# Patient Record
Sex: FEMALE | Race: WHITE | Employment: OTHER | URBAN - METROPOLITAN AREA
[De-identification: names, ages, dates, MRNs, and addresses within clinical notes are randomized per-mention and may not be internally consistent; named-entity substitution may affect disease eponyms.]

---

## 2017-01-17 ENCOUNTER — OFFICE VISIT (OUTPATIENT)
Dept: FAMILY MEDICINE CLINIC | Age: 77
End: 2017-01-17

## 2017-01-17 VITALS
TEMPERATURE: 97.8 F | DIASTOLIC BLOOD PRESSURE: 78 MMHG | HEART RATE: 78 BPM | HEIGHT: 61 IN | BODY MASS INDEX: 46.07 KG/M2 | SYSTOLIC BLOOD PRESSURE: 138 MMHG | WEIGHT: 244 LBS

## 2017-01-17 DIAGNOSIS — R10.11 RUQ ABDOMINAL PAIN: Primary | ICD-10-CM

## 2017-01-17 DIAGNOSIS — N18.30 CHRONIC KIDNEY DISEASE (CKD), STAGE III (MODERATE) (HCC): ICD-10-CM

## 2017-01-17 DIAGNOSIS — E72.11 HYPERHOMOCYSTEINEMIA (HCC): ICD-10-CM

## 2017-01-17 DIAGNOSIS — I10 ESSENTIAL HYPERTENSION, BENIGN: ICD-10-CM

## 2017-01-17 LAB
A/G RATIO: 1.4 (ref 1.1–2.2)
ALBUMIN SERPL-MCNC: 4 G/DL (ref 3.4–5)
ALP BLD-CCNC: 62 U/L (ref 40–129)
ALT SERPL-CCNC: 17 U/L (ref 10–40)
AMYLASE: 37 U/L (ref 25–115)
ANION GAP SERPL CALCULATED.3IONS-SCNC: 19 MMOL/L (ref 3–16)
AST SERPL-CCNC: 18 U/L (ref 15–37)
BASOPHILS ABSOLUTE: 0 K/UL (ref 0–0.2)
BASOPHILS RELATIVE PERCENT: 0.9 %
BILIRUB SERPL-MCNC: 0.4 MG/DL (ref 0–1)
BUN BLDV-MCNC: 51 MG/DL (ref 7–20)
CALCIUM SERPL-MCNC: 9.2 MG/DL (ref 8.3–10.6)
CHLORIDE BLD-SCNC: 95 MMOL/L (ref 99–110)
CO2: 26 MMOL/L (ref 21–32)
CREAT SERPL-MCNC: 1.8 MG/DL (ref 0.6–1.2)
EOSINOPHILS ABSOLUTE: 0.2 K/UL (ref 0–0.6)
EOSINOPHILS RELATIVE PERCENT: 7.5 %
GFR AFRICAN AMERICAN: 33
GFR NON-AFRICAN AMERICAN: 27
GLOBULIN: 2.8 G/DL
GLUCOSE BLD-MCNC: 176 MG/DL (ref 70–99)
HCT VFR BLD CALC: 39.4 % (ref 36–48)
HEMOGLOBIN: 13.2 G/DL (ref 12–16)
LIPASE: 38 U/L (ref 13–60)
LYMPHOCYTES ABSOLUTE: 0.7 K/UL (ref 1–5.1)
LYMPHOCYTES RELATIVE PERCENT: 23.1 %
MCH RBC QN AUTO: 32.5 PG (ref 26–34)
MCHC RBC AUTO-ENTMCNC: 33.4 G/DL (ref 31–36)
MCV RBC AUTO: 97.3 FL (ref 80–100)
MONOCYTES ABSOLUTE: 0.6 K/UL (ref 0–1.3)
MONOCYTES RELATIVE PERCENT: 19.8 %
NEUTROPHILS ABSOLUTE: 1.5 K/UL (ref 1.7–7.7)
NEUTROPHILS RELATIVE PERCENT: 48.7 %
PDW BLD-RTO: 14.7 % (ref 12.4–15.4)
PLATELET # BLD: 192 K/UL (ref 135–450)
PMV BLD AUTO: 7.3 FL (ref 5–10.5)
POTASSIUM SERPL-SCNC: 4.1 MMOL/L (ref 3.5–5.1)
RBC # BLD: 4.05 M/UL (ref 4–5.2)
SODIUM BLD-SCNC: 140 MMOL/L (ref 136–145)
TOTAL PROTEIN: 6.8 G/DL (ref 6.4–8.2)
WBC # BLD: 3.1 K/UL (ref 4–11)

## 2017-01-17 PROCEDURE — G8400 PT W/DXA NO RESULTS DOC: HCPCS | Performed by: FAMILY MEDICINE

## 2017-01-17 PROCEDURE — 1036F TOBACCO NON-USER: CPT | Performed by: FAMILY MEDICINE

## 2017-01-17 PROCEDURE — 1123F ACP DISCUSS/DSCN MKR DOCD: CPT | Performed by: FAMILY MEDICINE

## 2017-01-17 PROCEDURE — G8484 FLU IMMUNIZE NO ADMIN: HCPCS | Performed by: FAMILY MEDICINE

## 2017-01-17 PROCEDURE — 36415 COLL VENOUS BLD VENIPUNCTURE: CPT | Performed by: FAMILY MEDICINE

## 2017-01-17 PROCEDURE — 99214 OFFICE O/P EST MOD 30 MIN: CPT | Performed by: FAMILY MEDICINE

## 2017-01-17 PROCEDURE — 1090F PRES/ABSN URINE INCON ASSESS: CPT | Performed by: FAMILY MEDICINE

## 2017-01-17 PROCEDURE — G8419 CALC BMI OUT NRM PARAM NOF/U: HCPCS | Performed by: FAMILY MEDICINE

## 2017-01-17 PROCEDURE — 4040F PNEUMOC VAC/ADMIN/RCVD: CPT | Performed by: FAMILY MEDICINE

## 2017-01-17 PROCEDURE — G8598 ASA/ANTIPLAT THER USED: HCPCS | Performed by: FAMILY MEDICINE

## 2017-01-17 PROCEDURE — G8427 DOCREV CUR MEDS BY ELIG CLIN: HCPCS | Performed by: FAMILY MEDICINE

## 2017-01-17 RX ORDER — ONDANSETRON 4 MG/1
4 TABLET, FILM COATED ORAL EVERY 8 HOURS PRN
Qty: 20 TABLET | Refills: 0 | Status: SHIPPED | OUTPATIENT
Start: 2017-01-17 | End: 2017-03-20 | Stop reason: SDUPTHER

## 2017-02-06 RX ORDER — GLIPIZIDE 5 MG/1
TABLET, FILM COATED, EXTENDED RELEASE ORAL
Qty: 90 TABLET | Refills: 3 | Status: SHIPPED | OUTPATIENT
Start: 2017-02-06 | End: 2018-02-26 | Stop reason: SDUPTHER

## 2017-02-13 ENCOUNTER — ANTI-COAG VISIT (OUTPATIENT)
Dept: FAMILY MEDICINE CLINIC | Age: 77
End: 2017-02-13

## 2017-02-13 ENCOUNTER — OFFICE VISIT (OUTPATIENT)
Dept: FAMILY MEDICINE CLINIC | Age: 77
End: 2017-02-13

## 2017-02-13 VITALS
SYSTOLIC BLOOD PRESSURE: 134 MMHG | HEIGHT: 61 IN | HEART RATE: 76 BPM | BODY MASS INDEX: 46.44 KG/M2 | WEIGHT: 246 LBS | DIASTOLIC BLOOD PRESSURE: 76 MMHG

## 2017-02-13 DIAGNOSIS — I10 ESSENTIAL HYPERTENSION, BENIGN: Primary | ICD-10-CM

## 2017-02-13 DIAGNOSIS — E11.21 DIABETIC NEPHROPATHY ASSOCIATED WITH TYPE 2 DIABETES MELLITUS (HCC): ICD-10-CM

## 2017-02-13 DIAGNOSIS — E66.01 OBESITY, CLASS III, BMI 40-49.9 (MORBID OBESITY) (HCC): ICD-10-CM

## 2017-02-13 DIAGNOSIS — R60.0 LOCALIZED EDEMA: ICD-10-CM

## 2017-02-13 DIAGNOSIS — I82.409 ACUTE DEEP VEIN THROMBOSIS (DVT) OF LOWER EXTREMITY, UNSPECIFIED LATERALITY, UNSPECIFIED VEIN (HCC): ICD-10-CM

## 2017-02-13 DIAGNOSIS — E78.00 HYPERCHOLESTEROLEMIA: ICD-10-CM

## 2017-02-13 DIAGNOSIS — M1A.0490 IDIOPATHIC CHRONIC GOUT OF HAND WITHOUT TOPHUS, UNSPECIFIED LATERALITY: ICD-10-CM

## 2017-02-13 DIAGNOSIS — N18.30 CHRONIC KIDNEY DISEASE (CKD), STAGE III (MODERATE) (HCC): ICD-10-CM

## 2017-02-13 LAB
A/G RATIO: 1.6 (ref 1.1–2.2)
ALBUMIN SERPL-MCNC: 4.3 G/DL (ref 3.4–5)
ALP BLD-CCNC: 67 U/L (ref 40–129)
ALT SERPL-CCNC: 17 U/L (ref 10–40)
ANION GAP SERPL CALCULATED.3IONS-SCNC: 15 MMOL/L (ref 3–16)
AST SERPL-CCNC: 17 U/L (ref 15–37)
BILIRUB SERPL-MCNC: 0.4 MG/DL (ref 0–1)
BUN BLDV-MCNC: 29 MG/DL (ref 7–20)
CALCIUM SERPL-MCNC: 10.2 MG/DL (ref 8.3–10.6)
CHLORIDE BLD-SCNC: 99 MMOL/L (ref 99–110)
CHOLESTEROL, TOTAL: 222 MG/DL (ref 0–199)
CO2: 30 MMOL/L (ref 21–32)
CREAT SERPL-MCNC: 1.5 MG/DL (ref 0.6–1.2)
CREATININE URINE: 37.2 MG/DL (ref 28–259)
GFR AFRICAN AMERICAN: 41
GFR NON-AFRICAN AMERICAN: 34
GLOBULIN: 2.7 G/DL
GLUCOSE BLD-MCNC: 140 MG/DL (ref 70–99)
HDLC SERPL-MCNC: 51 MG/DL (ref 40–60)
INR BLD: 2.21 (ref 0.85–1.16)
LDL CHOLESTEROL CALCULATED: 112 MG/DL
MICROALBUMIN UR-MCNC: 1.3 MG/DL
MICROALBUMIN/CREAT UR-RTO: 34.9 MG/G (ref 0–30)
POTASSIUM SERPL-SCNC: 4.2 MMOL/L (ref 3.5–5.1)
PROTHROMBIN TIME: 25.6 SEC (ref 9.8–13)
SODIUM BLD-SCNC: 144 MMOL/L (ref 136–145)
TOTAL PROTEIN: 7 G/DL (ref 6.4–8.2)
TRIGL SERPL-MCNC: 296 MG/DL (ref 0–150)
VLDLC SERPL CALC-MCNC: 59 MG/DL

## 2017-02-13 PROCEDURE — G8598 ASA/ANTIPLAT THER USED: HCPCS | Performed by: FAMILY MEDICINE

## 2017-02-13 PROCEDURE — 99215 OFFICE O/P EST HI 40 MIN: CPT | Performed by: FAMILY MEDICINE

## 2017-02-13 PROCEDURE — 4040F PNEUMOC VAC/ADMIN/RCVD: CPT | Performed by: FAMILY MEDICINE

## 2017-02-13 PROCEDURE — 36415 COLL VENOUS BLD VENIPUNCTURE: CPT | Performed by: FAMILY MEDICINE

## 2017-02-13 PROCEDURE — 1090F PRES/ABSN URINE INCON ASSESS: CPT | Performed by: FAMILY MEDICINE

## 2017-02-13 PROCEDURE — G8400 PT W/DXA NO RESULTS DOC: HCPCS | Performed by: FAMILY MEDICINE

## 2017-02-13 PROCEDURE — G8427 DOCREV CUR MEDS BY ELIG CLIN: HCPCS | Performed by: FAMILY MEDICINE

## 2017-02-13 PROCEDURE — G8484 FLU IMMUNIZE NO ADMIN: HCPCS | Performed by: FAMILY MEDICINE

## 2017-02-13 PROCEDURE — 1123F ACP DISCUSS/DSCN MKR DOCD: CPT | Performed by: FAMILY MEDICINE

## 2017-02-13 PROCEDURE — G8417 CALC BMI ABV UP PARAM F/U: HCPCS | Performed by: FAMILY MEDICINE

## 2017-02-13 PROCEDURE — 1036F TOBACCO NON-USER: CPT | Performed by: FAMILY MEDICINE

## 2017-02-13 RX ORDER — ALLOPURINOL 300 MG/1
TABLET ORAL
Qty: 45 TABLET | Refills: 3 | Status: SHIPPED | OUTPATIENT
Start: 2017-02-13 | End: 2018-04-19 | Stop reason: SDUPTHER

## 2017-02-13 RX ORDER — FUROSEMIDE 80 MG
TABLET ORAL
Qty: 90 TABLET | Refills: 0 | Status: SHIPPED | OUTPATIENT
Start: 2017-02-13 | End: 2017-08-14 | Stop reason: SDUPTHER

## 2017-02-13 RX ORDER — METOPROLOL TARTRATE 100 MG/1
TABLET ORAL
Qty: 180 TABLET | Refills: 3 | Status: SHIPPED | OUTPATIENT
Start: 2017-02-13 | End: 2018-04-14 | Stop reason: SDUPTHER

## 2017-02-14 LAB
ESTIMATED AVERAGE GLUCOSE: 131.2 MG/DL
HBA1C MFR BLD: 6.2 %

## 2017-03-20 ENCOUNTER — CARE COORDINATION (OUTPATIENT)
Dept: CARE COORDINATION | Age: 77
End: 2017-03-20

## 2017-03-21 ENCOUNTER — OFFICE VISIT (OUTPATIENT)
Dept: FAMILY MEDICINE CLINIC | Age: 77
End: 2017-03-21

## 2017-03-21 ENCOUNTER — ANTI-COAG VISIT (OUTPATIENT)
Dept: FAMILY MEDICINE CLINIC | Age: 77
End: 2017-03-21

## 2017-03-21 VITALS — RESPIRATION RATE: 18 BRPM | DIASTOLIC BLOOD PRESSURE: 78 MMHG | HEART RATE: 76 BPM | SYSTOLIC BLOOD PRESSURE: 136 MMHG

## 2017-03-21 DIAGNOSIS — S80.12XA TRAUMATIC HEMATOMA OF LEFT LOWER LEG, INITIAL ENCOUNTER: ICD-10-CM

## 2017-03-21 DIAGNOSIS — I82.409 ACUTE DEEP VEIN THROMBOSIS (DVT) OF LOWER EXTREMITY, UNSPECIFIED LATERALITY, UNSPECIFIED VEIN (HCC): Primary | ICD-10-CM

## 2017-03-21 LAB
INTERNATIONAL NORMALIZATION RATIO, POC: 3.3
PROTHROMBIN TIME, POC: ABNORMAL

## 2017-03-21 PROCEDURE — G8417 CALC BMI ABV UP PARAM F/U: HCPCS | Performed by: FAMILY MEDICINE

## 2017-03-21 PROCEDURE — 99214 OFFICE O/P EST MOD 30 MIN: CPT | Performed by: FAMILY MEDICINE

## 2017-03-21 PROCEDURE — 1036F TOBACCO NON-USER: CPT | Performed by: FAMILY MEDICINE

## 2017-03-21 PROCEDURE — G8484 FLU IMMUNIZE NO ADMIN: HCPCS | Performed by: FAMILY MEDICINE

## 2017-03-21 PROCEDURE — G8598 ASA/ANTIPLAT THER USED: HCPCS | Performed by: FAMILY MEDICINE

## 2017-03-21 PROCEDURE — G8400 PT W/DXA NO RESULTS DOC: HCPCS | Performed by: FAMILY MEDICINE

## 2017-03-21 PROCEDURE — 1123F ACP DISCUSS/DSCN MKR DOCD: CPT | Performed by: FAMILY MEDICINE

## 2017-03-21 PROCEDURE — 1090F PRES/ABSN URINE INCON ASSESS: CPT | Performed by: FAMILY MEDICINE

## 2017-03-21 PROCEDURE — 4040F PNEUMOC VAC/ADMIN/RCVD: CPT | Performed by: FAMILY MEDICINE

## 2017-03-21 PROCEDURE — 85610 PROTHROMBIN TIME: CPT | Performed by: FAMILY MEDICINE

## 2017-03-21 PROCEDURE — G8428 CUR MEDS NOT DOCUMENT: HCPCS | Performed by: FAMILY MEDICINE

## 2017-03-21 RX ORDER — HYDROCODONE BITARTRATE AND ACETAMINOPHEN 5; 325 MG/1; MG/1
1-2 TABLET ORAL EVERY 6 HOURS PRN
Qty: 20 TABLET | Refills: 0 | Status: SHIPPED | OUTPATIENT
Start: 2017-03-21 | End: 2017-03-28

## 2017-03-23 ENCOUNTER — TELEPHONE (OUTPATIENT)
Dept: FAMILY MEDICINE CLINIC | Age: 77
End: 2017-03-23

## 2017-03-23 DIAGNOSIS — T14.8XXA HEMATOMA: Primary | ICD-10-CM

## 2017-03-24 ENCOUNTER — TELEPHONE (OUTPATIENT)
Dept: SURGERY | Age: 77
End: 2017-03-24

## 2017-03-24 ENCOUNTER — INITIAL CONSULT (OUTPATIENT)
Dept: SURGERY | Age: 77
End: 2017-03-24

## 2017-03-24 VITALS — TEMPERATURE: 98 F | DIASTOLIC BLOOD PRESSURE: 50 MMHG | SYSTOLIC BLOOD PRESSURE: 110 MMHG

## 2017-03-24 DIAGNOSIS — S80.12XA TRAUMATIC HEMATOMA OF LEFT LOWER LEG, INITIAL ENCOUNTER: Primary | ICD-10-CM

## 2017-03-24 DIAGNOSIS — M79.605 LEG PAIN, LEFT: ICD-10-CM

## 2017-03-24 PROCEDURE — 1123F ACP DISCUSS/DSCN MKR DOCD: CPT | Performed by: SURGERY

## 2017-03-24 PROCEDURE — G8417 CALC BMI ABV UP PARAM F/U: HCPCS | Performed by: SURGERY

## 2017-03-24 PROCEDURE — G8427 DOCREV CUR MEDS BY ELIG CLIN: HCPCS | Performed by: SURGERY

## 2017-03-24 PROCEDURE — 10140 I&D HMTMA SEROMA/FLUID COLLJ: CPT | Performed by: SURGERY

## 2017-03-24 PROCEDURE — G8598 ASA/ANTIPLAT THER USED: HCPCS | Performed by: SURGERY

## 2017-03-24 PROCEDURE — 1090F PRES/ABSN URINE INCON ASSESS: CPT | Performed by: SURGERY

## 2017-03-24 PROCEDURE — G8484 FLU IMMUNIZE NO ADMIN: HCPCS | Performed by: SURGERY

## 2017-03-24 PROCEDURE — 4040F PNEUMOC VAC/ADMIN/RCVD: CPT | Performed by: SURGERY

## 2017-03-24 PROCEDURE — 1036F TOBACCO NON-USER: CPT | Performed by: SURGERY

## 2017-03-24 PROCEDURE — G8400 PT W/DXA NO RESULTS DOC: HCPCS | Performed by: SURGERY

## 2017-03-27 ENCOUNTER — TELEPHONE (OUTPATIENT)
Dept: SURGERY | Age: 77
End: 2017-03-27

## 2017-03-28 ENCOUNTER — HOSPITAL ENCOUNTER (OUTPATIENT)
Dept: WOUND CARE | Age: 77
Discharge: HOME | End: 2017-03-29
Admitting: SURGERY

## 2017-03-28 ENCOUNTER — OFFICE VISIT (OUTPATIENT)
Dept: FAMILY MEDICINE CLINIC | Age: 77
End: 2017-03-28

## 2017-03-28 VITALS
BODY MASS INDEX: 45.54 KG/M2 | SYSTOLIC BLOOD PRESSURE: 127 MMHG | DIASTOLIC BLOOD PRESSURE: 64 MMHG | WEIGHT: 241 LBS | HEART RATE: 82 BPM

## 2017-03-28 VITALS
SYSTOLIC BLOOD PRESSURE: 129 MMHG | DIASTOLIC BLOOD PRESSURE: 75 MMHG | WEIGHT: 241.4 LBS | BODY MASS INDEX: 45.61 KG/M2 | TEMPERATURE: 98 F | RESPIRATION RATE: 18 BRPM | HEART RATE: 96 BPM

## 2017-03-28 DIAGNOSIS — L03.116 CELLULITIS OF LEFT LOWER EXTREMITY: ICD-10-CM

## 2017-03-28 DIAGNOSIS — S80.12XA TRAUMATIC HEMATOMA OF LEFT LOWER LEG, INITIAL ENCOUNTER: Primary | ICD-10-CM

## 2017-03-28 DIAGNOSIS — N18.4 TYPE 2 DIABETES MELLITUS WITH STAGE 4 CHRONIC KIDNEY DISEASE, WITHOUT LONG-TERM CURRENT USE OF INSULIN (HCC): ICD-10-CM

## 2017-03-28 DIAGNOSIS — E11.22 TYPE 2 DIABETES MELLITUS WITH STAGE 4 CHRONIC KIDNEY DISEASE, WITHOUT LONG-TERM CURRENT USE OF INSULIN (HCC): ICD-10-CM

## 2017-03-28 DIAGNOSIS — I10 ESSENTIAL HYPERTENSION, BENIGN: ICD-10-CM

## 2017-03-28 DIAGNOSIS — I82.409 RECURRENT DEEP VEIN THROMBOSIS (DVT) (HCC): ICD-10-CM

## 2017-03-28 LAB
A/G RATIO: 1.2 (ref 1.1–2.2)
ALBUMIN SERPL-MCNC: 3.6 G/DL (ref 3.4–5)
ALP BLD-CCNC: 99 U/L (ref 40–129)
ALT SERPL-CCNC: 69 U/L (ref 10–40)
ANION GAP SERPL CALCULATED.3IONS-SCNC: 17 MMOL/L (ref 3–16)
AST SERPL-CCNC: 51 U/L (ref 15–37)
BASOPHILS ABSOLUTE: 0.2 K/UL (ref 0–0.2)
BASOPHILS RELATIVE PERCENT: 2 %
BILIRUB SERPL-MCNC: 0.5 MG/DL (ref 0–1)
BUN BLDV-MCNC: 47 MG/DL (ref 7–20)
CALCIUM SERPL-MCNC: 9.2 MG/DL (ref 8.3–10.6)
CHLORIDE BLD-SCNC: 100 MMOL/L (ref 99–110)
CO2: 26 MMOL/L (ref 21–32)
CREAT SERPL-MCNC: 1.6 MG/DL (ref 0.6–1.2)
EOSINOPHILS ABSOLUTE: 0.6 K/UL (ref 0–0.6)
EOSINOPHILS RELATIVE PERCENT: 6 %
GFR AFRICAN AMERICAN: 38
GFR NON-AFRICAN AMERICAN: 31
GLOBULIN: 3 G/DL
GLUCOSE BLD-MCNC: 156 MG/DL (ref 70–99)
GLUCOSE BLD-MCNC: 158 MG/DL
HCT VFR BLD CALC: 25.2 % (ref 36–48)
HEMOGLOBIN: 8.3 G/DL (ref 12–16)
INTERNATIONAL NORMALIZATION RATIO, POC: 1.2
LYMPHOCYTES ABSOLUTE: 1.8 K/UL (ref 1–5.1)
LYMPHOCYTES RELATIVE PERCENT: 19 %
MCH RBC QN AUTO: 32.6 PG (ref 26–34)
MCHC RBC AUTO-ENTMCNC: 32.9 G/DL (ref 31–36)
MCV RBC AUTO: 99.1 FL (ref 80–100)
MONOCYTES ABSOLUTE: 1 K/UL (ref 0–1.3)
MONOCYTES RELATIVE PERCENT: 11 %
MYELOCYTE PERCENT: 2 %
NEUTROPHILS ABSOLUTE: 5.8 K/UL (ref 1.7–7.7)
NEUTROPHILS RELATIVE PERCENT: 60 %
NUCLEATED RED BLOOD CELLS: 1 /100 WBC
PDW BLD-RTO: 14.7 % (ref 12.4–15.4)
PLATELET # BLD: 470 K/UL (ref 135–450)
PMV BLD AUTO: 6.9 FL (ref 5–10.5)
POTASSIUM SERPL-SCNC: 4.6 MMOL/L (ref 3.5–5.1)
PROTHROMBIN TIME, POC: ABNORMAL
RBC # BLD: 2.54 M/UL (ref 4–5.2)
SLIDE REVIEW: ABNORMAL
SODIUM BLD-SCNC: 143 MMOL/L (ref 136–145)
TOTAL PROTEIN: 6.6 G/DL (ref 6.4–8.2)
WBC # BLD: 9.4 K/UL (ref 4–11)

## 2017-03-28 PROCEDURE — G8417 CALC BMI ABV UP PARAM F/U: HCPCS | Performed by: FAMILY MEDICINE

## 2017-03-28 PROCEDURE — 1036F TOBACCO NON-USER: CPT | Performed by: FAMILY MEDICINE

## 2017-03-28 PROCEDURE — 11045 DBRDMT SUBQ TISS EACH ADDL: CPT | Performed by: NURSE PRACTITIONER

## 2017-03-28 PROCEDURE — 1123F ACP DISCUSS/DSCN MKR DOCD: CPT | Performed by: FAMILY MEDICINE

## 2017-03-28 PROCEDURE — G8400 PT W/DXA NO RESULTS DOC: HCPCS | Performed by: FAMILY MEDICINE

## 2017-03-28 PROCEDURE — 99214 OFFICE O/P EST MOD 30 MIN: CPT | Performed by: FAMILY MEDICINE

## 2017-03-28 PROCEDURE — 36415 COLL VENOUS BLD VENIPUNCTURE: CPT | Performed by: FAMILY MEDICINE

## 2017-03-28 PROCEDURE — 82962 GLUCOSE BLOOD TEST: CPT | Performed by: FAMILY MEDICINE

## 2017-03-28 PROCEDURE — G8427 DOCREV CUR MEDS BY ELIG CLIN: HCPCS | Performed by: FAMILY MEDICINE

## 2017-03-28 PROCEDURE — 1090F PRES/ABSN URINE INCON ASSESS: CPT | Performed by: FAMILY MEDICINE

## 2017-03-28 PROCEDURE — G8484 FLU IMMUNIZE NO ADMIN: HCPCS | Performed by: FAMILY MEDICINE

## 2017-03-28 PROCEDURE — G8598 ASA/ANTIPLAT THER USED: HCPCS | Performed by: FAMILY MEDICINE

## 2017-03-28 PROCEDURE — 4040F PNEUMOC VAC/ADMIN/RCVD: CPT | Performed by: FAMILY MEDICINE

## 2017-03-28 PROCEDURE — 11042 DBRDMT SUBQ TIS 1ST 20SQCM/<: CPT | Performed by: NURSE PRACTITIONER

## 2017-03-28 PROCEDURE — 85610 PROTHROMBIN TIME: CPT | Performed by: FAMILY MEDICINE

## 2017-03-28 RX ORDER — CHLORAL HYDRATE 500 MG
1000 CAPSULE ORAL DAILY
Status: ON HOLD | COMMUNITY
End: 2020-06-12 | Stop reason: HOSPADM

## 2017-03-28 RX ORDER — LIDOCAINE 50 MG/G
OINTMENT TOPICAL ONCE
Status: DISCONTINUED | OUTPATIENT
Start: 2017-03-28 | End: 2017-03-30 | Stop reason: ALTCHOICE

## 2017-03-28 RX ORDER — MULTIVITAMIN/IRON/FOLIC ACID 18MG-0.4MG
250 TABLET ORAL DAILY
Status: ON HOLD | COMMUNITY
End: 2020-06-12 | Stop reason: HOSPADM

## 2017-03-28 RX ORDER — CEPHALEXIN 500 MG/1
500 CAPSULE ORAL 4 TIMES DAILY
Qty: 40 CAPSULE | Refills: 0 | Status: SHIPPED | OUTPATIENT
Start: 2017-03-28 | End: 2017-04-07

## 2017-03-28 RX ORDER — HYDROCODONE BITARTRATE AND ACETAMINOPHEN 5; 325 MG/1; MG/1
1-2 TABLET ORAL EVERY 4 HOURS PRN
Qty: 30 TABLET | Refills: 0 | Status: SHIPPED | OUTPATIENT
Start: 2017-03-28 | End: 2017-04-04

## 2017-03-28 ASSESSMENT — PAIN SCALES - GENERAL: PAINLEVEL_OUTOF10: 0

## 2017-03-29 DIAGNOSIS — D62 ANEMIA DUE TO BLOOD LOSS, ACUTE: Primary | ICD-10-CM

## 2017-03-29 RX ORDER — FERROUS SULFATE 325(65) MG
325 TABLET ORAL
Qty: 30 TABLET | Refills: 11 | Status: SHIPPED | OUTPATIENT
Start: 2017-03-29 | End: 2017-07-27

## 2017-03-30 ENCOUNTER — HOSPITAL ENCOUNTER (OUTPATIENT)
Dept: WOUND CARE | Age: 77
Discharge: HOME | End: 2017-03-28
Attending: SURGERY | Admitting: SURGERY

## 2017-03-30 ENCOUNTER — HOSPITAL ENCOUNTER (OUTPATIENT)
Dept: WOUND CARE | Age: 77
Discharge: HOME | End: 2017-03-30
Attending: SURGERY | Admitting: SURGERY

## 2017-03-30 VITALS
HEART RATE: 75 BPM | TEMPERATURE: 99 F | SYSTOLIC BLOOD PRESSURE: 109 MMHG | RESPIRATION RATE: 20 BRPM | DIASTOLIC BLOOD PRESSURE: 68 MMHG

## 2017-03-30 DIAGNOSIS — L97.922 ULCER OF LEFT LOWER LEG, WITH FAT LAYER EXPOSED (HCC): ICD-10-CM

## 2017-03-30 DIAGNOSIS — S80.12XD TRAUMATIC HEMATOMA OF LEFT LOWER LEG, SUBSEQUENT ENCOUNTER: Primary | ICD-10-CM

## 2017-03-30 PROBLEM — L97.929 ULCER OF LEFT LOWER LEG (HCC): Status: ACTIVE | Noted: 2017-03-30

## 2017-03-30 LAB
GRAM STAIN RESULT: ABNORMAL
WOUND/ABSCESS: ABNORMAL

## 2017-03-30 PROCEDURE — 99024 POSTOP FOLLOW-UP VISIT: CPT | Performed by: SURGERY

## 2017-03-30 RX ORDER — LIDOCAINE 50 MG/G
OINTMENT TOPICAL ONCE
Status: DISCONTINUED | OUTPATIENT
Start: 2017-03-30 | End: 2017-03-31 | Stop reason: HOSPADM

## 2017-03-30 RX ORDER — CLONIDINE HYDROCHLORIDE 0.1 MG/1
TABLET ORAL
Qty: 60 TABLET | Refills: 3 | Status: SHIPPED | OUTPATIENT
Start: 2017-03-30 | End: 2017-06-20 | Stop reason: SDUPTHER

## 2017-04-06 ENCOUNTER — HOSPITAL ENCOUNTER (OUTPATIENT)
Dept: WOUND CARE | Age: 77
Discharge: HOME | End: 2017-04-06
Attending: SURGERY | Admitting: SURGERY

## 2017-04-06 VITALS
DIASTOLIC BLOOD PRESSURE: 70 MMHG | RESPIRATION RATE: 20 BRPM | BODY MASS INDEX: 45.99 KG/M2 | TEMPERATURE: 97.8 F | WEIGHT: 243.39 LBS | SYSTOLIC BLOOD PRESSURE: 142 MMHG | HEART RATE: 80 BPM

## 2017-04-06 DIAGNOSIS — S80.12XD TRAUMATIC HEMATOMA OF LEFT LOWER LEG, SUBSEQUENT ENCOUNTER: Primary | ICD-10-CM

## 2017-04-06 DIAGNOSIS — L97.923 ULCER OF LEFT LOWER LEG, WITH NECROSIS OF MUSCLE (HCC): ICD-10-CM

## 2017-04-06 PROCEDURE — 11046 DBRDMT MUSC&/FSCA EA ADDL: CPT | Performed by: SURGERY

## 2017-04-06 PROCEDURE — 11043 DBRDMT MUSC&/FSCA 1ST 20/<: CPT | Performed by: SURGERY

## 2017-04-06 RX ORDER — LIDOCAINE 50 MG/G
OINTMENT TOPICAL ONCE
Status: DISCONTINUED | OUTPATIENT
Start: 2017-04-06 | End: 2017-04-07 | Stop reason: HOSPADM

## 2017-04-06 RX ORDER — HYDROCODONE BITARTRATE AND ACETAMINOPHEN 5; 325 MG/1; MG/1
1 TABLET ORAL EVERY 6 HOURS PRN
Qty: 30 TABLET | Refills: 0 | Status: SHIPPED | OUTPATIENT
Start: 2017-04-06 | End: 2017-04-13

## 2017-04-06 ASSESSMENT — PAIN SCALES - GENERAL: PAINLEVEL_OUTOF10: 3

## 2017-04-06 ASSESSMENT — PAIN DESCRIPTION - PAIN TYPE: TYPE: ACUTE PAIN

## 2017-04-06 ASSESSMENT — PAIN DESCRIPTION - LOCATION: LOCATION: LEG

## 2017-04-06 ASSESSMENT — PAIN DESCRIPTION - DESCRIPTORS: DESCRIPTORS: SORE

## 2017-04-06 ASSESSMENT — PAIN DESCRIPTION - ORIENTATION: ORIENTATION: LEFT

## 2017-04-07 ENCOUNTER — TELEPHONE (OUTPATIENT)
Dept: SURGERY | Age: 77
End: 2017-04-07

## 2017-04-10 ENCOUNTER — TELEPHONE (OUTPATIENT)
Dept: SURGERY | Age: 77
End: 2017-04-10

## 2017-04-10 ENCOUNTER — TELEPHONE (OUTPATIENT)
Dept: FAMILY MEDICINE CLINIC | Age: 77
End: 2017-04-10

## 2017-04-13 ENCOUNTER — HOSPITAL ENCOUNTER (OUTPATIENT)
Dept: WOUND CARE | Age: 77
Discharge: HOME | End: 2017-04-13
Attending: SURGERY | Admitting: SURGERY

## 2017-04-13 VITALS
DIASTOLIC BLOOD PRESSURE: 71 MMHG | SYSTOLIC BLOOD PRESSURE: 127 MMHG | TEMPERATURE: 98.7 F | HEART RATE: 88 BPM | RESPIRATION RATE: 18 BRPM

## 2017-04-13 DIAGNOSIS — S80.12XD TRAUMATIC HEMATOMA OF LEFT LOWER LEG, SUBSEQUENT ENCOUNTER: Primary | ICD-10-CM

## 2017-04-13 PROCEDURE — 11043 DBRDMT MUSC&/FSCA 1ST 20/<: CPT | Performed by: SURGERY

## 2017-04-13 PROCEDURE — 11046 DBRDMT MUSC&/FSCA EA ADDL: CPT | Performed by: SURGERY

## 2017-04-13 RX ORDER — LIDOCAINE 50 MG/G
OINTMENT TOPICAL PRN
Status: DISCONTINUED | OUTPATIENT
Start: 2017-04-13 | End: 2017-04-14 | Stop reason: HOSPADM

## 2017-04-13 ASSESSMENT — PAIN SCALES - GENERAL: PAINLEVEL_OUTOF10: 5

## 2017-04-13 ASSESSMENT — PAIN DESCRIPTION - LOCATION: LOCATION: LEG

## 2017-04-13 ASSESSMENT — PAIN DESCRIPTION - ORIENTATION: ORIENTATION: LEFT;LOWER

## 2017-04-13 ASSESSMENT — PAIN DESCRIPTION - DESCRIPTORS: DESCRIPTORS: THROBBING

## 2017-04-13 ASSESSMENT — PAIN DESCRIPTION - PAIN TYPE: TYPE: ACUTE PAIN

## 2017-04-13 ASSESSMENT — PAIN DESCRIPTION - FREQUENCY: FREQUENCY: CONTINUOUS

## 2017-04-20 ENCOUNTER — HOSPITAL ENCOUNTER (OUTPATIENT)
Dept: WOUND CARE | Age: 77
Discharge: HOME | End: 2017-04-20
Attending: SURGERY | Admitting: SURGERY

## 2017-04-20 VITALS
DIASTOLIC BLOOD PRESSURE: 76 MMHG | SYSTOLIC BLOOD PRESSURE: 122 MMHG | HEART RATE: 92 BPM | RESPIRATION RATE: 18 BRPM | TEMPERATURE: 98.4 F

## 2017-04-20 DIAGNOSIS — S80.12XD TRAUMATIC HEMATOMA OF LEFT LOWER LEG, SUBSEQUENT ENCOUNTER: Primary | ICD-10-CM

## 2017-04-20 DIAGNOSIS — L97.923 ULCER OF LEFT LOWER LEG, WITH NECROSIS OF MUSCLE (HCC): ICD-10-CM

## 2017-04-20 PROCEDURE — 11046 DBRDMT MUSC&/FSCA EA ADDL: CPT | Performed by: SURGERY

## 2017-04-20 PROCEDURE — 11043 DBRDMT MUSC&/FSCA 1ST 20/<: CPT | Performed by: SURGERY

## 2017-04-20 RX ORDER — LIDOCAINE 50 MG/G
OINTMENT TOPICAL ONCE
Status: DISCONTINUED | OUTPATIENT
Start: 2017-04-20 | End: 2017-04-21 | Stop reason: HOSPADM

## 2017-04-20 ASSESSMENT — PAIN SCALES - GENERAL: PAINLEVEL_OUTOF10: 0

## 2017-04-27 ENCOUNTER — HOSPITAL ENCOUNTER (OUTPATIENT)
Dept: WOUND CARE | Age: 77
Discharge: HOME | End: 2017-04-27
Attending: SURGERY | Admitting: SURGERY

## 2017-04-27 VITALS
SYSTOLIC BLOOD PRESSURE: 127 MMHG | DIASTOLIC BLOOD PRESSURE: 66 MMHG | RESPIRATION RATE: 18 BRPM | HEART RATE: 74 BPM | TEMPERATURE: 97.5 F

## 2017-04-27 DIAGNOSIS — L97.922 ULCER OF LEFT LOWER LEG, WITH FAT LAYER EXPOSED (HCC): Primary | ICD-10-CM

## 2017-04-27 PROCEDURE — 11042 DBRDMT SUBQ TIS 1ST 20SQCM/<: CPT | Performed by: NURSE PRACTITIONER

## 2017-04-27 PROCEDURE — 11045 DBRDMT SUBQ TISS EACH ADDL: CPT | Performed by: NURSE PRACTITIONER

## 2017-04-27 RX ORDER — LIDOCAINE 50 MG/G
OINTMENT TOPICAL PRN
Status: DISCONTINUED | OUTPATIENT
Start: 2017-04-27 | End: 2017-04-28 | Stop reason: HOSPADM

## 2017-04-27 ASSESSMENT — PAIN SCALES - GENERAL: PAINLEVEL_OUTOF10: 0

## 2017-05-04 ENCOUNTER — HOSPITAL ENCOUNTER (OUTPATIENT)
Dept: WOUND CARE | Age: 77
Discharge: HOME | End: 2017-05-04
Attending: SURGERY | Admitting: SURGERY

## 2017-05-04 VITALS
TEMPERATURE: 97.7 F | RESPIRATION RATE: 18 BRPM | SYSTOLIC BLOOD PRESSURE: 133 MMHG | BODY MASS INDEX: 47.36 KG/M2 | HEART RATE: 79 BPM | WEIGHT: 250.66 LBS | DIASTOLIC BLOOD PRESSURE: 80 MMHG

## 2017-05-04 DIAGNOSIS — L97.923 ULCER OF LEFT LOWER LEG, WITH NECROSIS OF MUSCLE (HCC): ICD-10-CM

## 2017-05-04 DIAGNOSIS — S80.12XD TRAUMATIC HEMATOMA OF LEFT LOWER LEG, SUBSEQUENT ENCOUNTER: Primary | ICD-10-CM

## 2017-05-04 PROCEDURE — 11045 DBRDMT SUBQ TISS EACH ADDL: CPT | Performed by: SURGERY

## 2017-05-04 PROCEDURE — 11042 DBRDMT SUBQ TIS 1ST 20SQCM/<: CPT | Performed by: SURGERY

## 2017-05-04 RX ORDER — HYDROCODONE BITARTRATE AND ACETAMINOPHEN 5; 325 MG/1; MG/1
2 TABLET ORAL EVERY 6 HOURS PRN
Qty: 25 TABLET | Refills: 0 | Status: SHIPPED | OUTPATIENT
Start: 2017-05-04 | End: 2017-05-11

## 2017-05-04 RX ORDER — LIDOCAINE 50 MG/G
OINTMENT TOPICAL ONCE
Status: DISCONTINUED | OUTPATIENT
Start: 2017-05-04 | End: 2017-05-05 | Stop reason: HOSPADM

## 2017-05-05 ENCOUNTER — OFFICE VISIT (OUTPATIENT)
Dept: FAMILY MEDICINE CLINIC | Age: 77
End: 2017-05-05

## 2017-05-05 VITALS
TEMPERATURE: 97.9 F | SYSTOLIC BLOOD PRESSURE: 136 MMHG | DIASTOLIC BLOOD PRESSURE: 72 MMHG | BODY MASS INDEX: 46.63 KG/M2 | HEART RATE: 76 BPM | HEIGHT: 61 IN | WEIGHT: 247 LBS

## 2017-05-05 DIAGNOSIS — N18.4 TYPE 2 DIABETES MELLITUS WITH STAGE 4 CHRONIC KIDNEY DISEASE, WITHOUT LONG-TERM CURRENT USE OF INSULIN (HCC): Primary | ICD-10-CM

## 2017-05-05 DIAGNOSIS — I82.409 RECURRENT DEEP VEIN THROMBOSIS (DVT) (HCC): ICD-10-CM

## 2017-05-05 DIAGNOSIS — D50.9 IRON DEFICIENCY ANEMIA, UNSPECIFIED IRON DEFICIENCY ANEMIA TYPE: ICD-10-CM

## 2017-05-05 DIAGNOSIS — I10 ESSENTIAL HYPERTENSION, BENIGN: ICD-10-CM

## 2017-05-05 DIAGNOSIS — R51.9 HEADACHE, UNSPECIFIED HEADACHE TYPE: ICD-10-CM

## 2017-05-05 DIAGNOSIS — E11.22 TYPE 2 DIABETES MELLITUS WITH STAGE 4 CHRONIC KIDNEY DISEASE, WITHOUT LONG-TERM CURRENT USE OF INSULIN (HCC): Primary | ICD-10-CM

## 2017-05-05 LAB
A/G RATIO: 1.3 (ref 1.1–2.2)
ALBUMIN SERPL-MCNC: 3.9 G/DL (ref 3.4–5)
ALP BLD-CCNC: 65 U/L (ref 40–129)
ALT SERPL-CCNC: 11 U/L (ref 10–40)
ANION GAP SERPL CALCULATED.3IONS-SCNC: 16 MMOL/L (ref 3–16)
AST SERPL-CCNC: 14 U/L (ref 15–37)
BASOPHILS ABSOLUTE: 0 K/UL (ref 0–0.2)
BASOPHILS RELATIVE PERCENT: 0.7 %
BILIRUB SERPL-MCNC: <0.2 MG/DL (ref 0–1)
BUN BLDV-MCNC: 30 MG/DL (ref 7–20)
CALCIUM SERPL-MCNC: 9.6 MG/DL (ref 8.3–10.6)
CHLORIDE BLD-SCNC: 101 MMOL/L (ref 99–110)
CO2: 27 MMOL/L (ref 21–32)
CREAT SERPL-MCNC: 1.5 MG/DL (ref 0.6–1.2)
EOSINOPHILS ABSOLUTE: 0.3 K/UL (ref 0–0.6)
EOSINOPHILS RELATIVE PERCENT: 5.2 %
GFR AFRICAN AMERICAN: 41
GFR NON-AFRICAN AMERICAN: 34
GLOBULIN: 3 G/DL
GLUCOSE BLD-MCNC: 156 MG/DL (ref 70–99)
GLUCOSE BLD-MCNC: 177 MG/DL
HCT VFR BLD CALC: 32.7 % (ref 36–48)
HEMOGLOBIN: 10.4 G/DL (ref 12–16)
IRON SATURATION: 24 % (ref 15–50)
IRON: 71 UG/DL (ref 37–145)
LYMPHOCYTES ABSOLUTE: 1.3 K/UL (ref 1–5.1)
LYMPHOCYTES RELATIVE PERCENT: 21.5 %
MCH RBC QN AUTO: 31.2 PG (ref 26–34)
MCHC RBC AUTO-ENTMCNC: 31.9 G/DL (ref 31–36)
MCV RBC AUTO: 97.9 FL (ref 80–100)
MONOCYTES ABSOLUTE: 0.6 K/UL (ref 0–1.3)
MONOCYTES RELATIVE PERCENT: 9.6 %
NEUTROPHILS ABSOLUTE: 3.8 K/UL (ref 1.7–7.7)
NEUTROPHILS RELATIVE PERCENT: 63 %
PDW BLD-RTO: 16.1 % (ref 12.4–15.4)
PLATELET # BLD: 285 K/UL (ref 135–450)
PMV BLD AUTO: 7.2 FL (ref 5–10.5)
POTASSIUM SERPL-SCNC: 4.5 MMOL/L (ref 3.5–5.1)
RBC # BLD: 3.34 M/UL (ref 4–5.2)
SEDIMENTATION RATE, ERYTHROCYTE: 84 MM/HR (ref 0–30)
SODIUM BLD-SCNC: 144 MMOL/L (ref 136–145)
TOTAL IRON BINDING CAPACITY: 291 UG/DL (ref 260–445)
TOTAL PROTEIN: 6.9 G/DL (ref 6.4–8.2)
WBC # BLD: 6 K/UL (ref 4–11)

## 2017-05-05 PROCEDURE — 1090F PRES/ABSN URINE INCON ASSESS: CPT | Performed by: FAMILY MEDICINE

## 2017-05-05 PROCEDURE — 82962 GLUCOSE BLOOD TEST: CPT | Performed by: FAMILY MEDICINE

## 2017-05-05 PROCEDURE — G8427 DOCREV CUR MEDS BY ELIG CLIN: HCPCS | Performed by: FAMILY MEDICINE

## 2017-05-05 PROCEDURE — G8400 PT W/DXA NO RESULTS DOC: HCPCS | Performed by: FAMILY MEDICINE

## 2017-05-05 PROCEDURE — 99214 OFFICE O/P EST MOD 30 MIN: CPT | Performed by: FAMILY MEDICINE

## 2017-05-05 PROCEDURE — 36415 COLL VENOUS BLD VENIPUNCTURE: CPT | Performed by: FAMILY MEDICINE

## 2017-05-05 PROCEDURE — G8417 CALC BMI ABV UP PARAM F/U: HCPCS | Performed by: FAMILY MEDICINE

## 2017-05-05 PROCEDURE — 1123F ACP DISCUSS/DSCN MKR DOCD: CPT | Performed by: FAMILY MEDICINE

## 2017-05-05 PROCEDURE — 4040F PNEUMOC VAC/ADMIN/RCVD: CPT | Performed by: FAMILY MEDICINE

## 2017-05-05 PROCEDURE — G8598 ASA/ANTIPLAT THER USED: HCPCS | Performed by: FAMILY MEDICINE

## 2017-05-05 PROCEDURE — 1036F TOBACCO NON-USER: CPT | Performed by: FAMILY MEDICINE

## 2017-05-05 ASSESSMENT — PATIENT HEALTH QUESTIONNAIRE - PHQ9
SUM OF ALL RESPONSES TO PHQ9 QUESTIONS 1 & 2: 0
SUM OF ALL RESPONSES TO PHQ QUESTIONS 1-9: 0
1. LITTLE INTEREST OR PLEASURE IN DOING THINGS: 0
2. FEELING DOWN, DEPRESSED OR HOPELESS: 0

## 2017-05-06 LAB
ESTIMATED AVERAGE GLUCOSE: 122.6 MG/DL
HBA1C MFR BLD: 5.9 %

## 2017-05-11 ENCOUNTER — HOSPITAL ENCOUNTER (OUTPATIENT)
Dept: WOUND CARE | Age: 77
Discharge: HOME | End: 2017-05-11
Attending: SURGERY | Admitting: SURGERY

## 2017-05-11 VITALS
DIASTOLIC BLOOD PRESSURE: 83 MMHG | HEART RATE: 90 BPM | TEMPERATURE: 97.9 F | SYSTOLIC BLOOD PRESSURE: 144 MMHG | RESPIRATION RATE: 18 BRPM

## 2017-05-11 DIAGNOSIS — L97.923 ULCER OF LEFT LOWER LEG, WITH NECROSIS OF MUSCLE (HCC): ICD-10-CM

## 2017-05-11 DIAGNOSIS — S80.12XD TRAUMATIC HEMATOMA OF LEFT LOWER LEG, SUBSEQUENT ENCOUNTER: Primary | ICD-10-CM

## 2017-05-11 PROCEDURE — 11042 DBRDMT SUBQ TIS 1ST 20SQCM/<: CPT | Performed by: SURGERY

## 2017-05-11 PROCEDURE — 11045 DBRDMT SUBQ TISS EACH ADDL: CPT | Performed by: SURGERY

## 2017-05-11 RX ORDER — LIDOCAINE 50 MG/G
OINTMENT TOPICAL ONCE
Status: DISCONTINUED | OUTPATIENT
Start: 2017-05-11 | End: 2017-05-12 | Stop reason: HOSPADM

## 2017-05-18 ENCOUNTER — HOSPITAL ENCOUNTER (OUTPATIENT)
Dept: WOUND CARE | Age: 77
Discharge: HOME | End: 2017-05-18
Attending: SURGERY | Admitting: SURGERY

## 2017-05-18 VITALS
HEART RATE: 90 BPM | DIASTOLIC BLOOD PRESSURE: 69 MMHG | SYSTOLIC BLOOD PRESSURE: 123 MMHG | RESPIRATION RATE: 18 BRPM | TEMPERATURE: 97.5 F

## 2017-05-18 RX ORDER — LIDOCAINE 50 MG/G
OINTMENT TOPICAL ONCE
Status: DISCONTINUED | OUTPATIENT
Start: 2017-05-18 | End: 2017-05-19 | Stop reason: HOSPADM

## 2017-05-25 ENCOUNTER — HOSPITAL ENCOUNTER (OUTPATIENT)
Dept: WOUND CARE | Age: 77
Discharge: HOME | End: 2017-05-25
Attending: SURGERY | Admitting: SURGERY

## 2017-05-25 VITALS
HEART RATE: 88 BPM | DIASTOLIC BLOOD PRESSURE: 84 MMHG | TEMPERATURE: 97.5 F | SYSTOLIC BLOOD PRESSURE: 152 MMHG | RESPIRATION RATE: 20 BRPM

## 2017-05-25 DIAGNOSIS — L97.922 ULCER OF LEFT LOWER LEG, WITH FAT LAYER EXPOSED (HCC): Primary | ICD-10-CM

## 2017-05-25 PROCEDURE — 11042 DBRDMT SUBQ TIS 1ST 20SQCM/<: CPT | Performed by: SURGERY

## 2017-05-25 PROCEDURE — 11045 DBRDMT SUBQ TISS EACH ADDL: CPT | Performed by: SURGERY

## 2017-05-25 RX ORDER — LIDOCAINE 50 MG/G
OINTMENT TOPICAL ONCE
Status: DISCONTINUED | OUTPATIENT
Start: 2017-05-25 | End: 2017-05-26 | Stop reason: HOSPADM

## 2017-05-25 ASSESSMENT — PAIN DESCRIPTION - PROGRESSION: CLINICAL_PROGRESSION: NOT CHANGED

## 2017-05-25 ASSESSMENT — PAIN DESCRIPTION - PAIN TYPE: TYPE: ACUTE PAIN

## 2017-05-25 ASSESSMENT — PAIN DESCRIPTION - ORIENTATION: ORIENTATION: LEFT

## 2017-05-25 ASSESSMENT — PAIN DESCRIPTION - DESCRIPTORS: DESCRIPTORS: BURNING

## 2017-05-25 ASSESSMENT — PAIN DESCRIPTION - LOCATION: LOCATION: LEG

## 2017-05-25 ASSESSMENT — PAIN SCALES - GENERAL: PAINLEVEL_OUTOF10: 5

## 2017-05-25 ASSESSMENT — PAIN DESCRIPTION - FREQUENCY: FREQUENCY: INTERMITTENT

## 2017-06-01 ENCOUNTER — HOSPITAL ENCOUNTER (OUTPATIENT)
Dept: WOUND CARE | Age: 77
Discharge: HOME | End: 2017-06-01
Attending: SURGERY | Admitting: SURGERY

## 2017-06-01 VITALS
TEMPERATURE: 98.6 F | HEART RATE: 89 BPM | DIASTOLIC BLOOD PRESSURE: 76 MMHG | RESPIRATION RATE: 20 BRPM | SYSTOLIC BLOOD PRESSURE: 132 MMHG

## 2017-06-01 DIAGNOSIS — L97.923 ULCER OF LEFT LOWER LEG, WITH NECROSIS OF MUSCLE (HCC): ICD-10-CM

## 2017-06-01 DIAGNOSIS — S80.12XD TRAUMATIC HEMATOMA OF LEFT LOWER LEG, SUBSEQUENT ENCOUNTER: Primary | ICD-10-CM

## 2017-06-01 PROCEDURE — 11042 DBRDMT SUBQ TIS 1ST 20SQCM/<: CPT | Performed by: SURGERY

## 2017-06-01 PROCEDURE — 11045 DBRDMT SUBQ TISS EACH ADDL: CPT | Performed by: SURGERY

## 2017-06-01 RX ORDER — LIDOCAINE 50 MG/G
OINTMENT TOPICAL PRN
Status: DISCONTINUED | OUTPATIENT
Start: 2017-06-01 | End: 2017-06-02 | Stop reason: HOSPADM

## 2017-06-08 ENCOUNTER — HOSPITAL ENCOUNTER (OUTPATIENT)
Dept: WOUND CARE | Age: 77
Discharge: HOME | End: 2017-06-08
Attending: SURGERY | Admitting: SURGERY

## 2017-06-08 VITALS
TEMPERATURE: 98.7 F | BODY MASS INDEX: 46.24 KG/M2 | WEIGHT: 244.93 LBS | RESPIRATION RATE: 20 BRPM | DIASTOLIC BLOOD PRESSURE: 75 MMHG | SYSTOLIC BLOOD PRESSURE: 131 MMHG | HEIGHT: 61 IN | HEART RATE: 77 BPM

## 2017-06-08 DIAGNOSIS — S80.12XD TRAUMATIC HEMATOMA OF LEFT LOWER LEG, SUBSEQUENT ENCOUNTER: Primary | ICD-10-CM

## 2017-06-08 DIAGNOSIS — L97.923 ULCER OF LEFT LOWER LEG, WITH NECROSIS OF MUSCLE (HCC): ICD-10-CM

## 2017-06-08 PROCEDURE — 11045 DBRDMT SUBQ TISS EACH ADDL: CPT | Performed by: SURGERY

## 2017-06-08 PROCEDURE — 11042 DBRDMT SUBQ TIS 1ST 20SQCM/<: CPT | Performed by: SURGERY

## 2017-06-08 RX ORDER — LIDOCAINE 50 MG/G
OINTMENT TOPICAL PRN
Status: DISCONTINUED | OUTPATIENT
Start: 2017-06-08 | End: 2017-06-09 | Stop reason: HOSPADM

## 2017-06-08 ASSESSMENT — PAIN SCALES - GENERAL: PAINLEVEL_OUTOF10: 0

## 2017-06-15 ENCOUNTER — HOSPITAL ENCOUNTER (OUTPATIENT)
Dept: WOUND CARE | Age: 77
Discharge: HOME | End: 2017-06-15
Attending: SURGERY | Admitting: SURGERY

## 2017-06-15 VITALS
TEMPERATURE: 98.2 F | RESPIRATION RATE: 18 BRPM | HEART RATE: 87 BPM | DIASTOLIC BLOOD PRESSURE: 70 MMHG | SYSTOLIC BLOOD PRESSURE: 131 MMHG

## 2017-06-15 DIAGNOSIS — S80.12XD TRAUMATIC HEMATOMA OF LEFT LOWER LEG, SUBSEQUENT ENCOUNTER: ICD-10-CM

## 2017-06-15 DIAGNOSIS — L97.923 ULCER OF LEFT LOWER LEG, WITH NECROSIS OF MUSCLE (HCC): Primary | ICD-10-CM

## 2017-06-15 PROCEDURE — 11042 DBRDMT SUBQ TIS 1ST 20SQCM/<: CPT | Performed by: SURGERY

## 2017-06-15 PROCEDURE — 11045 DBRDMT SUBQ TISS EACH ADDL: CPT | Performed by: SURGERY

## 2017-06-15 RX ORDER — LIDOCAINE 50 MG/G
OINTMENT TOPICAL PRN
Status: DISCONTINUED | OUTPATIENT
Start: 2017-06-15 | End: 2017-06-16 | Stop reason: HOSPADM

## 2017-06-16 ENCOUNTER — PROCEDURE VISIT (OUTPATIENT)
Dept: SURGERY | Age: 77
End: 2017-06-16

## 2017-06-16 DIAGNOSIS — M79.89 LEFT LEG SWELLING: ICD-10-CM

## 2017-06-16 DIAGNOSIS — M79.605 LEG PAIN, LEFT: Primary | ICD-10-CM

## 2017-06-16 PROCEDURE — 93971 EXTREMITY STUDY: CPT | Performed by: SURGERY

## 2017-06-20 ENCOUNTER — OFFICE VISIT (OUTPATIENT)
Dept: FAMILY MEDICINE CLINIC | Age: 77
End: 2017-06-20

## 2017-06-20 VITALS
HEART RATE: 78 BPM | SYSTOLIC BLOOD PRESSURE: 136 MMHG | WEIGHT: 248 LBS | HEIGHT: 61 IN | BODY MASS INDEX: 46.82 KG/M2 | DIASTOLIC BLOOD PRESSURE: 76 MMHG

## 2017-06-20 DIAGNOSIS — I35.8 HEART MURMUR, AORTIC: ICD-10-CM

## 2017-06-20 DIAGNOSIS — E11.22 TYPE 2 DIABETES MELLITUS WITH STAGE 4 CHRONIC KIDNEY DISEASE, WITHOUT LONG-TERM CURRENT USE OF INSULIN (HCC): ICD-10-CM

## 2017-06-20 DIAGNOSIS — I10 ESSENTIAL HYPERTENSION, BENIGN: ICD-10-CM

## 2017-06-20 DIAGNOSIS — N18.4 TYPE 2 DIABETES MELLITUS WITH STAGE 4 CHRONIC KIDNEY DISEASE, WITHOUT LONG-TERM CURRENT USE OF INSULIN (HCC): ICD-10-CM

## 2017-06-20 DIAGNOSIS — L97.921 CHRONIC ULCER OF LEFT LEG, LIMITED TO BREAKDOWN OF SKIN (HCC): ICD-10-CM

## 2017-06-20 DIAGNOSIS — G47.33 OSA (OBSTRUCTIVE SLEEP APNEA): ICD-10-CM

## 2017-06-20 DIAGNOSIS — N18.30 CHRONIC KIDNEY DISEASE (CKD), STAGE III (MODERATE) (HCC): ICD-10-CM

## 2017-06-20 DIAGNOSIS — E78.00 HYPERCHOLESTEROLEMIA: Primary | ICD-10-CM

## 2017-06-20 DIAGNOSIS — I82.409 RECURRENT DEEP VEIN THROMBOSIS (DVT) (HCC): ICD-10-CM

## 2017-06-20 PROCEDURE — G8427 DOCREV CUR MEDS BY ELIG CLIN: HCPCS | Performed by: FAMILY MEDICINE

## 2017-06-20 PROCEDURE — G8417 CALC BMI ABV UP PARAM F/U: HCPCS | Performed by: FAMILY MEDICINE

## 2017-06-20 PROCEDURE — 1090F PRES/ABSN URINE INCON ASSESS: CPT | Performed by: FAMILY MEDICINE

## 2017-06-20 PROCEDURE — G8400 PT W/DXA NO RESULTS DOC: HCPCS | Performed by: FAMILY MEDICINE

## 2017-06-20 PROCEDURE — G8598 ASA/ANTIPLAT THER USED: HCPCS | Performed by: FAMILY MEDICINE

## 2017-06-20 PROCEDURE — 1123F ACP DISCUSS/DSCN MKR DOCD: CPT | Performed by: FAMILY MEDICINE

## 2017-06-20 PROCEDURE — 99215 OFFICE O/P EST HI 40 MIN: CPT | Performed by: FAMILY MEDICINE

## 2017-06-20 PROCEDURE — 1036F TOBACCO NON-USER: CPT | Performed by: FAMILY MEDICINE

## 2017-06-20 PROCEDURE — 4040F PNEUMOC VAC/ADMIN/RCVD: CPT | Performed by: FAMILY MEDICINE

## 2017-06-20 RX ORDER — LOSARTAN POTASSIUM 50 MG/1
TABLET ORAL
Qty: 90 TABLET | Refills: 3 | Status: SHIPPED | OUTPATIENT
Start: 2017-06-20 | End: 2018-04-26 | Stop reason: SDUPTHER

## 2017-06-20 RX ORDER — CLONIDINE HYDROCHLORIDE 0.1 MG/1
TABLET ORAL
Qty: 60 TABLET | Refills: 5 | Status: SHIPPED | OUTPATIENT
Start: 2017-06-20 | End: 2017-12-11 | Stop reason: DRUGHIGH

## 2017-06-22 ENCOUNTER — HOSPITAL ENCOUNTER (OUTPATIENT)
Dept: WOUND CARE | Age: 77
Discharge: HOME | End: 2017-06-22
Attending: SURGERY | Admitting: SURGERY

## 2017-06-22 VITALS
DIASTOLIC BLOOD PRESSURE: 79 MMHG | RESPIRATION RATE: 18 BRPM | SYSTOLIC BLOOD PRESSURE: 145 MMHG | TEMPERATURE: 99.2 F | HEART RATE: 86 BPM

## 2017-06-22 DIAGNOSIS — S80.12XD TRAUMATIC HEMATOMA OF LEFT LOWER LEG, SUBSEQUENT ENCOUNTER: ICD-10-CM

## 2017-06-22 DIAGNOSIS — L97.923 ULCER OF LEFT LOWER LEG, WITH NECROSIS OF MUSCLE (HCC): Primary | ICD-10-CM

## 2017-06-22 PROCEDURE — 11042 DBRDMT SUBQ TIS 1ST 20SQCM/<: CPT | Performed by: SURGERY

## 2017-06-22 PROCEDURE — 11045 DBRDMT SUBQ TISS EACH ADDL: CPT | Performed by: SURGERY

## 2017-06-22 RX ORDER — LIDOCAINE 50 MG/G
OINTMENT TOPICAL PRN
Status: DISCONTINUED | OUTPATIENT
Start: 2017-06-22 | End: 2017-06-23 | Stop reason: HOSPADM

## 2017-06-22 ASSESSMENT — PAIN SCALES - GENERAL: PAINLEVEL_OUTOF10: 0

## 2017-06-29 ENCOUNTER — HOSPITAL ENCOUNTER (OUTPATIENT)
Dept: WOUND CARE | Age: 77
Discharge: HOME | End: 2017-06-29
Attending: SURGERY | Admitting: SURGERY

## 2017-06-29 VITALS
RESPIRATION RATE: 18 BRPM | DIASTOLIC BLOOD PRESSURE: 75 MMHG | TEMPERATURE: 98.1 F | HEART RATE: 89 BPM | SYSTOLIC BLOOD PRESSURE: 130 MMHG

## 2017-06-29 DIAGNOSIS — L97.922 ULCER OF LEFT LOWER LEG, WITH FAT LAYER EXPOSED (HCC): ICD-10-CM

## 2017-06-29 DIAGNOSIS — S80.12XD TRAUMATIC HEMATOMA OF LEFT LOWER LEG, SUBSEQUENT ENCOUNTER: Primary | ICD-10-CM

## 2017-06-29 PROCEDURE — 11045 DBRDMT SUBQ TISS EACH ADDL: CPT | Performed by: SURGERY

## 2017-06-29 PROCEDURE — 11042 DBRDMT SUBQ TIS 1ST 20SQCM/<: CPT | Performed by: SURGERY

## 2017-06-29 RX ORDER — LIDOCAINE 50 MG/G
OINTMENT TOPICAL ONCE
Status: DISCONTINUED | OUTPATIENT
Start: 2017-06-29 | End: 2017-06-30 | Stop reason: HOSPADM

## 2017-06-29 ASSESSMENT — PAIN SCALES - GENERAL: PAINLEVEL_OUTOF10: 0

## 2017-07-03 RX ORDER — POTASSIUM CHLORIDE 750 MG/1
TABLET, FILM COATED, EXTENDED RELEASE ORAL
Qty: 90 TABLET | Refills: 3 | Status: SHIPPED | OUTPATIENT
Start: 2017-07-03 | End: 2018-07-16 | Stop reason: SDUPTHER

## 2017-07-06 ENCOUNTER — HOSPITAL ENCOUNTER (OUTPATIENT)
Dept: WOUND CARE | Age: 77
Discharge: HOME | End: 2017-07-06
Attending: SURGERY | Admitting: SURGERY

## 2017-07-06 VITALS
TEMPERATURE: 98.5 F | WEIGHT: 249.56 LBS | SYSTOLIC BLOOD PRESSURE: 127 MMHG | DIASTOLIC BLOOD PRESSURE: 74 MMHG | RESPIRATION RATE: 16 BRPM | HEART RATE: 85 BPM | BODY MASS INDEX: 47.12 KG/M2 | HEIGHT: 61 IN

## 2017-07-06 DIAGNOSIS — S80.12XD TRAUMATIC HEMATOMA OF LEFT LOWER LEG, SUBSEQUENT ENCOUNTER: Primary | ICD-10-CM

## 2017-07-06 PROCEDURE — 11042 DBRDMT SUBQ TIS 1ST 20SQCM/<: CPT | Performed by: SURGERY

## 2017-07-06 PROCEDURE — 11045 DBRDMT SUBQ TISS EACH ADDL: CPT | Performed by: SURGERY

## 2017-07-06 RX ORDER — LIDOCAINE HYDROCHLORIDE 40 MG/ML
SOLUTION TOPICAL ONCE
Status: DISCONTINUED | OUTPATIENT
Start: 2017-07-06 | End: 2017-07-07 | Stop reason: HOSPADM

## 2017-07-06 ASSESSMENT — PAIN SCALES - GENERAL: PAINLEVEL_OUTOF10: 0

## 2017-07-13 ENCOUNTER — HOSPITAL ENCOUNTER (OUTPATIENT)
Dept: WOUND CARE | Age: 77
Discharge: HOME | End: 2017-07-13
Attending: SURGERY | Admitting: SURGERY

## 2017-07-13 VITALS
RESPIRATION RATE: 16 BRPM | DIASTOLIC BLOOD PRESSURE: 78 MMHG | SYSTOLIC BLOOD PRESSURE: 145 MMHG | TEMPERATURE: 98.2 F | HEART RATE: 89 BPM

## 2017-07-13 DIAGNOSIS — S80.12XD TRAUMATIC HEMATOMA OF LEFT LOWER LEG, SUBSEQUENT ENCOUNTER: Primary | ICD-10-CM

## 2017-07-13 PROCEDURE — 11045 DBRDMT SUBQ TISS EACH ADDL: CPT | Performed by: SURGERY

## 2017-07-13 PROCEDURE — 11042 DBRDMT SUBQ TIS 1ST 20SQCM/<: CPT | Performed by: SURGERY

## 2017-07-13 RX ORDER — LIDOCAINE HYDROCHLORIDE 40 MG/ML
SOLUTION TOPICAL ONCE
Status: DISCONTINUED | OUTPATIENT
Start: 2017-07-13 | End: 2017-07-14 | Stop reason: HOSPADM

## 2017-07-13 ASSESSMENT — PAIN SCALES - GENERAL: PAINLEVEL_OUTOF10: 0

## 2017-07-14 ENCOUNTER — TELEPHONE (OUTPATIENT)
Dept: SURGERY | Age: 77
End: 2017-07-14

## 2017-07-21 ENCOUNTER — TELEPHONE (OUTPATIENT)
Dept: SURGERY | Age: 77
End: 2017-07-21

## 2017-07-27 ENCOUNTER — HOSPITAL ENCOUNTER (OUTPATIENT)
Dept: WOUND CARE | Age: 77
Discharge: HOME | End: 2017-07-27
Attending: SURGERY | Admitting: SURGERY

## 2017-07-27 VITALS
SYSTOLIC BLOOD PRESSURE: 132 MMHG | RESPIRATION RATE: 20 BRPM | DIASTOLIC BLOOD PRESSURE: 80 MMHG | HEART RATE: 87 BPM | TEMPERATURE: 98.1 F

## 2017-07-27 DIAGNOSIS — S80.12XD TRAUMATIC HEMATOMA OF LEFT LOWER LEG, SUBSEQUENT ENCOUNTER: Primary | ICD-10-CM

## 2017-07-27 PROCEDURE — 97597 DBRDMT OPN WND 1ST 20 CM/<: CPT | Performed by: NURSE PRACTITIONER

## 2017-07-27 RX ORDER — LIDOCAINE HYDROCHLORIDE 40 MG/ML
SOLUTION TOPICAL ONCE
Status: DISCONTINUED | OUTPATIENT
Start: 2017-07-27 | End: 2017-07-28 | Stop reason: HOSPADM

## 2017-08-01 ENCOUNTER — OFFICE VISIT (OUTPATIENT)
Dept: FAMILY MEDICINE CLINIC | Age: 77
End: 2017-08-01

## 2017-08-01 VITALS
HEART RATE: 79 BPM | SYSTOLIC BLOOD PRESSURE: 139 MMHG | WEIGHT: 253 LBS | BODY MASS INDEX: 47.77 KG/M2 | DIASTOLIC BLOOD PRESSURE: 78 MMHG | HEIGHT: 61 IN

## 2017-08-01 DIAGNOSIS — I63.9 CEREBRAL INFARCTION, UNSPECIFIED MECHANISM (HCC): ICD-10-CM

## 2017-08-01 DIAGNOSIS — G47.33 OSA (OBSTRUCTIVE SLEEP APNEA): ICD-10-CM

## 2017-08-01 DIAGNOSIS — I10 ESSENTIAL HYPERTENSION, BENIGN: ICD-10-CM

## 2017-08-01 DIAGNOSIS — D64.9 ANEMIA, UNSPECIFIED TYPE: ICD-10-CM

## 2017-08-01 DIAGNOSIS — E78.00 HYPERCHOLESTEROLEMIA: ICD-10-CM

## 2017-08-01 DIAGNOSIS — M1A.0490 IDIOPATHIC CHRONIC GOUT OF HAND WITHOUT TOPHUS, UNSPECIFIED LATERALITY: ICD-10-CM

## 2017-08-01 DIAGNOSIS — G89.29 CHRONIC LEFT SHOULDER PAIN: ICD-10-CM

## 2017-08-01 DIAGNOSIS — M25.512 CHRONIC LEFT SHOULDER PAIN: ICD-10-CM

## 2017-08-01 DIAGNOSIS — L97.922 ULCER OF LEFT LOWER LEG, WITH FAT LAYER EXPOSED (HCC): ICD-10-CM

## 2017-08-01 DIAGNOSIS — N18.4 TYPE 2 DIABETES MELLITUS WITH STAGE 4 CHRONIC KIDNEY DISEASE, WITHOUT LONG-TERM CURRENT USE OF INSULIN (HCC): Primary | ICD-10-CM

## 2017-08-01 DIAGNOSIS — N18.30 CHRONIC KIDNEY DISEASE (CKD), STAGE III (MODERATE) (HCC): ICD-10-CM

## 2017-08-01 DIAGNOSIS — E11.22 TYPE 2 DIABETES MELLITUS WITH STAGE 4 CHRONIC KIDNEY DISEASE, WITHOUT LONG-TERM CURRENT USE OF INSULIN (HCC): Primary | ICD-10-CM

## 2017-08-01 LAB
A/G RATIO: 1.4 (ref 1.1–2.2)
ALBUMIN SERPL-MCNC: 3.9 G/DL (ref 3.4–5)
ALP BLD-CCNC: 60 U/L (ref 40–129)
ALT SERPL-CCNC: 13 U/L (ref 10–40)
ANION GAP SERPL CALCULATED.3IONS-SCNC: 15 MMOL/L (ref 3–16)
AST SERPL-CCNC: 16 U/L (ref 15–37)
BASOPHILS ABSOLUTE: 0 K/UL (ref 0–0.2)
BASOPHILS RELATIVE PERCENT: 0.8 %
BILIRUB SERPL-MCNC: <0.2 MG/DL (ref 0–1)
BUN BLDV-MCNC: 30 MG/DL (ref 7–20)
CALCIUM SERPL-MCNC: 9.6 MG/DL (ref 8.3–10.6)
CHLORIDE BLD-SCNC: 102 MMOL/L (ref 99–110)
CO2: 27 MMOL/L (ref 21–32)
CREAT SERPL-MCNC: 1.3 MG/DL (ref 0.6–1.2)
EOSINOPHILS ABSOLUTE: 0.4 K/UL (ref 0–0.6)
EOSINOPHILS RELATIVE PERCENT: 6.3 %
GFR AFRICAN AMERICAN: 48
GFR NON-AFRICAN AMERICAN: 40
GLOBULIN: 2.7 G/DL
GLUCOSE BLD-MCNC: 168 MG/DL (ref 70–99)
HBA1C MFR BLD: 7.3 %
HCT VFR BLD CALC: 34.5 % (ref 36–48)
HEMOGLOBIN: 11.2 G/DL (ref 12–16)
LYMPHOCYTES ABSOLUTE: 1.2 K/UL (ref 1–5.1)
LYMPHOCYTES RELATIVE PERCENT: 19.8 %
MCH RBC QN AUTO: 31.2 PG (ref 26–34)
MCHC RBC AUTO-ENTMCNC: 32.5 G/DL (ref 31–36)
MCV RBC AUTO: 96 FL (ref 80–100)
MONOCYTES ABSOLUTE: 0.5 K/UL (ref 0–1.3)
MONOCYTES RELATIVE PERCENT: 9 %
NEUTROPHILS ABSOLUTE: 3.9 K/UL (ref 1.7–7.7)
NEUTROPHILS RELATIVE PERCENT: 64.1 %
PDW BLD-RTO: 16.7 % (ref 12.4–15.4)
PLATELET # BLD: 218 K/UL (ref 135–450)
PMV BLD AUTO: 7.3 FL (ref 5–10.5)
POTASSIUM SERPL-SCNC: 4.2 MMOL/L (ref 3.5–5.1)
RBC # BLD: 3.59 M/UL (ref 4–5.2)
SEDIMENTATION RATE, ERYTHROCYTE: 76 MM/HR (ref 0–30)
SODIUM BLD-SCNC: 144 MMOL/L (ref 136–145)
TOTAL PROTEIN: 6.6 G/DL (ref 6.4–8.2)
URIC ACID, SERUM: 7.9 MG/DL (ref 2.6–6)
WBC # BLD: 6.1 K/UL (ref 4–11)

## 2017-08-01 PROCEDURE — G8427 DOCREV CUR MEDS BY ELIG CLIN: HCPCS | Performed by: FAMILY MEDICINE

## 2017-08-01 PROCEDURE — 99215 OFFICE O/P EST HI 40 MIN: CPT | Performed by: FAMILY MEDICINE

## 2017-08-01 PROCEDURE — G8417 CALC BMI ABV UP PARAM F/U: HCPCS | Performed by: FAMILY MEDICINE

## 2017-08-01 PROCEDURE — 4040F PNEUMOC VAC/ADMIN/RCVD: CPT | Performed by: FAMILY MEDICINE

## 2017-08-01 PROCEDURE — 36415 COLL VENOUS BLD VENIPUNCTURE: CPT | Performed by: FAMILY MEDICINE

## 2017-08-01 PROCEDURE — 1123F ACP DISCUSS/DSCN MKR DOCD: CPT | Performed by: FAMILY MEDICINE

## 2017-08-01 PROCEDURE — G8598 ASA/ANTIPLAT THER USED: HCPCS | Performed by: FAMILY MEDICINE

## 2017-08-01 PROCEDURE — 1090F PRES/ABSN URINE INCON ASSESS: CPT | Performed by: FAMILY MEDICINE

## 2017-08-01 PROCEDURE — 83036 HEMOGLOBIN GLYCOSYLATED A1C: CPT | Performed by: FAMILY MEDICINE

## 2017-08-01 PROCEDURE — G8400 PT W/DXA NO RESULTS DOC: HCPCS | Performed by: FAMILY MEDICINE

## 2017-08-01 PROCEDURE — 1036F TOBACCO NON-USER: CPT | Performed by: FAMILY MEDICINE

## 2017-08-03 ENCOUNTER — HOSPITAL ENCOUNTER (OUTPATIENT)
Dept: WOUND CARE | Age: 77
Discharge: HOME | End: 2017-08-03
Attending: SURGERY | Admitting: NURSE PRACTITIONER

## 2017-08-03 VITALS
WEIGHT: 254.41 LBS | RESPIRATION RATE: 18 BRPM | DIASTOLIC BLOOD PRESSURE: 79 MMHG | HEART RATE: 85 BPM | SYSTOLIC BLOOD PRESSURE: 138 MMHG | BODY MASS INDEX: 48.03 KG/M2 | HEIGHT: 61 IN | TEMPERATURE: 98.8 F

## 2017-08-03 DIAGNOSIS — S80.12XD TRAUMATIC HEMATOMA OF LEFT LOWER LEG, SUBSEQUENT ENCOUNTER: ICD-10-CM

## 2017-08-03 DIAGNOSIS — M79.89 LEFT LEG SWELLING: ICD-10-CM

## 2017-08-03 DIAGNOSIS — L97.922 ULCER OF LEFT LOWER LEG, WITH FAT LAYER EXPOSED (HCC): Primary | ICD-10-CM

## 2017-08-03 PROCEDURE — 97597 DBRDMT OPN WND 1ST 20 CM/<: CPT | Performed by: NURSE PRACTITIONER

## 2017-08-03 RX ORDER — LIDOCAINE HYDROCHLORIDE 40 MG/ML
SOLUTION TOPICAL ONCE
Status: DISCONTINUED | OUTPATIENT
Start: 2017-08-03 | End: 2017-08-04 | Stop reason: HOSPADM

## 2017-08-03 ASSESSMENT — PAIN DESCRIPTION - FREQUENCY: FREQUENCY: INTERMITTENT

## 2017-08-03 ASSESSMENT — PAIN DESCRIPTION - DESCRIPTORS: DESCRIPTORS: ACHING

## 2017-08-03 ASSESSMENT — PAIN DESCRIPTION - PROGRESSION: CLINICAL_PROGRESSION: NOT CHANGED

## 2017-08-03 ASSESSMENT — PAIN DESCRIPTION - ONSET: ONSET: ON-GOING

## 2017-08-03 ASSESSMENT — PAIN SCALES - GENERAL: PAINLEVEL_OUTOF10: 6

## 2017-08-03 ASSESSMENT — PAIN DESCRIPTION - PAIN TYPE: TYPE: ACUTE PAIN

## 2017-08-03 ASSESSMENT — PAIN DESCRIPTION - LOCATION: LOCATION: LEG

## 2017-08-03 ASSESSMENT — PAIN DESCRIPTION - ORIENTATION: ORIENTATION: LEFT

## 2017-08-10 ENCOUNTER — HOSPITAL ENCOUNTER (OUTPATIENT)
Dept: WOUND CARE | Age: 77
Discharge: HOME | End: 2017-08-10
Attending: SURGERY | Admitting: SURGERY

## 2017-08-10 VITALS
TEMPERATURE: 98.6 F | HEART RATE: 87 BPM | DIASTOLIC BLOOD PRESSURE: 78 MMHG | SYSTOLIC BLOOD PRESSURE: 176 MMHG | RESPIRATION RATE: 18 BRPM

## 2017-08-10 DIAGNOSIS — L97.922 ULCER OF LEFT LOWER LEG, WITH FAT LAYER EXPOSED (HCC): Primary | ICD-10-CM

## 2017-08-10 PROCEDURE — 11042 DBRDMT SUBQ TIS 1ST 20SQCM/<: CPT | Performed by: SURGERY

## 2017-08-10 RX ORDER — LIDOCAINE HYDROCHLORIDE 40 MG/ML
SOLUTION TOPICAL ONCE
Status: DISCONTINUED | OUTPATIENT
Start: 2017-08-10 | End: 2017-08-11 | Stop reason: HOSPADM

## 2017-08-14 DIAGNOSIS — R60.0 LOCALIZED EDEMA: ICD-10-CM

## 2017-08-14 RX ORDER — FUROSEMIDE 80 MG
TABLET ORAL
Qty: 90 TABLET | Refills: 0 | Status: SHIPPED | OUTPATIENT
Start: 2017-08-14 | End: 2017-11-21 | Stop reason: SDUPTHER

## 2017-08-17 ENCOUNTER — HOSPITAL ENCOUNTER (OUTPATIENT)
Dept: WOUND CARE | Age: 77
Discharge: HOME | End: 2017-08-17
Attending: SURGERY | Admitting: SURGERY

## 2017-08-17 VITALS
DIASTOLIC BLOOD PRESSURE: 70 MMHG | TEMPERATURE: 97.7 F | SYSTOLIC BLOOD PRESSURE: 138 MMHG | HEART RATE: 81 BPM | RESPIRATION RATE: 18 BRPM

## 2017-08-17 DIAGNOSIS — L97.923 ULCER OF LEFT LOWER LEG, WITH NECROSIS OF MUSCLE (HCC): Primary | ICD-10-CM

## 2017-08-17 PROCEDURE — 11042 DBRDMT SUBQ TIS 1ST 20SQCM/<: CPT | Performed by: SURGERY

## 2017-08-17 RX ORDER — LIDOCAINE HYDROCHLORIDE 40 MG/ML
SOLUTION TOPICAL ONCE
Status: DISCONTINUED | OUTPATIENT
Start: 2017-08-17 | End: 2017-08-18 | Stop reason: HOSPADM

## 2017-08-17 ASSESSMENT — PAIN SCALES - GENERAL: PAINLEVEL_OUTOF10: 0

## 2017-08-24 ENCOUNTER — HOSPITAL ENCOUNTER (OUTPATIENT)
Dept: WOUND CARE | Age: 77
Discharge: HOME | End: 2017-08-24
Attending: SURGERY | Admitting: SURGERY

## 2017-08-24 VITALS
RESPIRATION RATE: 16 BRPM | SYSTOLIC BLOOD PRESSURE: 124 MMHG | TEMPERATURE: 98.1 F | HEART RATE: 76 BPM | DIASTOLIC BLOOD PRESSURE: 71 MMHG

## 2017-08-24 DIAGNOSIS — L97.922 ULCER OF LEFT LOWER LEG, WITH FAT LAYER EXPOSED (HCC): Primary | ICD-10-CM

## 2017-08-24 PROCEDURE — 11042 DBRDMT SUBQ TIS 1ST 20SQCM/<: CPT | Performed by: SURGERY

## 2017-08-24 RX ORDER — LIDOCAINE 50 MG/G
OINTMENT TOPICAL ONCE
Status: DISCONTINUED | OUTPATIENT
Start: 2017-08-24 | End: 2017-08-25 | Stop reason: HOSPADM

## 2017-08-24 ASSESSMENT — PAIN SCALES - GENERAL: PAINLEVEL_OUTOF10: 0

## 2017-08-31 ENCOUNTER — HOSPITAL ENCOUNTER (OUTPATIENT)
Dept: WOUND CARE | Age: 77
Discharge: HOME | End: 2017-08-31
Attending: SURGERY | Admitting: SURGERY

## 2017-08-31 VITALS
SYSTOLIC BLOOD PRESSURE: 124 MMHG | DIASTOLIC BLOOD PRESSURE: 64 MMHG | HEART RATE: 86 BPM | RESPIRATION RATE: 16 BRPM | TEMPERATURE: 98.1 F

## 2017-08-31 DIAGNOSIS — L97.923 ULCER OF LEFT LOWER LEG, WITH NECROSIS OF MUSCLE (HCC): ICD-10-CM

## 2017-08-31 DIAGNOSIS — M79.89 LEFT LEG SWELLING: Primary | ICD-10-CM

## 2017-08-31 PROCEDURE — 11042 DBRDMT SUBQ TIS 1ST 20SQCM/<: CPT | Performed by: SURGERY

## 2017-08-31 RX ORDER — LIDOCAINE 50 MG/G
OINTMENT TOPICAL ONCE
Status: DISCONTINUED | OUTPATIENT
Start: 2017-08-31 | End: 2017-09-01 | Stop reason: HOSPADM

## 2017-08-31 ASSESSMENT — PAIN DESCRIPTION - FREQUENCY: FREQUENCY: INTERMITTENT

## 2017-08-31 ASSESSMENT — PAIN DESCRIPTION - ORIENTATION: ORIENTATION: LEFT;LOWER

## 2017-08-31 ASSESSMENT — PAIN DESCRIPTION - ONSET: ONSET: ON-GOING

## 2017-08-31 ASSESSMENT — PAIN DESCRIPTION - DESCRIPTORS: DESCRIPTORS: BURNING

## 2017-08-31 ASSESSMENT — PAIN SCALES - GENERAL: PAINLEVEL_OUTOF10: 3

## 2017-08-31 ASSESSMENT — PAIN DESCRIPTION - LOCATION: LOCATION: LEG

## 2017-08-31 ASSESSMENT — PAIN DESCRIPTION - PAIN TYPE: TYPE: ACUTE PAIN;CHRONIC PAIN

## 2017-09-07 ENCOUNTER — HOSPITAL ENCOUNTER (OUTPATIENT)
Dept: WOUND CARE | Age: 77
Discharge: HOME | End: 2017-09-07
Attending: SURGERY | Admitting: SURGERY

## 2017-09-07 VITALS
SYSTOLIC BLOOD PRESSURE: 189 MMHG | RESPIRATION RATE: 16 BRPM | DIASTOLIC BLOOD PRESSURE: 92 MMHG | TEMPERATURE: 98.4 F | HEIGHT: 61 IN | HEART RATE: 86 BPM | WEIGHT: 246.47 LBS | BODY MASS INDEX: 46.53 KG/M2

## 2017-09-07 DIAGNOSIS — L97.923 ULCER OF LEFT LOWER LEG, WITH NECROSIS OF MUSCLE (HCC): Primary | ICD-10-CM

## 2017-09-07 PROCEDURE — 11042 DBRDMT SUBQ TIS 1ST 20SQCM/<: CPT | Performed by: SURGERY

## 2017-09-07 RX ORDER — LIDOCAINE HYDROCHLORIDE 40 MG/ML
SOLUTION TOPICAL ONCE
Status: DISCONTINUED | OUTPATIENT
Start: 2017-09-07 | End: 2017-09-08 | Stop reason: HOSPADM

## 2017-09-07 ASSESSMENT — PAIN DESCRIPTION - PAIN TYPE: TYPE: ACUTE PAIN

## 2017-09-07 ASSESSMENT — PAIN DESCRIPTION - DESCRIPTORS: DESCRIPTORS: ACHING;THROBBING

## 2017-09-07 ASSESSMENT — PAIN DESCRIPTION - LOCATION: LOCATION: LEG

## 2017-09-07 ASSESSMENT — PAIN DESCRIPTION - ORIENTATION: ORIENTATION: LEFT;LOWER

## 2017-09-07 ASSESSMENT — PAIN DESCRIPTION - FREQUENCY: FREQUENCY: INTERMITTENT

## 2017-09-07 ASSESSMENT — PAIN SCALES - GENERAL: PAINLEVEL_OUTOF10: 8

## 2017-09-07 ASSESSMENT — PAIN DESCRIPTION - ONSET: ONSET: ON-GOING

## 2017-09-14 ENCOUNTER — HOSPITAL ENCOUNTER (OUTPATIENT)
Dept: WOUND CARE | Age: 77
Discharge: HOME | End: 2017-09-14
Attending: NURSE PRACTITIONER | Admitting: NURSE PRACTITIONER

## 2017-09-14 VITALS
SYSTOLIC BLOOD PRESSURE: 133 MMHG | HEART RATE: 89 BPM | DIASTOLIC BLOOD PRESSURE: 77 MMHG | RESPIRATION RATE: 16 BRPM | TEMPERATURE: 98.4 F

## 2017-09-14 DIAGNOSIS — S80.12XD TRAUMATIC HEMATOMA OF LEFT LOWER LEG, SUBSEQUENT ENCOUNTER: Primary | ICD-10-CM

## 2017-09-14 DIAGNOSIS — L97.922 ULCER OF LEFT LOWER LEG, WITH FAT LAYER EXPOSED (HCC): ICD-10-CM

## 2017-09-14 PROCEDURE — 11042 DBRDMT SUBQ TIS 1ST 20SQCM/<: CPT | Performed by: NURSE PRACTITIONER

## 2017-09-14 RX ORDER — LIDOCAINE HYDROCHLORIDE 40 MG/ML
SOLUTION TOPICAL ONCE
Status: DISCONTINUED | OUTPATIENT
Start: 2017-09-14 | End: 2017-09-15 | Stop reason: HOSPADM

## 2017-09-21 ENCOUNTER — HOSPITAL ENCOUNTER (OUTPATIENT)
Dept: WOUND CARE | Age: 77
Discharge: HOME | End: 2017-09-21
Attending: SURGERY | Admitting: SURGERY

## 2017-09-21 VITALS
DIASTOLIC BLOOD PRESSURE: 80 MMHG | SYSTOLIC BLOOD PRESSURE: 135 MMHG | HEART RATE: 81 BPM | TEMPERATURE: 98.6 F | RESPIRATION RATE: 20 BRPM

## 2017-09-21 DIAGNOSIS — S80.12XD TRAUMATIC HEMATOMA OF LEFT LOWER LEG, SUBSEQUENT ENCOUNTER: Primary | ICD-10-CM

## 2017-09-21 PROCEDURE — 11042 DBRDMT SUBQ TIS 1ST 20SQCM/<: CPT | Performed by: SURGERY

## 2017-09-21 RX ORDER — LIDOCAINE HYDROCHLORIDE 40 MG/ML
SOLUTION TOPICAL ONCE
Status: DISCONTINUED | OUTPATIENT
Start: 2017-09-21 | End: 2017-09-22 | Stop reason: HOSPADM

## 2017-09-28 ENCOUNTER — HOSPITAL ENCOUNTER (OUTPATIENT)
Dept: WOUND CARE | Age: 77
Discharge: HOME | End: 2017-09-28
Attending: SURGERY | Admitting: SURGERY

## 2017-09-28 VITALS
TEMPERATURE: 98 F | HEART RATE: 80 BPM | DIASTOLIC BLOOD PRESSURE: 78 MMHG | RESPIRATION RATE: 20 BRPM | SYSTOLIC BLOOD PRESSURE: 133 MMHG

## 2017-09-28 DIAGNOSIS — R60.0 LOCALIZED EDEMA: Primary | ICD-10-CM

## 2017-09-28 DIAGNOSIS — L97.923 ULCER OF LEFT LOWER LEG, WITH NECROSIS OF MUSCLE (HCC): ICD-10-CM

## 2017-09-28 PROCEDURE — 11042 DBRDMT SUBQ TIS 1ST 20SQCM/<: CPT | Performed by: SURGERY

## 2017-09-28 RX ORDER — LIDOCAINE HYDROCHLORIDE 40 MG/ML
SOLUTION TOPICAL ONCE
Status: DISCONTINUED | OUTPATIENT
Start: 2017-09-28 | End: 2017-09-29 | Stop reason: HOSPADM

## 2017-09-28 RX ORDER — LIDOCAINE HYDROCHLORIDE 40 MG/ML
SOLUTION TOPICAL ONCE
Status: DISCONTINUED | OUTPATIENT
Start: 2017-09-28 | End: 2017-09-28

## 2017-09-28 ASSESSMENT — PAIN SCALES - GENERAL: PAINLEVEL_OUTOF10: 0

## 2017-10-12 ENCOUNTER — HOSPITAL ENCOUNTER (OUTPATIENT)
Dept: WOUND CARE | Age: 77
Discharge: HOME | End: 2017-10-12
Attending: SURGERY | Admitting: SURGERY

## 2017-10-12 VITALS
HEIGHT: 61 IN | BODY MASS INDEX: 45.95 KG/M2 | RESPIRATION RATE: 16 BRPM | HEART RATE: 81 BPM | SYSTOLIC BLOOD PRESSURE: 134 MMHG | DIASTOLIC BLOOD PRESSURE: 81 MMHG | TEMPERATURE: 98 F | WEIGHT: 243.39 LBS

## 2017-10-12 DIAGNOSIS — L97.922 ULCER OF LEFT LOWER LEG, WITH FAT LAYER EXPOSED (HCC): Primary | ICD-10-CM

## 2017-10-12 PROCEDURE — 11042 DBRDMT SUBQ TIS 1ST 20SQCM/<: CPT | Performed by: SURGERY

## 2017-10-12 RX ORDER — LIDOCAINE HYDROCHLORIDE 40 MG/ML
SOLUTION TOPICAL ONCE
Status: DISCONTINUED | OUTPATIENT
Start: 2017-10-12 | End: 2017-10-13 | Stop reason: HOSPADM

## 2017-10-12 NOTE — PROGRESS NOTES
Subjective:      Patient ID: Gail Modi is a 68 y.o. female. Chief Complaint   Patient presents with    Wound Check     left lower leg wound follow-up   Says she is doing well., min pain    HPI  Huge hematoma that made skin ischemic Lt leg    Review of Systems  No change  Objective:   Physical Exam   Constitutional:   Morbid obesity   Cardiovascular:   Pulses:       Dorsalis pedis pulses are 2+ on the right side, and 2+ on the left side. Posterior tibial pulses are 1+ on the right side, and 0 on the left side. + 4 pitting edema and hematoma Lt   Musculoskeletal: Edema: + 4 left leg pitting edema. Legs:  Much improved    Assessment:      1. Traumatic hematoma of left lower leg, initial encounter     2. Leg pain, left     Even when now when most of the skin looks normal but still is  sensitive to light touch. Ant lat and posterior calf    Duplex scan was normal       Excisional Debridement Procedure Note  Indications:  Based on my examination of this patient's wound(s)/ulcer(s) today, debridement is required to promote healing and evaluate the wound base. Performed by: Catalina Hansen MD    Consent obtained? Yes    Time out taken: Yes    Pain Control: Anesthetic: Other (comment) (lidocaine 4% 5 ml to left lower leg wound)     Debridement:Excisional Debridement    Using curette, scissors and forceps the wound/ulcer was sharply debrided    down through and including the removal of  dermis and subcutaneous tissue.         Devitalized Tissue Debrided:  biofilm, slough, necrotic/eschar and callus      Pre Debridement Measurements:  Are located in the [facility]  Documentation Flow Sheet   Wound/Ulcer #: 1, 2 and 4     Post  Debridement Measurements:  Wound 03/28/17 Leg Lateral;Left;Lower # 1  (Active)   Wound Type Wound 10/12/2017  3:55 PM   Wound Traumatic 9/21/2017  6:15 PM   Dressing Status Changed 10/12/2017  4:30 PM   Dressing Changed Changed/New 10/12/2017  4:30 PM   Dressing/Treatment treatment:  Well tolerated by patient. Plan:     Treatment Note please see attached Discharge Instructions    In my professional opinion this patient would benefit from HBO Therapy: No    Written patient dismissal instructions given to patient and signed by patient or POA. Discharge Instructions       Wound Clinic Physician Orders and Discharge Instructions  [address]   [address], [place] [address]  Telephone: [phone]      NAME: Fnany Burrell  YOB: 1940  MEDICAL RECORD NUMBER: [id number]  DATE: 10/12/2017      Wound Cleansing:   Do not scrub or use excessive force. Cleanse wound prior to applying a clean dressing with:  [x] Normal Saline [x] Keep Wound Dry in Shower [] Wound Cleanser   [] Cleanse wound with Mild Soap & Water [] May Shower at Discharge   [] Other:       Topical Treatments:  Do not apply lotions, creams, or ointments to wound bed unless directed. [] Apply moisturizing lotion to skin surrounding the wound prior to dressing change.  [] Apply antifungal ointment to skin surrounding the wound prior to dressing change. [x] Apply thin film of moisture barrier ointment to skin immediately around wound.   [] Other:   Verdia [place]  Dressings:                            Wound Location: LEFT LOWER LEG  Cordella Elmer  [x] Apply Primary Dressing:                                                                                      [] MediHoney Gel                    [] Alginate with Silver              [] Alginate                        [x] Collagen                   [] Collagen with Silver   [] Santyl with Moisten saline gauze                          [] Hydrocolloid               [] MediHoney Alginate           [] Foam with Silver                        [] Foam   [] Hydrofera Blue                  [] Mepilex Border                                             [x] Moisten with Saline           [] Hydrogel                   [] Mepitel                                              [] Bactroban/Mupirocin            [] Polysporin                                      [] Other:    [] Pack wound loosely with                 [] Iodoform   [] Plain Packing                 [] Other   [x] Cover and Secure with:                                           [x] Gauze                      [] Jayjay             [x] Kerlix                        [] Ace Wrap                 [] Cover Roll Tape                   [] ABD                                                            [] Other:                         Avoid contact of tape with skin. [x] Change dressing:                 [] Daily                         [] Every Other Day                  [x] Three times per week                        [] Once a week            [] Do Not Change Dressing                      [x] Other:                 Apply:   [x] SpandaGrip []Right Leg [x]Left Leg  []Low compression 5-10 mm/Hg   [x]Medium compression 10-20 mm/Hg  []High compression 20-30 mm/Hg  every morning immediately when getting up should be applied to affected leg(s) from mid foot to knee making sure to cover the heel. Remove every night before going to bed.      [x] Elevate leg(s) above the level of the heart when sitting. [x] Avoid prolonged standing in one place.       Compression:  Apply: [] Multilayer Compression Wrap Applied in Clinic []RightLeg []Left Leg  [] Multi-layer compression. Do not get leg(s) with wrap wet. If wraps become too tight call the center or completely remove the wrap. [x] Elevate leg(s) above the level of the heart when sitting.    [x] Avoid prolonged standing in one place.          Dietary:  [] Diet as tolerated: [x] Calorie Diabetic Diet: [] No Added Salt:  [] Increase Protein: [] Other:   Activity:  [x] Activity as tolerated: [] Patient has no activity restrictions [] Strict Bedrest: [] Remain off Work: [] May return to full duty work: [] Return to work with restrictions:       Return Appointment:  [] Wound and dressing supply provider:   [] [facility] or [facility]:   [] Nurse visit: [] Physician or NP scheduled for Nurse Visit:   [x] Return Appointment: With DR Jazmyn Barroso in 1 Week(s)      [] Ordered tests:        SCRIPT GIVEN FOR #[id number] ON 5/4/17 FOR PAIN      Nurse Case Manger: : Nikko Prasadmason  Electronically signed by Carole Fuentes RN on 10/12/2017 at 4:03 PM   [address] [facility] Information: Should you experience any significant changes in your wound(s) or have questions about your wound care, please contact the [address] [place] [address] [phone] [address] [place] 8:30 am - 4:30 pm and Friday 8:30 am - 2:00 pm. If you need help with your wound outside these hours and cannot wait until we are again available, contact your PCP or go to the hospital emergency room.       Nurse Signature:_______________________      Date: ___________ Time: ____________  Alrene Garces  Discharge Nurse Signature          PLEASE NOTE: IF YOU ARE UNABLE TO OBTAIN WOUND SUPPLIES, CONTINUE TO USE THE SUPPLIES YOU HAVE AVAILABLE UNTIL YOU ARE ABLE TO [address]. IT IS MOST IMPORTANT TO KEEP THE WOUND COVERED AT ALL TIMES.     Physician Signature:_______________________  John  Date: ___________ Time: ____________  Arlene Garces  [] Dr Rachna Bernal [x] Dr Champ Grigsby CNP   [] Dr Adela Ewing [] Dr Filemon Hernandez [] Dr Marylen Cough [] Dr Davidson1 [place]. [place]  [] Dr Stephanie Rasmussen [] Jostin Logan NP [] Dr. Delphine Jacobson      The information contained in the After Visit Summary has been reviewed with me, the patient and/or responsible adult, by my health care provider(s). I had the opportunity to ask questions regarding this information.  I have elected to receive;          Patient Signature:_______________________  John  Date: ___________ Time: ____________  Eloisa.K[place]  [] Patient unable to sign Discharge Instructions given to ECF/Transportation/POA          [] After Visit Summary  [x] Comprehensive Discharge Instruction        Electronically signed by Anamaria Martinez MD on 10/12/2017 at 4:36 PM

## 2017-10-19 ENCOUNTER — HOSPITAL ENCOUNTER (OUTPATIENT)
Dept: WOUND CARE | Age: 77
Discharge: HOME | End: 2017-10-19
Attending: SURGERY | Admitting: SURGERY

## 2017-10-19 VITALS
DIASTOLIC BLOOD PRESSURE: 78 MMHG | TEMPERATURE: 97.7 F | RESPIRATION RATE: 16 BRPM | HEART RATE: 84 BPM | SYSTOLIC BLOOD PRESSURE: 148 MMHG

## 2017-10-19 DIAGNOSIS — L97.922 SKIN ULCER OF LEFT LOWER LEG WITH FAT LAYER EXPOSED (HCC): Primary | ICD-10-CM

## 2017-10-19 DIAGNOSIS — S80.12XD TRAUMATIC HEMATOMA OF LEFT LOWER LEG, SUBSEQUENT ENCOUNTER: ICD-10-CM

## 2017-10-19 PROCEDURE — 11042 DBRDMT SUBQ TIS 1ST 20SQCM/<: CPT | Performed by: SURGERY

## 2017-10-19 RX ORDER — LIDOCAINE HYDROCHLORIDE 40 MG/ML
SOLUTION TOPICAL ONCE
Status: DISCONTINUED | OUTPATIENT
Start: 2017-10-19 | End: 2017-10-20 | Stop reason: HOSPADM

## 2017-10-19 ASSESSMENT — PAIN SCALES - GENERAL: PAINLEVEL_OUTOF10: 0

## 2017-10-19 NOTE — PROGRESS NOTES
Subjective:      Patient ID: Aaron Viera is a 68 y.o. female. Chief Complaint   Patient presents with    Wound Check     Follow-up visit for wounds to the left lower leg. Says she is doing well., min pain in wound, but skin in calf sensitive     HPI  Huge hematoma that made skin ischemic Lt leg    Review of Systems  No change  Objective:   Physical Exam   Constitutional:   Morbid obesity   Cardiovascular:   Pulses:       Dorsalis pedis pulses are 2+ on the right side, and 2+ on the left side. Posterior tibial pulses are 1+ on the right side, and 0 on the left side. + 4 pitting edema and hematoma Lt   Musculoskeletal: Edema: + 4 left leg pitting edema. Legs:  Much improved    Assessment:      1. Traumatic hematoma of left lower leg, initial encounter     2. Leg pain, left     Even when now when most of the skin looks normal but still is  sensitive to light touch. Ant lat and posterior calf    Duplex scan was normal       Excisional Debridement Procedure Note  Indications:  Based on my examination of this patient's wound(s)/ulcer(s) today, debridement is required to promote healing and evaluate the wound base. Performed by: Tania Lebron MD    Consent obtained? Yes    Time out taken: Yes    Pain Control: Anesthetic: 4% Topical Xylocaine (5 ml)     Debridement:Excisional Debridement    Using curette the wound/ulcer was sharply debrided    down through and including the removal of  dermis and subcutaneous tissue.         Devitalized Tissue Debrided:  biofilm and slough      Pre Debridement Measurements:  Are located in the [place]  Documentation Flow Sheet   Wound/Ulcer #: 1, 2 and 4     Post  Debridement Measurements:  Wound 03/28/17 Leg Lateral;Left;Lower # 1  (Active)   Wound Type Wound 10/19/2017  3:50 PM   Wound Traumatic 9/21/2017  6:15 PM   Dressing Status Old drainage 10/19/2017  3:50 PM   Dressing Changed Changed/New 10/12/2017  4:30 PM   Dressing/Treatment Other (Comment) granulation tissue;Red;Slough; Yellow 10/19/2017  3:50 PM   Margins Attached edges 10/19/2017  3:50 PM   Mandy-wound Assessment Dry; Intact 10/19/2017  3:50 PM   Non-staged Wound Description Full thickness 10/19/2017  3:50 PM   Red%Wound Bed 95% 10/19/2017  3:50 PM   Yellow%Wound Bed 5% 10/19/2017  3:50 PM   Black%Wound Bed 100 10/12/2017  3:55 PM   Drainage Amount Small 10/19/2017  3:50 PM   Drainage Description Serosanguinous 10/19/2017  3:50 PM   Odor None 10/12/2017  3:55 PM   Op First Treatment Date 08/24/17 8/24/2017  5:08 PM   Number of days: 55       Wound 09/21/17 Leg Anterior; Left #4 ( skin tore with dressing change 9/19/17) (Active)   Wound Type Wound 10/19/2017  3:50 PM   Wound Traumatic 9/21/2017  3:54 PM   Dressing Status Dry; Intact 10/19/2017  3:50 PM   Dressing Changed Changed/New 10/12/2017  4:30 PM   Dressing/Treatment Other (Comment) 10/12/2017  4:30 PM   Wound Cleansed Rinsed/Irrigated with saline 10/12/2017  4:30 PM   Wound Length (cm) 1.6 cm 10/19/2017  3:50 PM   Wound Width (cm) 0.4 cm 10/19/2017  3:50 PM   Wound Depth (cm)  0.1 10/19/2017  3:50 PM   Calculated Wound Size (cm^2) (l*w) 0.64 cm^2 10/19/2017  3:50 PM   Change in Wound Size % (l*w) 81.29 10/19/2017  3:50 PM   Wound Assessment Black;Slough 10/19/2017  3:50 PM   Margins Undefined edges 10/19/2017  3:50 PM   Mandy-wound Assessment Dry;Pink 10/19/2017  3:50 PM   Non-staged Wound Description Full thickness 10/19/2017  3:50 PM   Red%Wound Bed 10 10/12/2017  3:55 PM   Yellow%Wound Bed 80 10/12/2017  3:55 PM   Black%Wound Bed 100% 10/19/2017  3:50 PM   Drainage Amount None 10/19/2017  3:50 PM   Drainage Description Tan 10/12/2017  3:55 PM   Odor None 10/12/2017  3:55 PM   Op First Treatment Date 09/21/17 9/21/2017  3:54 PM   Number of days: 27       Percent of Wound/Ulcer Debrided: 100%    Total Surface Area Debrided:  4.04 sq cm    Diabetic/Pressure/Non Pressure Ulcers only:  Ulcer: N/A    Bleeding: Minimal    Hemostasis Achieved: by pressure    Procedural Pain: 1  / 10     Post Procedural Pain: 0 / 10     Response to treatment:  Well tolerated by patient. Plan:     Treatment Note please see attached Discharge Instructions    In my professional opinion this patient would benefit from HBO Therapy: No    Written patient dismissal instructions given to patient and signed by patient or POA. Discharge Instructions       Wound Clinic Physician Orders and Discharge Instructions  [address] [facility] [place]  Telephone: [phone]      NAME: Nova Gruber  YOB: 1940  MEDICAL RECORD NUMBER: [id number]  DATE: 10/19/2017      Wound Cleansing:   Do not scrub or use excessive force. Cleanse wound prior to applying a clean dressing with:  [x] Normal Saline [x] Keep Wound Dry in Shower [] Wound Cleanser   [] Cleanse wound with Mild Soap & Water [] May Shower at Discharge   [] Other:       Topical Treatments:  Do not apply lotions, creams, or ointments to wound bed unless directed. [] Apply moisturizing lotion to skin surrounding the wound prior to dressing change.  [] Apply antifungal ointment to skin surrounding the wound prior to dressing change. [x] Apply thin film of moisture barrier ointment to skin immediately around wound.   [] Other:   Elmon Fresh  Dressings:                            Wound Location: LEFT LOWER LEG  WOUNDS                  [x] Apply Primary Dressing:                                                                                      [] MediHoney Gel                    [] Alginate with Silver              [] Alginate                        [x] Collagen                   [] Collagen with Silver   [] Santyl with Moisten saline gauze                          [] Hydrocolloid               [] MediHoney Alginate           [] Foam with Silver                        [] Foam   [] Hydrofera Blue                  [] Mepilex Instructions given to ECF/Transportation/POA  Martin Lomas  [x] After Visit Summary  [x] Comprehensive Discharge Instruction        Electronically signed by Naeem Cage MD on 10/19/2017 at [address] [facility] PM  Discharge [address] [facility] Physician Orders and Discharge Instructions  [facility]  [address] [facility]   [address] [place]  Telephone: [phone]      NAME: Renee Linnette OF BIRTH: 1940  MEDICAL RECORD NUMBER: [id number]  DATE: 10/19/2017      Wound Cleansing:   Do not scrub or use excessive force. Cleanse wound prior to applying a clean dressing with:  [x] Normal Saline [x] Keep Wound Dry in Shower [] Wound Cleanser   [] Cleanse wound with Mild Soap & Water [] May Shower at Discharge   [] Other:       Topical Treatments:  Do not apply lotions, creams, or ointments to wound bed unless directed. [] Apply moisturizing lotion to skin surrounding the wound prior to dressing change.  [] Apply antifungal ointment to skin surrounding the wound prior to dressing change. [x] Apply thin film of moisture barrier ointment to skin immediately around wound.   [] Other:   Martin Lomas  Dressings:                            Wound Location: LEFT LOWER LEG  WOUNDS                  [x] Apply Primary Dressing:                                                                                      [] MediHoney Gel                    [] Alginate with Silver              [] Alginate                        [x] Collagen                   [] Collagen with Silver   [] Santyl with Moisten saline gauze                          [] Hydrocolloid               [] MediHoney Alginate           [] Foam with Silver                        [] Foam   [] Hydrofera Blue                  [] Mepilex Border                                             [x] Moisten with Visit Summary  [x] Comprehensive Discharge Instruction        Electronically signed by Gustabo Ricketts MD on 10/19/2017 at 4:11 PM

## 2017-10-24 ENCOUNTER — OFFICE VISIT (OUTPATIENT)
Dept: FAMILY MEDICINE CLINIC | Age: 77
End: 2017-10-24

## 2017-10-24 VITALS
WEIGHT: 244 LBS | SYSTOLIC BLOOD PRESSURE: 128 MMHG | HEIGHT: 61 IN | DIASTOLIC BLOOD PRESSURE: 73 MMHG | HEART RATE: 80 BPM | BODY MASS INDEX: 46.07 KG/M2

## 2017-10-24 DIAGNOSIS — I82.409 RECURRENT DEEP VEIN THROMBOSIS (DVT) (HCC): ICD-10-CM

## 2017-10-24 DIAGNOSIS — Z23 NEEDS FLU SHOT: ICD-10-CM

## 2017-10-24 DIAGNOSIS — D64.9 ANEMIA, UNSPECIFIED TYPE: ICD-10-CM

## 2017-10-24 DIAGNOSIS — L97.922 SKIN ULCER OF LEFT LOWER LEG WITH FAT LAYER EXPOSED (HCC): ICD-10-CM

## 2017-10-24 DIAGNOSIS — N18.30 CHRONIC KIDNEY DISEASE (CKD), STAGE III (MODERATE) (HCC): Primary | ICD-10-CM

## 2017-10-24 DIAGNOSIS — G47.33 OSA (OBSTRUCTIVE SLEEP APNEA): ICD-10-CM

## 2017-10-24 DIAGNOSIS — N18.30 TYPE 2 DIABETES MELLITUS WITH STAGE 3 CHRONIC KIDNEY DISEASE, WITHOUT LONG-TERM CURRENT USE OF INSULIN (HCC): ICD-10-CM

## 2017-10-24 DIAGNOSIS — I10 ESSENTIAL HYPERTENSION, BENIGN: ICD-10-CM

## 2017-10-24 DIAGNOSIS — E11.22 TYPE 2 DIABETES MELLITUS WITH STAGE 3 CHRONIC KIDNEY DISEASE, WITHOUT LONG-TERM CURRENT USE OF INSULIN (HCC): ICD-10-CM

## 2017-10-24 DIAGNOSIS — E78.00 HYPERCHOLESTEROLEMIA: ICD-10-CM

## 2017-10-24 LAB
A/G RATIO: 1.5 (ref 1.1–2.2)
ALBUMIN SERPL-MCNC: 4.4 G/DL (ref 3.4–5)
ALP BLD-CCNC: 63 U/L (ref 40–129)
ALT SERPL-CCNC: 16 U/L (ref 10–40)
ANION GAP SERPL CALCULATED.3IONS-SCNC: 16 MMOL/L (ref 3–16)
AST SERPL-CCNC: 17 U/L (ref 15–37)
BASOPHILS ABSOLUTE: 0.1 K/UL (ref 0–0.2)
BASOPHILS RELATIVE PERCENT: 1 %
BILIRUB SERPL-MCNC: 0.3 MG/DL (ref 0–1)
BUN BLDV-MCNC: 39 MG/DL (ref 7–20)
CALCIUM SERPL-MCNC: 11.1 MG/DL (ref 8.3–10.6)
CHLORIDE BLD-SCNC: 98 MMOL/L (ref 99–110)
CO2: 31 MMOL/L (ref 21–32)
CREAT SERPL-MCNC: 1.9 MG/DL (ref 0.6–1.2)
EOSINOPHILS ABSOLUTE: 0.4 K/UL (ref 0–0.6)
EOSINOPHILS RELATIVE PERCENT: 6.5 %
ESTIMATED AVERAGE GLUCOSE: 119.8 MG/DL
GFR AFRICAN AMERICAN: 31
GFR NON-AFRICAN AMERICAN: 26
GLOBULIN: 2.9 G/DL
GLUCOSE BLD-MCNC: 163 MG/DL (ref 70–99)
HBA1C MFR BLD: 5.8 %
HCT VFR BLD CALC: 36.8 % (ref 36–48)
HEMOGLOBIN: 12.4 G/DL (ref 12–16)
LYMPHOCYTES ABSOLUTE: 1.4 K/UL (ref 1–5.1)
LYMPHOCYTES RELATIVE PERCENT: 21.7 %
MCH RBC QN AUTO: 32.5 PG (ref 26–34)
MCHC RBC AUTO-ENTMCNC: 33.6 G/DL (ref 31–36)
MCV RBC AUTO: 96.6 FL (ref 80–100)
MONOCYTES ABSOLUTE: 0.6 K/UL (ref 0–1.3)
MONOCYTES RELATIVE PERCENT: 9.8 %
NEUTROPHILS ABSOLUTE: 3.8 K/UL (ref 1.7–7.7)
NEUTROPHILS RELATIVE PERCENT: 61 %
PDW BLD-RTO: 16.2 % (ref 12.4–15.4)
PLATELET # BLD: 219 K/UL (ref 135–450)
PMV BLD AUTO: 7.3 FL (ref 5–10.5)
POTASSIUM SERPL-SCNC: 4 MMOL/L (ref 3.5–5.1)
RBC # BLD: 3.81 M/UL (ref 4–5.2)
SODIUM BLD-SCNC: 145 MMOL/L (ref 136–145)
TOTAL PROTEIN: 7.3 G/DL (ref 6.4–8.2)
WBC # BLD: 6.3 K/UL (ref 4–11)

## 2017-10-24 PROCEDURE — 99215 OFFICE O/P EST HI 40 MIN: CPT | Performed by: FAMILY MEDICINE

## 2017-10-24 PROCEDURE — 4040F PNEUMOC VAC/ADMIN/RCVD: CPT | Performed by: FAMILY MEDICINE

## 2017-10-24 PROCEDURE — G8484 FLU IMMUNIZE NO ADMIN: HCPCS | Performed by: FAMILY MEDICINE

## 2017-10-24 PROCEDURE — 1123F ACP DISCUSS/DSCN MKR DOCD: CPT | Performed by: FAMILY MEDICINE

## 2017-10-24 PROCEDURE — 90662 IIV NO PRSV INCREASED AG IM: CPT | Performed by: FAMILY MEDICINE

## 2017-10-24 PROCEDURE — 1090F PRES/ABSN URINE INCON ASSESS: CPT | Performed by: FAMILY MEDICINE

## 2017-10-24 PROCEDURE — G8427 DOCREV CUR MEDS BY ELIG CLIN: HCPCS | Performed by: FAMILY MEDICINE

## 2017-10-24 PROCEDURE — G8598 ASA/ANTIPLAT THER USED: HCPCS | Performed by: FAMILY MEDICINE

## 2017-10-24 PROCEDURE — 1036F TOBACCO NON-USER: CPT | Performed by: FAMILY MEDICINE

## 2017-10-24 PROCEDURE — G0008 ADMIN INFLUENZA VIRUS VAC: HCPCS | Performed by: FAMILY MEDICINE

## 2017-10-24 PROCEDURE — G8417 CALC BMI ABV UP PARAM F/U: HCPCS | Performed by: FAMILY MEDICINE

## 2017-10-24 PROCEDURE — 36415 COLL VENOUS BLD VENIPUNCTURE: CPT | Performed by: FAMILY MEDICINE

## 2017-10-24 PROCEDURE — G8400 PT W/DXA NO RESULTS DOC: HCPCS | Performed by: FAMILY MEDICINE

## 2017-10-24 NOTE — PROGRESS NOTES
Chief Complaint   Patient presents with    Follow-up       HPI: Wilfred Prince presents for evaluation and management of chronic mult issues. Cliff Pacheco reports that she is doing pretty well. She is frustrated that her leg wound has not healed up for what is now about 9 months. She continues to take all of her chronic meds for blood pressure cholesterol and diabetes. Tolerating them well. 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 Susann Homans. She checks her blood sugars typically between breakfast and lunch and they're running about 120. Her edema is controlled with Lasix. She has been working on weight loss for her obstructive sleep apnea and has been avoiding carbohydrates. Has managed to affect a 9 pound weight loss. ROS: no fever or chills, no headache, no runny nose, no sore throat, no cough, no sob, no chest pain, no palpitation, no abd pain, no n/v/d/c, no  joint swelling, no rash, no new skin breakdown, no new weakness or numbness, no depressed mood, no suicidal or homicidal ideation  somey frequency, no dysuria, no bleeding and bruising    Allergies   Allergen Reactions    Latex Rash     Swelling lesions      Ace Inhibitors     Hydrochlorothiazide     Neosporin [Neomycin-Bacitracin Zn-Polymyx]     Penicillins     Sulfa Antibiotics     Tape [Adhesive Tape] Other (See Comments)     Sores     New Prescriptions    No medications on file     Current Outpatient Prescriptions   Medication Sig Dispense Refill    furosemide (LASIX) 80 MG tablet TAKE ONE HALF TO ONE WHOLE TABLET DAILY AS NEEDED FOR SWELLING 90 tablet 0    potassium chloride (KLOR-CON) 10 MEQ extended release tablet TAKE ONE TABLET BY MOUTH DAILY 90 tablet 3    cloNIDine (CATAPRES) 0.1 MG tablet TAKE 0.5 (1/2) TABLET BY MOUTH 2 TIMES DAILY. (Patient taking differently: daily TAKE 0.5 (1/2) TABLET BY MOUTH 2 TIMES DAILY. ) 60 tablet 5    exenatide (BYDUREON) 2 MG SRER injection INJECT 1

## 2017-11-02 ENCOUNTER — HOSPITAL ENCOUNTER (OUTPATIENT)
Dept: WOUND CARE | Age: 77
Discharge: HOME | End: 2017-11-02
Admitting: SURGERY

## 2017-11-02 VITALS
BODY MASS INDEX: 47.99 KG/M2 | HEART RATE: 80 BPM | HEIGHT: 61 IN | DIASTOLIC BLOOD PRESSURE: 70 MMHG | SYSTOLIC BLOOD PRESSURE: 160 MMHG | WEIGHT: 254.19 LBS | RESPIRATION RATE: 16 BRPM | TEMPERATURE: 98.8 F

## 2017-11-02 DIAGNOSIS — L97.922 SKIN ULCER OF LEFT LOWER LEG WITH FAT LAYER EXPOSED (HCC): Primary | ICD-10-CM

## 2017-11-02 PROCEDURE — 11042 DBRDMT SUBQ TIS 1ST 20SQCM/<: CPT | Performed by: SURGERY

## 2017-11-02 RX ORDER — LIDOCAINE HYDROCHLORIDE 40 MG/ML
SOLUTION TOPICAL ONCE
Status: DISCONTINUED | OUTPATIENT
Start: 2017-11-02 | End: 2017-11-03 | Stop reason: HOSPADM

## 2017-11-02 NOTE — PROGRESS NOTES
Subjective:      Patient ID: Aaron Viera is a 68 y.o. female. Chief Complaint   Patient presents with    Wound Check     Follow Up on Left Lower Leg   Says she is doing well., min pain in wound, but skin in calf sensitive     HPI  Huge hematoma that made skin ischemic Lt leg    Review of Systems  No change  Objective:   Physical Exam   Constitutional:   Morbid obesity   Cardiovascular:   Pulses:       Dorsalis pedis pulses are 2+ on the right side, and 2+ on the left side. Posterior tibial pulses are 1+ on the right side, and 0 on the left side. + 4 pitting edema and hematoma Lt   Musculoskeletal: Edema: + 4 left leg pitting edema. Legs:  Much improved    Assessment:      1. Traumatic hematoma of left lower leg, initial encounter     2. Leg pain, left     Even when now when most of the skin looks normal but still is  sensitive to light touch. Ant lat and posterior calf    Duplex scan was normal         Excisional Debridement Procedure Note  Indications:  Based on my examination of this patient's wound(s)/ulcer(s) today, debridement is required to promote healing and evaluate the wound base. Performed by: Tania Lebron MD    Consent obtained? Yes    Time out taken: Yes    Pain Control: Anesthetic: 4% Topical Xylocaine (5 ml)     Debridement:Excisional Debridement    Using curette, scissors and forceps the wound/ulcer was sharply debrided    down through and including the removal of  subcutaneous tissue.         Devitalized Tissue Debrided:  slough and necrotic/eschar      Pre Debridement Measurements:  Are located in the [facility]  Documentation Flow Sheet   Wound/Ulcer #: 1, 4     Post  Debridement Measurements:  Wound 03/28/17 Leg Lateral;Left;Lower;Proximal # 1  (Active)   Wound Image   11/2/2017  5:14 PM   Wound Type Wound 10/19/2017  3:50 PM   Wound Traumatic 9/21/2017  6:15 PM   Dressing Status Old drainage 11/2/2017  4:50 PM   Dressing Changed Changed/New 11/2/2017  5:30 PM N/A    Bleeding: Minimal    Hemostasis Achieved: by pressure    Procedural Pain: 2  / 10     Post Procedural Pain: 0 / 10     Response to treatment:  Well tolerated by patient. Plan:     Treatment Note please see attached Discharge Instructions    In my professional opinion this patient would benefit from HBO Therapy: No    Written patient dismissal instructions given to patient and signed by patient or POA. Discharge Instructions       Wound Clinic Physician Orders and Discharge Instructions  [address]   [address], [place] [address]  Telephone: [phone]      NAME: Krunal Mclaughlin  YOB: 1940  MEDICAL RECORD NUMBER: [id number]  DATE: 11/2/2017      Wound Cleansing:   Do not scrub or use excessive force. Cleanse wound prior to applying a clean dressing with:  [x] Normal Saline [x] Keep Wound Dry in Shower [] Wound Cleanser   [] Cleanse wound with Mild Soap & Water [] May Shower at Discharge   [] Other:       Topical Treatments:  Do not apply lotions, creams, or ointments to wound bed unless directed. [] Apply moisturizing lotion to skin surrounding the wound prior to dressing change.  [] Apply antifungal ointment to skin surrounding the wound prior to dressing change. [x] Apply thin film of moisture barrier ointment to skin immediately around wound.   [] Other:   Sherron Aguilera  Dressings:                            Wound Location: LEFT LOWER LEG  WOUNDS                  [x] Apply Primary Dressing:                                                                                      [] MediHoney Gel                    [] Alginate with Silver              [] Alginate                        [] Collagen                   [] Collagen with Silver   [] Santyl with Moisten saline gauze                          [] Hydrocolloid               [] MediHoney Alginate           [] Foam with Silver                        [] Foam   [x] Hydrofera Blue                  [] Mepilex Border                                             [x] Moisten with Saline           [] Hydrogel                   [] Mepitel                                              [] Bactroban/Mupirocin            [] Polysporin                                      [] Other:    [] Pack wound loosely with                 [] Iodoform   [] Plain Packing                 [] Other   [x] Cover and Secure with:                                           [x] Gauze                      [] Jayjay             [x] Kerlix                        [] Ace Wrap                 [] Cover Roll Tape                   [] ABD                                                            [] Other:                         Avoid contact of tape with skin. [x] Change dressing:                 [] Daily                         [] Every Other Day                  [x] Three times per week                        [] Once a week            [] Do Not Change Dressing                      [] Other:            Apply:   [x] SpandaGrip []Right Leg [x]Left Leg  []Low compression 5-10 mm/Hg   [x]Medium compression 10-20 mm/Hg  []High compression 20-30 mm/Hg  every morning immediately when getting up should be applied to affected leg(s) from mid foot to knee making sure to cover the heel. Remove every night before going to bed.      [x] Elevate leg(s) above the level of the heart when sitting. [x] Avoid prolonged standing in one place.       Compression:  Apply: [] Multilayer Compression Wrap Applied in Clinic []RightLeg []Left Leg  [] Multi-layer compression. Do not get leg(s) with wrap wet. If wraps become too tight call the center or completely remove the wrap. [x] Elevate leg(s) above the level of the heart when sitting.    [x] Avoid prolonged standing in one place.          Dietary:  [] Diet as tolerated: [x] Calorie Diabetic Diet: [] No Added Salt:  [] Increase Protein: [] Other:   Activity:  [x] Activity as tolerated: [] Patient has no activity restrictions [] Strict Bedrest: [] Remain off Work: [] May return to full duty work: [] Return to work with restrictions:   [address]  Return Appointment:  [] Wound and dressing supply provider:   [] [facility] or [facility] [address]:   [] Nurse visit: [] Physician or NP scheduled for Nurse Visit:   [x] Return Appointment: With DR Jazmyn Barroso in 1 Week(s)      [] Ordered tests:        [id number]      Nurse Case Manger: : Lorenz Krabbe  Electronically signed by Hans Pearson RN on 11/2/2017 at 5:05 PM  [address] [place] [address] Information: Should you experience any significant changes in your wound(s) or have questions about your wound care, please contact the [address] [place] [address] [phone] [address] [place] 8:30 am - 4:30 pm and Friday 8:30 am - 2:00 pm. If you need help with your wound outside these hours and cannot wait until we are again available, contact your PCP or go to the hospital emergency room.       Nurse Signature:_______________________      Date: ___________ Time: ____________  Kb Cater  Discharge Nurse Signature          PLEASE NOTE: IF YOU ARE UNABLE TO OBTAIN WOUND SUPPLIES, CONTINUE TO USE THE SUPPLIES YOU HAVE AVAILABLE UNTIL YOU ARE ABLE TO [facility]. IT IS MOST IMPORTANT TO KEEP THE WOUND COVERED AT ALL TIMES.     Physician Signature:_______________________  [address]  Date: ___________ Time: ____________  Kb Cater  [] Dr Jesenia Vasquez [x] Dr Sierra Kyle CNP   [] Dr Ksenia Burroughs [] Dr Orlando Rogers [] Dr Regulo Lemons [] Dr Davidson1 [facility]  [] Dr Danya Landa [] Jostin Duke NP [] Dr. Alda Church      The information contained in the After Visit Summary has been reviewed with me, the patient and/or responsible adult, by my health care provider(s). I had the opportunity to ask questions regarding this information.  I have elected to receive;          Patient Signature:_______________________  [address]  Date: ___________ Time:

## 2017-11-09 ENCOUNTER — HOSPITAL ENCOUNTER (OUTPATIENT)
Dept: WOUND CARE | Age: 77
Discharge: HOME | End: 2017-11-09
Attending: SURGERY | Admitting: SURGERY

## 2017-11-09 VITALS
RESPIRATION RATE: 18 BRPM | DIASTOLIC BLOOD PRESSURE: 76 MMHG | TEMPERATURE: 98.1 F | HEART RATE: 86 BPM | SYSTOLIC BLOOD PRESSURE: 153 MMHG

## 2017-11-09 DIAGNOSIS — L97.922 SKIN ULCER OF LEFT LOWER LEG WITH FAT LAYER EXPOSED (HCC): Primary | ICD-10-CM

## 2017-11-09 PROCEDURE — 11042 DBRDMT SUBQ TIS 1ST 20SQCM/<: CPT | Performed by: SURGERY

## 2017-11-09 RX ORDER — LIDOCAINE HYDROCHLORIDE 40 MG/ML
SOLUTION TOPICAL ONCE
Status: DISCONTINUED | OUTPATIENT
Start: 2017-11-09 | End: 2017-11-10 | Stop reason: HOSPADM

## 2017-11-09 ASSESSMENT — PAIN SCALES - GENERAL: PAINLEVEL_OUTOF10: 0

## 2017-11-09 NOTE — PROGRESS NOTES
Dressing/Treatment Other (Comment) 11/9/2017  5:06 PM   Wound Cleansed Rinsed/Irrigated with saline 10/12/2017  4:30 PM   Wound Length (cm) 0.6 cm 11/9/2017  4:22 PM   Wound Width (cm) 1 cm 11/9/2017  4:22 PM   Wound Depth (cm)  0.2 11/9/2017  4:22 PM   Calculated Wound Size (cm^2) (l*w) 0.6 cm^2 11/9/2017  4:22 PM   Change in Wound Size % (l*w) 99.57 11/9/2017  4:22 PM   Tunneling Position ___ O'Clock 4 4/27/2017  4:16 PM   Undermining Starts ___ O'Clock 9 4/27/2017  4:16 PM   Undermining Ends___ O'Clock 12 4/27/2017  4:16 PM   Undermining Maxium Distance (cm) 1.5 4/27/2017  4:16 PM   Wound Assessment Granulation tissue 11/9/2017  4:22 PM   Margins Undefined edges 11/2/2017  4:50 PM   Mandy-wound Assessment Fragile;Dry 11/9/2017  4:22 PM   Non-staged Wound Description Full thickness 9/21/2017  3:54 PM   [place]%Wound Bed 10 11/2/2017  4:50 PM   Red%Wound Bed 95 11/9/2017  4:22 PM   Yellow%Wound Bed 5 11/9/2017  4:22 PM   Black%Wound Bed 20 10/12/2017  3:55 PM   Other%Wound Bed 10 10/12/2017  3:55 PM   Drainage Amount Small 11/9/2017  4:22 PM   Drainage Description Serosanguinous 11/9/2017  4:22 PM   Odor None 11/9/2017  4:22 PM   Op First Treatment Date 03/24/17 5/18/2017  1:39 PM   Number of days: 226       Wound 09/21/17 Leg Left;Lateral;Distal #4 ( skin tore with dressing change 9/19/17) (Active)   Wound Image   11/2/2017  5:14 PM   Wound Type Wound 10/19/2017  3:50 PM   Wound Traumatic 9/21/2017  3:54 PM   Dressing Status Old drainage 11/9/2017  4:22 PM   Dressing Changed Changed/New 11/9/2017  5:06 PM   Dressing/Treatment Other (Comment) 11/9/2017  5:06 PM   Wound Cleansed Rinsed/Irrigated with saline 10/12/2017  4:30 PM   Wound Length (cm) 2.2 cm 11/9/2017  4:22 PM   Wound Width (cm) 1.4 cm 11/9/2017  4:22 PM   Wound Depth (cm)  0.1 11/9/2017  4:22 PM   Calculated Wound Size (cm^2) (l*w) 3.08 cm^2 11/9/2017  4:22 PM   Change in Wound Size % (l*w) 9.94 11/9/2017  4:22 PM   Wound Assessment Granulation

## 2017-11-16 ENCOUNTER — HOSPITAL ENCOUNTER (OUTPATIENT)
Dept: WOUND CARE | Age: 77
Discharge: HOME | End: 2017-11-16
Attending: SURGERY | Admitting: SURGERY

## 2017-11-16 VITALS
DIASTOLIC BLOOD PRESSURE: 76 MMHG | TEMPERATURE: 98.4 F | HEART RATE: 78 BPM | RESPIRATION RATE: 18 BRPM | SYSTOLIC BLOOD PRESSURE: 126 MMHG

## 2017-11-16 DIAGNOSIS — S80.12XD TRAUMATIC HEMATOMA OF LEFT LOWER LEG, SUBSEQUENT ENCOUNTER: Primary | ICD-10-CM

## 2017-11-16 PROCEDURE — 11042 DBRDMT SUBQ TIS 1ST 20SQCM/<: CPT | Performed by: SURGERY

## 2017-11-16 RX ORDER — LIDOCAINE HYDROCHLORIDE 40 MG/ML
SOLUTION TOPICAL ONCE
Status: DISCONTINUED | OUTPATIENT
Start: 2017-11-16 | End: 2017-11-17 | Stop reason: HOSPADM

## 2017-11-16 NOTE — PROGRESS NOTES
Subjective:    11/16/17  Patient ID: Corbin Ortiz is a 68 y.o. female. Chief Complaint   Patient presents with    Wound Check     follow up wounds left lower leg   Says she is doing well., min pain in wound, but skin in calf sensitive     HPI  Huge hematoma that made skin ischemic Lt leg    Review of Systems  No change  Objective:   Physical Exam   Constitutional:   Morbid obesity   Cardiovascular:   Pulses:       Dorsalis pedis pulses are 2+ on the right side, and 2+ on the left side. Posterior tibial pulses are 1+ on the right side, and 0 on the left side. + 4 pitting edema and hematoma Lt   Musculoskeletal: Edema: + 4 left leg pitting edema. Legs:  Much improved    Assessment:      1. Traumatic hematoma of left lower leg, initial encounter     2. Leg pain, left     Even when now when most of the skin looks normal but still is  sensitive to light touch. Ant lat and posterior calf    Duplex scan was normal           Excisional Debridement Procedure Note  Indications:  Based on my examination of this patient's wound(s)/ulcer(s) today, debridement is required to promote healing and evaluate the wound base. Performed by: Alvaro Anaya MD    Consent obtained? Yes    Time out taken: Yes    Pain Control: Anesthetic: 4% Lidocaine Liquid Topical (5mL's)     Debridement:Excisional Debridement    Using curette, scissors and forceps the wound/ulcer was sharply debrided    down through and including the removal of  epidermis, dermis and subcutaneous tissue.         Devitalized Tissue Debrided:  fibrin, biofilm, slough and callus      Pre Debridement Measurements:  Are located in the [place] [facility]  Documentation Flow Sheet   Wound/Ulcer #: 1 and 4     Post  Debridement Measurements:  Wound 03/28/17 Leg Lateral;Left;Lower;Proximal # 1  (Active)   Wound Image   11/2/2017  5:14 PM   Wound Type Wound 10/19/2017  3:50 PM   Wound Traumatic 9/21/2017  6:15 PM   Dressing Status Old drainage 11/16/2017  3:43

## 2017-11-21 DIAGNOSIS — R60.0 LOCALIZED EDEMA: ICD-10-CM

## 2017-11-22 RX ORDER — FUROSEMIDE 80 MG
TABLET ORAL
Qty: 90 TABLET | Refills: 3 | Status: SHIPPED | OUTPATIENT
Start: 2017-11-22 | End: 2018-04-26 | Stop reason: SDUPTHER

## 2017-11-30 ENCOUNTER — HOSPITAL ENCOUNTER (OUTPATIENT)
Dept: WOUND CARE | Age: 77
Discharge: HOME | End: 2017-11-30
Attending: SURGERY | Admitting: SURGERY

## 2017-11-30 VITALS
DIASTOLIC BLOOD PRESSURE: 75 MMHG | RESPIRATION RATE: 18 BRPM | HEART RATE: 79 BPM | TEMPERATURE: 98.3 F | SYSTOLIC BLOOD PRESSURE: 122 MMHG

## 2017-11-30 DIAGNOSIS — S80.12XD TRAUMATIC HEMATOMA OF LEFT LOWER LEG, SUBSEQUENT ENCOUNTER: Primary | ICD-10-CM

## 2017-11-30 PROCEDURE — 11042 DBRDMT SUBQ TIS 1ST 20SQCM/<: CPT | Performed by: SURGERY

## 2017-11-30 RX ORDER — LIDOCAINE HYDROCHLORIDE 40 MG/ML
SOLUTION TOPICAL ONCE
Status: DISCONTINUED | OUTPATIENT
Start: 2017-11-30 | End: 2017-12-01 | Stop reason: HOSPADM

## 2017-11-30 NOTE — PROGRESS NOTES
Subjective:    11/16/17  Patient ID: Juniro Be is a 68 y.o. female. Chief Complaint   Patient presents with    Wound Check     follow up wound left lateral and distal leg   Says she is doing well., min pain in wound, but skin in calf sensitive     HPI  Huge hematoma that made skin ischemic Lt leg    Review of Systems  No change  Objective:   Physical Exam   Constitutional:   Morbid obesity   Cardiovascular:   Pulses:       Dorsalis pedis pulses are 2+ on the right side, and 2+ on the left side. Posterior tibial pulses are 1+ on the right side, and 0 on the left side. + 4 pitting edema and hematoma Lt   Musculoskeletal: Edema: + 4 left leg pitting edema. Legs:  Much improved    Assessment:      1. Traumatic hematoma of left lower leg, initial encounter     2. Leg pain, left     Even when now when most of the skin looks normal but still is  sensitive to light touch. Ant lat and posterior calf    Duplex scan was normal             Excisional Debridement Procedure Note  Indications:  Based on my examination of this patient's wound(s)/ulcer(s) today, debridement is required to promote healing and evaluate the wound base. Performed by: Ag Davis MD    Consent obtained? Yes    Time out taken: Yes    Pain Control: Anesthetic: 4% Lidocaine Liquid Topical (5mL's)     Debridement:Excisional Debridement    Using curette the wound/ulcer was sharply debrided    down through and including the removal of  dermis and subcutaneous tissue.         Devitalized Tissue Debrided:  biofilm, slough and necrotic/eschar      Pre Debridement Measurements:  Are located in the [facility]  Documentation Flow Sheet   Wound/Ulcer #: 1 and 4     Post  Debridement Measurements:  Wound 03/28/17 Leg Lateral;Left;Lower;Proximal # 1  (Active)   Wound Image   11/2/2017  5:14 PM   Wound Type Wound 10/19/2017  3:50 PM   Wound Traumatic 9/21/2017  6:15 PM   Dressing Status Old drainage 11/30/2017  3:40 PM   Dressing Size % (l*w) -215.79 11/30/2017  4:04 PM   Wound Assessment Granulation tissue; Hyper granulation tissue 11/30/2017  3:40 PM   Drainage Amount Small 11/30/2017  3:40 PM   Drainage Description Serosanguinous 11/30/2017  3:40 PM   Odor None 11/30/2017  3:40 PM   Margins Undefined edges 11/30/2017  3:40 PM   Mandy-wound Assessment Dry;Pink 11/30/2017  3:40 PM   Non-staged Wound Description Full thickness 11/2/2017  4:50 PM   [place]%Wound Bed 10 11/2/2017  4:50 PM   Red%Wound Bed 95 11/30/2017  3:40 PM   Yellow%Wound Bed 5 11/30/2017  3:40 PM   Black%Wound Bed 100% 10/19/2017  3:50 PM   Op First Treatment Date 09/21/17 9/21/2017  3:54 PM   Number of days: 70       Percent of Wound/Ulcer Debrided: 100%    Total Surface Area Debrided:  12.62 sq cm    Diabetic/Pressure/Non Pressure Ulcers only:  Ulcer: N/A    Bleeding: Minimal    Hemostasis Achieved: by pressure    Procedural Pain: 3  / 10     Post Procedural Pain: 0 / 10     Response to treatment:  Well tolerated by patient.

## 2017-12-07 ENCOUNTER — HOSPITAL ENCOUNTER (OUTPATIENT)
Dept: WOUND CARE | Age: 77
Discharge: HOME | End: 2017-12-07
Attending: SURGERY | Admitting: SURGERY

## 2017-12-07 VITALS
DIASTOLIC BLOOD PRESSURE: 73 MMHG | TEMPERATURE: 97.6 F | HEIGHT: 61 IN | RESPIRATION RATE: 18 BRPM | BODY MASS INDEX: 46.49 KG/M2 | HEART RATE: 78 BPM | WEIGHT: 246.25 LBS | SYSTOLIC BLOOD PRESSURE: 155 MMHG

## 2017-12-07 DIAGNOSIS — S80.12XD TRAUMATIC HEMATOMA OF LEFT LOWER LEG, SUBSEQUENT ENCOUNTER: Primary | ICD-10-CM

## 2017-12-07 PROCEDURE — 11042 DBRDMT SUBQ TIS 1ST 20SQCM/<: CPT | Performed by: SURGERY

## 2017-12-07 RX ORDER — LIDOCAINE HYDROCHLORIDE 40 MG/ML
SOLUTION TOPICAL ONCE
Status: DISCONTINUED | OUTPATIENT
Start: 2017-12-07 | End: 2017-12-08 | Stop reason: HOSPADM

## 2017-12-07 ASSESSMENT — PAIN SCALES - GENERAL: PAINLEVEL_OUTOF10: 0

## 2017-12-07 NOTE — PROGRESS NOTES
Subjective:    12/7/17  Patient ID: Jose Munguia is a 68 y.o. female. Chief Complaint   Patient presents with    Wound Check     Follow-up visit for wounds to the left lower leg. Says she is doing well., min pain in wound, but skin in calf sensitive     HPI  Huge hematoma that made skin ischemic Lt leg    Review of Systems  No change  Objective:   Physical Exam   Constitutional:   Morbid obesity   Cardiovascular:   Pulses:       Dorsalis pedis pulses are 2+ on the right side, and 2+ on the left side. Posterior tibial pulses are 1+ on the right side, and 0 on the left side. + 4 pitting edema and hematoma Lt   Musculoskeletal: Edema: + 4 left leg pitting edema. Legs:  Much improved    Assessment:      1. Traumatic hematoma of left lower leg, initial encounter     2. Leg pain, left     Even when now when most of the skin looks normal but still is  sensitive to light touch. Ant lat and posterior calf    Duplex scan was normal             Excisional Debridement Procedure Note  Indications:  Based on my examination of this patient's wound(s)/ulcer(s) today, debridement is required to promote healing and evaluate the wound base. Performed by: Amelia Klein MD    Consent obtained? Yes    Time out taken: Yes    Pain Control: Anesthetic: 4% Lidocaine Liquid Topical (5 ml)     Debridement:Excisional Debridement    Using curette the wound/ulcer was sharply debrided    down through and including the removal of  subcutaneous tissue.         Devitalized Tissue Debrided:  biofilm, slough and necrotic/eschar      Pre Debridement Measurements:  Are located in the [facility]  Documentation Flow Sheet   Wound/Ulcer #: 1 and 5     Post  Debridement Measurements:  Wound 03/28/17 Leg Lateral;Left;Lower;Proximal # 1  (Active)   Wound Image   11/2/2017  5:14 PM   Wound Type Wound 12/7/2017  1:45 PM   Wound Traumatic 9/21/2017  6:15 PM   Dressing Status Old drainage 12/7/2017  1:45 PM   Dressing Changed Changed/New 12/7/2017  2:07 PM   Dressing/Treatment Other (Comment) 12/7/2017  2:07 PM   Wound Cleansed Rinsed/Irrigated with saline 10/12/2017  4:30 PM   Wound Length (cm) 2.9 cm 12/7/2017  2:03 PM   Wound Width (cm) 1.2 cm 12/7/2017  2:03 PM   Wound Depth (cm)  0.1 12/7/2017  1:45 PM   Calculated Wound Size (cm^2) (l*w) 3.48 cm^2 12/7/2017  2:03 PM   Change in Wound Size % (l*w) 97.48 12/7/2017  2:03 PM   Tunneling Position ___ O'Clock 4 4/27/2017  4:16 PM   Undermining Starts ___ O'Clock 9 4/27/2017  4:16 PM   Undermining Ends___ O'Clock 12 4/27/2017  4:16 PM   Undermining Maxium Distance (cm) 1.5 4/27/2017  4:16 PM   Wound Assessment Granulation tissue;Pink;Red;Slough; Yellow 12/7/2017  1:45 PM   Drainage Amount Small 12/7/2017  1:45 PM   Drainage Description Serosanguinous 12/7/2017  1:45 PM   Odor None 12/7/2017  1:45 PM   Margins Defined edges 12/7/2017  1:45 PM   Mandy-wound Assessment Dry;Fragile;Pink 12/7/2017  1:45 PM   Non-staged Wound Description Full thickness 12/7/2017  1:45 PM   [place]%Wound Bed 80% 12/7/2017  1:45 PM   Red%Wound Bed 10% 12/7/2017  1:45 PM   Yellow%Wound Bed 10% 12/7/2017  1:45 PM   Black%Wound Bed 90 11/30/2017  3:40 PM   Other%Wound Bed 10 10/12/2017  3:55 PM   Op First Treatment Date 03/24/17 5/18/2017  1:39 PM   Number of days: 254       Wound 09/21/17 Leg Left;Lateral;Distal #4 ( skin tore with dressing change 9/19/17) (Active)   Wound Image   11/2/2017  5:14 PM   Wound Type Wound 10/19/2017  3:50 PM   Wound Traumatic 9/21/2017  3:54 PM   Dressing Status Dry; Intact 12/7/2017  1:59 PM   Dressing Changed Changed/New 11/30/2017  4:35 PM   Dressing/Treatment Other (Comment) 11/30/2017  4:35 PM   Wound Cleansed Rinsed/Irrigated with saline 10/12/2017  4:30 PM   Wound Length (cm) 0 cm 12/7/2017  1:59 PM   Wound Width (cm) 0 cm 12/7/2017  1:59 PM   Wound Depth (cm)  0 12/7/2017  1:59 PM   Calculated Wound Size (cm^2) (l*w) 0 cm^2 12/7/2017  1:59 PM   Change in Wound Size % (l*w) 100 12/7/2017  1:59 PM   Wound Assessment Epithelialization 12/7/2017  1:59 PM   Drainage Amount None 12/7/2017  1:59 PM   Drainage Description Serosanguinous 11/30/2017  3:40 PM   Odor None 12/7/2017  1:59 PM   Margins Undefined edges 11/30/2017  3:40 PM   Mandy-wound Assessment Dry;Pink 12/7/2017  1:59 PM   Non-staged Wound Description Full thickness 11/2/2017  4:50 PM   [place]%Wound Bed 10 11/2/2017  4:50 PM   Red%Wound Bed 95 11/30/2017  3:40 PM   Yellow%Wound Bed 5 11/30/2017  3:40 PM   Black%Wound Bed 100% 10/19/2017  3:50 PM   Op First Treatment Date 09/21/17 9/21/2017  3:54 PM   Number of days: 77       Wound 12/07/17 Leg Left; Lower; Anterior # 5 LEFT LOWER LEG ANTERIOR NOTED 12/7/17 (Active)   Dressing Changed Changed/New 12/7/2017  2:07 PM   Dressing/Treatment Other (Comment) 12/7/2017  2:07 PM   Wound Length (cm) 0.6 cm 12/7/2017  2:06 PM   Wound Width (cm) 0.3 cm 12/7/2017  2:06 PM   Wound Depth (cm)  0.1 12/7/2017  2:06 PM   Calculated Wound Size (cm^2) (l*w) 0.18 cm^2 12/7/2017  2:06 PM   Wound Assessment Granulation tissue;Red;Yellow 12/7/2017  2:06 PM   Drainage Amount None 12/7/2017  2:06 PM   Odor None 12/7/2017  2:06 PM   Margins Attached edges 12/7/2017  2:06 PM   Mandy-wound Assessment Dry 12/7/2017  2:06 PM   Non-staged Wound Description Full thickness 12/7/2017  2:06 PM   Red%Wound Bed 80 12/7/2017  2:06 PM   Yellow%Wound Bed 20 12/7/2017  2:06 PM   Number of days: 0       Percent of Wound/Ulcer Debrided: 100%    Total Surface Area Debrided:  3.66 sq cm    Diabetic/Pressure/Non Pressure Ulcers only:  Ulcer: N/A    Bleeding: Minimal    Hemostasis Achieved: by pressure    Procedural Pain: 4  / 10     Post Procedural Pain: 0 / 10     Response to treatment:  Well tolerated by patient.

## 2017-12-11 ENCOUNTER — TELEPHONE (OUTPATIENT)
Dept: FAMILY MEDICINE CLINIC | Age: 77
End: 2017-12-11

## 2017-12-11 DIAGNOSIS — E78.00 HYPERCHOLESTEROLEMIA: Primary | ICD-10-CM

## 2017-12-11 DIAGNOSIS — I10 ESSENTIAL HYPERTENSION, BENIGN: ICD-10-CM

## 2017-12-11 RX ORDER — CLONIDINE HYDROCHLORIDE 0.1 MG/1
0.05 TABLET ORAL 2 TIMES DAILY
Qty: 9 TABLET | Refills: 0 | Status: SHIPPED | OUTPATIENT
Start: 2017-12-11 | End: 2018-01-23

## 2017-12-11 NOTE — TELEPHONE ENCOUNTER
Attempted to contact patient on 12/11/2017. Result: left message on the patient's voicemail asking patient to return my call. Pre-Visit planning not completed.

## 2017-12-14 ENCOUNTER — HOSPITAL ENCOUNTER (OUTPATIENT)
Dept: WOUND CARE | Age: 77
Discharge: HOME | End: 2017-12-14
Attending: SURGERY | Admitting: SURGERY

## 2017-12-14 VITALS
TEMPERATURE: 98.1 F | RESPIRATION RATE: 18 BRPM | DIASTOLIC BLOOD PRESSURE: 73 MMHG | SYSTOLIC BLOOD PRESSURE: 135 MMHG | HEART RATE: 105 BPM

## 2017-12-14 DIAGNOSIS — S80.12XD TRAUMATIC HEMATOMA OF LEFT LOWER LEG, SUBSEQUENT ENCOUNTER: Primary | ICD-10-CM

## 2017-12-14 PROCEDURE — 11042 DBRDMT SUBQ TIS 1ST 20SQCM/<: CPT | Performed by: SURGERY

## 2017-12-14 RX ORDER — LIDOCAINE HYDROCHLORIDE 40 MG/ML
SOLUTION TOPICAL ONCE
Status: DISCONTINUED | OUTPATIENT
Start: 2017-12-14 | End: 2017-12-15 | Stop reason: HOSPADM

## 2017-12-14 ASSESSMENT — PAIN SCALES - GENERAL: PAINLEVEL_OUTOF10: 0

## 2017-12-14 NOTE — PROGRESS NOTES
12/14/2017  2:09 PM   Dressing/Treatment Other (Comment) 12/14/2017  2:09 PM   Wound Cleansed Rinsed/Irrigated with saline 10/12/2017  4:30 PM   Wound Length (cm) 3.6 cm 12/14/2017  1:45 PM   Wound Width (cm) 1 cm 12/14/2017  1:45 PM   Wound Depth (cm)  0.1 12/14/2017  1:23 PM   Calculated Wound Size (cm^2) (l*w) 3.6 cm^2 12/14/2017  1:45 PM   Change in Wound Size % (l*w) 97.39 12/14/2017  1:45 PM   Tunneling Position ___ O'Clock 4 4/27/2017  4:16 PM   Undermining Starts ___ O'Clock 9 4/27/2017  4:16 PM   Undermining Ends___ O'Clock 12 4/27/2017  4:16 PM   Undermining Maxium Distance (cm) 1.5 4/27/2017  4:16 PM   Wound Assessment Granulation tissue;Red;Slough; Yellow 12/14/2017  1:23 PM   Drainage Amount Small 12/14/2017  1:23 PM   Drainage Description Serosanguinous 12/14/2017  1:23 PM   Odor None 12/7/2017  1:45 PM   Margins Defined edges 12/14/2017  1:23 PM   Mandy-wound Assessment Dry 12/14/2017  1:23 PM   Non-staged Wound Description Full thickness 12/14/2017  1:23 PM   [place]%Wound Bed 80% 12/7/2017  1:45 PM   Red%Wound Bed 95% 12/14/2017  1:23 PM   Yellow%Wound Bed 5% 12/14/2017  1:23 PM   Black%Wound Bed 90 11/30/2017  3:40 PM   Other%Wound Bed 10 10/12/2017  3:55 PM   Op First Treatment Date 03/24/17 5/18/2017  1:39 PM   Number of days: 261       Wound 12/07/17 Leg Left; Lower; Anterior # 5 LEFT LOWER LEG ANTERIOR NOTED 12/7/17 (Active)   Wound Type Wound 12/14/2017  1:16 PM   Dressing Status Dry; Intact 12/14/2017  1:16 PM   Dressing Changed Changed/New 12/7/2017  2:07 PM   Dressing/Treatment Other (Comment) 12/7/2017  2:07 PM   Wound Length (cm) 0 cm 12/14/2017  1:16 PM   Wound Width (cm) 0 cm 12/14/2017  1:16 PM   Wound Depth (cm)  0 12/14/2017  1:16 PM   Calculated Wound Size (cm^2) (l*w) 0 cm^2 12/14/2017  1:16 PM   Change in Wound Size % (l*w) 100 12/14/2017  1:16 PM   Wound Assessment Epithelialization 12/14/2017  1:16 PM   Drainage Amount None 12/14/2017  1:16 PM   Odor None 12/7/2017  2:06 PM   Margins Attached edges 12/7/2017  2:06 PM   Mandy-wound Assessment Dry 12/14/2017  1:16 PM   Non-staged Wound Description Full thickness 12/7/2017  2:06 PM   Red%Wound Bed 80 12/7/2017  2:06 PM   Yellow%Wound Bed 20 12/7/2017  2:06 PM   Number of days: 7       Percent of Wound/Ulcer Debrided: 100%    Total Surface Area Debrided:  3.6 sq cm    Diabetic/Pressure/Non Pressure Ulcers only:  Ulcer: N/A    Bleeding: Minimal    Hemostasis Achieved: by pressure    Procedural Pain: 2  / 10     Post Procedural Pain: 0 / 10     Response to treatment:  Well tolerated by patient.

## 2017-12-19 ENCOUNTER — OFFICE VISIT (OUTPATIENT)
Dept: FAMILY MEDICINE CLINIC | Age: 77
End: 2017-12-19

## 2017-12-19 VITALS
SYSTOLIC BLOOD PRESSURE: 135 MMHG | BODY MASS INDEX: 46.63 KG/M2 | DIASTOLIC BLOOD PRESSURE: 72 MMHG | WEIGHT: 247 LBS | HEIGHT: 61 IN | HEART RATE: 72 BPM

## 2017-12-19 DIAGNOSIS — I10 ESSENTIAL HYPERTENSION, BENIGN: Primary | ICD-10-CM

## 2017-12-19 DIAGNOSIS — N18.30 TYPE 2 DIABETES MELLITUS WITH STAGE 3 CHRONIC KIDNEY DISEASE, WITHOUT LONG-TERM CURRENT USE OF INSULIN (HCC): ICD-10-CM

## 2017-12-19 DIAGNOSIS — N18.4 CHRONIC KIDNEY DISEASE, STAGE 4, SEVERELY DECREASED GFR (HCC): ICD-10-CM

## 2017-12-19 DIAGNOSIS — E11.22 TYPE 2 DIABETES MELLITUS WITH STAGE 3 CHRONIC KIDNEY DISEASE, WITHOUT LONG-TERM CURRENT USE OF INSULIN (HCC): ICD-10-CM

## 2017-12-19 DIAGNOSIS — I82.409 RECURRENT DEEP VEIN THROMBOSIS (DVT) (HCC): ICD-10-CM

## 2017-12-19 DIAGNOSIS — E78.00 HYPERCHOLESTEROLEMIA: ICD-10-CM

## 2017-12-19 DIAGNOSIS — I35.8 HEART MURMUR, AORTIC: ICD-10-CM

## 2017-12-19 DIAGNOSIS — M1A.0490 IDIOPATHIC CHRONIC GOUT OF HAND WITHOUT TOPHUS, UNSPECIFIED LATERALITY: ICD-10-CM

## 2017-12-19 PROCEDURE — 1090F PRES/ABSN URINE INCON ASSESS: CPT | Performed by: FAMILY MEDICINE

## 2017-12-19 PROCEDURE — G8400 PT W/DXA NO RESULTS DOC: HCPCS | Performed by: FAMILY MEDICINE

## 2017-12-19 PROCEDURE — 4040F PNEUMOC VAC/ADMIN/RCVD: CPT | Performed by: FAMILY MEDICINE

## 2017-12-19 PROCEDURE — G8427 DOCREV CUR MEDS BY ELIG CLIN: HCPCS | Performed by: FAMILY MEDICINE

## 2017-12-19 PROCEDURE — 99215 OFFICE O/P EST HI 40 MIN: CPT | Performed by: FAMILY MEDICINE

## 2017-12-19 PROCEDURE — G8484 FLU IMMUNIZE NO ADMIN: HCPCS | Performed by: FAMILY MEDICINE

## 2017-12-19 PROCEDURE — G8598 ASA/ANTIPLAT THER USED: HCPCS | Performed by: FAMILY MEDICINE

## 2017-12-19 PROCEDURE — 1123F ACP DISCUSS/DSCN MKR DOCD: CPT | Performed by: FAMILY MEDICINE

## 2017-12-19 PROCEDURE — 1036F TOBACCO NON-USER: CPT | Performed by: FAMILY MEDICINE

## 2017-12-19 PROCEDURE — G8417 CALC BMI ABV UP PARAM F/U: HCPCS | Performed by: FAMILY MEDICINE

## 2017-12-19 NOTE — PROGRESS NOTES
Chief Complaint   Patient presents with    Hypertension    Chronic Kidney Disease    Hyperlipidemia    Diabetes       HPI:  Aaron Viera is a Highly comorbid complicated and at risk 68 y.o. (: 1940) here today   for multiple medical problems. Patient is  compliant with their blood pressure medications  without Lightheadedness, Edema and Sedation  They are not checking Home Blood Pressures. Patient is  compliant with and Tolerating their cholesterol medication without muscle aches or abdominal pain. Patient is  compliant with their diabetes medications  without diaphoresis, , sweating, , anxiety, , tremor, , diarrhea,  and hypoglycemia   They are checkingblood sugars which are running 110-120. She reports her gout is controlled on current medications.     She tells me Dr. Liat Taylor took her off her chronic warfarin therapy for recurrent DVTs    ROS:  Fever: negative  Chills: negative  Headache: negative  Visual changes: negative  Runny nose: negative  Sore throat: negative  Ear pain: negative  Swollen glands or thyroid pain/swelling: negative  Cough: negative  Sob: negative  Chest pain: negative  Palpitation: negative  Abd pain: negative  Nausea: negative  Vomiting: negative  Diarrhea: negative  Constipation: negative  Urinary frequency: positive  Dysuria: negative  Focal weakness or numbness: negative  Joint pain or swelling:negative   Rash or skin changes: negative  Easy bleeding:  no  Easy bruising: no        Allergies   Allergen Reactions    Latex Rash     Swelling lesions      Ace Inhibitors     Hydrochlorothiazide     Neosporin [Neomycin-Bacitracin Zn-Polymyx]     Penicillins     Sulfa Antibiotics     Tape Joe Mat Tape] Other (See Comments)     Sores     New Prescriptions    No medications on file     Current Outpatient Prescriptions   Medication Sig Dispense Refill    cloNIDine (CATAPRES) 0.1 MG tablet 0.5 tablets 2 times daily TAKE 0.5 (1/2) TABLET BY MOUTH 2 TIMES DAILY. 9 tablet 0    furosemide (LASIX) 80 MG tablet TAKE ONE HALF TO ONE WHOLE TABLET DAILY AS NEEDED FOR SWELLING 90 tablet 3    potassium chloride (KLOR-CON) 10 MEQ extended release tablet TAKE ONE TABLET BY MOUTH DAILY 90 tablet 3    exenatide (BYDUREON) 2 MG SRER injection INJECT 1 SYRINGE INTO THE SKIN ONCE A WEEK. 12 Syringe 3    losartan (COZAAR) 50 MG tablet TAKE 1 TABLET BY MOUTH DAILY. 90 tablet 3    ascorbic acid (VITAMIN C) 500 MG tablet Take 500 mg by mouth 2 times daily      Cholecalciferol (VITAMIN D PO) Take 1 tablet by mouth daily      Omega-3 Fatty Acids (FISH OIL) 1000 MG CAPS Take 1,000 mg by mouth daily      magnesium oxide (MAG-OX) 400 MG tablet Take 400 mg by mouth daily      ondansetron (ZOFRAN ODT) 4 MG disintegrating tablet Take 1-2 tablets by mouth every 12 hours as needed for Nausea 12 tablet 0    acetaminophen (APAP EXTRA STRENGTH) 500 MG tablet Take 1 tablet by mouth every 6 hours as needed for Pain 40 tablet 0    allopurinol (ZYLOPRIM) 300 MG tablet TAKE ONE HALF TABLET BY MOUTH DAILY 45 tablet 3    metoprolol (LOPRESSOR) 100 MG tablet TAKE ONE TABLET BY MOUTH TWO TIMES A  tablet 3    glipiZIDE (GLUCOTROL XL) 5 MG extended release tablet TAKE 1 TABLET BY MOUTH DAILY. 90 tablet 3    amLODIPine (NORVASC) 10 MG tablet TAKE 0.5 (1/2) TABLET BY MOUTH DAILY 90 tablet 3    simvastatin (ZOCOR) 40 MG tablet TAKE ONE TABLET EVERY EVENING 90 tablet 3    Handicap Placard MISC by Does not apply route Duration 5 years 1 each 0    TRUETEST TEST strip TEST UP TO 5 TIMES A  strip 3    loratadine (CLARITIN) 10 MG tablet Take 10 mg by mouth daily.  fluticasone (FLONASE) 50 MCG/ACT nasal spray 2 sprays by Nasal route daily.  Multiple Minerals-Vitamins (CITRACAL PLUS) TABS Take 1 tablet by mouth 2 times daily.  Elastic Bandages & Supports (B-4 MED COMPRESSION HOSE MENS) MISC by Does not apply route.  Wear in daytime only        No current facility-administered medications for this visit.         Past Medical History:   Diagnosis Date    Blood circulation, collateral     Cerebral artery occlusion with cerebral infarction (HCC)     Chronic kidney disease (CKD), stage III (moderate)     CVA (cerebral infarction)     Diabetes     DVT, lower extremity, recurrent (HCC) 3/30/2012    x 3 LLE :  life long coumadin    Edema     Gout     Heart murmur, aortic 10/14/2014    EF normal, no aortic stenosis Echo 11/11/14    Hypercholesterolemia     Hyperhomocysteinemia ([address] 4/16/12    Hypertension     TRUPTI (obstructive sleep apnea)     CPAP at 16cm    Osteoarthritis     Sciatica 11/8/2016    Traumatic hematoma of left lower leg 3/21/2017    Venous insufficiency     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 Avelino Bean left lower extremity     Family History   Problem Relation Age of Onset    Other Father      AAA    Diabetes Mother     Heart Failure Mother     Arthritis Other     Cancer Brother      bone    High Blood Pressure Other      Social History   Substance Use Topics    Smoking status: Never Smoker    Smokeless tobacco: Never Used    Alcohol use No       Objective     /72   Pulse 72   Ht 5' 1\" (1.549 m)   Wt 247 lb (112 kg)   BMI 46.67 kg/m²     Wt Readings from Last 3 Encounters:   12/07/17 246 lb 4.1 oz (111.7 kg)   11/02/17 254 lb 3.1 oz (115.3 kg)   10/24/17 244 lb (110.7 kg)       WDWN in NAD obese  HEENT: NCAT, PERRL, TM's neg, Canals patent, Nares pink and Patent, Op/OC: pink and  Mallampati 4 with full dentures dentition  Neck: no LAD, no TMG, supple and symmetric  Lungs: CTAB BS Equal and Easy, no accessory muscle use  CV: RRR w/o M,R,G,Pedal pulses present 2+, trace to 1+ bilateral pedal edema  Abd:  BS+, S, ND, NT, no hsm  Neuro:  A&O x 3, CN2-12 grossly intact, Motor/Sensory intact w/o focal deficit, Coordination intact, normal gait  Psych:  Judgement and insight are intact, Nl Speech and motor activity, no JIMMY, no FOI, dressed casually in street clothes  Skin:  Left lower extremity in an compression dressing      Chemistry        Component Value Date/Time     10/24/2017 1002    K 4.0 10/24/2017 1002    CL 98 (L) 10/24/2017 1002    CO2 31 10/24/2017 1002    BUN 39 (H) 10/24/2017 1002    CREATININE 1.9 (H) 10/24/2017 1002        Component Value Date/Time    CALCIUM 11.1 (H) 10/24/2017 1002    ALKPHOS 63 10/24/2017 1002    AST 17 10/24/2017 1002    ALT 16 10/24/2017 1002    BILITOT 0.3 10/24/2017 1002          Lab Results   Component Value Date    WBC 6.3 10/24/2017    HGB 12.4 10/24/2017    HCT 36.8 10/24/2017    MCV 96.6 10/24/2017     10/24/2017     Lab Results   Component Value Date    LABA1C 5.8 10/24/2017     Lab Results   Component Value Date    .8 10/24/2017     Lab Results   Component Value Date    LABA1C 5.8 10/24/2017     No components found for: CHLPL  Lab Results   Component Value Date    TRIG 296 (H) 02/13/2017    TRIG 264 (H) 07/13/2016    TRIG 244 (H) 07/17/2015     Lab Results   Component Value Date    HDL 51 02/13/2017    HDL 51 07/13/2016    HDL 50 07/17/2015     Lab Results   Component Value Date    LDLCALC 112 (H) 02/13/2017    LDLCALC 91 07/13/2016    LDLCALC 97 07/17/2015     Lab Results   Component Value Date    LABVLDL 59 02/13/2017    LABVLDL 53 07/13/2016    LABVLDL 49 07/17/2015         Assessment   Plan     1. Essential hypertension, benign  Appears controlled: Continue meds recheck 3 months  - Comprehensive Metabolic Panel    2. Chronic kidney disease, stage 4, severely decreased GFR (HCC)  Chronic and persistent: Manage risk factors recheck labs prior to follow up next visit  - Comprehensive Metabolic Panel    3.  Hypercholesterolemia  Chronic and persistent, appears stable follow    4. Type 2 diabetes mellitus with stage 3 chronic kidney disease, without long-term current use of insulin (HCC)  Well-controlled: Continue meds recheck 3 months  - Comprehensive Metabolic Panel    5. Heart murmur, aortic  Appears stable reviewed echocardiogram with patient follow    6. 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 Recurrent deep vein thrombosis (DVT) (HCC)  Doing well off Coumadin at this time continue to monitor      Francesca received counseling on the following healthy behaviors: nutrition and exercise  I have instructed Francesca to complete a self tracking handout on Blood Sugars  and instructed them to bring it with them to her next appointment. Discussed use, benefit, and side effects of prescribed medications. Barriers to medication compliance addressed. All patient questions answered. Pt voiced understanding. RTC 3 months    Scribe attestation: Beatriz aMy am scribing for and in the presence of Eduardo Black MD. Electronically signed by Caroline Barcenas on 12/19/2017 at 11:52 AM      Provider attestation: Blanca Martinez MD  personally performed the services described in this documentation, as scribed by the user listed above in my presence, and it is both accurate and complete. I agree with the Chief Complaint, ROS, and Past Histories independently gathered by the clinical support staff and the remaining scribed note accurately describes my personal service to the patient.     12/19/2017    12:26 PM

## 2017-12-21 ENCOUNTER — HOSPITAL ENCOUNTER (OUTPATIENT)
Dept: WOUND CARE | Age: 77
Discharge: HOME | End: 2017-12-28
Attending: SURGERY | Admitting: SURGERY

## 2017-12-21 DIAGNOSIS — L97.922 SKIN ULCER OF LEFT LOWER LEG WITH FAT LAYER EXPOSED (HCC): Primary | ICD-10-CM

## 2017-12-21 DIAGNOSIS — S80.12XD TRAUMATIC HEMATOMA OF LEFT LOWER LEG, SUBSEQUENT ENCOUNTER: ICD-10-CM

## 2017-12-28 VITALS
RESPIRATION RATE: 18 BRPM | DIASTOLIC BLOOD PRESSURE: 91 MMHG | HEART RATE: 82 BPM | TEMPERATURE: 97.9 F | SYSTOLIC BLOOD PRESSURE: 143 MMHG

## 2017-12-28 PROCEDURE — 11042 DBRDMT SUBQ TIS 1ST 20SQCM/<: CPT | Performed by: SURGERY

## 2017-12-28 RX ORDER — LIDOCAINE HYDROCHLORIDE 40 MG/ML
SOLUTION TOPICAL ONCE
Status: DISCONTINUED | OUTPATIENT
Start: 2017-12-28 | End: 2017-12-29 | Stop reason: HOSPADM

## 2017-12-28 ASSESSMENT — PAIN SCALES - GENERAL: PAINLEVEL_OUTOF10: 0

## 2017-12-28 NOTE — PROGRESS NOTES
Subjective:    12/14/17  Patient ID: Melissa Ramirez is a 68 y.o. female. Chief Complaint   Patient presents with    Wound Check     Follow-up visit for wounds to the left leg. Says she is doing well., min pain in wound, but skin in calf sensitive     HPI  Huge hematoma that made skin ischemic Lt leg    Review of Systems  No change  Objective:   Physical Exam   Constitutional:   Morbid obesity   Cardiovascular:   Pulses:       Dorsalis pedis pulses are 2+ on the right side, and 2+ on the left side. Posterior tibial pulses are 1+ on the right side, and 0 on the left side. + 4 pitting edema and hematoma Lt   Musculoskeletal: Edema: + 4 left leg pitting edema. Legs:  Much improved    Assessment:      1. Traumatic hematoma of left lower leg, initial encounter     2. Leg pain, left     Even when now when most of the skin looks normal but still is  sensitive to light touch. Ant lat and posterior calf    Duplex scan was normal             Excisional Debridement Procedure Note  Indications:  Based on my examination of this patient's wound(s)/ulcer(s) today, debridement is required to promote healing and evaluate the wound base. Performed by: Devin Munoz MD    Consent obtained? Yes    Time out taken: Yes    Pain Control: Anesthetic: 4% Lidocaine Liquid Topical (2.5 ml)     Debridement:Excisional Debridement    Using curette the wound/ulcer was sharply debrided    down through and including the removal of  subcutaneous tissue.         Devitalized Tissue Debrided:  biofilm, slough and callus      Pre Debridement Measurements:  Are located in the [facility]  Documentation Flow Sheet   Wound/Ulcer #: 1     Post  Debridement Measurements:  Wound 03/28/17 Leg Lateral;Left;Lower;Proximal # 1  (Active)   Wound Image   11/2/2017  5:14 PM   Wound Type Wound 12/28/2017  3:55 PM   Wound Traumatic 9/21/2017  6:15 PM   Dressing Status Old drainage 12/28/2017  3:55 PM   Dressing Changed Changed/New 12/14/2017 2:09 PM   Dressing/Treatment Other (Comment) 12/28/2017  4:28 PM   Wound Cleansed Rinsed/Irrigated with saline 12/28/2017  3:55 PM   Wound Length (cm) 6 cm 12/28/2017  3:55 PM   Wound Width (cm) 1.1 cm 12/28/2017  3:55 PM   Wound Depth (cm)  0.1 12/28/2017  3:55 PM   Calculated Wound Size (cm^2) (l*w) 6.6 cm^2 12/28/2017  3:55 PM   Change in Wound Size % (l*w) 95.22 12/28/2017  3:55 PM   Tunneling Position ___ O'Clock 4 4/27/2017  4:16 PM   Undermining Starts ___ O'Clock 9 4/27/2017  4:16 PM   Undermining Ends___ O'Clock 12 4/27/2017  4:16 PM   Undermining Maxium Distance (cm) 1.5 4/27/2017  4:16 PM   Wound Assessment Granulation tissue;Red;Slough; Yellow 12/28/2017  3:55 PM   Drainage Amount Small 12/28/2017  3:55 PM   Drainage Description Serosanguinous 12/28/2017  3:55 PM   Odor None 12/7/2017  1:45 PM   Margins Defined edges 12/28/2017  3:55 PM   Mandy-wound Assessment Dry 12/28/2017  3:55 PM   Non-staged Wound Description Full thickness 12/28/2017  3:55 PM   [place]%Wound Bed 80% 12/7/2017  1:45 PM   Red%Wound Bed 90% 12/28/2017  3:55 PM   Yellow%Wound Bed 10% 12/28/2017  3:55 PM   Black%Wound Bed 90 11/30/2017  3:40 PM   Other%Wound Bed 10 10/12/2017  3:55 PM   Op First Treatment Date 03/24/17 5/18/2017  1:39 PM   Number of days: 275       Percent of Wound/Ulcer Debrided: 100%    Total Surface Area Debrided:  6.6 sq cm    Diabetic/Pressure/Non Pressure Ulcers only:  Ulcer: N/A    Bleeding: Minimal    Hemostasis Achieved: by pressure    Procedural Pain: 5  / 10     Post Procedural Pain: 0 / 10     Response to treatment:  Well tolerated by patient.       Endoform applied c safe gel ,hydrofera blue and kerlix

## 2018-01-04 ENCOUNTER — HOSPITAL ENCOUNTER (OUTPATIENT)
Dept: WOUND CARE | Age: 78
Discharge: HOME | End: 2018-01-04
Attending: SURGERY | Admitting: SURGERY

## 2018-01-04 VITALS
DIASTOLIC BLOOD PRESSURE: 85 MMHG | HEART RATE: 94 BPM | RESPIRATION RATE: 18 BRPM | TEMPERATURE: 98.7 F | SYSTOLIC BLOOD PRESSURE: 157 MMHG

## 2018-01-04 DIAGNOSIS — S80.12XD TRAUMATIC HEMATOMA OF LEFT LOWER LEG, SUBSEQUENT ENCOUNTER: Primary | ICD-10-CM

## 2018-01-04 PROCEDURE — 11042 DBRDMT SUBQ TIS 1ST 20SQCM/<: CPT | Performed by: SURGERY

## 2018-01-04 RX ORDER — LIDOCAINE HYDROCHLORIDE 40 MG/ML
SOLUTION TOPICAL ONCE
Status: DISCONTINUED | OUTPATIENT
Start: 2018-01-04 | End: 2018-01-05 | Stop reason: HOSPADM

## 2018-01-04 ASSESSMENT — PAIN DESCRIPTION - LOCATION: LOCATION: LEG

## 2018-01-04 ASSESSMENT — PAIN DESCRIPTION - PROGRESSION: CLINICAL_PROGRESSION: NOT CHANGED

## 2018-01-04 ASSESSMENT — PAIN DESCRIPTION - ORIENTATION: ORIENTATION: LEFT

## 2018-01-04 ASSESSMENT — PAIN SCALES - GENERAL: PAINLEVEL_OUTOF10: 4

## 2018-01-04 ASSESSMENT — PAIN DESCRIPTION - ONSET: ONSET: ON-GOING

## 2018-01-04 ASSESSMENT — PAIN DESCRIPTION - DESCRIPTORS: DESCRIPTORS: DISCOMFORT

## 2018-01-04 ASSESSMENT — PAIN DESCRIPTION - PAIN TYPE: TYPE: ACUTE PAIN

## 2018-01-04 ASSESSMENT — PAIN DESCRIPTION - FREQUENCY: FREQUENCY: INTERMITTENT

## 2018-01-04 NOTE — PROGRESS NOTES
PM   Mandy-wound Assessment Dry;Edema;Pink 1/4/2018  4:18 PM   Non-staged Wound Description Full thickness 1/4/2018  4:18 PM   Red%Wound Bed 90% 1/4/2018  4:18 PM   Yellow%Wound Bed 10% 1/4/2018  4:18 PM   Op First Treatment Date 01/04/18 1/4/2018  4:18 PM   Number of days: 0       Wound 01/04/18 Leg Left;Lateral;Lower;Distal #7(acquired on 12/21/17) (Active)   Wound Image   1/4/2018  4:18 PM   Wound Type Wound 1/4/2018  4:18 PM   Dressing Status Other (Comment) 1/4/2018  4:18 PM   Dressing/Treatment Other (Comment) 1/4/2018  4:20 PM   Wound Length (cm) 0.5 cm 1/4/2018  4:18 PM   Wound Width (cm) 0.6 cm 1/4/2018  4:18 PM   Wound Depth (cm)  0.1 1/4/2018  4:18 PM   Calculated Wound Size (cm^2) (l*w) 0.3 cm^2 1/4/2018  4:18 PM   Wound Assessment Hyper granulation tissue;Red;Slough; Yellow 1/4/2018  4:18 PM   Drainage Amount None 1/4/2018  4:18 PM   Margins Undefined edges 1/4/2018  4:18 PM   Mandy-wound Assessment Dry;Edema;Pink 1/4/2018  4:18 PM   Non-staged Wound Description Full thickness 1/4/2018  4:18 PM   Red%Wound Bed 90% 1/4/2018  4:18 PM   Yellow%Wound Bed 10% 1/4/2018  4:18 PM   Op First Treatment Date 01/04/18 1/4/2018  4:18 PM   Number of days: 0       Percent of Wound/Ulcer Debrided: 100%    Total Surface Area Debrided:  10.7 sq cm    Diabetic/Pressure/Non Pressure Ulcers only:  Ulcer: N/A    Bleeding: Minimal    Hemostasis Achieved: by pressure    Procedural Pain: 5  / 10     Post Procedural Pain: 0 / 10     Response to treatment:  Well tolerated by patient. Endoform applied c safe gel ,hydrofera blue extrasorb and kerlix and spandagrip.

## 2018-01-08 DIAGNOSIS — E78.00 HYPERCHOLESTEROLEMIA: ICD-10-CM

## 2018-01-08 DIAGNOSIS — I10 ESSENTIAL HYPERTENSION, BENIGN: ICD-10-CM

## 2018-01-08 RX ORDER — AMLODIPINE BESYLATE 10 MG/1
TABLET ORAL
Qty: 45 TABLET | Refills: 3 | Status: SHIPPED | OUTPATIENT
Start: 2018-01-08 | End: 2019-01-17 | Stop reason: SDUPTHER

## 2018-01-08 RX ORDER — SIMVASTATIN 40 MG
TABLET ORAL
Qty: 90 TABLET | Refills: 3 | Status: SHIPPED | OUTPATIENT
Start: 2018-01-08 | End: 2019-01-18

## 2018-01-11 ENCOUNTER — HOSPITAL ENCOUNTER (OUTPATIENT)
Dept: WOUND CARE | Age: 78
Discharge: HOME | End: 2018-01-11
Attending: SURGERY | Admitting: SURGERY

## 2018-01-11 VITALS
HEART RATE: 78 BPM | SYSTOLIC BLOOD PRESSURE: 130 MMHG | RESPIRATION RATE: 18 BRPM | DIASTOLIC BLOOD PRESSURE: 79 MMHG | TEMPERATURE: 97.8 F

## 2018-01-11 DIAGNOSIS — L97.922 SKIN ULCER OF LEFT LOWER LEG WITH FAT LAYER EXPOSED (HCC): Primary | ICD-10-CM

## 2018-01-11 PROCEDURE — 11042 DBRDMT SUBQ TIS 1ST 20SQCM/<: CPT | Performed by: SURGERY

## 2018-01-11 RX ORDER — LIDOCAINE 50 MG/G
OINTMENT TOPICAL ONCE
Status: DISCONTINUED | OUTPATIENT
Start: 2018-01-11 | End: 2018-01-12 | Stop reason: HOSPADM

## 2018-01-11 ASSESSMENT — PAIN DESCRIPTION - PAIN TYPE: TYPE: ACUTE PAIN

## 2018-01-11 ASSESSMENT — PAIN SCALES - GENERAL: PAINLEVEL_OUTOF10: 7

## 2018-01-18 ENCOUNTER — HOSPITAL ENCOUNTER (OUTPATIENT)
Dept: WOUND CARE | Age: 78
Discharge: HOME | End: 2018-01-18
Attending: SURGERY | Admitting: SURGERY

## 2018-01-18 VITALS
HEART RATE: 81 BPM | RESPIRATION RATE: 18 BRPM | TEMPERATURE: 98.4 F | SYSTOLIC BLOOD PRESSURE: 128 MMHG | DIASTOLIC BLOOD PRESSURE: 79 MMHG

## 2018-01-18 DIAGNOSIS — L97.922 SKIN ULCER OF LEFT LOWER LEG WITH FAT LAYER EXPOSED (HCC): Primary | ICD-10-CM

## 2018-01-18 PROCEDURE — 11042 DBRDMT SUBQ TIS 1ST 20SQCM/<: CPT | Performed by: SURGERY

## 2018-01-18 RX ORDER — LIDOCAINE HYDROCHLORIDE 40 MG/ML
SOLUTION TOPICAL ONCE
Status: DISCONTINUED | OUTPATIENT
Start: 2018-01-18 | End: 2018-01-19 | Stop reason: HOSPADM

## 2018-01-23 RX ORDER — CLONIDINE HYDROCHLORIDE 0.1 MG/1
TABLET ORAL
Qty: 60 TABLET | Refills: 3 | Status: SHIPPED | OUTPATIENT
Start: 2018-01-23 | End: 2018-04-26 | Stop reason: SDUPTHER

## 2018-01-25 ENCOUNTER — HOSPITAL ENCOUNTER (OUTPATIENT)
Dept: WOUND CARE | Age: 78
Discharge: HOME | End: 2018-01-25
Attending: SURGERY | Admitting: SURGERY

## 2018-01-25 VITALS
DIASTOLIC BLOOD PRESSURE: 79 MMHG | SYSTOLIC BLOOD PRESSURE: 128 MMHG | TEMPERATURE: 97.3 F | HEART RATE: 80 BPM | RESPIRATION RATE: 18 BRPM

## 2018-01-25 DIAGNOSIS — L97.922 SKIN ULCER OF LEFT LOWER LEG WITH FAT LAYER EXPOSED (HCC): ICD-10-CM

## 2018-01-25 DIAGNOSIS — S80.12XD TRAUMATIC HEMATOMA OF LEFT LOWER LEG, SUBSEQUENT ENCOUNTER: Primary | ICD-10-CM

## 2018-01-25 PROCEDURE — 11042 DBRDMT SUBQ TIS 1ST 20SQCM/<: CPT | Performed by: SURGERY

## 2018-01-25 RX ORDER — LIDOCAINE HYDROCHLORIDE 40 MG/ML
SOLUTION TOPICAL ONCE
Status: DISCONTINUED | OUTPATIENT
Start: 2018-01-25 | End: 2018-01-26 | Stop reason: HOSPADM

## 2018-01-25 ASSESSMENT — PAIN DESCRIPTION - PROGRESSION: CLINICAL_PROGRESSION: NOT CHANGED

## 2018-01-25 ASSESSMENT — PAIN DESCRIPTION - ONSET: ONSET: ON-GOING

## 2018-01-25 ASSESSMENT — PAIN DESCRIPTION - FREQUENCY: FREQUENCY: INTERMITTENT

## 2018-01-25 ASSESSMENT — PAIN DESCRIPTION - PAIN TYPE: TYPE: ACUTE PAIN

## 2018-01-25 ASSESSMENT — PAIN DESCRIPTION - DESCRIPTORS: DESCRIPTORS: DISCOMFORT

## 2018-01-25 ASSESSMENT — PAIN DESCRIPTION - LOCATION: LOCATION: LEG

## 2018-01-25 ASSESSMENT — PAIN SCALES - GENERAL: PAINLEVEL_OUTOF10: 4

## 2018-01-25 ASSESSMENT — PAIN DESCRIPTION - ORIENTATION: ORIENTATION: LEFT

## 2018-02-01 ENCOUNTER — HOSPITAL ENCOUNTER (OUTPATIENT)
Dept: WOUND CARE | Age: 78
Discharge: HOME | End: 2018-02-01
Attending: SURGERY | Admitting: SURGERY

## 2018-02-01 VITALS
SYSTOLIC BLOOD PRESSURE: 140 MMHG | BODY MASS INDEX: 45.82 KG/M2 | RESPIRATION RATE: 18 BRPM | WEIGHT: 242.51 LBS | DIASTOLIC BLOOD PRESSURE: 80 MMHG | TEMPERATURE: 98.2 F | HEART RATE: 85 BPM

## 2018-02-01 DIAGNOSIS — L97.922 SKIN ULCER OF LEFT LOWER LEG WITH FAT LAYER EXPOSED (HCC): ICD-10-CM

## 2018-02-01 DIAGNOSIS — S80.12XD TRAUMATIC HEMATOMA OF LEFT LOWER LEG, SUBSEQUENT ENCOUNTER: Primary | ICD-10-CM

## 2018-02-01 PROCEDURE — 99212 OFFICE O/P EST SF 10 MIN: CPT | Performed by: SURGERY

## 2018-02-01 ASSESSMENT — PAIN SCALES - GENERAL: PAINLEVEL_OUTOF10: 0

## 2018-02-01 NOTE — PROGRESS NOTES
Subjective:   2/1/18  Patient ID: Negro Randhawa is a 68 y.o. female. Chief Complaint   Patient presents with    Wound Check     Follow up for wound on left lower leg   Says she is doing well., min pain in wound, but skin in calf sensitive     HPI  Huge hematoma that made skin ischemic Lt leg    Review of Systems  No change  Objective:   Physical Exam   Constitutional:   Morbid obesity   Cardiovascular:   Pulses:       Dorsalis pedis pulses are 2+ on the right side, and 2+ on the left side. Posterior tibial pulses are 1+ on the right side, and 0 on the left side. + 4 pitting edema and hematoma Lt   Musculoskeletal: Edema: + 4 left leg pitting edema. Legs:  Much improved    Assessment:      1. Traumatic hematoma of left lower leg, initial encounter     2. Leg pain, left     Even when now when most of the skin looks normal but still is  sensitive to light touch. Ant lat and posterior calf    Duplex scan was normal         Excisional Debridement Procedure Note  Indications:  Based on my examination of this patient's wound(s)/ulcer(s) today, debridement is not required to promote healing and evaluate the wound base. Performed by: Alfred Roy MD    Consent obtained? Yes    Time out taken: Yes    Pain Control:     Wound/Ulcer #: 1     Post  Debridement Measurements:  Wound 03/28/17 Leg Lateral;Left;Lower;Proximal # 1  (Active)   Wound Image   2/1/2018  3:50 PM   Wound Type Wound 2/1/2018  3:50 PM   Wound Traumatic 2/1/2018  3:50 PM   Dressing Status Clean;Dry; Intact 2/1/2018  3:50 PM   Dressing Changed Changed/New 12/14/2017  2:09 PM   Dressing/Treatment Other (Comment) 2/1/2018  4:21 PM   Wound Cleansed Rinsed/Irrigated with saline 2/1/2018  3:50 PM   Wound Length (cm) 0.5 cm 2/1/2018  3:50 PM   Wound Width (cm) 0.2 cm 2/1/2018  3:50 PM   Wound Depth (cm)  0.1 2/1/2018  3:50 PM   Calculated Wound Size (cm^2) (l*w) 0.1 cm^2 2/1/2018  3:50 PM   Change in Wound Size % (l*w) 99.93 2/1/2018

## 2018-02-26 RX ORDER — GLIPIZIDE 5 MG/1
TABLET, FILM COATED, EXTENDED RELEASE ORAL
Qty: 90 TABLET | Refills: 3 | Status: SHIPPED | OUTPATIENT
Start: 2018-02-26 | End: 2018-04-26 | Stop reason: SDUPTHER

## 2018-03-13 ENCOUNTER — TELEPHONE (OUTPATIENT)
Dept: FAMILY MEDICINE CLINIC | Age: 78
End: 2018-03-13

## 2018-03-13 DIAGNOSIS — E11.22 TYPE 2 DIABETES MELLITUS WITH CHRONIC KIDNEY DISEASE, WITHOUT LONG-TERM CURRENT USE OF INSULIN, UNSPECIFIED CKD STAGE (HCC): Primary | ICD-10-CM

## 2018-03-20 ENCOUNTER — OFFICE VISIT (OUTPATIENT)
Dept: FAMILY MEDICINE CLINIC | Age: 78
End: 2018-03-20

## 2018-03-20 VITALS
HEIGHT: 61 IN | BODY MASS INDEX: 46.07 KG/M2 | SYSTOLIC BLOOD PRESSURE: 148 MMHG | DIASTOLIC BLOOD PRESSURE: 68 MMHG | HEART RATE: 84 BPM | WEIGHT: 244 LBS

## 2018-03-20 DIAGNOSIS — M1A.0490 IDIOPATHIC CHRONIC GOUT OF HAND WITHOUT TOPHUS, UNSPECIFIED LATERALITY: ICD-10-CM

## 2018-03-20 DIAGNOSIS — I82.409 RECURRENT DEEP VEIN THROMBOSIS (DVT) (HCC): Primary | ICD-10-CM

## 2018-03-20 DIAGNOSIS — E11.22 TYPE 2 DIABETES MELLITUS WITH STAGE 3 CHRONIC KIDNEY DISEASE, WITHOUT LONG-TERM CURRENT USE OF INSULIN (HCC): ICD-10-CM

## 2018-03-20 DIAGNOSIS — G47.33 OSA (OBSTRUCTIVE SLEEP APNEA): ICD-10-CM

## 2018-03-20 DIAGNOSIS — I10 ESSENTIAL HYPERTENSION, BENIGN: ICD-10-CM

## 2018-03-20 DIAGNOSIS — N18.4 CHRONIC KIDNEY DISEASE, STAGE 4, SEVERELY DECREASED GFR (HCC): ICD-10-CM

## 2018-03-20 DIAGNOSIS — N18.30 TYPE 2 DIABETES MELLITUS WITH STAGE 3 CHRONIC KIDNEY DISEASE, WITHOUT LONG-TERM CURRENT USE OF INSULIN (HCC): ICD-10-CM

## 2018-03-20 DIAGNOSIS — E78.00 HYPERCHOLESTEROLEMIA: ICD-10-CM

## 2018-03-20 DIAGNOSIS — M79.605 LEG PAIN, LEFT: ICD-10-CM

## 2018-03-20 LAB
A/G RATIO: 1.6 (ref 1.1–2.2)
ALBUMIN SERPL-MCNC: 4.4 G/DL (ref 3.4–5)
ALP BLD-CCNC: 69 U/L (ref 40–129)
ALT SERPL-CCNC: 16 U/L (ref 10–40)
ANION GAP SERPL CALCULATED.3IONS-SCNC: 20 MMOL/L (ref 3–16)
AST SERPL-CCNC: 17 U/L (ref 15–37)
BILIRUB SERPL-MCNC: 0.4 MG/DL (ref 0–1)
BUN BLDV-MCNC: 38 MG/DL (ref 7–20)
CALCIUM SERPL-MCNC: 10.2 MG/DL (ref 8.3–10.6)
CHLORIDE BLD-SCNC: 95 MMOL/L (ref 99–110)
CHOLESTEROL, TOTAL: 187 MG/DL (ref 0–199)
CO2: 29 MMOL/L (ref 21–32)
CREAT SERPL-MCNC: 1.4 MG/DL (ref 0.6–1.2)
CREATININE URINE: 37.8 MG/DL (ref 28–259)
GFR AFRICAN AMERICAN: 44
GFR NON-AFRICAN AMERICAN: 36
GLOBULIN: 2.7 G/DL
GLUCOSE BLD-MCNC: 168 MG/DL (ref 70–99)
HBA1C MFR BLD: 6.7 %
HDLC SERPL-MCNC: 48 MG/DL (ref 40–60)
LDL CHOLESTEROL CALCULATED: 88 MG/DL
MICROALBUMIN UR-MCNC: <1.2 MG/DL
MICROALBUMIN/CREAT UR-RTO: NORMAL MG/G (ref 0–30)
POTASSIUM SERPL-SCNC: 4.2 MMOL/L (ref 3.5–5.1)
SODIUM BLD-SCNC: 144 MMOL/L (ref 136–145)
TOTAL PROTEIN: 7.1 G/DL (ref 6.4–8.2)
TRIGL SERPL-MCNC: 254 MG/DL (ref 0–150)
URIC ACID, SERUM: 8.9 MG/DL (ref 2.6–6)
VLDLC SERPL CALC-MCNC: 51 MG/DL

## 2018-03-20 PROCEDURE — 1123F ACP DISCUSS/DSCN MKR DOCD: CPT | Performed by: FAMILY MEDICINE

## 2018-03-20 PROCEDURE — 1090F PRES/ABSN URINE INCON ASSESS: CPT | Performed by: FAMILY MEDICINE

## 2018-03-20 PROCEDURE — G8400 PT W/DXA NO RESULTS DOC: HCPCS | Performed by: FAMILY MEDICINE

## 2018-03-20 PROCEDURE — G8482 FLU IMMUNIZE ORDER/ADMIN: HCPCS | Performed by: FAMILY MEDICINE

## 2018-03-20 PROCEDURE — 4040F PNEUMOC VAC/ADMIN/RCVD: CPT | Performed by: FAMILY MEDICINE

## 2018-03-20 PROCEDURE — 1036F TOBACCO NON-USER: CPT | Performed by: FAMILY MEDICINE

## 2018-03-20 PROCEDURE — G8417 CALC BMI ABV UP PARAM F/U: HCPCS | Performed by: FAMILY MEDICINE

## 2018-03-20 PROCEDURE — 36415 COLL VENOUS BLD VENIPUNCTURE: CPT | Performed by: FAMILY MEDICINE

## 2018-03-20 PROCEDURE — G8599 NO ASA/ANTIPLAT THER USE RNG: HCPCS | Performed by: FAMILY MEDICINE

## 2018-03-20 PROCEDURE — 83036 HEMOGLOBIN GLYCOSYLATED A1C: CPT | Performed by: FAMILY MEDICINE

## 2018-03-20 PROCEDURE — G8427 DOCREV CUR MEDS BY ELIG CLIN: HCPCS | Performed by: FAMILY MEDICINE

## 2018-03-20 PROCEDURE — 99215 OFFICE O/P EST HI 40 MIN: CPT | Performed by: FAMILY MEDICINE

## 2018-03-20 RX ORDER — GABAPENTIN 300 MG/1
300 CAPSULE ORAL 3 TIMES DAILY
Qty: 90 CAPSULE | Refills: 3 | Status: SHIPPED | OUTPATIENT
Start: 2018-03-20 | End: 2018-04-20 | Stop reason: SDUPTHER

## 2018-03-20 RX ORDER — FLUTICASONE PROPIONATE 50 MCG
2 SPRAY, SUSPENSION (ML) NASAL DAILY
Qty: 1 BOTTLE | Refills: 1 | Status: SHIPPED | OUTPATIENT
Start: 2018-03-20 | End: 2020-04-23 | Stop reason: ALTCHOICE

## 2018-03-20 ASSESSMENT — ENCOUNTER SYMPTOMS
BACK PAIN: 1
VOMITING: 0
DIARRHEA: 0
RHINORRHEA: 1
SHORTNESS OF BREATH: 0
GASTROINTESTINAL NEGATIVE: 1
EYE PAIN: 0
COUGH: 1
NAUSEA: 0
CONSTIPATION: 0
EYES NEGATIVE: 1
ABDOMINAL PAIN: 0
SORE THROAT: 0
COLOR CHANGE: 0

## 2018-03-20 NOTE — PROGRESS NOTES
No chief complaint on file. HPI:  Nabila Mas is a 68 y.o. (: 1940) here today   for review of a 2 week hx of low back pain that radiates down her left leg. Pt also states that she was released from the wound center on 18 for a wound that she has on her left leg that is causing her a great deal of pain as well. Pt states that she is currently taking Claritin for her seasonal allergies. She describes the pain as burning and shooting but unlike her right sided sciatica previously notes pain is worse if she dorsiflexes her foot. She is compliant with her blood pressure medications  without Lightheadedness, Urinary Frequency, Edema and Sedation  She is not checking Home Blood Pressures      She is compliant with her diabetes medications  without diaphoresis, , sweating, , anxiety, , tremor, , diarrhea,  and hypoglycemia   She is not  Checking blood sugars. She is compliant with her cholesterol medication without myalgia and abdominal pain    She notes no increased edema and denies any gouty attacks since last visit. She refuses to use CPAP for her sleep apnea, reports she sleeping okay    Review of Systems   Constitutional: Negative. Negative for chills and fever. HENT: Positive for rhinorrhea. Negative for ear pain and sore throat. Eyes: Negative. Negative for pain and visual disturbance. Respiratory: Positive for cough. Negative for shortness of breath. Cardiovascular: Negative. Negative for chest pain and palpitations. Gastrointestinal: Negative. Negative for abdominal pain, constipation, diarrhea, nausea and vomiting. Genitourinary: Negative. Negative for dysuria, frequency and urgency. Musculoskeletal: Positive for back pain. Negative for joint swelling and myalgias. Skin: Negative. Negative for color change and rash. Neurological: Negative. Negative for weakness, numbness and headaches. Hematological: Negative. Negative for adenopathy.  Does not (110.7 kg)   BMI 46.10 kg/m²   Wt Readings from Last 3 Encounters:   03/20/18 244 lb (110.7 kg)   02/01/18 242 lb 8.1 oz (110 kg)   12/19/17 247 lb (112 kg)       Physical Exam   Constitutional: She appears well-developed and well-nourished. Morbidly obese   HENT:   Head: Normocephalic and atraumatic. Nose: Nose normal.   Mouth/Throat: No oropharyngeal exudate. TMs negative bilaterally, canals patent, nasal mucosa pink and patent,  Oropharynx pink and patent    Full upper and lower dentures with a class 4 airway   Eyes: Pupils are equal, round, and reactive to light. Right eye exhibits no discharge. Left eye exhibits no discharge. No scleral icterus. Neck: Normal range of motion. Neck supple. No thyromegaly present. Cardiovascular: Normal rate, regular rhythm and intact distal pulses. Exam reveals no gallop and no friction rub. Murmur heard. Systolic murmur is present with a grade of 1/6   Pulses:       Dorsalis pedis pulses are 2+ on the right side, and 2+ on the left side. Posterior tibial pulses are 2+ on the right side, and 2+ on the left side. No Edema Lower Extremities   Pulmonary/Chest: Effort normal and breath sounds normal. She has no wheezes. She has no rales. Abdominal: Soft. Bowel sounds are normal. She exhibits no distension. There is no splenomegaly or hepatomegaly. There is no tenderness. There is no rebound and no guarding. Musculoskeletal: Normal range of motion. She exhibits no tenderness or deformity. Lymphadenopathy:     She has no cervical adenopathy. Neurological: She is alert. She has normal strength. No cranial nerve deficit or sensory deficit. Gait normal.   Negative straight leg raise bilaterally   Skin: Skin is warm and dry. No rash noted. No erythema. Well healed surgical scar with erythema and scale on lateral left lower leg   Psychiatric: She has a normal mood and affect.  Her speech is normal and behavior is normal. Judgment and thought content normal.   Vitals reviewed. Chemistry        Component Value Date/Time     10/24/2017 1002    K 4.0 10/24/2017 1002    CL 98 (L) 10/24/2017 1002    CO2 31 10/24/2017 1002    BUN 39 (H) 10/24/2017 1002    CREATININE 1.9 (H) 10/24/2017 1002        Component Value Date/Time    CALCIUM 11.1 (H) 10/24/2017 1002    ALKPHOS 63 10/24/2017 1002    AST 17 10/24/2017 1002    ALT 16 10/24/2017 1002    BILITOT 0.3 10/24/2017 1002          Lab Results   Component Value Date    WBC 6.3 10/24/2017    HGB 12.4 10/24/2017    HCT 36.8 10/24/2017    MCV 96.6 10/24/2017     10/24/2017     Lab Results   Component Value Date    LABA1C 5.8 10/24/2017     Lab Results   Component Value Date    .8 10/24/2017     Lab Results   Component Value Date    LABA1C 5.8 10/24/2017     No components found for: CHLPL  Lab Results   Component Value Date    TRIG 296 (H) 02/13/2017    TRIG 264 (H) 07/13/2016    TRIG 244 (H) 07/17/2015     Lab Results   Component Value Date    HDL 51 02/13/2017    HDL 51 07/13/2016    HDL 50 07/17/2015     Lab Results   Component Value Date    LDLCALC 112 (H) 02/13/2017    LDLCALC 91 07/13/2016    LDLCALC 97 07/17/2015     Lab Results   Component Value Date    LABVLDL 59 02/13/2017    LABVLDL 53 07/13/2016    LABVLDL 49 07/17/2015         Assessment   Plan     1. Recurrent deep vein thrombosis (DVT) (HCC)  Appears to be doing well off medications without recurrence    2. Essential hypertension, benign  Moderately elevated today we will recheck in 6 weeks if still elevated may need to titrate meds, her diastolic blood pressures a little bit low  - Comprehensive Metabolic Panel    3. Type 2 diabetes mellitus with stage 3 chronic kidney disease, without long-term current use of insulin (HCC)  Controlled: Appears stable. We will continue current management and monitor for adverse reaction and disease progression.   Follow-up as noted below    - Comprehensive Metabolic Panel  - Lipid Panel  - POCT

## 2018-04-10 ENCOUNTER — ANTI-COAG VISIT (OUTPATIENT)
Dept: FAMILY MEDICINE CLINIC | Age: 78
End: 2018-04-10

## 2018-04-14 DIAGNOSIS — I10 ESSENTIAL HYPERTENSION, BENIGN: ICD-10-CM

## 2018-04-16 RX ORDER — METOPROLOL TARTRATE 100 MG/1
TABLET ORAL
Qty: 180 TABLET | Refills: 3 | Status: SHIPPED | OUTPATIENT
Start: 2018-04-16 | End: 2019-04-15 | Stop reason: SDUPTHER

## 2018-04-19 RX ORDER — ALLOPURINOL 300 MG/1
TABLET ORAL
Qty: 45 TABLET | Refills: 3 | Status: SHIPPED | OUTPATIENT
Start: 2018-04-19 | End: 2019-04-15 | Stop reason: SDUPTHER

## 2018-04-20 DIAGNOSIS — M79.605 LEG PAIN, LEFT: ICD-10-CM

## 2018-04-23 RX ORDER — GABAPENTIN 300 MG/1
300 CAPSULE ORAL 3 TIMES DAILY
Qty: 90 CAPSULE | Refills: 3 | Status: SHIPPED | OUTPATIENT
Start: 2018-04-23 | End: 2018-10-24 | Stop reason: SDUPTHER

## 2018-04-26 DIAGNOSIS — N18.4 TYPE 2 DIABETES MELLITUS WITH STAGE 4 CHRONIC KIDNEY DISEASE, WITHOUT LONG-TERM CURRENT USE OF INSULIN (HCC): ICD-10-CM

## 2018-04-26 DIAGNOSIS — I10 ESSENTIAL HYPERTENSION, BENIGN: ICD-10-CM

## 2018-04-26 DIAGNOSIS — R60.0 LOCALIZED EDEMA: ICD-10-CM

## 2018-04-26 DIAGNOSIS — E11.22 TYPE 2 DIABETES MELLITUS WITH STAGE 4 CHRONIC KIDNEY DISEASE, WITHOUT LONG-TERM CURRENT USE OF INSULIN (HCC): ICD-10-CM

## 2018-04-26 RX ORDER — CLONIDINE HYDROCHLORIDE 0.1 MG/1
TABLET ORAL
Qty: 47 TABLET | Refills: 3 | Status: SHIPPED | OUTPATIENT
Start: 2018-04-26 | End: 2018-10-17

## 2018-04-26 RX ORDER — LOSARTAN POTASSIUM 50 MG/1
TABLET ORAL
Qty: 44 TABLET | Refills: 0 | Status: SHIPPED | OUTPATIENT
Start: 2018-04-26 | End: 2018-10-17

## 2018-04-26 RX ORDER — FUROSEMIDE 80 MG
TABLET ORAL
Qty: 44 TABLET | Refills: 0 | Status: SHIPPED | OUTPATIENT
Start: 2018-04-26 | End: 2019-01-18

## 2018-04-26 RX ORDER — CLONIDINE HYDROCHLORIDE 0.1 MG/1
TABLET ORAL
Qty: 90 TABLET | Refills: 0 | Status: SHIPPED | OUTPATIENT
Start: 2018-04-26 | End: 2018-10-17 | Stop reason: SDUPTHER

## 2018-04-26 RX ORDER — GLIPIZIDE 5 MG/1
TABLET, FILM COATED, EXTENDED RELEASE ORAL
Qty: 50 TABLET | Refills: 0 | Status: SHIPPED | OUTPATIENT
Start: 2018-04-26 | End: 2019-06-26

## 2018-05-16 DIAGNOSIS — N18.4 TYPE 2 DIABETES MELLITUS WITH STAGE 4 CHRONIC KIDNEY DISEASE, WITHOUT LONG-TERM CURRENT USE OF INSULIN (HCC): ICD-10-CM

## 2018-05-16 DIAGNOSIS — E11.22 TYPE 2 DIABETES MELLITUS WITH STAGE 4 CHRONIC KIDNEY DISEASE, WITHOUT LONG-TERM CURRENT USE OF INSULIN (HCC): ICD-10-CM

## 2018-05-16 RX ORDER — EXENATIDE 2 MG
KIT SUBCUTANEOUS
Qty: 12 EACH | Refills: 3 | Status: SHIPPED | OUTPATIENT
Start: 2018-05-16 | End: 2019-01-29

## 2018-05-18 ENCOUNTER — TELEPHONE (OUTPATIENT)
Dept: RHEUMATOLOGY | Age: 78
End: 2018-05-18

## 2018-07-10 ENCOUNTER — OFFICE VISIT (OUTPATIENT)
Dept: FAMILY MEDICINE CLINIC | Age: 78
End: 2018-07-10

## 2018-07-10 VITALS
DIASTOLIC BLOOD PRESSURE: 72 MMHG | SYSTOLIC BLOOD PRESSURE: 116 MMHG | HEART RATE: 73 BPM | BODY MASS INDEX: 48.37 KG/M2 | WEIGHT: 256 LBS

## 2018-07-10 DIAGNOSIS — I10 ESSENTIAL HYPERTENSION, BENIGN: ICD-10-CM

## 2018-07-10 DIAGNOSIS — N18.4 TYPE 2 DIABETES MELLITUS WITH STAGE 4 CHRONIC KIDNEY DISEASE, WITHOUT LONG-TERM CURRENT USE OF INSULIN (HCC): Primary | ICD-10-CM

## 2018-07-10 DIAGNOSIS — E11.22 TYPE 2 DIABETES MELLITUS WITH STAGE 4 CHRONIC KIDNEY DISEASE, WITHOUT LONG-TERM CURRENT USE OF INSULIN (HCC): Primary | ICD-10-CM

## 2018-07-10 DIAGNOSIS — E72.11 HYPERHOMOCYSTEINEMIA (HCC): ICD-10-CM

## 2018-07-10 DIAGNOSIS — R60.0 LOCALIZED EDEMA: ICD-10-CM

## 2018-07-10 DIAGNOSIS — E78.00 HYPERCHOLESTEROLEMIA: ICD-10-CM

## 2018-07-10 DIAGNOSIS — N18.4 CHRONIC KIDNEY DISEASE, STAGE 4, SEVERELY DECREASED GFR (HCC): ICD-10-CM

## 2018-07-10 LAB
A/G RATIO: 1.3 (ref 1.1–2.2)
ALBUMIN SERPL-MCNC: 4.3 G/DL (ref 3.4–5)
ALP BLD-CCNC: 75 U/L (ref 40–129)
ALT SERPL-CCNC: 16 U/L (ref 10–40)
ANION GAP SERPL CALCULATED.3IONS-SCNC: 18 MMOL/L (ref 3–16)
AST SERPL-CCNC: 17 U/L (ref 15–37)
BILIRUB SERPL-MCNC: 0.3 MG/DL (ref 0–1)
BUN BLDV-MCNC: 59 MG/DL (ref 7–20)
CALCIUM SERPL-MCNC: 10.6 MG/DL (ref 8.3–10.6)
CHLORIDE BLD-SCNC: 104 MMOL/L (ref 99–110)
CO2: 25 MMOL/L (ref 21–32)
CREAT SERPL-MCNC: 1.7 MG/DL (ref 0.6–1.2)
GFR AFRICAN AMERICAN: 35
GFR NON-AFRICAN AMERICAN: 29
GLOBULIN: 3.2 G/DL
GLUCOSE BLD-MCNC: 149 MG/DL (ref 70–99)
HBA1C MFR BLD: 7.9 %
HOMOCYSTEINE: 23 UMOL/L (ref 0–10)
POTASSIUM SERPL-SCNC: 4.4 MMOL/L (ref 3.5–5.1)
SODIUM BLD-SCNC: 147 MMOL/L (ref 136–145)
TOTAL PROTEIN: 7.5 G/DL (ref 6.4–8.2)

## 2018-07-10 PROCEDURE — 4040F PNEUMOC VAC/ADMIN/RCVD: CPT | Performed by: FAMILY MEDICINE

## 2018-07-10 PROCEDURE — 1036F TOBACCO NON-USER: CPT | Performed by: FAMILY MEDICINE

## 2018-07-10 PROCEDURE — G8599 NO ASA/ANTIPLAT THER USE RNG: HCPCS | Performed by: FAMILY MEDICINE

## 2018-07-10 PROCEDURE — 1090F PRES/ABSN URINE INCON ASSESS: CPT | Performed by: FAMILY MEDICINE

## 2018-07-10 PROCEDURE — 1123F ACP DISCUSS/DSCN MKR DOCD: CPT | Performed by: FAMILY MEDICINE

## 2018-07-10 PROCEDURE — G8427 DOCREV CUR MEDS BY ELIG CLIN: HCPCS | Performed by: FAMILY MEDICINE

## 2018-07-10 PROCEDURE — 99215 OFFICE O/P EST HI 40 MIN: CPT | Performed by: FAMILY MEDICINE

## 2018-07-10 PROCEDURE — G8417 CALC BMI ABV UP PARAM F/U: HCPCS | Performed by: FAMILY MEDICINE

## 2018-07-10 PROCEDURE — G8400 PT W/DXA NO RESULTS DOC: HCPCS | Performed by: FAMILY MEDICINE

## 2018-07-10 PROCEDURE — 36415 COLL VENOUS BLD VENIPUNCTURE: CPT | Performed by: FAMILY MEDICINE

## 2018-07-10 PROCEDURE — 83036 HEMOGLOBIN GLYCOSYLATED A1C: CPT | Performed by: FAMILY MEDICINE

## 2018-07-10 RX ORDER — FLASH GLUCOSE SENSOR
KIT MISCELLANEOUS
Qty: 1 DEVICE | Refills: 0 | Status: SHIPPED | OUTPATIENT
Start: 2018-07-10 | End: 2018-07-18 | Stop reason: SDUPTHER

## 2018-07-10 RX ORDER — FLASH GLUCOSE SENSOR
KIT MISCELLANEOUS
Qty: 1 EACH | Refills: 0 | Status: SHIPPED | OUTPATIENT
Start: 2018-07-10 | End: 2020-03-25

## 2018-07-10 ASSESSMENT — PATIENT HEALTH QUESTIONNAIRE - PHQ9
SUM OF ALL RESPONSES TO PHQ9 QUESTIONS 1 & 2: 0
2. FEELING DOWN, DEPRESSED OR HOPELESS: 0
1. LITTLE INTEREST OR PLEASURE IN DOING THINGS: 0
SUM OF ALL RESPONSES TO PHQ QUESTIONS 1-9: 0

## 2018-07-10 ASSESSMENT — ENCOUNTER SYMPTOMS
SORE THROAT: 0
VOMITING: 0
SHORTNESS OF BREATH: 0
CONSTIPATION: 0
COLOR CHANGE: 0
EYE PAIN: 0
ABDOMINAL PAIN: 0
COUGH: 0
DIARRHEA: 0
NAUSEA: 0
RHINORRHEA: 0

## 2018-07-10 NOTE — PROGRESS NOTES
bruise/bleed easily. Psychiatric/Behavioral: Negative for dysphoric mood, self-injury and suicidal ideas. The patient is not nervous/anxious. Allergies   Allergen Reactions    Latex Rash     Swelling lesions      Ace Inhibitors     Hydrochlorothiazide     Neosporin [Neomycin-Bacitracin Zn-Polymyx]     Penicillins     Sulfa Antibiotics     Tape Mae Kellen Tape] Other (See Comments)     Sores     New Prescriptions    No medications on file       Meds Prior to visit:  Current Outpatient Prescriptions on File Prior to Visit   Medication Sig Dispense Refill    BYDUREON 2 MG SRER injection INJECT 1 SYRINGE INTO THE SKIN ONCE A WEEK. 12 each 3    cloNIDine (CATAPRES) 0.1 MG tablet TAKE 0.5 (1/2) TABLET BY MOUTH 2 TIMES DAILY. 47 tablet 3    losartan (COZAAR) 50 MG tablet TAKE 1 TABLET BY MOUTH DAILY. 44 tablet 0    furosemide (LASIX) 80 MG tablet TAKE ONE HALF TO ONE WHOLE TABLET DAILY AS NEEDED FOR SWELLING 44 tablet 0    glipiZIDE (GLUCOTROL XL) 5 MG extended release tablet TAKE 1 TABLET BY MOUTH DAILY. 50 tablet 0    cloNIDine (CATAPRES) 0.1 MG tablet TAKE 0.5 (1/2) TABLET BY MOUTH 2 TIMES DAILY. 90 tablet 0    gabapentin (NEURONTIN) 300 MG capsule Take 1 capsule by mouth 3 times daily. . 90 capsule 3    allopurinol (ZYLOPRIM) 300 MG tablet TAKE ONE HALF TABLET BY MOUTH DAILY 45 tablet 3    metoprolol (LOPRESSOR) 100 MG tablet TAKE ONE TABLET BY MOUTH TWO TIMES A  tablet 3    fluticasone (FLONASE) 50 MCG/ACT nasal spray 2 sprays by Nasal route daily 1 Bottle 1    simvastatin (ZOCOR) 40 MG tablet TAKE ONE TABLET EVERY EVENING 90 tablet 3    amLODIPine (NORVASC) 10 MG tablet TAKE 0.5 (1/2) TABLET BY MOUTH DAILY 45 tablet 3    potassium chloride (KLOR-CON) 10 MEQ extended release tablet TAKE ONE TABLET BY MOUTH DAILY 90 tablet 3    ascorbic acid (VITAMIN C) 500 MG tablet Take 500 mg by mouth 2 times daily      Cholecalciferol (VITAMIN D PO) Take 1 tablet by mouth daily      Omega-3 Her speech is normal and behavior is normal.   Vitals reviewed. Chemistry        Component Value Date/Time     03/20/2018 0940    K 4.2 03/20/2018 0940    CL 95 (L) 03/20/2018 0940    CO2 29 03/20/2018 0940    BUN 38 (H) 03/20/2018 0940    CREATININE 1.4 (H) 03/20/2018 0940        Component Value Date/Time    CALCIUM 10.2 03/20/2018 0940    ALKPHOS 69 03/20/2018 0940    AST 17 03/20/2018 0940    ALT 16 03/20/2018 0940    BILITOT 0.4 03/20/2018 0940          Lab Results   Component Value Date    WBC 6.3 10/24/2017    HGB 12.4 10/24/2017    HCT 36.8 10/24/2017    MCV 96.6 10/24/2017     10/24/2017     Lab Results   Component Value Date    LABA1C 6.7 03/20/2018     Lab Results   Component Value Date    .8 10/24/2017     Lab Results   Component Value Date    LABA1C 6.7 03/20/2018     No components found for: CHLPL  Lab Results   Component Value Date    TRIG 254 (H) 03/20/2018    TRIG 296 (H) 02/13/2017    TRIG 264 (H) 07/13/2016     Lab Results   Component Value Date    HDL 48 03/20/2018    HDL 51 02/13/2017    HDL 51 07/13/2016     Lab Results   Component Value Date    LDLCALC 88 03/20/2018    LDLCALC 112 (H) 02/13/2017    LDLCALC 91 07/13/2016     Lab Results   Component Value Date    LABVLDL 51 03/20/2018    LABVLDL 59 02/13/2017    LABVLDL 53 07/13/2016         Assessment   Plan     1. Type 2 diabetes mellitus with stage 4 chronic kidney disease, without long-term current use of insulin (HCC)  Uncontrolled. Counseled patient to cut back on posture to eat more vegetables instead  - POCT glycosylated hemoglobin (Hb A1C)  - Comprehensive Metabolic Panel  - HOMOCYSTEINE, SERUM  - Continuous Blood Gluc  (FREESTYLE LACI READER) MARCO; As directed  Dispense: 1 Device; Refill: 0  - Continuous Blood Gluc Sensor ([address] [place] [address]) MISC; Use as directed  Dispense: 1 each; Refill: 0    2.  Localized edema  Counseled patient to keep foot elevated in that it was okay to use her Lasix

## 2018-07-13 ENCOUNTER — TELEPHONE (OUTPATIENT)
Dept: FAMILY MEDICINE CLINIC | Age: 78
End: 2018-07-13

## 2018-07-16 DIAGNOSIS — I10 ESSENTIAL HYPERTENSION, BENIGN: ICD-10-CM

## 2018-07-16 DIAGNOSIS — E11.22 TYPE 2 DIABETES MELLITUS WITH STAGE 4 CHRONIC KIDNEY DISEASE, WITHOUT LONG-TERM CURRENT USE OF INSULIN (HCC): ICD-10-CM

## 2018-07-16 DIAGNOSIS — N18.4 TYPE 2 DIABETES MELLITUS WITH STAGE 4 CHRONIC KIDNEY DISEASE, WITHOUT LONG-TERM CURRENT USE OF INSULIN (HCC): ICD-10-CM

## 2018-07-17 RX ORDER — POTASSIUM CHLORIDE 750 MG/1
TABLET, FILM COATED, EXTENDED RELEASE ORAL
Qty: 90 TABLET | Refills: 0 | Status: SHIPPED | OUTPATIENT
Start: 2018-07-17 | End: 2018-10-17 | Stop reason: SDUPTHER

## 2018-07-17 RX ORDER — LOSARTAN POTASSIUM 50 MG/1
TABLET ORAL
Qty: 90 TABLET | Refills: 0 | Status: SHIPPED | OUTPATIENT
Start: 2018-07-17 | End: 2018-10-17 | Stop reason: SDUPTHER

## 2018-07-18 DIAGNOSIS — N18.4 TYPE 2 DIABETES MELLITUS WITH STAGE 4 CHRONIC KIDNEY DISEASE, WITHOUT LONG-TERM CURRENT USE OF INSULIN (HCC): ICD-10-CM

## 2018-07-18 DIAGNOSIS — E11.22 TYPE 2 DIABETES MELLITUS WITH STAGE 4 CHRONIC KIDNEY DISEASE, WITHOUT LONG-TERM CURRENT USE OF INSULIN (HCC): ICD-10-CM

## 2018-07-18 RX ORDER — FLASH GLUCOSE SENSOR
KIT MISCELLANEOUS
Qty: 1 DEVICE | Refills: 0 | Status: SHIPPED | OUTPATIENT
Start: 2018-07-18 | End: 2018-07-19

## 2018-07-18 NOTE — TELEPHONE ENCOUNTER
[facility], but they don't carry Continuous Blood Gluc  (FREESTYLE LACI READER) MARCO       Pt requested to be send into [facility] on [address]. Device is on pending.

## 2018-07-20 RX ORDER — BLOOD-GLUCOSE METER
1 KIT MISCELLANEOUS DAILY
Qty: 1 KIT | Refills: 0 | Status: SHIPPED | OUTPATIENT
Start: 2018-07-20 | End: 2020-03-25

## 2018-07-20 RX ORDER — BLOOD-GLUCOSE METER
1 KIT MISCELLANEOUS DAILY
Qty: 1 KIT | Refills: 0 | Status: SHIPPED | OUTPATIENT
Start: 2018-07-20 | End: 2018-07-20 | Stop reason: SDUPTHER

## 2018-08-22 ENCOUNTER — TELEPHONE (OUTPATIENT)
Dept: FAMILY MEDICINE CLINIC | Age: 78
End: 2018-08-22

## 2018-08-22 RX ORDER — CIPROFLOXACIN 500 MG/1
500 TABLET, FILM COATED ORAL 2 TIMES DAILY
Qty: 14 TABLET | Refills: 0 | Status: SHIPPED | OUTPATIENT
Start: 2018-08-22 | End: 2018-08-29

## 2018-08-22 NOTE — TELEPHONE ENCOUNTER
The PT believes she has a bladder infection \"Burning when she urinates\"   She is wanting to know if something could be sent into the Pharmacy for her.         If possible use the [facility] [address] call back for PT--- [phone]

## 2018-10-09 ENCOUNTER — TELEPHONE (OUTPATIENT)
Dept: FAMILY MEDICINE CLINIC | Age: 78
End: 2018-10-09

## 2018-10-10 ENCOUNTER — HOSPITAL ENCOUNTER (OUTPATIENT)
Dept: GENERAL RADIOLOGY | Age: 78
Discharge: HOME | End: 2018-10-10
Payer: MEDICARE

## 2018-10-10 ENCOUNTER — OFFICE VISIT (OUTPATIENT)
Dept: FAMILY MEDICINE CLINIC | Age: 78
End: 2018-10-10
Payer: MEDICARE

## 2018-10-10 ENCOUNTER — HOSPITAL ENCOUNTER (OUTPATIENT)
Age: 78
Discharge: HOME | End: 2018-10-10
Payer: MEDICARE

## 2018-10-10 VITALS
HEIGHT: 61 IN | WEIGHT: 251 LBS | SYSTOLIC BLOOD PRESSURE: 134 MMHG | BODY MASS INDEX: 47.39 KG/M2 | HEART RATE: 82 BPM | DIASTOLIC BLOOD PRESSURE: 77 MMHG

## 2018-10-10 DIAGNOSIS — Z23 NEED FOR VACCINATION: ICD-10-CM

## 2018-10-10 DIAGNOSIS — M79.672 LEFT FOOT PAIN: ICD-10-CM

## 2018-10-10 DIAGNOSIS — E11.22 TYPE 2 DIABETES MELLITUS WITH STAGE 4 CHRONIC KIDNEY DISEASE, WITHOUT LONG-TERM CURRENT USE OF INSULIN (HCC): Primary | ICD-10-CM

## 2018-10-10 DIAGNOSIS — I10 ESSENTIAL HYPERTENSION, BENIGN: ICD-10-CM

## 2018-10-10 DIAGNOSIS — R60.0 LOCALIZED EDEMA: ICD-10-CM

## 2018-10-10 DIAGNOSIS — L03.116 CELLULITIS OF LEFT LOWER EXTREMITY: ICD-10-CM

## 2018-10-10 DIAGNOSIS — N18.4 CHRONIC KIDNEY DISEASE, STAGE 4, SEVERELY DECREASED GFR (HCC): ICD-10-CM

## 2018-10-10 DIAGNOSIS — N18.4 TYPE 2 DIABETES MELLITUS WITH STAGE 4 CHRONIC KIDNEY DISEASE, WITHOUT LONG-TERM CURRENT USE OF INSULIN (HCC): Primary | ICD-10-CM

## 2018-10-10 DIAGNOSIS — E78.00 HYPERCHOLESTEROLEMIA: ICD-10-CM

## 2018-10-10 PROBLEM — L97.922 SKIN ULCER OF LEFT LOWER LEG WITH FAT LAYER EXPOSED (HCC): Status: RESOLVED | Noted: 2017-03-30 | Resolved: 2018-10-10

## 2018-10-10 LAB
A/G RATIO: 1.5 (ref 1.1–2.2)
ALBUMIN SERPL-MCNC: 4.5 G/DL (ref 3.4–5)
ALP BLD-CCNC: 66 U/L (ref 40–129)
ALT SERPL-CCNC: 19 U/L (ref 10–40)
ANION GAP SERPL CALCULATED.3IONS-SCNC: 17 MMOL/L (ref 3–16)
AST SERPL-CCNC: 20 U/L (ref 15–37)
BASOPHILS ABSOLUTE: 0 K/UL (ref 0–0.2)
BASOPHILS RELATIVE PERCENT: 0.1 %
BILIRUB SERPL-MCNC: 0.3 MG/DL (ref 0–1)
BUN BLDV-MCNC: 40 MG/DL (ref 7–20)
CALCIUM SERPL-MCNC: 11.1 MG/DL (ref 8.3–10.6)
CHLORIDE BLD-SCNC: 97 MMOL/L (ref 99–110)
CO2: 27 MMOL/L (ref 21–32)
CREAT SERPL-MCNC: 1.8 MG/DL (ref 0.6–1.2)
EOSINOPHILS ABSOLUTE: 0.4 K/UL (ref 0–0.6)
EOSINOPHILS RELATIVE PERCENT: 5 %
GFR AFRICAN AMERICAN: 33
GFR NON-AFRICAN AMERICAN: 27
GLOBULIN: 3 G/DL
GLUCOSE BLD-MCNC: 243 MG/DL (ref 70–99)
HBA1C MFR BLD: 7.8 %
HCT VFR BLD CALC: 45.1 % (ref 36–48)
HEMOGLOBIN: 14.7 G/DL (ref 12–16)
LYMPHOCYTES ABSOLUTE: 1.4 K/UL (ref 1–5.1)
LYMPHOCYTES RELATIVE PERCENT: 16 %
MCH RBC QN AUTO: 32.6 PG (ref 26–34)
MCHC RBC AUTO-ENTMCNC: 32.6 G/DL (ref 31–36)
MCV RBC AUTO: 100 FL (ref 80–100)
MONOCYTES ABSOLUTE: 0.9 K/UL (ref 0–1.3)
MONOCYTES RELATIVE PERCENT: 10.1 %
NEUTROPHILS ABSOLUTE: 5.8 K/UL (ref 1.7–7.7)
NEUTROPHILS RELATIVE PERCENT: 68.8 %
PDW BLD-RTO: 15.4 % (ref 12.4–15.4)
PLATELET # BLD: 176 K/UL (ref 135–450)
PLATELET SLIDE REVIEW: ADEQUATE
PMV BLD AUTO: 8.3 FL (ref 5–10.5)
POTASSIUM SERPL-SCNC: 4.2 MMOL/L (ref 3.5–5.1)
RBC # BLD: 4.51 M/UL (ref 4–5.2)
SLIDE REVIEW: NORMAL
SODIUM BLD-SCNC: 141 MMOL/L (ref 136–145)
TOTAL PROTEIN: 7.5 G/DL (ref 6.4–8.2)
URIC ACID, SERUM: 7.8 MG/DL (ref 2.6–6)
WBC # BLD: 8.5 K/UL (ref 4–11)

## 2018-10-10 PROCEDURE — G8427 DOCREV CUR MEDS BY ELIG CLIN: HCPCS | Performed by: FAMILY MEDICINE

## 2018-10-10 PROCEDURE — 1090F PRES/ABSN URINE INCON ASSESS: CPT | Performed by: FAMILY MEDICINE

## 2018-10-10 PROCEDURE — G0008 ADMIN INFLUENZA VIRUS VAC: HCPCS | Performed by: FAMILY MEDICINE

## 2018-10-10 PROCEDURE — 36415 COLL VENOUS BLD VENIPUNCTURE: CPT | Performed by: FAMILY MEDICINE

## 2018-10-10 PROCEDURE — G8599 NO ASA/ANTIPLAT THER USE RNG: HCPCS | Performed by: FAMILY MEDICINE

## 2018-10-10 PROCEDURE — 1036F TOBACCO NON-USER: CPT | Performed by: FAMILY MEDICINE

## 2018-10-10 PROCEDURE — G8417 CALC BMI ABV UP PARAM F/U: HCPCS | Performed by: FAMILY MEDICINE

## 2018-10-10 PROCEDURE — 1101F PT FALLS ASSESS-DOCD LE1/YR: CPT | Performed by: FAMILY MEDICINE

## 2018-10-10 PROCEDURE — 99215 OFFICE O/P EST HI 40 MIN: CPT | Performed by: FAMILY MEDICINE

## 2018-10-10 PROCEDURE — 4040F PNEUMOC VAC/ADMIN/RCVD: CPT | Performed by: FAMILY MEDICINE

## 2018-10-10 PROCEDURE — 73630 X-RAY EXAM OF FOOT: CPT

## 2018-10-10 PROCEDURE — 83036 HEMOGLOBIN GLYCOSYLATED A1C: CPT | Performed by: FAMILY MEDICINE

## 2018-10-10 PROCEDURE — G8482 FLU IMMUNIZE ORDER/ADMIN: HCPCS | Performed by: FAMILY MEDICINE

## 2018-10-10 PROCEDURE — 90682 RIV4 VACC RECOMBINANT DNA IM: CPT | Performed by: FAMILY MEDICINE

## 2018-10-10 PROCEDURE — 1123F ACP DISCUSS/DSCN MKR DOCD: CPT | Performed by: FAMILY MEDICINE

## 2018-10-10 PROCEDURE — G8400 PT W/DXA NO RESULTS DOC: HCPCS | Performed by: FAMILY MEDICINE

## 2018-10-10 RX ORDER — LEVOFLOXACIN 750 MG/1
750 TABLET ORAL DAILY
Qty: 14 TABLET | Refills: 0 | Status: SHIPPED | OUTPATIENT
Start: 2018-10-10 | End: 2018-10-24

## 2018-10-10 ASSESSMENT — ENCOUNTER SYMPTOMS
VOMITING: 0
ABDOMINAL PAIN: 0
COUGH: 0
COLOR CHANGE: 0
DIARRHEA: 0
EYE PAIN: 0
SORE THROAT: 0
RHINORRHEA: 0
NAUSEA: 0
SHORTNESS OF BREATH: 0
CONSTIPATION: 0

## 2018-10-10 NOTE — PROGRESS NOTES
Vaccine Information Sheet, \"Influenza - Inactivated\"  given to [facility] Maryannities, or parent/legal guardian of  [facility] Nazario and verbalized understanding. Patient responses:    Have you ever had a reaction to a flu vaccine? No  Are you able to eat eggs without adverse effects? Yes  Do you have any current illness? No  Have you ever had [place] Syndrome? No    Flu vaccine given per order. Please see immunization tab.

## 2018-10-10 NOTE — PROGRESS NOTES
Chief Complaint   Patient presents with    Leg Problem    Diabetes    Hyperlipidemia    Hypertension         HPI:  Rosaline Massey is a 68 y.o. (: 1940) here today   for review of her left leg pain and review of multiple other medical problems      Pt is here today with a 4-5 day hx of left leg pain. Pt states that she has had some swelling in the leg and ankle. She also states that her veins in the leg were sticking out really far. She also has c/o not being able to move her pinky toe and warmth in the leg. She has pain when she moves her toes at all and has pain with walking. She notes her leg pain at the site of her former leg wound is quite severe-almost indescribable. They note that the skin of the well-healed wound has changed from pink to more purplish and dusky      She is compliant with her diabetes medications  without diaphoresis, , sweating, , anxiety, , tremor, , diarrhea,  and hypoglycemia   She is not  Checking blood sugars. She is compliant with her cholesterol medication without myalgia and abdominal pain      She is compliant with her blood pressure medications  without Lightheadedness, Urinary Frequency, Edema and Sedation  She is not checking Home Blood Pressures. Review of Systems   Constitutional: Negative for chills and fever. HENT: Negative for ear pain, rhinorrhea and sore throat. Eyes: Negative for pain and visual disturbance. Respiratory: Negative for cough and shortness of breath. Cardiovascular: Negative for chest pain and palpitations. Gastrointestinal: Negative for abdominal pain, constipation, diarrhea, nausea and vomiting. Genitourinary: Negative for dysuria and frequency. Musculoskeletal: Positive for arthralgias. Negative for joint swelling and myalgias. Skin: Positive for rash. Negative for color change. Neurological: Negative for weakness, numbness and headaches. Hematological: Negative for adenopathy. Does not bruise/bleed easily.

## 2018-10-11 DIAGNOSIS — M1A.0490 IDIOPATHIC CHRONIC GOUT OF HAND WITHOUT TOPHUS, UNSPECIFIED LATERALITY: Primary | ICD-10-CM

## 2018-10-11 DIAGNOSIS — E83.52 HYPERCALCEMIA: ICD-10-CM

## 2018-10-11 RX ORDER — COLCHICINE 0.6 MG/1
TABLET ORAL
Qty: 30 TABLET | Refills: 1 | Status: SHIPPED | OUTPATIENT
Start: 2018-10-11 | End: 2020-03-17

## 2018-10-15 ENCOUNTER — TELEPHONE (OUTPATIENT)
Dept: FAMILY MEDICINE CLINIC | Age: 78
End: 2018-10-15

## 2018-10-17 DIAGNOSIS — N18.4 TYPE 2 DIABETES MELLITUS WITH STAGE 4 CHRONIC KIDNEY DISEASE, WITHOUT LONG-TERM CURRENT USE OF INSULIN (HCC): ICD-10-CM

## 2018-10-17 DIAGNOSIS — E11.22 TYPE 2 DIABETES MELLITUS WITH STAGE 4 CHRONIC KIDNEY DISEASE, WITHOUT LONG-TERM CURRENT USE OF INSULIN (HCC): ICD-10-CM

## 2018-10-17 DIAGNOSIS — I10 ESSENTIAL HYPERTENSION, BENIGN: ICD-10-CM

## 2018-10-17 RX ORDER — POTASSIUM CHLORIDE 750 MG/1
TABLET, FILM COATED, EXTENDED RELEASE ORAL
Qty: 90 TABLET | Refills: 3 | Status: SHIPPED | OUTPATIENT
Start: 2018-10-17 | End: 2019-11-25 | Stop reason: SDUPTHER

## 2018-10-17 RX ORDER — CLONIDINE HYDROCHLORIDE 0.1 MG/1
TABLET ORAL
Qty: 90 TABLET | Refills: 3 | Status: SHIPPED | OUTPATIENT
Start: 2018-10-17 | End: 2019-11-25 | Stop reason: SDUPTHER

## 2018-10-17 RX ORDER — LOSARTAN POTASSIUM 50 MG/1
TABLET ORAL
Qty: 90 TABLET | Refills: 3 | Status: SHIPPED | OUTPATIENT
Start: 2018-10-17 | End: 2019-11-25 | Stop reason: SDUPTHER

## 2018-10-24 ENCOUNTER — OFFICE VISIT (OUTPATIENT)
Dept: FAMILY MEDICINE CLINIC | Age: 78
End: 2018-10-24
Payer: MEDICARE

## 2018-10-24 VITALS
WEIGHT: 253 LBS | SYSTOLIC BLOOD PRESSURE: 124 MMHG | BODY MASS INDEX: 47.77 KG/M2 | HEART RATE: 76 BPM | DIASTOLIC BLOOD PRESSURE: 85 MMHG | HEIGHT: 61 IN

## 2018-10-24 DIAGNOSIS — N18.4 CHRONIC KIDNEY DISEASE, STAGE 4, SEVERELY DECREASED GFR (HCC): ICD-10-CM

## 2018-10-24 DIAGNOSIS — E11.22 TYPE 2 DIABETES MELLITUS WITH STAGE 4 CHRONIC KIDNEY DISEASE, WITHOUT LONG-TERM CURRENT USE OF INSULIN (HCC): ICD-10-CM

## 2018-10-24 DIAGNOSIS — L03.119 CELLULITIS OF LOWER EXTREMITY, UNSPECIFIED LATERALITY: ICD-10-CM

## 2018-10-24 DIAGNOSIS — M1A.0490 IDIOPATHIC CHRONIC GOUT OF HAND WITHOUT TOPHUS, UNSPECIFIED LATERALITY: ICD-10-CM

## 2018-10-24 DIAGNOSIS — R60.0 LOCALIZED EDEMA: Primary | ICD-10-CM

## 2018-10-24 DIAGNOSIS — M79.605 LEG PAIN, LEFT: ICD-10-CM

## 2018-10-24 DIAGNOSIS — N18.4 TYPE 2 DIABETES MELLITUS WITH STAGE 4 CHRONIC KIDNEY DISEASE, WITHOUT LONG-TERM CURRENT USE OF INSULIN (HCC): ICD-10-CM

## 2018-10-24 PROCEDURE — 1090F PRES/ABSN URINE INCON ASSESS: CPT | Performed by: FAMILY MEDICINE

## 2018-10-24 PROCEDURE — G8599 NO ASA/ANTIPLAT THER USE RNG: HCPCS | Performed by: FAMILY MEDICINE

## 2018-10-24 PROCEDURE — G8417 CALC BMI ABV UP PARAM F/U: HCPCS | Performed by: FAMILY MEDICINE

## 2018-10-24 PROCEDURE — 4040F PNEUMOC VAC/ADMIN/RCVD: CPT | Performed by: FAMILY MEDICINE

## 2018-10-24 PROCEDURE — 99215 OFFICE O/P EST HI 40 MIN: CPT | Performed by: FAMILY MEDICINE

## 2018-10-24 PROCEDURE — G8427 DOCREV CUR MEDS BY ELIG CLIN: HCPCS | Performed by: FAMILY MEDICINE

## 2018-10-24 PROCEDURE — 1036F TOBACCO NON-USER: CPT | Performed by: FAMILY MEDICINE

## 2018-10-24 PROCEDURE — G8482 FLU IMMUNIZE ORDER/ADMIN: HCPCS | Performed by: FAMILY MEDICINE

## 2018-10-24 PROCEDURE — G8400 PT W/DXA NO RESULTS DOC: HCPCS | Performed by: FAMILY MEDICINE

## 2018-10-24 PROCEDURE — 1123F ACP DISCUSS/DSCN MKR DOCD: CPT | Performed by: FAMILY MEDICINE

## 2018-10-24 PROCEDURE — 1101F PT FALLS ASSESS-DOCD LE1/YR: CPT | Performed by: FAMILY MEDICINE

## 2018-10-24 RX ORDER — GABAPENTIN 300 MG/1
300 CAPSULE ORAL 3 TIMES DAILY
Qty: 90 CAPSULE | Refills: 3 | Status: SHIPPED | OUTPATIENT
Start: 2018-10-24 | End: 2019-12-16 | Stop reason: SDUPTHER

## 2018-10-24 ASSESSMENT — ENCOUNTER SYMPTOMS
CONSTIPATION: 0
SHORTNESS OF BREATH: 0
DIARRHEA: 0
COLOR CHANGE: 1
COUGH: 0
SORE THROAT: 0
ABDOMINAL PAIN: 0
RHINORRHEA: 0
NAUSEA: 0
EYE PAIN: 0
VOMITING: 0

## 2018-10-24 NOTE — PROGRESS NOTES
Psychiatric/Behavioral: Negative for dysphoric mood, self-injury and suicidal ideas. The patient is not nervous/anxious. Allergies   Allergen Reactions    Latex Rash     Swelling lesions      Ace Inhibitors     Hydrochlorothiazide     Neosporin [Neomycin-Bacitracin Zn-Polymyx]     Penicillins     Sulfa Antibiotics     Tape [facility] Tape] Other (See Comments)     Sores     New Prescriptions    No medications on file       Meds Prior to visit:  Current Outpatient Prescriptions on File Prior to Visit   Medication Sig Dispense Refill    potassium chloride (KLOR-CON) 10 MEQ extended release tablet TAKE ONE TABLET BY MOUTH DAILY 90 tablet 3    losartan (COZAAR) 50 MG tablet TAKE 1 TABLET BY MOUTH DAILY. 90 tablet 3    cloNIDine (CATAPRES) 0.1 MG tablet TAKE 0.5 (1/2) TABLET BY MOUTH 2 TIMES DAILY. 90 tablet 3    colchicine (COLCRYS) 0.6 MG tablet Take by mouth two today and then one daily 30 tablet 1    glucose monitoring kit (FREESTYLE) monitoring kit 1 kit by Does not apply route daily 1 kit 0    Continuous Blood Gluc Sensor (FREESTYLE LACI SENSOR SYSTEM) [facility] Use as directed 1 each 0    BYDUREON 2 MG SRER injection INJECT 1 SYRINGE INTO THE SKIN ONCE A WEEK. 12 each 3    furosemide (LASIX) 80 MG tablet TAKE ONE HALF TO ONE WHOLE TABLET DAILY AS NEEDED FOR SWELLING 44 tablet 0    glipiZIDE (GLUCOTROL XL) 5 MG extended release tablet TAKE 1 TABLET BY MOUTH DAILY. 50 tablet 0    gabapentin (NEURONTIN) 300 MG capsule Take 1 capsule by mouth 3 times daily. . 90 capsule 3    allopurinol (ZYLOPRIM) 300 MG tablet TAKE ONE HALF TABLET BY MOUTH DAILY 45 tablet 3    metoprolol (LOPRESSOR) 100 MG tablet TAKE ONE TABLET BY MOUTH TWO TIMES A  tablet 3    fluticasone (FLONASE) 50 MCG/ACT nasal spray 2 sprays by Nasal route daily 1 Bottle 1    simvastatin (ZOCOR) 40 MG tablet TAKE ONE TABLET EVERY EVENING 90 tablet 3    amLODIPine (NORVASC) 10 MG tablet TAKE 0.5 (1/2) TABLET BY MOUTH DAILY 45 tablet 3    ascorbic acid (VITAMIN C) 500 MG tablet Take 500 mg by mouth 2 times daily      Cholecalciferol (VITAMIN D PO) Take 1 tablet by mouth daily      Omega-3 Fatty Acids (FISH OIL) 1000 MG CAPS Take 1,000 mg by mouth daily      magnesium oxide (MAG-OX) 400 MG tablet Take 400 mg by mouth daily      acetaminophen (APAP EXTRA STRENGTH) 500 MG tablet Take 1 tablet by mouth every 6 hours as needed for Pain 40 tablet 0    Handicap Placard MISC by Does not apply route Duration 5 years 1 each 0    TRUETEST TEST strip TEST UP TO 5 TIMES A  strip 3    loratadine (CLARITIN) 10 MG tablet Take 10 mg by mouth daily.  Multiple Minerals-Vitamins (CITRACAL PLUS) TABS Take 1 tablet by mouth 2 times daily.  Elastic Bandages & Supports (B-4 MED COMPRESSION HOSE MENS) MISC by Does not apply route. Wear in daytime only        No current facility-administered medications on file prior to visit.         Past Medical History:   Diagnosis Date    Blood circulation, collateral     Cerebral artery occlusion with cerebral infarction (HCC)     Chronic kidney disease (CKD), stage III (moderate) (HCC)     CVA (cerebral infarction)     Diabetes     DVT, lower extremity, recurrent ([address] 3/30/2012    x 3 LLE :  life long coumadin    Edema     Gout     Heart murmur 10/14/2014    EF normal, no aortic stenosis Echo 11/11/14 - mild MR    Hypercalcemia 10/11/2018    Hypercholesterolemia     Hyperhomocysteinemia ([address] 4/16/12    Hypertension     TRUPTI (obstructive sleep apnea)     CPAP at 16cm    Osteoarthritis     Sciatica 11/8/2016    Traumatic hematoma of left lower leg 3/21/2017    Venous insufficiency     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 Bernardo Lynn left lower extremity     Family History   Problem long-term current use of insulin (HCC)  Marginal control: Continue meds follow-up 6 weeks    5. Chronic kidney disease, stage 4, severely decreased GFR (HCC)  Manage risk factors to delay progression. Recheck with labs in 6 weeks    6. Leg pain, left  Chronic: Counseled patient to use her Neurontin as directed we may titrate up this medication at follow-up  - gabapentin (NEURONTIN) 300 MG capsule; Take 1 capsule by mouth 3 times daily. .  Dispense: 90 capsule; Refill: 3    Discussed use, benefit, and side effects of prescribed medications. Barriers to medication compliance addressed. All patient questions answered. Pt voiced understanding. RTC 6 weeks and as needed. Patient indicates she will not follow me to my new office    Scribe attestation:  Sury Turcios am scribing for and in the presence of Corina Conti MD. Electronically signed by Galen Harley on 10/24/2018 at 2:10 PM          Provider attestation: Abad Lewis MD, personally performed the services scribed by the user listed above in my presence, and it is both accurate and complete. I agree with the ROS and Past Histories independently gathered by the clinical support staff and the remaining scribed note accurately describes my personal service to the patient.     [facility]    10/24/2018  2:48 PM

## 2018-11-02 ENCOUNTER — TELEPHONE (OUTPATIENT)
Dept: FAMILY MEDICINE CLINIC | Age: 78
End: 2018-11-02

## 2018-11-02 DIAGNOSIS — M17.0 PRIMARY OSTEOARTHRITIS OF BOTH KNEES: Primary | ICD-10-CM

## 2018-11-02 NOTE — TELEPHONE ENCOUNTER
Please call and give patient Dr. Haylee Hernandez contact information. I made a referral.  Thank you.

## 2018-11-05 ENCOUNTER — TELEPHONE (OUTPATIENT)
Dept: FAMILY MEDICINE CLINIC | Age: 78
End: 2018-11-05

## 2018-11-13 ENCOUNTER — OFFICE VISIT (OUTPATIENT)
Dept: ORTHOPEDIC SURGERY | Age: 78
End: 2018-11-13
Payer: MEDICARE

## 2018-11-13 VITALS
WEIGHT: 253 LBS | BODY MASS INDEX: 47.77 KG/M2 | DIASTOLIC BLOOD PRESSURE: 80 MMHG | HEIGHT: 61 IN | HEART RATE: 83 BPM | SYSTOLIC BLOOD PRESSURE: 155 MMHG

## 2018-11-13 DIAGNOSIS — M25.562 PAIN IN BOTH KNEES, UNSPECIFIED CHRONICITY: ICD-10-CM

## 2018-11-13 DIAGNOSIS — M25.561 PAIN IN BOTH KNEES, UNSPECIFIED CHRONICITY: ICD-10-CM

## 2018-11-13 DIAGNOSIS — M17.0 PRIMARY OSTEOARTHRITIS OF BOTH KNEES: Primary | ICD-10-CM

## 2018-11-13 PROCEDURE — G8417 CALC BMI ABV UP PARAM F/U: HCPCS | Performed by: ORTHOPAEDIC SURGERY

## 2018-11-13 PROCEDURE — G8482 FLU IMMUNIZE ORDER/ADMIN: HCPCS | Performed by: ORTHOPAEDIC SURGERY

## 2018-11-13 PROCEDURE — G8427 DOCREV CUR MEDS BY ELIG CLIN: HCPCS | Performed by: ORTHOPAEDIC SURGERY

## 2018-11-13 PROCEDURE — 1090F PRES/ABSN URINE INCON ASSESS: CPT | Performed by: ORTHOPAEDIC SURGERY

## 2018-11-13 PROCEDURE — 1123F ACP DISCUSS/DSCN MKR DOCD: CPT | Performed by: ORTHOPAEDIC SURGERY

## 2018-11-13 PROCEDURE — 20610 DRAIN/INJ JOINT/BURSA W/O US: CPT | Performed by: ORTHOPAEDIC SURGERY

## 2018-11-13 PROCEDURE — 1036F TOBACCO NON-USER: CPT | Performed by: ORTHOPAEDIC SURGERY

## 2018-11-13 PROCEDURE — 99213 OFFICE O/P EST LOW 20 MIN: CPT | Performed by: ORTHOPAEDIC SURGERY

## 2018-11-13 PROCEDURE — 1101F PT FALLS ASSESS-DOCD LE1/YR: CPT | Performed by: ORTHOPAEDIC SURGERY

## 2018-11-13 PROCEDURE — G8599 NO ASA/ANTIPLAT THER USE RNG: HCPCS | Performed by: ORTHOPAEDIC SURGERY

## 2018-11-13 PROCEDURE — 4040F PNEUMOC VAC/ADMIN/RCVD: CPT | Performed by: ORTHOPAEDIC SURGERY

## 2018-11-13 PROCEDURE — G8400 PT W/DXA NO RESULTS DOC: HCPCS | Performed by: ORTHOPAEDIC SURGERY

## 2018-11-13 RX ORDER — METHYLPREDNISOLONE ACETATE 40 MG/ML
80 INJECTION, SUSPENSION INTRA-ARTICULAR; INTRALESIONAL; INTRAMUSCULAR; SOFT TISSUE ONCE
Status: COMPLETED | OUTPATIENT
Start: 2018-11-13 | End: 2018-11-13

## 2018-11-13 RX ADMIN — METHYLPREDNISOLONE ACETATE 80 MG: 40 INJECTION, SUSPENSION INTRA-ARTICULAR; INTRALESIONAL; INTRAMUSCULAR; SOFT TISSUE at 17:20

## 2018-11-13 RX ADMIN — METHYLPREDNISOLONE ACETATE 80 MG: 40 INJECTION, SUSPENSION INTRA-ARTICULAR; INTRALESIONAL; INTRAMUSCULAR; SOFT TISSUE at 17:21

## 2019-01-17 DIAGNOSIS — E78.00 HYPERCHOLESTEROLEMIA: ICD-10-CM

## 2019-01-17 DIAGNOSIS — I10 ESSENTIAL HYPERTENSION, BENIGN: ICD-10-CM

## 2019-01-17 DIAGNOSIS — R60.0 LOCALIZED EDEMA: ICD-10-CM

## 2019-01-18 RX ORDER — AMLODIPINE BESYLATE 10 MG/1
TABLET ORAL
Qty: 45 TABLET | Refills: 0 | Status: SHIPPED | OUTPATIENT
Start: 2019-01-18 | End: 2019-04-15 | Stop reason: SDUPTHER

## 2019-01-18 RX ORDER — SIMVASTATIN 40 MG
TABLET ORAL
Qty: 90 TABLET | Refills: 0 | Status: SHIPPED | OUTPATIENT
Start: 2019-01-18 | End: 2019-04-15 | Stop reason: SDUPTHER

## 2019-01-18 RX ORDER — FUROSEMIDE 80 MG
TABLET ORAL
Qty: 90 TABLET | Refills: 0 | Status: SHIPPED | OUTPATIENT
Start: 2019-01-18 | End: 2019-02-25

## 2019-01-29 ENCOUNTER — TELEPHONE (OUTPATIENT)
Dept: PRIMARY CARE CLINIC | Age: 79
End: 2019-01-29

## 2019-01-29 DIAGNOSIS — N18.4 TYPE 2 DIABETES MELLITUS WITH STAGE 4 CHRONIC KIDNEY DISEASE, WITHOUT LONG-TERM CURRENT USE OF INSULIN (HCC): Primary | ICD-10-CM

## 2019-01-29 DIAGNOSIS — E11.22 TYPE 2 DIABETES MELLITUS WITH STAGE 4 CHRONIC KIDNEY DISEASE, WITHOUT LONG-TERM CURRENT USE OF INSULIN (HCC): Primary | ICD-10-CM

## 2019-02-12 ENCOUNTER — TELEPHONE (OUTPATIENT)
Dept: FAMILY MEDICINE CLINIC | Age: 79
End: 2019-02-12

## 2019-02-12 ENCOUNTER — OFFICE VISIT (OUTPATIENT)
Dept: FAMILY MEDICINE CLINIC | Age: 79
End: 2019-02-12
Payer: MEDICARE

## 2019-02-12 ENCOUNTER — OFFICE VISIT (OUTPATIENT)
Dept: SURGERY | Age: 79
End: 2019-02-12
Payer: MEDICARE

## 2019-02-12 ENCOUNTER — PROCEDURE VISIT (OUTPATIENT)
Dept: SURGERY | Age: 79
End: 2019-02-12
Payer: MEDICARE

## 2019-02-12 VITALS
SYSTOLIC BLOOD PRESSURE: 133 MMHG | HEART RATE: 86 BPM | WEIGHT: 252 LBS | DIASTOLIC BLOOD PRESSURE: 63 MMHG | HEIGHT: 61 IN | BODY MASS INDEX: 47.58 KG/M2

## 2019-02-12 VITALS
HEART RATE: 86 BPM | WEIGHT: 252 LBS | DIASTOLIC BLOOD PRESSURE: 63 MMHG | BODY MASS INDEX: 47.61 KG/M2 | SYSTOLIC BLOOD PRESSURE: 133 MMHG

## 2019-02-12 DIAGNOSIS — R60.0 LOCALIZED EDEMA: ICD-10-CM

## 2019-02-12 DIAGNOSIS — N18.4 TYPE 2 DIABETES MELLITUS WITH STAGE 4 CHRONIC KIDNEY DISEASE, WITHOUT LONG-TERM CURRENT USE OF INSULIN (HCC): ICD-10-CM

## 2019-02-12 DIAGNOSIS — M79.89 LEFT LEG SWELLING: ICD-10-CM

## 2019-02-12 DIAGNOSIS — L03.116 CELLULITIS OF LEFT LOWER EXTREMITY: Primary | ICD-10-CM

## 2019-02-12 DIAGNOSIS — E11.22 TYPE 2 DIABETES MELLITUS WITH STAGE 4 CHRONIC KIDNEY DISEASE, WITHOUT LONG-TERM CURRENT USE OF INSULIN (HCC): ICD-10-CM

## 2019-02-12 DIAGNOSIS — S80.12XA TRAUMATIC HEMATOMA OF LEFT LOWER LEG, INITIAL ENCOUNTER: Primary | ICD-10-CM

## 2019-02-12 DIAGNOSIS — M79.605 LEG PAIN, LEFT: ICD-10-CM

## 2019-02-12 LAB — HBA1C MFR BLD: 6.9 %

## 2019-02-12 PROCEDURE — 1036F TOBACCO NON-USER: CPT | Performed by: FAMILY MEDICINE

## 2019-02-12 PROCEDURE — G8599 NO ASA/ANTIPLAT THER USE RNG: HCPCS | Performed by: FAMILY MEDICINE

## 2019-02-12 PROCEDURE — 93971 EXTREMITY STUDY: CPT | Performed by: SURGERY

## 2019-02-12 PROCEDURE — G8417 CALC BMI ABV UP PARAM F/U: HCPCS | Performed by: SURGERY

## 2019-02-12 PROCEDURE — 99214 OFFICE O/P EST MOD 30 MIN: CPT | Performed by: FAMILY MEDICINE

## 2019-02-12 PROCEDURE — G8482 FLU IMMUNIZE ORDER/ADMIN: HCPCS | Performed by: FAMILY MEDICINE

## 2019-02-12 PROCEDURE — 1090F PRES/ABSN URINE INCON ASSESS: CPT | Performed by: SURGERY

## 2019-02-12 PROCEDURE — G8427 DOCREV CUR MEDS BY ELIG CLIN: HCPCS | Performed by: SURGERY

## 2019-02-12 PROCEDURE — 1090F PRES/ABSN URINE INCON ASSESS: CPT | Performed by: FAMILY MEDICINE

## 2019-02-12 PROCEDURE — G8400 PT W/DXA NO RESULTS DOC: HCPCS | Performed by: SURGERY

## 2019-02-12 PROCEDURE — 83036 HEMOGLOBIN GLYCOSYLATED A1C: CPT | Performed by: FAMILY MEDICINE

## 2019-02-12 PROCEDURE — G8427 DOCREV CUR MEDS BY ELIG CLIN: HCPCS | Performed by: FAMILY MEDICINE

## 2019-02-12 PROCEDURE — G8482 FLU IMMUNIZE ORDER/ADMIN: HCPCS | Performed by: SURGERY

## 2019-02-12 PROCEDURE — G8417 CALC BMI ABV UP PARAM F/U: HCPCS | Performed by: FAMILY MEDICINE

## 2019-02-12 PROCEDURE — 1101F PT FALLS ASSESS-DOCD LE1/YR: CPT | Performed by: SURGERY

## 2019-02-12 PROCEDURE — 4040F PNEUMOC VAC/ADMIN/RCVD: CPT | Performed by: SURGERY

## 2019-02-12 PROCEDURE — 99214 OFFICE O/P EST MOD 30 MIN: CPT | Performed by: SURGERY

## 2019-02-12 PROCEDURE — G8599 NO ASA/ANTIPLAT THER USE RNG: HCPCS | Performed by: SURGERY

## 2019-02-12 PROCEDURE — 4040F PNEUMOC VAC/ADMIN/RCVD: CPT | Performed by: FAMILY MEDICINE

## 2019-02-12 PROCEDURE — 1123F ACP DISCUSS/DSCN MKR DOCD: CPT | Performed by: SURGERY

## 2019-02-12 PROCEDURE — G8400 PT W/DXA NO RESULTS DOC: HCPCS | Performed by: FAMILY MEDICINE

## 2019-02-12 PROCEDURE — 1123F ACP DISCUSS/DSCN MKR DOCD: CPT | Performed by: FAMILY MEDICINE

## 2019-02-12 PROCEDURE — 1101F PT FALLS ASSESS-DOCD LE1/YR: CPT | Performed by: FAMILY MEDICINE

## 2019-02-12 PROCEDURE — 1036F TOBACCO NON-USER: CPT | Performed by: SURGERY

## 2019-02-12 RX ORDER — CEPHALEXIN 500 MG/1
500 CAPSULE ORAL 2 TIMES DAILY
Qty: 20 CAPSULE | Refills: 0 | Status: SHIPPED | OUTPATIENT
Start: 2019-02-12 | End: 2020-04-14 | Stop reason: SDUPTHER

## 2019-02-12 ASSESSMENT — ENCOUNTER SYMPTOMS
BACK PAIN: 0
RESPIRATORY NEGATIVE: 1
EYES NEGATIVE: 1
GASTROINTESTINAL NEGATIVE: 1
ALLERGIC/IMMUNOLOGIC NEGATIVE: 1
COLOR CHANGE: 0

## 2019-02-12 ASSESSMENT — PATIENT HEALTH QUESTIONNAIRE - PHQ9
SUM OF ALL RESPONSES TO PHQ QUESTIONS 1-9: 0
1. LITTLE INTEREST OR PLEASURE IN DOING THINGS: 0
SUM OF ALL RESPONSES TO PHQ9 QUESTIONS 1 & 2: 0
2. FEELING DOWN, DEPRESSED OR HOPELESS: 0
SUM OF ALL RESPONSES TO PHQ QUESTIONS 1-9: 0

## 2019-02-22 ENCOUNTER — OFFICE VISIT (OUTPATIENT)
Dept: SURGERY | Age: 79
End: 2019-02-22
Payer: MEDICARE

## 2019-02-22 VITALS
HEIGHT: 61 IN | BODY MASS INDEX: 47.61 KG/M2 | HEART RATE: 83 BPM | DIASTOLIC BLOOD PRESSURE: 76 MMHG | SYSTOLIC BLOOD PRESSURE: 147 MMHG

## 2019-02-22 DIAGNOSIS — L03.116 LEFT LEG CELLULITIS: ICD-10-CM

## 2019-02-22 DIAGNOSIS — M79.605 LEG PAIN, LEFT: Primary | ICD-10-CM

## 2019-02-22 DIAGNOSIS — M79.89 LEFT LEG SWELLING: ICD-10-CM

## 2019-02-22 PROCEDURE — G8482 FLU IMMUNIZE ORDER/ADMIN: HCPCS | Performed by: NURSE PRACTITIONER

## 2019-02-22 PROCEDURE — 1123F ACP DISCUSS/DSCN MKR DOCD: CPT | Performed by: NURSE PRACTITIONER

## 2019-02-22 PROCEDURE — G8417 CALC BMI ABV UP PARAM F/U: HCPCS | Performed by: NURSE PRACTITIONER

## 2019-02-22 PROCEDURE — 4040F PNEUMOC VAC/ADMIN/RCVD: CPT | Performed by: NURSE PRACTITIONER

## 2019-02-22 PROCEDURE — G8427 DOCREV CUR MEDS BY ELIG CLIN: HCPCS | Performed by: NURSE PRACTITIONER

## 2019-02-22 PROCEDURE — 1101F PT FALLS ASSESS-DOCD LE1/YR: CPT | Performed by: NURSE PRACTITIONER

## 2019-02-22 PROCEDURE — 99213 OFFICE O/P EST LOW 20 MIN: CPT | Performed by: NURSE PRACTITIONER

## 2019-02-22 PROCEDURE — 1090F PRES/ABSN URINE INCON ASSESS: CPT | Performed by: NURSE PRACTITIONER

## 2019-02-22 PROCEDURE — G8400 PT W/DXA NO RESULTS DOC: HCPCS | Performed by: NURSE PRACTITIONER

## 2019-02-22 PROCEDURE — G8599 NO ASA/ANTIPLAT THER USE RNG: HCPCS | Performed by: NURSE PRACTITIONER

## 2019-02-22 PROCEDURE — 1036F TOBACCO NON-USER: CPT | Performed by: NURSE PRACTITIONER

## 2019-02-22 ASSESSMENT — ENCOUNTER SYMPTOMS
BACK PAIN: 0
COLOR CHANGE: 0

## 2019-02-23 PROBLEM — L03.116 LEFT LEG CELLULITIS: Status: ACTIVE | Noted: 2019-02-23

## 2019-02-25 ENCOUNTER — OFFICE VISIT (OUTPATIENT)
Dept: FAMILY MEDICINE CLINIC | Age: 79
End: 2019-02-25
Payer: MEDICARE

## 2019-02-25 VITALS
WEIGHT: 255 LBS | BODY MASS INDEX: 50.06 KG/M2 | HEART RATE: 88 BPM | DIASTOLIC BLOOD PRESSURE: 72 MMHG | HEIGHT: 60 IN | TEMPERATURE: 97.8 F | SYSTOLIC BLOOD PRESSURE: 132 MMHG

## 2019-02-25 DIAGNOSIS — I44.0 FIRST DEGREE AV BLOCK: ICD-10-CM

## 2019-02-25 DIAGNOSIS — I10 ESSENTIAL HYPERTENSION, BENIGN: ICD-10-CM

## 2019-02-25 DIAGNOSIS — E83.52 SERUM CALCIUM ELEVATED: ICD-10-CM

## 2019-02-25 DIAGNOSIS — Z86.73 H/O: CVA (CEREBROVASCULAR ACCIDENT): ICD-10-CM

## 2019-02-25 DIAGNOSIS — H60.331 ACUTE SWIMMER'S EAR OF RIGHT SIDE: ICD-10-CM

## 2019-02-25 DIAGNOSIS — G47.33 OSA (OBSTRUCTIVE SLEEP APNEA): ICD-10-CM

## 2019-02-25 DIAGNOSIS — E78.00 HYPERCHOLESTEROLEMIA: ICD-10-CM

## 2019-02-25 DIAGNOSIS — Z79.899 MEDICATION MANAGEMENT: ICD-10-CM

## 2019-02-25 DIAGNOSIS — N18.4 TYPE 2 DIABETES MELLITUS WITH STAGE 4 CHRONIC KIDNEY DISEASE, WITHOUT LONG-TERM CURRENT USE OF INSULIN (HCC): Primary | ICD-10-CM

## 2019-02-25 DIAGNOSIS — N18.4 CHRONIC KIDNEY DISEASE, STAGE 4, SEVERELY DECREASED GFR (HCC): ICD-10-CM

## 2019-02-25 DIAGNOSIS — E11.22 TYPE 2 DIABETES MELLITUS WITH STAGE 4 CHRONIC KIDNEY DISEASE, WITHOUT LONG-TERM CURRENT USE OF INSULIN (HCC): Primary | ICD-10-CM

## 2019-02-25 PROBLEM — M79.89 LEFT LEG SWELLING: Status: RESOLVED | Noted: 2017-08-03 | Resolved: 2019-02-25

## 2019-02-25 LAB
ANION GAP SERPL CALCULATED.3IONS-SCNC: 13 MMOL/L (ref 3–16)
BUN BLDV-MCNC: 27 MG/DL (ref 7–20)
CALCIUM SERPL-MCNC: 9.3 MG/DL (ref 8.3–10.6)
CHLORIDE BLD-SCNC: 104 MMOL/L (ref 99–110)
CHOLESTEROL, TOTAL: 160 MG/DL (ref 0–199)
CO2: 28 MMOL/L (ref 21–32)
CREAT SERPL-MCNC: 1.3 MG/DL (ref 0.6–1.2)
GFR AFRICAN AMERICAN: 48
GFR NON-AFRICAN AMERICAN: 40
GLUCOSE BLD-MCNC: 144 MG/DL (ref 70–99)
HDLC SERPL-MCNC: 45 MG/DL (ref 40–60)
LDL CHOLESTEROL CALCULATED: 74 MG/DL
POTASSIUM SERPL-SCNC: 5 MMOL/L (ref 3.5–5.1)
SODIUM BLD-SCNC: 145 MMOL/L (ref 136–145)
TRIGL SERPL-MCNC: 206 MG/DL (ref 0–150)
VLDLC SERPL CALC-MCNC: 41 MG/DL

## 2019-02-25 PROCEDURE — G8427 DOCREV CUR MEDS BY ELIG CLIN: HCPCS | Performed by: FAMILY MEDICINE

## 2019-02-25 PROCEDURE — 1090F PRES/ABSN URINE INCON ASSESS: CPT | Performed by: FAMILY MEDICINE

## 2019-02-25 PROCEDURE — 1101F PT FALLS ASSESS-DOCD LE1/YR: CPT | Performed by: FAMILY MEDICINE

## 2019-02-25 PROCEDURE — 99215 OFFICE O/P EST HI 40 MIN: CPT | Performed by: FAMILY MEDICINE

## 2019-02-25 PROCEDURE — G8599 NO ASA/ANTIPLAT THER USE RNG: HCPCS | Performed by: FAMILY MEDICINE

## 2019-02-25 PROCEDURE — G8417 CALC BMI ABV UP PARAM F/U: HCPCS | Performed by: FAMILY MEDICINE

## 2019-02-25 PROCEDURE — G8400 PT W/DXA NO RESULTS DOC: HCPCS | Performed by: FAMILY MEDICINE

## 2019-02-25 PROCEDURE — 93000 ELECTROCARDIOGRAM COMPLETE: CPT | Performed by: FAMILY MEDICINE

## 2019-02-25 PROCEDURE — 4130F TOPICAL PREP RX AOE: CPT | Performed by: FAMILY MEDICINE

## 2019-02-25 PROCEDURE — G8482 FLU IMMUNIZE ORDER/ADMIN: HCPCS | Performed by: FAMILY MEDICINE

## 2019-02-25 PROCEDURE — 1036F TOBACCO NON-USER: CPT | Performed by: FAMILY MEDICINE

## 2019-02-25 PROCEDURE — 36415 COLL VENOUS BLD VENIPUNCTURE: CPT | Performed by: FAMILY MEDICINE

## 2019-02-25 PROCEDURE — 4040F PNEUMOC VAC/ADMIN/RCVD: CPT | Performed by: FAMILY MEDICINE

## 2019-02-25 PROCEDURE — 1123F ACP DISCUSS/DSCN MKR DOCD: CPT | Performed by: FAMILY MEDICINE

## 2019-02-25 RX ORDER — CIPROFLOXACIN AND DEXAMETHASONE 3; 1 MG/ML; MG/ML
4 SUSPENSION/ DROPS AURICULAR (OTIC) 2 TIMES DAILY
Qty: 1 BOTTLE | Refills: 0 | Status: SHIPPED | OUTPATIENT
Start: 2019-02-25 | End: 2019-03-04

## 2019-02-26 ENCOUNTER — TELEPHONE (OUTPATIENT)
Dept: FAMILY MEDICINE CLINIC | Age: 79
End: 2019-02-26

## 2019-03-06 ENCOUNTER — OFFICE VISIT (OUTPATIENT)
Dept: FAMILY MEDICINE CLINIC | Age: 79
End: 2019-03-06
Payer: MEDICARE

## 2019-03-06 ENCOUNTER — OFFICE VISIT (OUTPATIENT)
Dept: SLEEP MEDICINE | Age: 79
End: 2019-03-06
Payer: MEDICARE

## 2019-03-06 VITALS
BODY MASS INDEX: 50.06 KG/M2 | HEART RATE: 78 BPM | HEIGHT: 60 IN | OXYGEN SATURATION: 95 % | SYSTOLIC BLOOD PRESSURE: 129 MMHG | WEIGHT: 255 LBS | DIASTOLIC BLOOD PRESSURE: 68 MMHG

## 2019-03-06 VITALS
SYSTOLIC BLOOD PRESSURE: 124 MMHG | WEIGHT: 258 LBS | BODY MASS INDEX: 50.39 KG/M2 | TEMPERATURE: 98.4 F | OXYGEN SATURATION: 94 % | HEART RATE: 75 BPM | DIASTOLIC BLOOD PRESSURE: 72 MMHG

## 2019-03-06 DIAGNOSIS — N18.30 CKD (CHRONIC KIDNEY DISEASE) STAGE 3, GFR 30-59 ML/MIN (HCC): ICD-10-CM

## 2019-03-06 DIAGNOSIS — I10 ESSENTIAL HYPERTENSION: ICD-10-CM

## 2019-03-06 DIAGNOSIS — E83.52 SERUM CALCIUM ELEVATED: ICD-10-CM

## 2019-03-06 DIAGNOSIS — R06.09 DOE (DYSPNEA ON EXERTION): Primary | ICD-10-CM

## 2019-03-06 DIAGNOSIS — Z86.73 H/O: STROKE: ICD-10-CM

## 2019-03-06 DIAGNOSIS — G47.33 OSA (OBSTRUCTIVE SLEEP APNEA): Primary | ICD-10-CM

## 2019-03-06 PROCEDURE — G8482 FLU IMMUNIZE ORDER/ADMIN: HCPCS | Performed by: FAMILY MEDICINE

## 2019-03-06 PROCEDURE — 36415 COLL VENOUS BLD VENIPUNCTURE: CPT | Performed by: FAMILY MEDICINE

## 2019-03-06 PROCEDURE — G8599 NO ASA/ANTIPLAT THER USE RNG: HCPCS | Performed by: PSYCHIATRY & NEUROLOGY

## 2019-03-06 PROCEDURE — G8482 FLU IMMUNIZE ORDER/ADMIN: HCPCS | Performed by: PSYCHIATRY & NEUROLOGY

## 2019-03-06 PROCEDURE — 4040F PNEUMOC VAC/ADMIN/RCVD: CPT | Performed by: PSYCHIATRY & NEUROLOGY

## 2019-03-06 PROCEDURE — G8417 CALC BMI ABV UP PARAM F/U: HCPCS | Performed by: PSYCHIATRY & NEUROLOGY

## 2019-03-06 PROCEDURE — G8427 DOCREV CUR MEDS BY ELIG CLIN: HCPCS | Performed by: FAMILY MEDICINE

## 2019-03-06 PROCEDURE — 1123F ACP DISCUSS/DSCN MKR DOCD: CPT | Performed by: FAMILY MEDICINE

## 2019-03-06 PROCEDURE — G8599 NO ASA/ANTIPLAT THER USE RNG: HCPCS | Performed by: FAMILY MEDICINE

## 2019-03-06 PROCEDURE — 99203 OFFICE O/P NEW LOW 30 MIN: CPT | Performed by: PSYCHIATRY & NEUROLOGY

## 2019-03-06 PROCEDURE — G8417 CALC BMI ABV UP PARAM F/U: HCPCS | Performed by: FAMILY MEDICINE

## 2019-03-06 PROCEDURE — G8427 DOCREV CUR MEDS BY ELIG CLIN: HCPCS | Performed by: PSYCHIATRY & NEUROLOGY

## 2019-03-06 PROCEDURE — 1036F TOBACCO NON-USER: CPT | Performed by: FAMILY MEDICINE

## 2019-03-06 PROCEDURE — G8400 PT W/DXA NO RESULTS DOC: HCPCS | Performed by: PSYCHIATRY & NEUROLOGY

## 2019-03-06 PROCEDURE — 1090F PRES/ABSN URINE INCON ASSESS: CPT | Performed by: FAMILY MEDICINE

## 2019-03-06 PROCEDURE — 1123F ACP DISCUSS/DSCN MKR DOCD: CPT | Performed by: PSYCHIATRY & NEUROLOGY

## 2019-03-06 PROCEDURE — 4040F PNEUMOC VAC/ADMIN/RCVD: CPT | Performed by: FAMILY MEDICINE

## 2019-03-06 PROCEDURE — 1090F PRES/ABSN URINE INCON ASSESS: CPT | Performed by: PSYCHIATRY & NEUROLOGY

## 2019-03-06 PROCEDURE — 1036F TOBACCO NON-USER: CPT | Performed by: PSYCHIATRY & NEUROLOGY

## 2019-03-06 PROCEDURE — G8400 PT W/DXA NO RESULTS DOC: HCPCS | Performed by: FAMILY MEDICINE

## 2019-03-06 PROCEDURE — 1101F PT FALLS ASSESS-DOCD LE1/YR: CPT | Performed by: PSYCHIATRY & NEUROLOGY

## 2019-03-06 PROCEDURE — 99214 OFFICE O/P EST MOD 30 MIN: CPT | Performed by: FAMILY MEDICINE

## 2019-03-06 PROCEDURE — 1101F PT FALLS ASSESS-DOCD LE1/YR: CPT | Performed by: FAMILY MEDICINE

## 2019-03-06 RX ORDER — FUROSEMIDE 20 MG/1
20 TABLET ORAL DAILY PRN
Qty: 30 TABLET | Refills: 0 | Status: SHIPPED | OUTPATIENT
Start: 2019-03-06 | End: 2019-04-30 | Stop reason: SDUPTHER

## 2019-03-06 ASSESSMENT — SLEEP AND FATIGUE QUESTIONNAIRES
HOW LIKELY ARE YOU TO NOD OFF OR FALL ASLEEP WHILE SITTING AND TALKING TO SOMEONE: 0
ESS TOTAL SCORE: 5
HOW LIKELY ARE YOU TO NOD OFF OR FALL ASLEEP WHILE LYING DOWN TO REST IN THE AFTERNOON WHEN CIRCUMSTANCES PERMIT: 1
HOW LIKELY ARE YOU TO NOD OFF OR FALL ASLEEP WHILE SITTING INACTIVE IN A PUBLIC PLACE: 0
HOW LIKELY ARE YOU TO NOD OFF OR FALL ASLEEP WHILE SITTING AND READING: 2
HOW LIKELY ARE YOU TO NOD OFF OR FALL ASLEEP WHILE WATCHING TV: 2
HOW LIKELY ARE YOU TO NOD OFF OR FALL ASLEEP WHILE SITTING QUIETLY AFTER LUNCH WITHOUT ALCOHOL: 0
HOW LIKELY ARE YOU TO NOD OFF OR FALL ASLEEP WHEN YOU ARE A PASSENGER IN A CAR FOR AN HOUR WITHOUT A BREAK: 0
HOW LIKELY ARE YOU TO NOD OFF OR FALL ASLEEP IN A CAR, WHILE STOPPED FOR A FEW MINUTES IN TRAFFIC: 0
NECK CIRCUMFERENCE (INCHES): 17.5

## 2019-03-06 ASSESSMENT — ENCOUNTER SYMPTOMS
EYES NEGATIVE: 1
GASTROINTESTINAL NEGATIVE: 1
CHOKING: 0
RESPIRATORY NEGATIVE: 1
ALLERGIC/IMMUNOLOGIC NEGATIVE: 1

## 2019-03-07 LAB
ANION GAP SERPL CALCULATED.3IONS-SCNC: 19 MMOL/L (ref 3–16)
BUN BLDV-MCNC: 21 MG/DL (ref 7–20)
CALCIUM SERPL-MCNC: 10.1 MG/DL (ref 8.3–10.6)
CHLORIDE BLD-SCNC: 97 MMOL/L (ref 99–110)
CO2: 26 MMOL/L (ref 21–32)
CREAT SERPL-MCNC: 1.2 MG/DL (ref 0.6–1.2)
GFR AFRICAN AMERICAN: 53
GFR NON-AFRICAN AMERICAN: 43
GLUCOSE BLD-MCNC: 297 MG/DL (ref 70–99)
POTASSIUM SERPL-SCNC: 4 MMOL/L (ref 3.5–5.1)
SODIUM BLD-SCNC: 142 MMOL/L (ref 136–145)

## 2019-03-08 ENCOUNTER — OFFICE VISIT (OUTPATIENT)
Dept: SURGERY | Age: 79
End: 2019-03-08
Payer: MEDICARE

## 2019-03-08 VITALS — HEART RATE: 76 BPM | DIASTOLIC BLOOD PRESSURE: 78 MMHG | SYSTOLIC BLOOD PRESSURE: 144 MMHG

## 2019-03-08 DIAGNOSIS — M79.89 LEFT LEG SWELLING: ICD-10-CM

## 2019-03-08 DIAGNOSIS — L03.116 LEFT LEG CELLULITIS: Primary | ICD-10-CM

## 2019-03-08 DIAGNOSIS — I87.2 VENOUS (PERIPHERAL) INSUFFICIENCY: ICD-10-CM

## 2019-03-08 PROCEDURE — G8400 PT W/DXA NO RESULTS DOC: HCPCS | Performed by: NURSE PRACTITIONER

## 2019-03-08 PROCEDURE — G8427 DOCREV CUR MEDS BY ELIG CLIN: HCPCS | Performed by: NURSE PRACTITIONER

## 2019-03-08 PROCEDURE — G8482 FLU IMMUNIZE ORDER/ADMIN: HCPCS | Performed by: NURSE PRACTITIONER

## 2019-03-08 PROCEDURE — 99213 OFFICE O/P EST LOW 20 MIN: CPT | Performed by: NURSE PRACTITIONER

## 2019-03-08 PROCEDURE — 1090F PRES/ABSN URINE INCON ASSESS: CPT | Performed by: NURSE PRACTITIONER

## 2019-03-08 PROCEDURE — G8417 CALC BMI ABV UP PARAM F/U: HCPCS | Performed by: NURSE PRACTITIONER

## 2019-03-08 PROCEDURE — 1036F TOBACCO NON-USER: CPT | Performed by: NURSE PRACTITIONER

## 2019-03-08 PROCEDURE — 4040F PNEUMOC VAC/ADMIN/RCVD: CPT | Performed by: NURSE PRACTITIONER

## 2019-03-08 PROCEDURE — 1101F PT FALLS ASSESS-DOCD LE1/YR: CPT | Performed by: NURSE PRACTITIONER

## 2019-03-08 PROCEDURE — 1123F ACP DISCUSS/DSCN MKR DOCD: CPT | Performed by: NURSE PRACTITIONER

## 2019-03-08 PROCEDURE — G8599 NO ASA/ANTIPLAT THER USE RNG: HCPCS | Performed by: NURSE PRACTITIONER

## 2019-03-08 ASSESSMENT — ENCOUNTER SYMPTOMS
COLOR CHANGE: 0
BACK PAIN: 0

## 2019-03-19 ENCOUNTER — HOSPITAL ENCOUNTER (OUTPATIENT)
Dept: NON INVASIVE DIAGNOSTICS | Age: 79
Discharge: HOME | End: 2019-03-19
Payer: MEDICARE

## 2019-03-19 DIAGNOSIS — R06.09 DOE (DYSPNEA ON EXERTION): ICD-10-CM

## 2019-03-19 LAB
LV EF: 60 %
LVEF MODALITY: NORMAL

## 2019-03-19 PROCEDURE — 93306 TTE W/DOPPLER COMPLETE: CPT

## 2019-03-21 ENCOUNTER — OFFICE VISIT (OUTPATIENT)
Dept: FAMILY MEDICINE CLINIC | Age: 79
End: 2019-03-21
Payer: MEDICARE

## 2019-03-21 VITALS
HEART RATE: 77 BPM | DIASTOLIC BLOOD PRESSURE: 77 MMHG | HEIGHT: 60 IN | SYSTOLIC BLOOD PRESSURE: 165 MMHG | WEIGHT: 258 LBS | BODY MASS INDEX: 50.65 KG/M2

## 2019-03-21 DIAGNOSIS — M54.50 ACUTE MIDLINE LOW BACK PAIN WITHOUT SCIATICA: ICD-10-CM

## 2019-03-21 DIAGNOSIS — R06.09 DOE (DYSPNEA ON EXERTION): ICD-10-CM

## 2019-03-21 DIAGNOSIS — M25.551 ACUTE RIGHT HIP PAIN: Primary | ICD-10-CM

## 2019-03-21 PROCEDURE — 1123F ACP DISCUSS/DSCN MKR DOCD: CPT | Performed by: NURSE PRACTITIONER

## 2019-03-21 PROCEDURE — G8417 CALC BMI ABV UP PARAM F/U: HCPCS | Performed by: NURSE PRACTITIONER

## 2019-03-21 PROCEDURE — G8599 NO ASA/ANTIPLAT THER USE RNG: HCPCS | Performed by: NURSE PRACTITIONER

## 2019-03-21 PROCEDURE — 1090F PRES/ABSN URINE INCON ASSESS: CPT | Performed by: NURSE PRACTITIONER

## 2019-03-21 PROCEDURE — 4040F PNEUMOC VAC/ADMIN/RCVD: CPT | Performed by: NURSE PRACTITIONER

## 2019-03-21 PROCEDURE — G8482 FLU IMMUNIZE ORDER/ADMIN: HCPCS | Performed by: NURSE PRACTITIONER

## 2019-03-21 PROCEDURE — G8400 PT W/DXA NO RESULTS DOC: HCPCS | Performed by: NURSE PRACTITIONER

## 2019-03-21 PROCEDURE — 99213 OFFICE O/P EST LOW 20 MIN: CPT | Performed by: NURSE PRACTITIONER

## 2019-03-21 PROCEDURE — 1101F PT FALLS ASSESS-DOCD LE1/YR: CPT | Performed by: NURSE PRACTITIONER

## 2019-03-21 PROCEDURE — 1036F TOBACCO NON-USER: CPT | Performed by: NURSE PRACTITIONER

## 2019-03-21 PROCEDURE — G8427 DOCREV CUR MEDS BY ELIG CLIN: HCPCS | Performed by: NURSE PRACTITIONER

## 2019-03-25 ENCOUNTER — OFFICE VISIT (OUTPATIENT)
Dept: ORTHOPEDIC SURGERY | Age: 79
End: 2019-03-25
Payer: MEDICARE

## 2019-03-25 VITALS
DIASTOLIC BLOOD PRESSURE: 76 MMHG | TEMPERATURE: 98.2 F | HEIGHT: 61 IN | HEART RATE: 79 BPM | SYSTOLIC BLOOD PRESSURE: 146 MMHG | BODY MASS INDEX: 48.15 KG/M2 | WEIGHT: 255 LBS

## 2019-03-25 DIAGNOSIS — M47.816 FACET ARTHROPATHY, LUMBAR: ICD-10-CM

## 2019-03-25 DIAGNOSIS — M54.5 ACUTE RIGHT-SIDED LOW BACK PAIN, WITH SCIATICA PRESENCE UNSPECIFIED: ICD-10-CM

## 2019-03-25 DIAGNOSIS — M25.551 PAIN OF RIGHT HIP JOINT: Primary | ICD-10-CM

## 2019-03-25 DIAGNOSIS — M16.11 ARTHRITIS OF RIGHT HIP: ICD-10-CM

## 2019-03-25 DIAGNOSIS — M51.36 DDD (DEGENERATIVE DISC DISEASE), LUMBAR: ICD-10-CM

## 2019-03-25 PROCEDURE — G8417 CALC BMI ABV UP PARAM F/U: HCPCS | Performed by: PHYSICIAN ASSISTANT

## 2019-03-25 PROCEDURE — 99214 OFFICE O/P EST MOD 30 MIN: CPT | Performed by: PHYSICIAN ASSISTANT

## 2019-03-25 PROCEDURE — 1036F TOBACCO NON-USER: CPT | Performed by: PHYSICIAN ASSISTANT

## 2019-03-25 PROCEDURE — 1090F PRES/ABSN URINE INCON ASSESS: CPT | Performed by: PHYSICIAN ASSISTANT

## 2019-03-25 PROCEDURE — 1123F ACP DISCUSS/DSCN MKR DOCD: CPT | Performed by: PHYSICIAN ASSISTANT

## 2019-03-25 PROCEDURE — G8427 DOCREV CUR MEDS BY ELIG CLIN: HCPCS | Performed by: PHYSICIAN ASSISTANT

## 2019-03-25 PROCEDURE — G8482 FLU IMMUNIZE ORDER/ADMIN: HCPCS | Performed by: PHYSICIAN ASSISTANT

## 2019-03-25 PROCEDURE — G8400 PT W/DXA NO RESULTS DOC: HCPCS | Performed by: PHYSICIAN ASSISTANT

## 2019-03-25 PROCEDURE — 1101F PT FALLS ASSESS-DOCD LE1/YR: CPT | Performed by: PHYSICIAN ASSISTANT

## 2019-03-25 PROCEDURE — G8599 NO ASA/ANTIPLAT THER USE RNG: HCPCS | Performed by: PHYSICIAN ASSISTANT

## 2019-03-25 PROCEDURE — 4040F PNEUMOC VAC/ADMIN/RCVD: CPT | Performed by: PHYSICIAN ASSISTANT

## 2019-03-25 RX ORDER — METHYLPREDNISOLONE 4 MG/1
TABLET ORAL
Qty: 1 KIT | Refills: 0 | Status: SHIPPED | OUTPATIENT
Start: 2019-03-25 | End: 2020-03-17

## 2019-04-08 ENCOUNTER — TELEPHONE (OUTPATIENT)
Dept: SLEEP CENTER | Age: 79
End: 2019-04-08

## 2019-04-08 ENCOUNTER — TELEPHONE (OUTPATIENT)
Dept: FAMILY MEDICINE CLINIC | Age: 79
End: 2019-04-08

## 2019-04-08 NOTE — TELEPHONE ENCOUNTER
Having bad hip pain and back pain and wants to know if can get something for the pain, doesn't want a steroid shot

## 2019-04-09 ENCOUNTER — OFFICE VISIT (OUTPATIENT)
Dept: FAMILY MEDICINE CLINIC | Age: 79
End: 2019-04-09
Payer: MEDICARE

## 2019-04-09 VITALS
HEIGHT: 61 IN | SYSTOLIC BLOOD PRESSURE: 162 MMHG | WEIGHT: 255 LBS | BODY MASS INDEX: 48.15 KG/M2 | HEART RATE: 82 BPM | DIASTOLIC BLOOD PRESSURE: 78 MMHG

## 2019-04-09 DIAGNOSIS — M54.41 ACUTE MIDLINE LOW BACK PAIN WITH RIGHT-SIDED SCIATICA: Primary | ICD-10-CM

## 2019-04-09 PROCEDURE — 4040F PNEUMOC VAC/ADMIN/RCVD: CPT | Performed by: NURSE PRACTITIONER

## 2019-04-09 PROCEDURE — 1090F PRES/ABSN URINE INCON ASSESS: CPT | Performed by: NURSE PRACTITIONER

## 2019-04-09 PROCEDURE — G8417 CALC BMI ABV UP PARAM F/U: HCPCS | Performed by: NURSE PRACTITIONER

## 2019-04-09 PROCEDURE — 1036F TOBACCO NON-USER: CPT | Performed by: NURSE PRACTITIONER

## 2019-04-09 PROCEDURE — 1123F ACP DISCUSS/DSCN MKR DOCD: CPT | Performed by: NURSE PRACTITIONER

## 2019-04-09 PROCEDURE — G8427 DOCREV CUR MEDS BY ELIG CLIN: HCPCS | Performed by: NURSE PRACTITIONER

## 2019-04-09 PROCEDURE — G8400 PT W/DXA NO RESULTS DOC: HCPCS | Performed by: NURSE PRACTITIONER

## 2019-04-09 PROCEDURE — 99214 OFFICE O/P EST MOD 30 MIN: CPT | Performed by: NURSE PRACTITIONER

## 2019-04-09 PROCEDURE — G8599 NO ASA/ANTIPLAT THER USE RNG: HCPCS | Performed by: NURSE PRACTITIONER

## 2019-04-09 RX ORDER — CYCLOBENZAPRINE HCL 5 MG
5 TABLET ORAL 3 TIMES DAILY PRN
Qty: 30 TABLET | Refills: 0 | Status: SHIPPED | OUTPATIENT
Start: 2019-04-09 | End: 2019-04-19

## 2019-04-09 NOTE — PROGRESS NOTES
Lex Mcnamara   YOB: 1940    Date of Visit:  4/9/2019     CC:  Low back pain    HPI:  Patient is a 66 y.o. female who complains of a 2 week(s) history of right lower back pain. Pain is aching in nature, with radiation to hip. Pain is constant, typically mild in intensity, and is exacerbated by movement. Associated symptoms: none. Patient reports the following CPR Red Flags: none. Precipitating factors: no known injury. Patient's history includes recurrent self limited episodes of low back pain in the past.  Previous treatment: rest, acetaminophen- as needed, oral steroid- Medrol dose pack. Diagnostic testing:  AP/Lat lumbar spine xrays done at ortho on 3/25 showed moderate degenerative disc disease with moderate facet arthropathy. No spondylolisthesis or compression fractures seen. Symptoms are worsening over time. Patient went to ortho who prescribed a medrol dose pack. Patient states there was no improvement in her pain with the steroids. Ortho recommended a steroid joint injection but patient declined as she was worried she would have a reaction to it or that it would not work. Patient returned today to discuss other options. She said she has taken a muscle relaxer in the past which was effective as is interested in trying that again. Vitals:    04/09/19 0909   BP: (!) 162/78   Pulse: 82   Weight: 255 lb (115.7 kg)   Height: 5' 1\" (1.549 m)       Outpatient Medications Marked as Taking for the 4/9/19 encounter (Office Visit) with ROGERS Payne CNP   Medication Sig Dispense Refill    cyclobenzaprine (FLEXERIL) 5 MG tablet Take 1 tablet by mouth 3 times daily as needed for Muscle spasms 30 tablet 0    methylPREDNISolone (MEDROL, ELAN,) 4 MG tablet Take by mouth.  6 po day one 5 po day 2 4 po day 3 3 po day 4 2 po day 5 1 po day 6. 1 kit 0    furosemide (LASIX) 20 MG tablet Take 1 tablet by mouth daily as needed (ALCARAZ and weight gain) 30 tablet 0    Exenatide ER (BYDUREON BCISE) 2 MG/0.85ML AUIJ Inject 2 mg into the skin once a week 4 pen 5    amLODIPine (NORVASC) 10 MG tablet TAKE 1/2 TABLET BY MOUTH DAILY 45 tablet 0    simvastatin (ZOCOR) 40 MG tablet TAKE ONE TABLET EVERY EVENING 90 tablet 0    gabapentin (NEURONTIN) 300 MG capsule Take 1 capsule by mouth 3 times daily. . 90 capsule 3    potassium chloride (KLOR-CON) 10 MEQ extended release tablet TAKE ONE TABLET BY MOUTH DAILY 90 tablet 3    losartan (COZAAR) 50 MG tablet TAKE 1 TABLET BY MOUTH DAILY. 90 tablet 3    cloNIDine (CATAPRES) 0.1 MG tablet TAKE 0.5 (1/2) TABLET BY MOUTH 2 TIMES DAILY. 90 tablet 3    colchicine (COLCRYS) 0.6 MG tablet Take by mouth two today and then one daily 30 tablet 1    glucose monitoring kit (FREESTYLE) monitoring kit 1 kit by Does not apply route daily 1 kit 0    Continuous Blood Gluc Sensor (FREESTYLE LACI SENSOR SYSTEM) [facility] Use as directed 1 each 0    glipiZIDE (GLUCOTROL XL) 5 MG extended release tablet TAKE 1 TABLET BY MOUTH DAILY. 50 tablet 0    allopurinol (ZYLOPRIM) 300 MG tablet TAKE ONE HALF TABLET BY MOUTH DAILY 45 tablet 3    metoprolol (LOPRESSOR) 100 MG tablet TAKE ONE TABLET BY MOUTH TWO TIMES A  tablet 3    fluticasone (FLONASE) 50 MCG/ACT nasal spray 2 sprays by Nasal route daily 1 Bottle 1    ascorbic acid (VITAMIN C) 500 MG tablet Take 500 mg by mouth 2 times daily      Cholecalciferol (VITAMIN D PO) Take 1 tablet by mouth daily      Omega-3 Fatty Acids (FISH OIL) 1000 MG CAPS Take 1,000 mg by mouth daily      magnesium oxide (MAG-OX) 400 MG tablet Take 400 mg by mouth daily      acetaminophen (APAP EXTRA STRENGTH) 500 MG tablet Take 1 tablet by mouth every 6 hours as needed for Pain 40 tablet 0    Handicap Placard [facility] by Does not apply route Duration 5 years 1 each 0    TRUETEST TEST strip TEST UP TO 5 TIMES A  strip 3    loratadine (CLARITIN) 10 MG tablet Take 10 mg by mouth daily.         Multiple Minerals-Vitamins (CITRACAL PLUS) TABS Take 1 tablet by mouth 2 times daily.  Elastic Bandages & Supports (B-4 MED COMPRESSION HOSE MENS) MISC by Does not apply route. Wear in daytime only          Past Medical History:   Diagnosis Date    Blood circulation, collateral     Cerebral artery occlusion with cerebral infarction ([address]     Chronic kidney disease (CKD), stage III (moderate) (HCC)     CVA (cerebral infarction)     Diabetes     DVT, lower extremity, recurrent ([address] 3/30/2012    x 3 LLE :  life long coumadin    Edema     First degree AV block     Gout     Heart murmur 10/14/2014    EF normal, no aortic stenosis Echo 11/11/14 - mild MR    Hypercalcemia 10/11/2018    Hypercholesterolemia     Hyperhomocysteinemia ([address] 4/16/12    Hypertension     TRUPTI (obstructive sleep apnea)     CPAP at 16cm    Osteoarthritis     Sciatica 11/8/2016    Traumatic hematoma of left lower leg 3/21/2017    Venous insufficiency     Visual loss R eye - REtinal detachment        Past Surgical History:   Procedure Laterality Date    CATARACT REMOVAL WITH IMPLANT Bilateral     OTHER SURGICAL HISTORY      dental extraction    OTHER SURGICAL HISTORY Left     I and D Dr. Deny Irwin left lower extremity       Social History     Tobacco Use    Smoking status: Never Smoker    Smokeless tobacco: Never Used   Substance Use Topics    Alcohol use: No     Alcohol/week: 0.0 oz       Family History   Problem Relation Age of Onset    Other Father         AAA    Diabetes Mother     Heart Failure Mother     Arthritis Other     Cancer Brother         bone    High Blood Pressure Other          ROS:  As documented in HPI    PHYSICAL EXAM:  General:  Patient appears well-developed and well-nourished. She is oriented to person, place and time; behavior, judgment and thought content are normal.  She appears to be in minimal pain. Skin:  Warm and dry. No rashes or lesions noted.     Musculoskeletal:  - Tenderness to Palpation: right sacroiliac region, right gluteal region, right greater trochanter. Positive generalized tenderness to right lower back and hip area. - Range of Motion:     flexion- diminished range with pain     extension- diminished range with pain    rotation- diminished range with pain to the right      - Scoliosis is absent. - Hyperkyphosis is absent. Neurological:   Limited exam due to patient's inability to get on the exam table. - Gait is abnormal - limping.  - Motor function is normal.  - Sensory function is normal.  Vascular:  Peripheral pulses are palpable. ASSESSMENT/PLAN:  Clinical picture is most consistent with a diagnosis of degenerative disc disease without herniation and right sciatica. 1. Acute midline low back pain with right-sided sciatica    - cyclobenzaprine (FLEXERIL) 5 MG tablet; Take 1 tablet by mouth 3 times daily as needed for Muscle spasms  Dispense: 30 tablet; Refill: 0  [facility] [place]      · Stretching exercises discussed with patient  · Educational materials distributed  · Acetaminophen recommended- maximum dose 3g/day- patient has been taking 1500 mg twice daily in the morning and at night. Recommended changing the dosing to 1000 mg three times daily. Explained to her that she is already taking the maximum dosage. · Muscle relaxant prescribed- Flexeril, patient warned about possible sedation  · Physical therapy referral provided    Patient and daughter are in agreement with plan. Will return if symptoms do not improve with current treatment. Spent an extended period of time talking with patient about the risk vs benefit of joint steroid injection. She is going to think about her options. She was advised to return if symptoms do not improve within 2 week(s), or sooner for worsening pain, progressive numbness, weakness, new bowel or bladder dysfunction, or any other worsening of her condition.

## 2019-04-09 NOTE — PATIENT INSTRUCTIONS
Patient Education        Low Back Pain: Exercises  Your Care Instructions  Here are some examples of typical rehabilitation exercises for your condition. Start each exercise slowly. Ease off the exercise if you start to have pain. Your doctor or physical therapist will tell you when you can start these exercises and which ones will work best for you. How to do the exercises  Press-up    1. Lie on your stomach, supporting your body with your forearms. 2. Press your elbows down into the floor to raise your upper back. As you do this, relax your stomach muscles and allow your back to arch without using your back muscles. As your press up, do not let your hips or pelvis come off the floor. 3. Hold for 15 to 30 seconds, then relax. 4. Repeat 2 to 4 times. Alternate arm and leg (bird dog) exercise    1. Start on the floor, on your hands and knees. 2. Tighten your belly muscles. 3. Raise one leg off the floor, and hold it straight out behind you. Be careful not to let your hip drop down, because that will twist your trunk. 4. Hold for about 6 seconds, then lower your leg and switch to the other leg. 5. Repeat 8 to 12 times on each leg. 6. Over time, work up to holding for 10 to 30 seconds each time. 7. If you feel stable and secure with your leg raised, try raising the opposite arm straight out in front of you at the same time. Knee-to-chest exercise    1. Lie on your back with your knees bent and your feet flat on the floor. 2. Bring one knee to your chest, keeping the other foot flat on the floor (or keeping the other leg straight, whichever feels better on your lower back). 3. Keep your lower back pressed to the floor. Hold for at least 15 to 30 seconds. 4. Relax, and lower the knee to the starting position. 5. Repeat with the other leg. Repeat 2 to 4 times with each leg. 6. To get more stretch, put your other leg flat on the floor while pulling your knee to your chest.    Curl-ups    1.  Lie on the floor on your back with your knees bent at a 90-degree angle. Your feet should be flat on the floor, about 12 inches from your buttocks. 2. Cross your arms over your chest. If this bothers your neck, try putting your hands behind your neck (not your head), with your elbows spread apart. 3. Slowly tighten your belly muscles and raise your shoulder blades off the floor. 4. Keep your head in line with your body, and do not press your chin to your chest.  5. Hold this position for 1 or 2 seconds, then slowly lower yourself back down to the floor. 6. Repeat 8 to 12 times. Pelvic tilt exercise    1. Lie on your back with your knees bent. 2. \"Brace\" your stomach. This means to tighten your muscles by pulling in and imagining your belly button moving toward your spine. You should feel like your back is pressing to the floor and your hips and pelvis are rocking back. 3. Hold for about 6 seconds while you breathe smoothly. 4. Repeat 8 to 12 times. Heel dig bridging    1. Lie on your back with both knees bent and your ankles bent so that only your heels are digging into the floor. Your knees should be bent about 90 degrees. 2. Then push your heels into the floor, squeeze your buttocks, and lift your hips off the floor until your shoulders, hips, and knees are all in a straight line. 3. Hold for about 6 seconds as you continue to breathe normally, and then slowly lower your hips back down to the floor and rest for up to 10 seconds. 4. Do 8 to 12 repetitions. Hamstring stretch in doorway    1. Lie on your back in a doorway, with one leg through the open door. 2. Slide your leg up the wall to straighten your knee. You should feel a gentle stretch down the back of your leg. 3. Hold the stretch for at least 15 to 30 seconds. Do not arch your back, point your toes, or bend either knee. Keep one heel touching the floor and the other heel touching the wall. 4. Repeat with your other leg.   5. Do 2 to 4 times for each leg. Hip flexor stretch    1. Kneel on the floor with one knee bent and one leg behind you. Place your forward knee over your foot. Keep your other knee touching the floor. 2. Slowly push your hips forward until you feel a stretch in the upper thigh of your rear leg. 3. Hold the stretch for at least 15 to 30 seconds. Repeat with your other leg. 4. Do 2 to 4 times on each side. Wall sit    1. Stand with your back 10 to 12 inches away from a wall. 2. Lean into the wall until your back is flat against it. 3. Slowly slide down until your knees are slightly bent, pressing your lower back into the wall. 4. Hold for about 6 seconds, then slide back up the wall. 5. Repeat 8 to 12 times. Follow-up care is a key part of your treatment and safety. Be sure to make and go to all appointments, and call your doctor if you are having problems. It's also a good idea to know your test results and keep a list of the medicines you take. Where can you learn more? Go to [email or web address] and sign in to your [employer] account. Enter K880 in the [facility] box to learn more about \"Low Back Pain: Exercises. \"     If you do not have an account, please click on the \"Sign Up Now\" link. Current as of: September 20, 2018  Content Version: 11.9  © 6935-0832 [employer]. Care instructions adapted under license by [facility] ([facility]). If you have questions about a medical condition or this instruction, always ask your healthcare professional. [place] [address] any warranty or liability for your use of this information.

## 2019-04-15 DIAGNOSIS — M10.9 GOUT, UNSPECIFIED CAUSE, UNSPECIFIED CHRONICITY, UNSPECIFIED SITE: Primary | ICD-10-CM

## 2019-04-15 DIAGNOSIS — R60.0 LOCALIZED EDEMA: ICD-10-CM

## 2019-04-15 DIAGNOSIS — E78.00 HYPERCHOLESTEROLEMIA: ICD-10-CM

## 2019-04-15 DIAGNOSIS — I10 ESSENTIAL HYPERTENSION, BENIGN: ICD-10-CM

## 2019-04-15 RX ORDER — GLIPIZIDE 5 MG/1
TABLET, FILM COATED, EXTENDED RELEASE ORAL
Qty: 90 TABLET | Refills: 3 | Status: SHIPPED | OUTPATIENT
Start: 2019-04-15 | End: 2020-05-27 | Stop reason: SDUPTHER

## 2019-04-15 RX ORDER — AMLODIPINE BESYLATE 10 MG/1
TABLET ORAL
Qty: 45 TABLET | Refills: 0 | Status: SHIPPED | OUTPATIENT
Start: 2019-04-15 | End: 2019-07-18 | Stop reason: SDUPTHER

## 2019-04-15 RX ORDER — FUROSEMIDE 80 MG
TABLET ORAL
Qty: 90 TABLET | Refills: 0 | OUTPATIENT
Start: 2019-04-15

## 2019-04-15 RX ORDER — METOPROLOL TARTRATE 100 MG/1
TABLET ORAL
Qty: 180 TABLET | Refills: 3 | Status: ON HOLD
Start: 2019-04-15 | End: 2020-04-28 | Stop reason: HOSPADM

## 2019-04-15 RX ORDER — SIMVASTATIN 40 MG
TABLET ORAL
Qty: 90 TABLET | Refills: 0 | Status: SHIPPED | OUTPATIENT
Start: 2019-04-15 | End: 2019-07-18 | Stop reason: SDUPTHER

## 2019-04-15 RX ORDER — ALLOPURINOL 300 MG/1
TABLET ORAL
Qty: 45 TABLET | Refills: 3 | Status: SHIPPED | OUTPATIENT
Start: 2019-04-15 | End: 2020-05-27 | Stop reason: SDUPTHER

## 2019-04-17 DIAGNOSIS — R60.0 LOCALIZED EDEMA: ICD-10-CM

## 2019-04-17 RX ORDER — FUROSEMIDE 80 MG
TABLET ORAL
Qty: 90 TABLET | Refills: 0 | OUTPATIENT
Start: 2019-04-17

## 2019-04-17 NOTE — TELEPHONE ENCOUNTER
MC- On medication list but dosage is very different, Refill was sent for 80mg, we have 20mg, last seen 4/9/2019, next 6/26/2019

## 2019-04-23 ENCOUNTER — OFFICE VISIT (OUTPATIENT)
Dept: ORTHOPEDIC SURGERY | Age: 79
End: 2019-04-23
Payer: MEDICARE

## 2019-04-23 VITALS
DIASTOLIC BLOOD PRESSURE: 74 MMHG | HEIGHT: 61 IN | BODY MASS INDEX: 48.33 KG/M2 | SYSTOLIC BLOOD PRESSURE: 123 MMHG | HEART RATE: 80 BPM | WEIGHT: 256 LBS

## 2019-04-23 DIAGNOSIS — M17.0 PRIMARY OSTEOARTHRITIS OF BOTH KNEES: Primary | ICD-10-CM

## 2019-04-23 PROCEDURE — 20610 DRAIN/INJ JOINT/BURSA W/O US: CPT | Performed by: ORTHOPAEDIC SURGERY

## 2019-04-23 RX ORDER — METHYLPREDNISOLONE ACETATE 40 MG/ML
80 INJECTION, SUSPENSION INTRA-ARTICULAR; INTRALESIONAL; INTRAMUSCULAR; SOFT TISSUE ONCE
Status: COMPLETED | OUTPATIENT
Start: 2019-04-23 | End: 2019-04-23

## 2019-04-23 RX ADMIN — METHYLPREDNISOLONE ACETATE 80 MG: 40 INJECTION, SUSPENSION INTRA-ARTICULAR; INTRALESIONAL; INTRAMUSCULAR; SOFT TISSUE at 14:22

## 2019-04-23 RX ADMIN — METHYLPREDNISOLONE ACETATE 80 MG: 40 INJECTION, SUSPENSION INTRA-ARTICULAR; INTRALESIONAL; INTRAMUSCULAR; SOFT TISSUE at 14:21

## 2019-04-23 NOTE — PATIENT INSTRUCTIONS
Impression:   Severe bilateral knee osteoarthritis. Plan/Treatment:   1. Injection with cortisone was recommended into the  Bilateral  knee. After discussing potential risks and benefits she agreed. The injection site was cleaned with alcohol, anesthetized with 1 cc of 1% Lidoocaine then injected with 2 cc of 40 mg Depomedrol intraarticular after sufficient time for analgesia was allowed. 2. She'll return here as needed.       Jermaine Alcocer MD  4/23/2019

## 2019-04-30 RX ORDER — FUROSEMIDE 20 MG/1
20 TABLET ORAL DAILY PRN
Qty: 30 TABLET | Refills: 0 | Status: SHIPPED | OUTPATIENT
Start: 2019-04-30 | End: 2020-01-31

## 2019-05-02 ENCOUNTER — TELEPHONE (OUTPATIENT)
Dept: FAMILY MEDICINE CLINIC | Age: 79
End: 2019-05-02

## 2019-06-11 ENCOUNTER — TELEPHONE (OUTPATIENT)
Dept: FAMILY MEDICINE CLINIC | Age: 79
End: 2019-06-11

## 2019-06-11 NOTE — TELEPHONE ENCOUNTER
Pt called in wanting to schedule an appointment with Dr. Julien Leventhal. Pt is complaining of pain in her legs and spine. All that is available is a 2:40 hospital follow up spot, Please give pt a call if able to be scheduled. Best call back number: [phone].

## 2019-06-12 NOTE — TELEPHONE ENCOUNTER
She said Bob Richter had arthritis and didn't say anything else, can she take some gabapentin, she says she can't hardly walk at all to even be able to be seen

## 2019-06-12 NOTE — TELEPHONE ENCOUNTER
Is there a reason patient doesn't want to go back to the orthopedic spine clinic at [facility]. She saw ALYSA Manning in March I think.     Find out and route back

## 2019-06-17 ENCOUNTER — TELEPHONE (OUTPATIENT)
Dept: FAMILY MEDICINE CLINIC | Age: 79
End: 2019-06-17

## 2019-06-17 NOTE — TELEPHONE ENCOUNTER
Dr. Packer Breeding out until next week    Is she truly home bound ? Because [facility] Prevention therapy would be ideal. If she is willing I will order for Dr. Arden Martinez. Medicare  Won;t cover in home PT for non home bound patients.

## 2019-06-17 NOTE — TELEPHONE ENCOUNTER
Spoke with PT daughter Linda Diaz, she states that PT is \"homebound\" and that it is \"very difficult for her to get our tf the house, last time PT was in for an OV with PCP, PT and PT daughter were stuck on Pt steps since it was so difficult to get down the stairs. \"  Please advise

## 2019-06-19 ENCOUNTER — TELEPHONE (OUTPATIENT)
Dept: ORTHOPEDIC SURGERY | Age: 79
End: 2019-06-19

## 2019-06-19 ENCOUNTER — CARE COORDINATION (OUTPATIENT)
Dept: CARE COORDINATION | Age: 79
End: 2019-06-19

## 2019-06-19 NOTE — TELEPHONE ENCOUNTER
Patient called states that she had a recent fall. She reports that she is able to go down stairs but not able to go up stairs. She is unable to leave her home due to her knees and difficulty walking. She is calling to see what Dr. Viviana Lao would recommend to help her?     Please call patient to discuss:  [phone]

## 2019-06-19 NOTE — CARE COORDINATION
Food Preparation:  Dependent  Ability to maintain home (clean home, laundry):  Dependent  Ability to drive and/or has transportation:  Dependent  Ability to do shopping:  Dependent  Ability to manage finances:  Dependent  Is patient able to live independently?:  Yes     Current Housing:  Private Residence        Per the Fall Risk Screening, did the patient have 2 or more falls or 1 fall with injury in the past year?:  Yes  How often do you think you are about to fall and you do NOT fall? For example, you grab something to stabilize yourself or hold onto a wall/furniture?:  Frequently  Use of a Mobility Aid:  Yes  Difficulty walking/impaired gait:  Yes  Issues with feet or shoes like numbness, edema, shoes not fitting:  Yes  Changes in vision, poor vision or poor lighting in environment:  Yes  Dizziness:  No  Other Fall Risk:  No     Frequent urination at night?:  No  Do you use rails/bars?:  Yes  Do you have a non-slip tub mat?:  Yes        Suggested Interventions and Community Resources  Fall Risk Prevention: In Process Other Services: In Process                  Prior to Admission medications    Medication Sig Start Date End Date Taking? Authorizing Provider   furosemide (LASIX) 20 MG tablet Take 1 tablet by mouth daily as needed (ALCARAZ and weight gain) 4/30/19   Wilfrid Crigler, APRN - CNP   simvastatin (ZOCOR) 40 MG tablet TAKE ONE TABLET EVERY EVENING 4/15/19   Maryuri Yeung MD   amLODIPine (NORVASC) 10 MG tablet TAKE 1/2 TABLET BY MOUTH DAILY 4/15/19   Maryuri Yeung MD   glipiZIDE (GLUCOTROL XL) 5 MG extended release tablet TAKE 1 TABLET BY MOUTH DAILY. 4/15/19   Maryuri Yeung MD   metoprolol (LOPRESSOR) 100 MG tablet TAKE ONE TABLET BY MOUTH TWO TIMES A DAY 4/15/19   Maryuri Yeung MD   allopurinol (ZYLOPRIM) 300 MG tablet TAKE ONE HALF TABLET BY MOUTH DAILY 4/15/19   Maryuri Yeung MD   methylPREDNISolone (MEDROL, ELAN,) 4 MG tablet Take by mouth.  6 po day one 5 po day 2 4 po day 3 3 po day 4 2 po day 5 1 po day 6. 3/25/19   HELIO Ceron   Exenatide ER (BYDUREON BCISE) 2 MG/0.85ML AUIJ Inject 2 mg into the skin once a week 1/29/19   Brit aBca MD   gabapentin (NEURONTIN) 300 MG capsule Take 1 capsule by mouth 3 times daily. . 10/24/18 10/24/19  Brit Baca MD   potassium chloride (KLOR-CON) 10 MEQ extended release tablet TAKE ONE TABLET BY MOUTH DAILY 10/17/18   Brit Baca MD   losartan (COZAAR) 50 MG tablet TAKE 1 TABLET BY MOUTH DAILY. 10/17/18   Brit Baca MD   cloNIDine (CATAPRES) 0.1 MG tablet TAKE 0.5 (1/2) TABLET BY MOUTH 2 TIMES DAILY. 10/17/18   Brit Baca MD   colchicine (COLCRYS) 0.6 MG tablet Take by mouth two today and then one daily 10/11/18   Brit Baca MD   glucose monitoring kit (FREESTYLE) monitoring kit 1 kit by Does not apply route daily 7/20/18   Tod Jimenez MD   Continuous Blood Gluc Sensor ([address] [place] [address]) [facility] Use as directed 7/10/18   Brit Baca MD   glipiZIDE (GLUCOTROL XL) 5 MG extended release tablet TAKE 1 TABLET BY MOUTH DAILY.  4/26/18   Brit Baca MD   fluticasone [facility]) 50 MCG/ACT nasal spray 2 sprays by Nasal route daily 3/20/18   Brit Baca MD   ascorbic acid (VITAMIN C) 500 MG tablet Take 500 mg by mouth 2 times daily    Historical Provider, MD   Cholecalciferol (VITAMIN D PO) Take 1 tablet by mouth daily    Historical Provider, MD   Omega-3 Fatty Acids (FISH OIL) 1000 MG CAPS Take 1,000 mg by mouth daily    Historical Provider, MD   magnesium oxide (MAG-OX) 400 MG tablet Take 400 mg by mouth daily    Historical Provider, MD   acetaminophen (APAP EXTRA STRENGTH) 500 MG tablet Take 1 tablet by mouth every 6 hours as needed for Pain 3/17/17   Samantha Randolph APRN - CNP   Handicap Placard MISC by Does not apply route Duration 5 years 3/4/16   Brit Baca MD   TRUETEST TEST strip TEST UP TO 5 TIMES A DAY 8/22/13   Brit Baca MD   loratadine

## 2019-06-21 ENCOUNTER — CARE COORDINATION (OUTPATIENT)
Dept: CARE COORDINATION | Age: 79
End: 2019-06-21

## 2019-06-21 NOTE — CARE COORDINATION
[address] spoke with daughter. She is requesting medrol dose [place] due to knee pain. Martine Rebolledo is having trouble with ambulation due to pain. She could not get an appt with .       Could you please contact Nereyda jaquez  [phone]  Pharmacy Days  [phone]

## 2019-06-23 NOTE — TELEPHONE ENCOUNTER
Let pt know I don't recommend oral steriods due to her diabetes    We can see if we can move her appointment up with Dr Soni Mcnamara if patient wishes

## 2019-06-25 ENCOUNTER — TELEPHONE (OUTPATIENT)
Dept: FAMILY MEDICINE CLINIC | Age: 79
End: 2019-06-25

## 2019-06-25 NOTE — TELEPHONE ENCOUNTER
See Note from [facility] and [address] 6.21.19  Unable to leave message in regards to VM not being set up.

## 2019-06-26 ENCOUNTER — OFFICE VISIT (OUTPATIENT)
Dept: FAMILY MEDICINE CLINIC | Age: 79
End: 2019-06-26
Payer: MEDICARE

## 2019-06-26 ENCOUNTER — TELEPHONE (OUTPATIENT)
Dept: FAMILY MEDICINE CLINIC | Age: 79
End: 2019-06-26

## 2019-06-26 ENCOUNTER — CARE COORDINATION (OUTPATIENT)
Dept: CARE COORDINATION | Age: 79
End: 2019-06-26

## 2019-06-26 VITALS
HEIGHT: 61 IN | HEART RATE: 76 BPM | SYSTOLIC BLOOD PRESSURE: 136 MMHG | WEIGHT: 260 LBS | BODY MASS INDEX: 49.09 KG/M2 | DIASTOLIC BLOOD PRESSURE: 74 MMHG

## 2019-06-26 DIAGNOSIS — Z86.73 H/O: CVA (CEREBROVASCULAR ACCIDENT): ICD-10-CM

## 2019-06-26 DIAGNOSIS — Z78.0 POST-MENOPAUSAL: ICD-10-CM

## 2019-06-26 DIAGNOSIS — E66.01 MORBID OBESITY WITH BMI OF 45.0-49.9, ADULT (HCC): ICD-10-CM

## 2019-06-26 DIAGNOSIS — I10 ESSENTIAL HYPERTENSION, BENIGN: ICD-10-CM

## 2019-06-26 DIAGNOSIS — E78.00 HYPERCHOLESTEROLEMIA: ICD-10-CM

## 2019-06-26 DIAGNOSIS — N18.30 TYPE 2 DIABETES MELLITUS WITH STAGE 3 CHRONIC KIDNEY DISEASE, WITHOUT LONG-TERM CURRENT USE OF INSULIN (HCC): Primary | ICD-10-CM

## 2019-06-26 DIAGNOSIS — G47.33 OSA (OBSTRUCTIVE SLEEP APNEA): ICD-10-CM

## 2019-06-26 DIAGNOSIS — R29.6 FREQUENT FALLS: ICD-10-CM

## 2019-06-26 DIAGNOSIS — E11.22 TYPE 2 DIABETES MELLITUS WITH STAGE 3 CHRONIC KIDNEY DISEASE, WITHOUT LONG-TERM CURRENT USE OF INSULIN (HCC): Primary | ICD-10-CM

## 2019-06-26 DIAGNOSIS — L98.9 SKIN LESION OF FACE: ICD-10-CM

## 2019-06-26 DIAGNOSIS — L03.115 CELLULITIS OF RIGHT LOWER EXTREMITY: ICD-10-CM

## 2019-06-26 PROBLEM — S80.12XA TRAUMATIC HEMATOMA OF LEFT LOWER LEG: Status: RESOLVED | Noted: 2017-03-21 | Resolved: 2019-06-26

## 2019-06-26 PROBLEM — L03.116 LEFT LEG CELLULITIS: Status: RESOLVED | Noted: 2019-02-23 | Resolved: 2019-06-26

## 2019-06-26 LAB
ANION GAP SERPL CALCULATED.3IONS-SCNC: 15 MMOL/L (ref 3–16)
BUN BLDV-MCNC: 23 MG/DL (ref 7–20)
CALCIUM SERPL-MCNC: 9.7 MG/DL (ref 8.3–10.6)
CHLORIDE BLD-SCNC: 103 MMOL/L (ref 99–110)
CO2: 26 MMOL/L (ref 21–32)
CREAT SERPL-MCNC: 1.1 MG/DL (ref 0.6–1.2)
GFR AFRICAN AMERICAN: 58
GFR NON-AFRICAN AMERICAN: 48
GLUCOSE BLD-MCNC: 149 MG/DL (ref 70–99)
HBA1C MFR BLD: 7.1 %
POTASSIUM SERPL-SCNC: 4.3 MMOL/L (ref 3.5–5.1)
SODIUM BLD-SCNC: 144 MMOL/L (ref 136–145)

## 2019-06-26 PROCEDURE — 36415 COLL VENOUS BLD VENIPUNCTURE: CPT | Performed by: FAMILY MEDICINE

## 2019-06-26 PROCEDURE — 1123F ACP DISCUSS/DSCN MKR DOCD: CPT | Performed by: FAMILY MEDICINE

## 2019-06-26 PROCEDURE — 4040F PNEUMOC VAC/ADMIN/RCVD: CPT | Performed by: FAMILY MEDICINE

## 2019-06-26 PROCEDURE — G8599 NO ASA/ANTIPLAT THER USE RNG: HCPCS | Performed by: FAMILY MEDICINE

## 2019-06-26 PROCEDURE — G8427 DOCREV CUR MEDS BY ELIG CLIN: HCPCS | Performed by: FAMILY MEDICINE

## 2019-06-26 PROCEDURE — 1036F TOBACCO NON-USER: CPT | Performed by: FAMILY MEDICINE

## 2019-06-26 PROCEDURE — 83036 HEMOGLOBIN GLYCOSYLATED A1C: CPT | Performed by: FAMILY MEDICINE

## 2019-06-26 PROCEDURE — 1090F PRES/ABSN URINE INCON ASSESS: CPT | Performed by: FAMILY MEDICINE

## 2019-06-26 PROCEDURE — G8400 PT W/DXA NO RESULTS DOC: HCPCS | Performed by: FAMILY MEDICINE

## 2019-06-26 PROCEDURE — G8417 CALC BMI ABV UP PARAM F/U: HCPCS | Performed by: FAMILY MEDICINE

## 2019-06-26 PROCEDURE — 99215 OFFICE O/P EST HI 40 MIN: CPT | Performed by: FAMILY MEDICINE

## 2019-06-26 RX ORDER — CLINDAMYCIN HYDROCHLORIDE 150 MG/1
150 CAPSULE ORAL 3 TIMES DAILY
Qty: 90 CAPSULE | Refills: 0 | Status: SHIPPED | OUTPATIENT
Start: 2019-06-26 | End: 2019-07-06

## 2019-06-26 NOTE — PROGRESS NOTES
PROGRESS NOTE     Taniya Monteiro MD  [facility] [address] [place] [address]  [phone] office  [phone] fax    Date of Service:  6/26/2019    Subjective:      Patient ID: . Jackelyn Herring is a 66 y.o. female      CC: injury to leg, skin lesion face, DM2, stage 3 CKD, HTN, HLD, TRUPTI, gout, recurrent DVT, h/o CVA    HPI     Injury to right lower leg  5 days ago, patient states she was walking up the stairs with her son-in-law behind her, when she tripped and fell. Sudden in loss states she did not fall very hard, and he softened her below. She has a large bruise along her right lower leg, lateral side. She is also developed some scabs from abrasions. Over the last couple days, there has been some erythema and increased heat around the scabs. No areas of induration, no drainage on skin. States her chronic knee pain is stable and still not controlled,  Isn't having any pain in ankle or foot    Skin lesion right cheek  I noticed a skin lesion on patient's right cheek. I asked her how long it has been there. She feels it is been there for a couple years but recently increased greatly in size. It does not hurt or itch. It is flat, and somewhat scaly, and slightly red. She does not have a dermatologist.    Treatment Adherence:   Medication compliance:  compliant most of the time  Diet compliance:  compliant some of the time  Weight trend: slight increase  Current exercise: no regular exercise  Barriers: impairment:  physical: was walking with walker, but now in wheelchair since fall    Diabetes Mellitus Type 2: pt denies blurry vision, foot ulcerations, neuropathy, polyphagia, polyuria, polydipsia, nausea, vomiting and diarrhea. Home blood sugar records: patient does not test  Any episodes of hypoglycemia? no  Eye exam current (within one year): yes  Tobacco history: She  reports that she has never smoked. She has never used smokeless tobacco.   Daily Aspirin? No    Stage 3 CKD  New baseline cr is 1.2-1.3    Patient had 40mg of lasix  2 days in a row, a week ago    Likely secondary to diabetes and hypertension. Of note patient was taking 80 mg of Lasix daily for lower extremity edema. According to chart lower extremity edema was also secondary to venous insufficiency. No clear history of congestive heart failure. Echocardiogram from 2014 showed no signs of diastolic or systolic heart failure. Patient denies any history of orthopnea, paroxysmal nocturnal dyspnea, or dyspnea on exertion. Patient does admit to some worsening lower extremity edema over the last month. Hypertension:  Home blood pressure monitoring: No.  She is adherent to a low sodium diet. Patient denies chest pain, shortness of breath, headache, lightheadedness, blurred vision,  palpitations, dry cough and fatigue. Antihypertensive medication side effects: no medication side effects noted. Use of agents associated with hypertension: none. Hyperlipidemia:  No new myalgias or GI upset on simvastatin (Zocor). Lab Results   Component Value Date    LABA1C 7.1 06/26/2019    LABA1C 6.9 02/12/2019    LABA1C 7.8 (A) 10/10/2018     Lab Results   Component Value Date    LABMICR <1.20 03/20/2018    CREATININE 1.2 03/06/2019     Lab Results   Component Value Date    ALT 19 10/10/2018    AST 20 10/10/2018     Lab Results   Component Value Date    CHOL 160 02/25/2019    TRIG 206 (H) 02/25/2019    HDL 45 02/25/2019    LDLCALC 74 02/25/2019    LDLDIRECT 106 (H) 08/29/2011          Sleep Apnea: patient hasn't used her CPAP machine in 3 years. Patient did go to initial appointment with Dr. Chucky Cantrell, but has failed to have sleep study completed. Residual symptoms include: morning fatigue, daytime fatigue and snoring. Gout  Patient states symptoms are well controlled on allopurinol. She has not had a gout flare anytime recently.     Lab Results   Component Value Date    LABURIC 7.8 (H) 10/10/2018 recurrent DVT,   On chart review, patient has a history of DVT in left leg ×3. Dr. Michaela Mckinley note that she is on lifelong Coumadin in the medical history section, but according to the notes, Coumadin was stopped little over a year ago when she had a leg hematoma. She's not had a recurrent DVT since, and blood thinners were held. She does not want to restart blood thinners at this time. She understands risk of doing so.    h/o CVA  Patient states in 2006 she had a stroke with expressive and receptive aphasia. No other symptoms. The only residual symptoms she has some mild expressive aphasia that only presents itself when she is stressed. Vitals:    06/26/19 1125   BP: 136/74   Pulse: 76   Weight: 260 lb (117.9 kg)   Height: 5' 1\" (1.549 m)       Outpatient Medications Marked as Taking for the 6/26/19 encounter (Office Visit) with Jolly Mc MD   Medication Sig Dispense Refill    clindamycin (CLEOCIN) 150 MG capsule Take 1 capsule by mouth 3 times daily for 10 days 90 capsule 0    furosemide (LASIX) 20 MG tablet Take 1 tablet by mouth daily as needed (ALCARAZ and weight gain) 30 tablet 0    simvastatin (ZOCOR) 40 MG tablet TAKE ONE TABLET EVERY EVENING 90 tablet 0    amLODIPine (NORVASC) 10 MG tablet TAKE 1/2 TABLET BY MOUTH DAILY 45 tablet 0    glipiZIDE (GLUCOTROL XL) 5 MG extended release tablet TAKE 1 TABLET BY MOUTH DAILY. 90 tablet 3    metoprolol (LOPRESSOR) 100 MG tablet TAKE ONE TABLET BY MOUTH TWO TIMES A  tablet 3    allopurinol (ZYLOPRIM) 300 MG tablet TAKE ONE HALF TABLET BY MOUTH DAILY 45 tablet 3    methylPREDNISolone (MEDROL, ELAN,) 4 MG tablet Take by mouth. 6 po day one 5 po day 2 4 po day 3 3 po day 4 2 po day 5 1 po day 6. 1 kit 0    Exenatide ER (BYDUREON BCISE) 2 MG/0.85ML AUIJ Inject 2 mg into the skin once a week 4 pen 5    gabapentin (NEURONTIN) 300 MG capsule Take 1 capsule by mouth 3 times daily. . 90 capsule 3    potassium chloride (KLOR-CON) 10 MEQ extended release tablet TAKE ONE TABLET BY MOUTH DAILY 90 tablet 3    losartan (COZAAR) 50 MG tablet TAKE 1 TABLET BY MOUTH DAILY. 90 tablet 3    cloNIDine (CATAPRES) 0.1 MG tablet TAKE 0.5 (1/2) TABLET BY MOUTH 2 TIMES DAILY. 90 tablet 3    colchicine (COLCRYS) 0.6 MG tablet Take by mouth two today and then one daily 30 tablet 1    glucose monitoring kit (FREESTYLE) monitoring kit 1 kit by Does not apply route daily 1 kit 0    Continuous Blood Gluc Sensor ([address] [place] [address]) [facility] Use as directed 1 each 0    fluticasone (FLONASE) 50 MCG/ACT nasal spray 2 sprays by Nasal route daily 1 Bottle 1    ascorbic acid (VITAMIN C) 500 MG tablet Take 500 mg by mouth 2 times daily      Cholecalciferol (VITAMIN D PO) Take 1 tablet by mouth daily      Omega-3 Fatty Acids (FISH OIL) 1000 MG CAPS Take 1,000 mg by mouth daily      magnesium oxide (MAG-OX) 400 MG tablet Take 400 mg by mouth daily      acetaminophen (APAP EXTRA STRENGTH) 500 MG tablet Take 1 tablet by mouth every 6 hours as needed for Pain 40 tablet 0    Handicap Placard MISC by Does not apply route Duration 5 years 1 each 0    TRUETEST TEST strip TEST UP TO 5 TIMES A  strip 3    loratadine (CLARITIN) 10 MG tablet Take 10 mg by mouth daily.  Multiple Minerals-Vitamins (CITRACAL PLUS) TABS Take 1 tablet by mouth 2 times daily.  Elastic Bandages & Supports (B-4 MED COMPRESSION HOSE MENS) MISC by Does not apply route.  Wear in daytime only          Past Medical History:   Diagnosis Date    Blood circulation, collateral     Cerebral artery occlusion with cerebral infarction (HCC)     Chronic kidney disease (CKD), stage III (moderate) (HCC)     CVA (cerebral infarction)     Diabetes     DVT, lower extremity, recurrent (HCC) 3/30/2012    x 3 LLE :  life long coumadin    Edema     First degree AV block     Gout     Heart murmur 10/14/2014    EF normal, no aortic stenosis Echo 11/11/14 - mild MR    Hypercalcemia 10/11/2018    Hypercholesterolemia     Hyperhomocysteinemia ([facility] [address] 4/16/12    Hypertension     TRUPTI (obstructive sleep apnea)     CPAP at 16cm    Osteoarthritis     Sciatica 11/8/2016    Traumatic hematoma of left lower leg 3/21/2017    Venous insufficiency     Visual loss R eye - REtinal detachment        Past Surgical History:   Procedure Laterality Date    CATARACT REMOVAL WITH IMPLANT Bilateral     OTHER SURGICAL HISTORY      dental extraction    OTHER SURGICAL HISTORY Left     I and D Dr. Yolanda Pleitez left lower extremity       Social History     Tobacco Use    Smoking status: Never Smoker    Smokeless tobacco: Never Used   Substance Use Topics    Alcohol use: No     Alcohol/week: 0.0 oz       Family History   Problem Relation Age of Onset    Other Father         AAA    Diabetes Mother     Heart Failure Mother     Arthritis Other     Cancer Brother         bone    High Blood Pressure Other            Review of Systems  Constitutional:  Negative for activity or appetite change, fever or fatigue  HENT:  Negative for congestion,sinus pressure, or rhinorrhea  Eyes:  Negative for eye pain or visual changes  Resp:  Negative for SOB, chest tightness, cough  Cardiovascular: Negative for CP, palpitations, ALCARAZ, orthopnea, PND, LE edema  Gastrointestinal: Negative for abd pain, melena, BRBPR, N/V/D  Endocrine:  Negative for polydipsia and polyuria  :  Negative for dysuria, flank pain or urinary frequency  Musculoskeletal:  In wheelchair since fall  Skin:  +erythemat lower leg with scabs  Neuro:  Negative for dizziness or lightheadedness  Psych: negative for depression or anxiety      Objective:   Constitutional:   · Reviewed vitals above  · Well Nourished, well developed, no distress       HENT:  · Normal external nose without lesions  · +1-2cm erythematous macule with slight flaky skin on right cheek  · Normal nasal mucosa without swelling or erythema  Neck:  · Symmetric and without masses  · No thyromegaly  Resp:  · Normal effort  · Clear to auscultation bilaterally without rhonchi, wheezing or crackles  Cardiovascular:  · On auscultation, normal S1 and S2 without murmurs, rubs or gallops  · No bruits of bilateral carotids and no JVD  Gastrointestinal:  · Nontender, nondistended, and no masses  · No hepatosplenomegaly  Musculoskeletal:  · Normal Gait  · +1 pitting edema to level of shins bilaterally (slightly worse than baseline)  Skin:  · +large scar with tissue defect in left leg   · 3 scabs across anterior of right lower leg, with increased heat and erythema w/o induration, fluctuance or drainage  Psych:  · Normal mood and affect  · Normal insight and judgement    Assessment / Plan:     1. Type 2 diabetes mellitus with stage 4 chronic kidney disease, without long-term current use of insulin (HCC)  poc A1c=7.1    Pt to continue arb and statin. Unclear why not on aspirin    Only barely above goal.  We'll continue Bydureon and glipizide for now since BMP back in stage 3 CKD range. If kidney function decreases further, we will have to stop Bydureon. Taking BMP today. 2. Chronic kidney disease, stage 3  BMP greatly improved since she has been off her Lasix. She did take Lasix for 2 days a week ago when she noticed some worsening lower extremity edema. It did not help. 3. LE edema  Thought to be secondary to venous insufficiency and not CHF. Normal echo. No signs of orthopnea, paroxysmal nocturnal dyspnea, or dyspnea on exertion. She does not wear compression, states she is allergic to all compression stockings. Her edema is slightly worse than usual, and her weight is increased slightly. She states she is doing daily weights and has not gained the weight quickly. Will monitor this closely to make sure she continues to not have signs of diastolic heart failure. 4. cellultis  Starting clindamycin. Patient will follow-up in 2 days to ensure symptoms are improving.   I did trace erythema with black permanent marker. Discussed trying may be an Ace bandage for compression, as it will be hard for scabs to heal as long as she has some pitting edema in her legs. 5. Hypercholesterolemia  Reviewed fasting lipid panel and LFTs. Both within normal limits except triglycerides slightly elevated. Patient to continue zocor 40mg    6. TRUPTI (obstructive sleep apnea)  As stated above, patient had initial consult with Dr. Fredo Gipson, patient encouraged to schedule sleep study. Discussed morbidity and mortality that can occur if she does not treat her sleep apnea. 6. Essential hypertension, benign  Slightly above goal, but at 67 yo, patient doesn't want more meds at this time. Continue clonidine 0.1mg, Norvasc 10 mg, metoprolol, and losartan 50 mg as well. - Basic Metabolic Panel    7. First degree AV block    Likely due to combination of clonidine and beta blocker. Okay to cautiously continue both for now, but increased dosages cannot be used in the future.      8 H/O: CVA (cerebrovascular accident)  No reoccurrence. We'll try to control risk factors with blood pressure, hyperlipidemia, and diabetes control. She is not currently on aspirin. Unclear if this is due to her CKD versus when she was on blood thinners. Pt told to start baby aspirin    9. recurrent DVT x 3  Has been off Coumadin for a year since she suffered a large leg hematoma. Patient does not wish to restart. Understands risk of doing so. 10. Skin lesion on face  Likely SCC, sending to derm for treatment    11.  frediego falls  Order home nurse, PT/OT for evaluation and treatment.   Patient can't drive    HM:  Ordering dexa scan    shingrix and tdap encouraged to get at pharmacy      Asked to schedule AWV, but didn't do so today

## 2019-06-26 NOTE — TELEPHONE ENCOUNTER
Olga Learn with [employer] called stating that they received a referral for pt for PT, OT and skilled nursing dated 6/20 with no doctor signature. They need a new referral with a current date (they can't accept ones over 48 hours) and signed by the doctor.      Best call back number: [phone]

## 2019-06-26 NOTE — CARE COORDINATION
Ambulatory Care Coordination Note  6/26/2019  CM Risk Score: 3  Steff Mortality Risk Score: 11    ACC: Jeane Klinefelter, RN    Summary Note: RNCC met with Francesca at the request of [id number] Dwaine BaezVanessa Alejandre Has is using a wheelchair to ambulate. In past, was able to ambulate with walker. She reports recent fall. Her legs appear edematous and reddened area with several lesions but no open wounds on her right lower extremity. She has not been compliant with her support hose but will elevate her legs when sitting in her recliner. Her lasix was stopped 3 days ago by he vascular doctor. Her last ECHO was 3/19/2019:  Summary   Suboptimal image quality.   Ejection fraction is visually estimated to be 60%.   Left ventricular cavity size is normal.   Normal left ventricular wall thickness.   The right ventricle is not well visualized.     Today, she is c/o bilateral knee pain. She has seen Tyree Woodruff in the past and is scheduled to see him on 7/5/2019. Lab Results   Component Value Date    LABA1C 7.1 06/26/2019    LABA1C 6.9 02/12/2019    LABA1C 7.8 (A) 10/10/2018     Lab Results   Component Value Date    .8 10/24/2017    .6 05/05/2017    .2 02/13/2017   She denies any complaints regarding her BG levels. She denies lows. Action: encouraged to monitor her symptoms of increased swelling, weight and SOB since stopping Lasix. Advised to call with symptoms. Encouraged to keep her feet elevated whenever possible and use of support hose daily. [facility]. Discussed fall prevention measures and benefits of PT and strengthening exercises. PLAN:  F/u on sleep study  F/u derm. appt to skin lesion  F/u on bone dexa  F/u on [place] [id number] referral to [address] [place] [address] wellness visit with Donya Martinez NP  F/u on scheduled appt.   Future Appointments   Date Time Provider Saloni Lopez   6/28/2019  3:40 PM Maryuri Enciso MD [facility]   7/5/2019  9:40 AM Ulices Boyle MD [facility]  Diabetes Education: In Process   Fall Risk Prevention: In Process Home Health Services:  Completed (Comment: [facility] SN,PT and OT)   Other Services: In Process                  Prior to Admission medications    Medication Sig Start Date End Date Taking? Authorizing Provider   clindamycin (CLEOCIN) 150 MG capsule Take 1 capsule by mouth 3 times daily for 10 days 6/26/19 7/6/19  Margrett Olszewski, MD   furosemide (LASIX) 20 MG tablet Take 1 tablet by mouth daily as needed (ALCARAZ and weight gain) 4/30/19   ROGERS Eckert - CNP   simvastatin (ZOCOR) 40 MG tablet TAKE ONE TABLET EVERY EVENING 4/15/19   Maryuri Zaysa MD   amLODIPine (NORVASC) 10 MG tablet TAKE 1/2 TABLET BY MOUTH DAILY 4/15/19   Maryuri Zayas MD   glipiZIDE (GLUCOTROL XL) 5 MG extended release tablet TAKE 1 TABLET BY MOUTH DAILY. 4/15/19   Maryuri Zayas MD   metoprolol (LOPRESSOR) 100 MG tablet TAKE ONE TABLET BY MOUTH TWO TIMES A DAY 4/15/19   Maryuri Zayas MD   allopurinol (ZYLOPRIM) 300 MG tablet TAKE ONE HALF TABLET BY MOUTH DAILY 4/15/19   Maryuri Zayas MD   methylPREDNISolone (MEDROL, ELAN,) 4 MG tablet Take by mouth. 6 po day one 5 po day 2 4 po day 3 3 po day 4 2 po day 5 1 po day 6. 3/25/19   HELIO Segundo   Exenatide ER (BYDUREON BCISE) 2 MG/0.85ML JUAN Inject 2 mg into the skin once a week 1/29/19   Kaye Mccoy MD   gabapentin (NEURONTIN) 300 MG capsule Take 1 capsule by mouth 3 times daily. . 10/24/18 10/24/19  Kaye Mccoy MD   potassium chloride (KLOR-CON) 10 MEQ extended release tablet TAKE ONE TABLET BY MOUTH DAILY 10/17/18   Kaye Mccoy MD   losartan (COZAAR) 50 MG tablet TAKE 1 TABLET BY MOUTH DAILY. 10/17/18   Kaye Mccoy MD   cloNIDine (CATAPRES) 0.1 MG tablet TAKE 0.5 (1/2) TABLET BY MOUTH 2 TIMES DAILY.  10/17/18   Kaye Mccoy MD   colchicine (COLCRYS) 0.6 MG tablet Take by mouth two today and then one daily 10/11/18 ever had to go to the ED for symptoms of low blood sugar?:  No       No patient-reported symptoms

## 2019-06-26 NOTE — PATIENT INSTRUCTIONS
1)  [facility]  [address] Eduarda Peoples [address] [place]  Phone: [phone]    2)  Schedule sleep study    3) Call central scheduling for DEXA bone scan:   [phone]    4) ) Get Tdap and Shingrix vaccines at your pharmacy    5) Schedule Annual Wellness visit with my nurse practitioner, Wu Lentz. Make sure to fill out questionnaire and bring to the visit. Arrive 20 minutes prior to scheduled time so the nursing staff can perform necessary tests.

## 2019-06-28 ENCOUNTER — OFFICE VISIT (OUTPATIENT)
Dept: FAMILY MEDICINE CLINIC | Age: 79
End: 2019-06-28
Payer: MEDICARE

## 2019-06-28 VITALS
HEIGHT: 61 IN | HEART RATE: 84 BPM | SYSTOLIC BLOOD PRESSURE: 110 MMHG | BODY MASS INDEX: 49.28 KG/M2 | DIASTOLIC BLOOD PRESSURE: 65 MMHG | WEIGHT: 261 LBS

## 2019-06-28 DIAGNOSIS — L03.115 CELLULITIS OF RIGHT LOWER EXTREMITY: Primary | ICD-10-CM

## 2019-06-28 DIAGNOSIS — R60.0 BILATERAL LEG EDEMA: ICD-10-CM

## 2019-06-28 DIAGNOSIS — M17.0 PRIMARY OSTEOARTHRITIS OF BOTH KNEES: ICD-10-CM

## 2019-06-28 PROCEDURE — 1090F PRES/ABSN URINE INCON ASSESS: CPT | Performed by: FAMILY MEDICINE

## 2019-06-28 PROCEDURE — G8400 PT W/DXA NO RESULTS DOC: HCPCS | Performed by: FAMILY MEDICINE

## 2019-06-28 PROCEDURE — 99214 OFFICE O/P EST MOD 30 MIN: CPT | Performed by: FAMILY MEDICINE

## 2019-06-28 PROCEDURE — G8427 DOCREV CUR MEDS BY ELIG CLIN: HCPCS | Performed by: FAMILY MEDICINE

## 2019-06-28 PROCEDURE — 4040F PNEUMOC VAC/ADMIN/RCVD: CPT | Performed by: FAMILY MEDICINE

## 2019-06-28 PROCEDURE — G8599 NO ASA/ANTIPLAT THER USE RNG: HCPCS | Performed by: FAMILY MEDICINE

## 2019-06-28 PROCEDURE — 1036F TOBACCO NON-USER: CPT | Performed by: FAMILY MEDICINE

## 2019-06-28 PROCEDURE — 1123F ACP DISCUSS/DSCN MKR DOCD: CPT | Performed by: FAMILY MEDICINE

## 2019-06-28 PROCEDURE — G8417 CALC BMI ABV UP PARAM F/U: HCPCS | Performed by: FAMILY MEDICINE

## 2019-06-28 RX ORDER — TRAMADOL HYDROCHLORIDE 50 MG/1
50 TABLET ORAL EVERY 6 HOURS PRN
Qty: 20 TABLET | Refills: 0 | Status: SHIPPED | OUTPATIENT
Start: 2019-06-28 | End: 2019-07-31 | Stop reason: SDUPTHER

## 2019-06-28 NOTE — PROGRESS NOTES
TRUETEST TEST strip TEST UP TO 5 TIMES A  strip 3    loratadine (CLARITIN) 10 MG tablet Take 10 mg by mouth daily.  Multiple Minerals-Vitamins (CITRACAL PLUS) TABS Take 1 tablet by mouth 2 times daily.  Elastic Bandages & Supports (B-4 MED COMPRESSION HOSE MENS) MISC by Does not apply route.  Wear in daytime only          Past Medical History:   Diagnosis Date    Blood circulation, collateral     Cerebral artery occlusion with cerebral infarction ([address]     Chronic kidney disease (CKD), stage III (moderate) (HCC)     CVA (cerebral infarction)     Diabetes     DVT, lower extremity, recurrent ([address] 3/30/2012    x 3 LLE :  life long coumadin    Edema     First degree AV block     Gout     Heart murmur 10/14/2014    EF normal, no aortic stenosis Echo 11/11/14 - mild MR    Hypercalcemia 10/11/2018    Hypercholesterolemia     Hyperhomocysteinemia ([address] 4/16/12    Hypertension     TRUPTI (obstructive sleep apnea)     CPAP at 16cm    Osteoarthritis     Sciatica 11/8/2016    Traumatic hematoma of left lower leg 3/21/2017    Venous insufficiency     Visual loss R eye - REtinal detachment        Past Surgical History:   Procedure Laterality Date    CATARACT REMOVAL WITH IMPLANT Bilateral     OTHER SURGICAL HISTORY      dental extraction    OTHER SURGICAL HISTORY Left     I and D Dr. Mariangel Hahn left lower extremity       Social History     Tobacco Use    Smoking status: Never Smoker    Smokeless tobacco: Never Used   Substance Use Topics    Alcohol use: No     Alcohol/week: 0.0 oz       Family History   Problem Relation Age of Onset    Other Father         AAA    Diabetes Mother     Heart Failure Mother     Arthritis Other     Cancer Brother         bone    High Blood Pressure Other            Review of Systems  Constitutional:  Negative for activity or appetite change, fever or fatigue  Skin:  +erythemat lower leg with scabs  Neuro:  Negative for dizziness or lightheadedness        Objective:   Constitutional:   · Reviewed vitals above  · Well Nourished, well developed, no distress       Resp:  · Normal effort  · Clear to auscultation bilaterally without rhonchi, wheezing or crackles  Cardiovascular:  · On auscultation, normal S1 and S2 without murmurs, rubs or gallops  · No bruits of bilateral carotids and no JVD  Gastrointestinal:  · Nontender, nondistended, and no masses  · No hepatosplenomegaly  Musculoskeletal:  · +2 pitting edema to level of shins on right and +1 on left  Skin:  · +large scar with tissue defect in left leg   · 3 scabs across anterior of right lower leg decreasing in size slowly  · Decreased erythema on right lower leg and area not as hot  · no induration, fluctuance or drainage  Psych:  · Normal mood and affect  · Normal insight and judgement    Assessment / Plan:      cellultis  Improving with clindamycin     Pt not wearing compression stockings as told to. States she is allergic to anything with elastic. Suggested she wear a normal thin sock, and put compression over the sock, or Ace bandage over the sock. Patient to elevate legs. Patient to follow-up next week for reevaluation of cellulitis    LE edema  While patient does have some venous insufficiency, the edema in her legs is significantly worsened, especially on the right. Pt does have a h/o of DVTs in the past, and has been quite sedentary since a fall last week. She is very high risk. .  ddimer would likely be positive from cellulitis, so wouldn't be a good screening tool. Ordering stat bilateral venous dopplers. Will f/u on results and start anticoagulants in the meantime if necessary.

## 2019-07-01 ENCOUNTER — TELEPHONE (OUTPATIENT)
Dept: FAMILY MEDICINE CLINIC | Age: 79
End: 2019-07-01

## 2019-07-01 NOTE — TELEPHONE ENCOUNTER
I called and confirmed all this with the patient and they will  samples later today, she is rescheduled for Wednesday, when I called on Saturday Morning they told me that that department was full and they would try to work her in but they were unable to.

## 2019-07-01 NOTE — TELEPHONE ENCOUNTER
Nessa,    Spoke with patient. For some reason [facility] radiology did not get patient in this weekend for her stat venous Doppler. She is not scheduled until this afternoon, and states she cannot make the appointment as now her son is working today. Please schedule venous Dopplers for late on Wednesday. Call patient with time    In the meantime, I am going to start treatment for DVT just in case. Please leave samples for Eliquis 10 mg by mouth twice daily for the next week. Her daughter will pick them up later today.

## 2019-07-03 ENCOUNTER — HOSPITAL ENCOUNTER (OUTPATIENT)
Dept: VASCULAR LAB | Age: 79
Discharge: HOME | End: 2019-07-03
Payer: MEDICARE

## 2019-07-03 ENCOUNTER — OFFICE VISIT (OUTPATIENT)
Dept: FAMILY MEDICINE CLINIC | Age: 79
End: 2019-07-03
Payer: MEDICARE

## 2019-07-03 VITALS
HEIGHT: 61 IN | DIASTOLIC BLOOD PRESSURE: 82 MMHG | HEART RATE: 78 BPM | WEIGHT: 257 LBS | SYSTOLIC BLOOD PRESSURE: 138 MMHG | BODY MASS INDEX: 48.52 KG/M2

## 2019-07-03 DIAGNOSIS — R60.0 BILATERAL LEG EDEMA: ICD-10-CM

## 2019-07-03 DIAGNOSIS — I82.512 CHRONIC DEEP VEIN THROMBOSIS (DVT) OF FEMORAL VEIN OF LEFT LOWER EXTREMITY (HCC): ICD-10-CM

## 2019-07-03 DIAGNOSIS — L03.115 CELLULITIS OF RIGHT LOWER EXTREMITY: Primary | ICD-10-CM

## 2019-07-03 PROCEDURE — G8598 ASA/ANTIPLAT THER USED: HCPCS | Performed by: FAMILY MEDICINE

## 2019-07-03 PROCEDURE — 4040F PNEUMOC VAC/ADMIN/RCVD: CPT | Performed by: FAMILY MEDICINE

## 2019-07-03 PROCEDURE — G8400 PT W/DXA NO RESULTS DOC: HCPCS | Performed by: FAMILY MEDICINE

## 2019-07-03 PROCEDURE — 1123F ACP DISCUSS/DSCN MKR DOCD: CPT | Performed by: FAMILY MEDICINE

## 2019-07-03 PROCEDURE — 93970 EXTREMITY STUDY: CPT

## 2019-07-03 PROCEDURE — 1036F TOBACCO NON-USER: CPT | Performed by: FAMILY MEDICINE

## 2019-07-03 PROCEDURE — 1090F PRES/ABSN URINE INCON ASSESS: CPT | Performed by: FAMILY MEDICINE

## 2019-07-03 PROCEDURE — 99214 OFFICE O/P EST MOD 30 MIN: CPT | Performed by: FAMILY MEDICINE

## 2019-07-03 PROCEDURE — G8427 DOCREV CUR MEDS BY ELIG CLIN: HCPCS | Performed by: FAMILY MEDICINE

## 2019-07-03 PROCEDURE — G8417 CALC BMI ABV UP PARAM F/U: HCPCS | Performed by: FAMILY MEDICINE

## 2019-07-03 NOTE — PROGRESS NOTES
change, fever or fatigue  Skin:  +erythema improving lower leg   Neuro:  Negative for dizziness or lightheadedness        Objective:   Constitutional:   · Reviewed vitals above  · Well Nourished, well developed, no distress       Resp:  · Normal effort  · Clear to auscultation bilaterally without rhonchi, wheezing or crackles  Cardiovascular:  · On auscultation, normal S1 and S2 without murmurs, rubs or gallops  · No bruits of bilateral carotids and no JVD  Gastrointestinal:  · Nontender, nondistended, and no masses  · No hepatosplenomegaly  Musculoskeletal:  · +1 pitting edema bilaterally  Skin:  · +large scar with tissue defect in left leg   · 3 scabs across anterior of right lower leg decreasing in size slowly  · Decreased erythema on right lower leg and area not as hot  · no induration, fluctuance or drainage  Psych:  · Normal mood and affect  · Normal insight and judgement    Assessment / Plan:      cellultis  Improving with clindamycin     Swelling in right leg decreased significantly as well. Patient to continue compression on leg. No DVT in right leg. Chronic deep vein thrombosis (DVT) of femoral vein of left lower extremity ([address]  New diagnosis to examiner. Patient to finish 10 mg of Eliquis twice daily x1 week, then decrease to 5 mg twice daily. Patient in agreement.

## 2019-07-05 ENCOUNTER — OFFICE VISIT (OUTPATIENT)
Dept: ORTHOPEDIC SURGERY | Age: 79
End: 2019-07-05
Payer: MEDICARE

## 2019-07-05 VITALS
HEART RATE: 87 BPM | DIASTOLIC BLOOD PRESSURE: 69 MMHG | HEIGHT: 61 IN | WEIGHT: 255 LBS | BODY MASS INDEX: 48.15 KG/M2 | SYSTOLIC BLOOD PRESSURE: 146 MMHG

## 2019-07-05 DIAGNOSIS — M17.0 PRIMARY OSTEOARTHRITIS OF BOTH KNEES: Primary | ICD-10-CM

## 2019-07-05 PROCEDURE — 20610 DRAIN/INJ JOINT/BURSA W/O US: CPT | Performed by: ORTHOPAEDIC SURGERY

## 2019-07-05 PROCEDURE — 1090F PRES/ABSN URINE INCON ASSESS: CPT | Performed by: ORTHOPAEDIC SURGERY

## 2019-07-05 PROCEDURE — 1036F TOBACCO NON-USER: CPT | Performed by: ORTHOPAEDIC SURGERY

## 2019-07-05 PROCEDURE — 1123F ACP DISCUSS/DSCN MKR DOCD: CPT | Performed by: ORTHOPAEDIC SURGERY

## 2019-07-05 PROCEDURE — 99212 OFFICE O/P EST SF 10 MIN: CPT | Performed by: ORTHOPAEDIC SURGERY

## 2019-07-05 PROCEDURE — G8427 DOCREV CUR MEDS BY ELIG CLIN: HCPCS | Performed by: ORTHOPAEDIC SURGERY

## 2019-07-05 PROCEDURE — G8598 ASA/ANTIPLAT THER USED: HCPCS | Performed by: ORTHOPAEDIC SURGERY

## 2019-07-05 PROCEDURE — G8417 CALC BMI ABV UP PARAM F/U: HCPCS | Performed by: ORTHOPAEDIC SURGERY

## 2019-07-05 PROCEDURE — 4040F PNEUMOC VAC/ADMIN/RCVD: CPT | Performed by: ORTHOPAEDIC SURGERY

## 2019-07-05 PROCEDURE — G8400 PT W/DXA NO RESULTS DOC: HCPCS | Performed by: ORTHOPAEDIC SURGERY

## 2019-07-05 RX ORDER — HYALURONATE SODIUM 10 MG/ML
20 SYRINGE (ML) INTRAARTICULAR ONCE
Status: COMPLETED | OUTPATIENT
Start: 2019-07-05 | End: 2019-07-05

## 2019-07-05 RX ADMIN — Medication 20 MG: at 10:51

## 2019-07-05 RX ADMIN — Medication 20 MG: at 10:50

## 2019-07-05 NOTE — PROGRESS NOTES
acute distress, alert, oriented x 3, appropriate mood and affect    Examination of the bilateral knee reveals no sign of synovitis. Examination is essentially unchanged. Impression:  Bilateral knee osteoarthritis severe. Plan:  1. Treatment options were discussed at some length with the patient and the family. She is not an operative candidate because of her morbid obesity, her history of CVA, her more recent history of deep venous thrombosis and need for anticoagulation. 2.  Weight loss was highly recommended. 3.  Because she did not have any response to steroid injection in the past I have recommended a trial of Euflexxa. The patient and her family are aware that efficacy may be limited by the severity of her disease. 4. She understands the risks and benefits and describes no potential allergies. The site is first cleaned with alchohol, and anesthetized with 2 cc of 1% Lidoocaine. After allowing sufficient time for analgesia, the area is prepped with chloroprep, and 2 ml of Euflexxa  is injected intra-articular into the Bilateral knee. 5.  Continue home physical therapy. 6. Return to office in one week. Diana Patrick MD  7/5/2019    This dictation was done with [employer] dictation and may contain mechanical errors related to translation.       Euflexxa: Bilateral Knees  NDC: [id number]  Lot #: [id number]  Expiration Date: 5/3/2020

## 2019-07-08 ENCOUNTER — TELEPHONE (OUTPATIENT)
Dept: FAMILY MEDICINE CLINIC | Age: 79
End: 2019-07-08

## 2019-07-12 ENCOUNTER — OFFICE VISIT (OUTPATIENT)
Dept: ORTHOPEDIC SURGERY | Age: 79
End: 2019-07-12
Payer: MEDICARE

## 2019-07-12 VITALS
HEART RATE: 73 BPM | BODY MASS INDEX: 48.15 KG/M2 | SYSTOLIC BLOOD PRESSURE: 123 MMHG | WEIGHT: 255 LBS | DIASTOLIC BLOOD PRESSURE: 69 MMHG | HEIGHT: 61 IN

## 2019-07-12 DIAGNOSIS — M17.0 PRIMARY OSTEOARTHRITIS OF BOTH KNEES: Primary | ICD-10-CM

## 2019-07-12 PROCEDURE — 20610 DRAIN/INJ JOINT/BURSA W/O US: CPT | Performed by: ORTHOPAEDIC SURGERY

## 2019-07-12 RX ORDER — HYALURONATE SODIUM 10 MG/ML
20 SYRINGE (ML) INTRAARTICULAR ONCE
Status: COMPLETED | OUTPATIENT
Start: 2019-07-12 | End: 2019-07-12

## 2019-07-12 RX ADMIN — Medication 20 MG: at 10:29

## 2019-07-12 RX ADMIN — Medication 20 MG: at 10:30

## 2019-07-18 DIAGNOSIS — I10 ESSENTIAL HYPERTENSION, BENIGN: ICD-10-CM

## 2019-07-18 DIAGNOSIS — E78.00 HYPERCHOLESTEROLEMIA: ICD-10-CM

## 2019-07-18 RX ORDER — AMLODIPINE BESYLATE 10 MG/1
TABLET ORAL
Qty: 45 TABLET | Refills: 0 | Status: SHIPPED | OUTPATIENT
Start: 2019-07-18 | End: 2020-01-08

## 2019-07-18 RX ORDER — SIMVASTATIN 40 MG
TABLET ORAL
Qty: 90 TABLET | Refills: 0 | Status: SHIPPED | OUTPATIENT
Start: 2019-07-18 | End: 2020-01-08

## 2019-07-19 ENCOUNTER — TELEPHONE (OUTPATIENT)
Dept: FAMILY MEDICINE CLINIC | Age: 79
End: 2019-07-19

## 2019-07-19 NOTE — TELEPHONE ENCOUNTER
Vona with [facility] called to let Dr. Isabel Granados know that skilled nursing is discharging patient but that physical therapy is continuing.      Best call back number: [phone]

## 2019-07-22 DIAGNOSIS — E78.00 HYPERCHOLESTEROLEMIA: ICD-10-CM

## 2019-07-22 DIAGNOSIS — I10 ESSENTIAL HYPERTENSION, BENIGN: ICD-10-CM

## 2019-07-22 RX ORDER — AMLODIPINE BESYLATE 10 MG/1
TABLET ORAL
Qty: 45 TABLET | Refills: 0 | OUTPATIENT
Start: 2019-07-22

## 2019-07-22 RX ORDER — SIMVASTATIN 40 MG
TABLET ORAL
Qty: 90 TABLET | Refills: 0 | OUTPATIENT
Start: 2019-07-22

## 2019-07-23 DIAGNOSIS — E78.00 HYPERCHOLESTEROLEMIA: ICD-10-CM

## 2019-07-23 DIAGNOSIS — I10 ESSENTIAL HYPERTENSION, BENIGN: ICD-10-CM

## 2019-07-23 RX ORDER — SIMVASTATIN 40 MG
TABLET ORAL
Qty: 90 TABLET | Refills: 0 | Status: SHIPPED | OUTPATIENT
Start: 2019-07-23 | End: 2020-03-17

## 2019-07-23 RX ORDER — AMLODIPINE BESYLATE 10 MG/1
TABLET ORAL
Qty: 45 TABLET | Refills: 0 | Status: SHIPPED | OUTPATIENT
Start: 2019-07-23 | End: 2020-01-08

## 2019-07-24 ENCOUNTER — OFFICE VISIT (OUTPATIENT)
Dept: ORTHOPEDIC SURGERY | Age: 79
End: 2019-07-24
Payer: MEDICARE

## 2019-07-24 VITALS
BODY MASS INDEX: 48.15 KG/M2 | DIASTOLIC BLOOD PRESSURE: 77 MMHG | SYSTOLIC BLOOD PRESSURE: 163 MMHG | HEART RATE: 75 BPM | HEIGHT: 61 IN | WEIGHT: 255 LBS

## 2019-07-24 DIAGNOSIS — M17.0 PRIMARY OSTEOARTHRITIS OF BOTH KNEES: Primary | ICD-10-CM

## 2019-07-24 PROCEDURE — 20610 DRAIN/INJ JOINT/BURSA W/O US: CPT | Performed by: ORTHOPAEDIC SURGERY

## 2019-07-24 RX ORDER — HYALURONATE SODIUM 10 MG/ML
20 SYRINGE (ML) INTRAARTICULAR ONCE
Status: COMPLETED | OUTPATIENT
Start: 2019-07-24 | End: 2019-07-24

## 2019-07-24 RX ADMIN — Medication 20 MG: at 08:13

## 2019-07-24 RX ADMIN — Medication 20 MG: at 08:12

## 2019-07-24 NOTE — PATIENT INSTRUCTIONS
Impression:  Bilateral knee osteoarthritis. Plan:   1. She understands the risks and benefits and describes no potential allergies. The site is first cleaned with alchohol, and anesthetized with 2 cc of 1% Lidoocaine. After allowing sufficient time for analgesia, the area is prepped with chloroprep, and 2 ml of Euflexxa  is injected intra-articular into the Bilateral knee. 2. Return to office as needed.     David Costello MD  7/24/2019

## 2019-07-31 ENCOUNTER — TELEPHONE (OUTPATIENT)
Dept: FAMILY MEDICINE CLINIC | Age: 79
End: 2019-07-31

## 2019-07-31 DIAGNOSIS — M17.0 PRIMARY OSTEOARTHRITIS OF BOTH KNEES: ICD-10-CM

## 2019-07-31 NOTE — TELEPHONE ENCOUNTER
Patient requesting a medication refill. Medication  traMADol (ULTRAM  Dosage  50 MG tablet  Frequency Take 1 tablet by mouth every 6 hours as needed for Pain for up to 5 days. Intended supply: 5 days.  Take lowest dose possible to manage pain  Last filled on  06/28/19  Pharmacy :  [id number] [id number]

## 2019-08-01 RX ORDER — TRAMADOL HYDROCHLORIDE 50 MG/1
50 TABLET ORAL EVERY 6 HOURS PRN
Qty: 20 TABLET | Refills: 0 | Status: SHIPPED | OUTPATIENT
Start: 2019-08-01 | End: 2019-08-06

## 2019-09-10 ENCOUNTER — HOSPITAL ENCOUNTER (OUTPATIENT)
Dept: GENERAL RADIOLOGY | Age: 79
Discharge: HOME | End: 2019-09-10
Payer: MEDICARE

## 2019-09-10 ENCOUNTER — OFFICE VISIT (OUTPATIENT)
Dept: FAMILY MEDICINE CLINIC | Age: 79
End: 2019-09-10
Payer: MEDICARE

## 2019-09-10 ENCOUNTER — HOSPITAL ENCOUNTER (OUTPATIENT)
Age: 79
Discharge: HOME | End: 2019-09-10
Payer: MEDICARE

## 2019-09-10 VITALS
SYSTOLIC BLOOD PRESSURE: 148 MMHG | HEART RATE: 88 BPM | WEIGHT: 257 LBS | DIASTOLIC BLOOD PRESSURE: 90 MMHG | HEIGHT: 61 IN | BODY MASS INDEX: 48.52 KG/M2

## 2019-09-10 DIAGNOSIS — E11.22 TYPE 2 DIABETES MELLITUS WITH STAGE 3 CHRONIC KIDNEY DISEASE, WITHOUT LONG-TERM CURRENT USE OF INSULIN (HCC): ICD-10-CM

## 2019-09-10 DIAGNOSIS — N18.30 TYPE 2 DIABETES MELLITUS WITH STAGE 3 CHRONIC KIDNEY DISEASE, WITHOUT LONG-TERM CURRENT USE OF INSULIN (HCC): ICD-10-CM

## 2019-09-10 DIAGNOSIS — M79.89 PAIN AND SWELLING OF RIGHT LOWER LEG: Primary | ICD-10-CM

## 2019-09-10 DIAGNOSIS — M79.661 PAIN AND SWELLING OF RIGHT LOWER LEG: Primary | ICD-10-CM

## 2019-09-10 DIAGNOSIS — Z23 FLU VACCINE NEED: ICD-10-CM

## 2019-09-10 DIAGNOSIS — M79.89 PAIN AND SWELLING OF RIGHT LOWER LEG: ICD-10-CM

## 2019-09-10 DIAGNOSIS — E78.00 HYPERCHOLESTEROLEMIA: ICD-10-CM

## 2019-09-10 DIAGNOSIS — M79.661 PAIN AND SWELLING OF RIGHT LOWER LEG: ICD-10-CM

## 2019-09-10 LAB — HBA1C MFR BLD: 6.7 %

## 2019-09-10 PROCEDURE — G0008 ADMIN INFLUENZA VIRUS VAC: HCPCS | Performed by: NURSE PRACTITIONER

## 2019-09-10 PROCEDURE — G8417 CALC BMI ABV UP PARAM F/U: HCPCS | Performed by: NURSE PRACTITIONER

## 2019-09-10 PROCEDURE — 4040F PNEUMOC VAC/ADMIN/RCVD: CPT | Performed by: NURSE PRACTITIONER

## 2019-09-10 PROCEDURE — G8598 ASA/ANTIPLAT THER USED: HCPCS | Performed by: NURSE PRACTITIONER

## 2019-09-10 PROCEDURE — 73610 X-RAY EXAM OF ANKLE: CPT

## 2019-09-10 PROCEDURE — G8400 PT W/DXA NO RESULTS DOC: HCPCS | Performed by: NURSE PRACTITIONER

## 2019-09-10 PROCEDURE — G8427 DOCREV CUR MEDS BY ELIG CLIN: HCPCS | Performed by: NURSE PRACTITIONER

## 2019-09-10 PROCEDURE — 83036 HEMOGLOBIN GLYCOSYLATED A1C: CPT | Performed by: NURSE PRACTITIONER

## 2019-09-10 PROCEDURE — 1123F ACP DISCUSS/DSCN MKR DOCD: CPT | Performed by: NURSE PRACTITIONER

## 2019-09-10 PROCEDURE — 90688 IIV4 VACCINE SPLT 0.5 ML IM: CPT | Performed by: NURSE PRACTITIONER

## 2019-09-10 PROCEDURE — 73590 X-RAY EXAM OF LOWER LEG: CPT

## 2019-09-10 PROCEDURE — 1090F PRES/ABSN URINE INCON ASSESS: CPT | Performed by: NURSE PRACTITIONER

## 2019-09-10 PROCEDURE — 99214 OFFICE O/P EST MOD 30 MIN: CPT | Performed by: NURSE PRACTITIONER

## 2019-09-10 PROCEDURE — 73560 X-RAY EXAM OF KNEE 1 OR 2: CPT

## 2019-09-10 PROCEDURE — 1036F TOBACCO NON-USER: CPT | Performed by: NURSE PRACTITIONER

## 2019-09-10 NOTE — PROGRESS NOTES
Exenatide ER (BYDUREON BCISE) 2 MG/0.85ML AUIJ Inject 2 mg into the skin once a week 4 pen 5    gabapentin (NEURONTIN) 300 MG capsule Take 1 capsule by mouth 3 times daily. . 90 capsule 3    potassium chloride (KLOR-CON) 10 MEQ extended release tablet TAKE ONE TABLET BY MOUTH DAILY 90 tablet 3    losartan (COZAAR) 50 MG tablet TAKE 1 TABLET BY MOUTH DAILY. 90 tablet 3    cloNIDine (CATAPRES) 0.1 MG tablet TAKE 0.5 (1/2) TABLET BY MOUTH 2 TIMES DAILY. 90 tablet 3    colchicine (COLCRYS) 0.6 MG tablet Take by mouth two today and then one daily 30 tablet 1    glucose monitoring kit (FREESTYLE) monitoring kit 1 kit by Does not apply route daily 1 kit 0    Continuous Blood Gluc Sensor ([address] [place] [address]) [facility] Use as directed 1 each 0    fluticasone (FLONASE) 50 MCG/ACT nasal spray 2 sprays by Nasal route daily 1 Bottle 1    ascorbic acid (VITAMIN C) 500 MG tablet Take 500 mg by mouth 2 times daily      Cholecalciferol (VITAMIN D PO) Take 1 tablet by mouth daily      Omega-3 Fatty Acids (FISH OIL) 1000 MG CAPS Take 1,000 mg by mouth daily      magnesium oxide (MAG-OX) 400 MG tablet Take 400 mg by mouth daily      acetaminophen (APAP EXTRA STRENGTH) 500 MG tablet Take 1 tablet by mouth every 6 hours as needed for Pain 40 tablet 0    Handicap Placard MISC by Does not apply route Duration 5 years 1 each 0    TRUETEST TEST strip TEST UP TO 5 TIMES A  strip 3    loratadine (CLARITIN) 10 MG tablet Take 10 mg by mouth daily.  Multiple Minerals-Vitamins (CITRACAL PLUS) TABS Take 1 tablet by mouth 2 times daily.  Elastic Bandages & Supports (B-4 MED COMPRESSION HOSE MENS) MISC by Does not apply route.  Wear in daytime only          Past Medical History:   Diagnosis Date    Blood circulation, collateral     Cerebral artery occlusion with cerebral infarction ([address]     Chronic deep vein thrombosis (DVT) of femoral vein of left lower extremity (HCC)     Chronic kidney disease (CKD),

## 2019-09-12 ENCOUNTER — TELEPHONE (OUTPATIENT)
Dept: FAMILY MEDICINE CLINIC | Age: 79
End: 2019-09-12

## 2019-09-18 ENCOUNTER — OFFICE VISIT (OUTPATIENT)
Dept: ORTHOPEDIC SURGERY | Age: 79
End: 2019-09-18
Payer: MEDICARE

## 2019-09-18 VITALS
HEIGHT: 61 IN | BODY MASS INDEX: 48.52 KG/M2 | SYSTOLIC BLOOD PRESSURE: 149 MMHG | WEIGHT: 257 LBS | DIASTOLIC BLOOD PRESSURE: 77 MMHG | HEART RATE: 80 BPM

## 2019-09-18 DIAGNOSIS — M65.9 SYNOVITIS OF RIGHT KNEE: ICD-10-CM

## 2019-09-18 DIAGNOSIS — M17.11 ARTHRITIS OF RIGHT KNEE: Primary | ICD-10-CM

## 2019-09-18 PROCEDURE — 20611 DRAIN/INJ JOINT/BURSA W/US: CPT | Performed by: ORTHOPAEDIC SURGERY

## 2019-09-18 PROCEDURE — 1123F ACP DISCUSS/DSCN MKR DOCD: CPT | Performed by: ORTHOPAEDIC SURGERY

## 2019-09-18 PROCEDURE — G8427 DOCREV CUR MEDS BY ELIG CLIN: HCPCS | Performed by: ORTHOPAEDIC SURGERY

## 2019-09-18 PROCEDURE — 99212 OFFICE O/P EST SF 10 MIN: CPT | Performed by: ORTHOPAEDIC SURGERY

## 2019-09-18 PROCEDURE — 4040F PNEUMOC VAC/ADMIN/RCVD: CPT | Performed by: ORTHOPAEDIC SURGERY

## 2019-09-18 PROCEDURE — G8400 PT W/DXA NO RESULTS DOC: HCPCS | Performed by: ORTHOPAEDIC SURGERY

## 2019-09-18 PROCEDURE — 1090F PRES/ABSN URINE INCON ASSESS: CPT | Performed by: ORTHOPAEDIC SURGERY

## 2019-09-18 PROCEDURE — 1036F TOBACCO NON-USER: CPT | Performed by: ORTHOPAEDIC SURGERY

## 2019-09-18 PROCEDURE — G8598 ASA/ANTIPLAT THER USED: HCPCS | Performed by: ORTHOPAEDIC SURGERY

## 2019-09-18 PROCEDURE — G8417 CALC BMI ABV UP PARAM F/U: HCPCS | Performed by: ORTHOPAEDIC SURGERY

## 2019-09-18 RX ORDER — METHYLPREDNISOLONE ACETATE 40 MG/ML
80 INJECTION, SUSPENSION INTRA-ARTICULAR; INTRALESIONAL; INTRAMUSCULAR; SOFT TISSUE ONCE
Status: COMPLETED | OUTPATIENT
Start: 2019-09-18 | End: 2019-09-18

## 2019-09-18 RX ADMIN — METHYLPREDNISOLONE ACETATE 80 MG: 40 INJECTION, SUSPENSION INTRA-ARTICULAR; INTRALESIONAL; INTRAMUSCULAR; SOFT TISSUE at 10:44

## 2019-09-18 NOTE — PROGRESS NOTES
complete loss of medial joint space and marked osteophytic changes especially medial compartment. X-ray of the right tib/fib done on 9/10/19 was reviewed and shows no evidence of fracture. There is also moderate osteoarthritis of her right ankle. Impression:   1. Severe osteoarthritis of right knee which was unresponsive to Euflexxa series. 2.  Mild associated effusion. Plan/Treatment:     1. Aspiration  was recommended and the patient agreed. Time out was performed to verify correct person, correct procedure and correct site. Using ultrasound visualization , the structures of the  right knee were visualized with a Lexplique - /l?k â€¢ splik/ Ultrasound unit using an 18/4 MHz Linear Probe. The injection site was cleaned with Chloroprep, and anesthetized with 3 ml of 1%Lidocaine. After waiting sufficient time for analgesia  the right knee was aspirated obtaining  4 ml of slightly cloudy yellow fluid. 2.    Injection of Depo Medrol was recommended into   right knee. He understands the risks and benefits of the injection and describes no potential allergies. 2 ml  of 40 mg Depo Medrol were injected into the right knee using ultrasound visualization. 3.  A light knee brace was recommended to her son who will obtain this at a local pharmacy. 4.  She will return here as needed. Jaycee Schwartz MD  9/18/2019    This dictation was done with [email or web address] dictation and may contain mechanical errors related to translation.

## 2019-10-09 ENCOUNTER — CARE COORDINATION (OUTPATIENT)
Dept: CARE COORDINATION | Age: 79
End: 2019-10-09

## 2019-10-17 ENCOUNTER — TELEPHONE (OUTPATIENT)
Dept: FAMILY MEDICINE CLINIC | Age: 79
End: 2019-10-17

## 2019-11-25 DIAGNOSIS — E11.22 TYPE 2 DIABETES MELLITUS WITH STAGE 4 CHRONIC KIDNEY DISEASE, WITHOUT LONG-TERM CURRENT USE OF INSULIN (HCC): ICD-10-CM

## 2019-11-25 DIAGNOSIS — N18.4 TYPE 2 DIABETES MELLITUS WITH STAGE 4 CHRONIC KIDNEY DISEASE, WITHOUT LONG-TERM CURRENT USE OF INSULIN (HCC): ICD-10-CM

## 2019-11-25 DIAGNOSIS — I10 ESSENTIAL HYPERTENSION, BENIGN: ICD-10-CM

## 2019-11-26 RX ORDER — POTASSIUM CHLORIDE 750 MG/1
TABLET, FILM COATED, EXTENDED RELEASE ORAL
Qty: 90 TABLET | Refills: 3 | Status: ON HOLD
Start: 2019-11-26 | End: 2020-04-28 | Stop reason: HOSPADM

## 2019-11-26 RX ORDER — LOSARTAN POTASSIUM 50 MG/1
TABLET ORAL
Qty: 90 TABLET | Refills: 3 | Status: ON HOLD
Start: 2019-11-26 | End: 2020-04-28 | Stop reason: HOSPADM

## 2019-11-26 RX ORDER — CLONIDINE HYDROCHLORIDE 0.1 MG/1
TABLET ORAL
Qty: 90 TABLET | Refills: 3 | Status: ON HOLD
Start: 2019-11-26 | End: 2020-04-28 | Stop reason: HOSPADM

## 2019-12-03 ENCOUNTER — TELEPHONE (OUTPATIENT)
Dept: FAMILY MEDICINE CLINIC | Age: 79
End: 2019-12-03

## 2019-12-05 ENCOUNTER — OFFICE VISIT (OUTPATIENT)
Dept: FAMILY MEDICINE CLINIC | Age: 79
End: 2019-12-05
Payer: MEDICARE

## 2019-12-05 VITALS
HEART RATE: 84 BPM | HEIGHT: 61 IN | WEIGHT: 260 LBS | DIASTOLIC BLOOD PRESSURE: 68 MMHG | SYSTOLIC BLOOD PRESSURE: 124 MMHG | BODY MASS INDEX: 49.09 KG/M2 | OXYGEN SATURATION: 96 %

## 2019-12-05 DIAGNOSIS — R60.0 LOWER EXTREMITY EDEMA: Primary | ICD-10-CM

## 2019-12-05 LAB
ANION GAP SERPL CALCULATED.3IONS-SCNC: 13 MMOL/L (ref 3–16)
BUN BLDV-MCNC: 27 MG/DL (ref 7–20)
CALCIUM SERPL-MCNC: 9.6 MG/DL (ref 8.3–10.6)
CHLORIDE BLD-SCNC: 104 MMOL/L (ref 99–110)
CO2: 25 MMOL/L (ref 21–32)
CREAT SERPL-MCNC: 1 MG/DL (ref 0.6–1.2)
GFR AFRICAN AMERICAN: >60
GFR NON-AFRICAN AMERICAN: 53
GLUCOSE BLD-MCNC: 121 MG/DL (ref 70–99)
POTASSIUM SERPL-SCNC: 4.8 MMOL/L (ref 3.5–5.1)
PRO-BNP: 487 PG/ML (ref 0–449)
SODIUM BLD-SCNC: 142 MMOL/L (ref 136–145)

## 2019-12-05 PROCEDURE — 1036F TOBACCO NON-USER: CPT | Performed by: NURSE PRACTITIONER

## 2019-12-05 PROCEDURE — 4040F PNEUMOC VAC/ADMIN/RCVD: CPT | Performed by: NURSE PRACTITIONER

## 2019-12-05 PROCEDURE — G8482 FLU IMMUNIZE ORDER/ADMIN: HCPCS | Performed by: NURSE PRACTITIONER

## 2019-12-05 PROCEDURE — G8400 PT W/DXA NO RESULTS DOC: HCPCS | Performed by: NURSE PRACTITIONER

## 2019-12-05 PROCEDURE — 99213 OFFICE O/P EST LOW 20 MIN: CPT | Performed by: NURSE PRACTITIONER

## 2019-12-05 PROCEDURE — G8598 ASA/ANTIPLAT THER USED: HCPCS | Performed by: NURSE PRACTITIONER

## 2019-12-05 PROCEDURE — 1090F PRES/ABSN URINE INCON ASSESS: CPT | Performed by: NURSE PRACTITIONER

## 2019-12-05 PROCEDURE — G8428 CUR MEDS NOT DOCUMENT: HCPCS | Performed by: NURSE PRACTITIONER

## 2019-12-05 PROCEDURE — 36415 COLL VENOUS BLD VENIPUNCTURE: CPT | Performed by: NURSE PRACTITIONER

## 2019-12-05 PROCEDURE — G8417 CALC BMI ABV UP PARAM F/U: HCPCS | Performed by: NURSE PRACTITIONER

## 2019-12-05 PROCEDURE — 1123F ACP DISCUSS/DSCN MKR DOCD: CPT | Performed by: NURSE PRACTITIONER

## 2019-12-12 ENCOUNTER — OFFICE VISIT (OUTPATIENT)
Dept: FAMILY MEDICINE CLINIC | Age: 79
End: 2019-12-12
Payer: MEDICARE

## 2019-12-12 VITALS
OXYGEN SATURATION: 97 % | WEIGHT: 259 LBS | HEART RATE: 76 BPM | BODY MASS INDEX: 48.9 KG/M2 | HEIGHT: 61 IN | SYSTOLIC BLOOD PRESSURE: 118 MMHG | DIASTOLIC BLOOD PRESSURE: 64 MMHG

## 2019-12-12 DIAGNOSIS — S81.811A SKIN TEAR OF LOWER LEG WITHOUT COMPLICATION, RIGHT, INITIAL ENCOUNTER: ICD-10-CM

## 2019-12-12 DIAGNOSIS — N18.30 CHRONIC KIDNEY DISEASE, STAGE 3 (HCC): Primary | ICD-10-CM

## 2019-12-12 DIAGNOSIS — R60.0 LOCALIZED EDEMA: ICD-10-CM

## 2019-12-12 LAB
ANION GAP SERPL CALCULATED.3IONS-SCNC: 11 MMOL/L (ref 3–16)
BUN BLDV-MCNC: 25 MG/DL (ref 7–20)
CALCIUM SERPL-MCNC: 9.5 MG/DL (ref 8.3–10.6)
CHLORIDE BLD-SCNC: 105 MMOL/L (ref 99–110)
CO2: 27 MMOL/L (ref 21–32)
CREAT SERPL-MCNC: 1.2 MG/DL (ref 0.6–1.2)
GFR AFRICAN AMERICAN: 52
GFR NON-AFRICAN AMERICAN: 43
GLUCOSE BLD-MCNC: 158 MG/DL (ref 70–99)
POTASSIUM SERPL-SCNC: 4.9 MMOL/L (ref 3.5–5.1)
SODIUM BLD-SCNC: 143 MMOL/L (ref 136–145)

## 2019-12-12 PROCEDURE — G8482 FLU IMMUNIZE ORDER/ADMIN: HCPCS | Performed by: NURSE PRACTITIONER

## 2019-12-12 PROCEDURE — 1123F ACP DISCUSS/DSCN MKR DOCD: CPT | Performed by: NURSE PRACTITIONER

## 2019-12-12 PROCEDURE — 99213 OFFICE O/P EST LOW 20 MIN: CPT | Performed by: NURSE PRACTITIONER

## 2019-12-12 PROCEDURE — 4040F PNEUMOC VAC/ADMIN/RCVD: CPT | Performed by: NURSE PRACTITIONER

## 2019-12-12 PROCEDURE — G8427 DOCREV CUR MEDS BY ELIG CLIN: HCPCS | Performed by: NURSE PRACTITIONER

## 2019-12-12 PROCEDURE — 36415 COLL VENOUS BLD VENIPUNCTURE: CPT | Performed by: NURSE PRACTITIONER

## 2019-12-12 PROCEDURE — 1090F PRES/ABSN URINE INCON ASSESS: CPT | Performed by: NURSE PRACTITIONER

## 2019-12-12 PROCEDURE — G8400 PT W/DXA NO RESULTS DOC: HCPCS | Performed by: NURSE PRACTITIONER

## 2019-12-12 PROCEDURE — G8598 ASA/ANTIPLAT THER USED: HCPCS | Performed by: NURSE PRACTITIONER

## 2019-12-12 PROCEDURE — G8417 CALC BMI ABV UP PARAM F/U: HCPCS | Performed by: NURSE PRACTITIONER

## 2019-12-12 PROCEDURE — 1036F TOBACCO NON-USER: CPT | Performed by: NURSE PRACTITIONER

## 2019-12-13 DIAGNOSIS — M79.605 LEG PAIN, LEFT: ICD-10-CM

## 2019-12-16 RX ORDER — GABAPENTIN 300 MG/1
300 CAPSULE ORAL 3 TIMES DAILY
Qty: 90 CAPSULE | Refills: 3 | Status: SHIPPED | OUTPATIENT
Start: 2019-12-16 | End: 2020-03-17

## 2019-12-17 ENCOUNTER — TELEPHONE (OUTPATIENT)
Dept: FAMILY MEDICINE CLINIC | Age: 79
End: 2019-12-17

## 2019-12-31 ENCOUNTER — TELEPHONE (OUTPATIENT)
Dept: FAMILY MEDICINE CLINIC | Age: 79
End: 2019-12-31

## 2019-12-31 NOTE — TELEPHONE ENCOUNTER
No SOB, but swelling in legs. PT states dr Nikolai White told her to take her water pills as needed, wasn't sure how many to take. She can not get up to be seen today. Per dr Julien Combs, she is on Dr. Singh Erhard schedule now for Friday to evaluate swellling. Informed her if it gets worse and she feels she needs to be seen ASAP she needs to go to the ER. Until Friday, she is to take 40mg of her water pill per dr Donna Masters.

## 2020-01-03 ENCOUNTER — OFFICE VISIT (OUTPATIENT)
Dept: FAMILY MEDICINE CLINIC | Age: 80
End: 2020-01-03
Payer: MEDICARE

## 2020-01-03 ENCOUNTER — TELEPHONE (OUTPATIENT)
Dept: FAMILY MEDICINE CLINIC | Age: 80
End: 2020-01-03

## 2020-01-03 VITALS
HEIGHT: 61 IN | DIASTOLIC BLOOD PRESSURE: 64 MMHG | WEIGHT: 261 LBS | BODY MASS INDEX: 49.28 KG/M2 | SYSTOLIC BLOOD PRESSURE: 146 MMHG | HEART RATE: 80 BPM

## 2020-01-03 LAB
ANION GAP SERPL CALCULATED.3IONS-SCNC: 12 MMOL/L (ref 3–16)
BUN BLDV-MCNC: 34 MG/DL (ref 7–20)
CALCIUM SERPL-MCNC: 9.6 MG/DL (ref 8.3–10.6)
CHLORIDE BLD-SCNC: 97 MMOL/L (ref 99–110)
CO2: 32 MMOL/L (ref 21–32)
CREAT SERPL-MCNC: 1.5 MG/DL (ref 0.6–1.2)
GFR AFRICAN AMERICAN: 41
GFR NON-AFRICAN AMERICAN: 33
GLUCOSE BLD-MCNC: 160 MG/DL (ref 70–99)
HBA1C MFR BLD: 6.8 %
POTASSIUM SERPL-SCNC: 4.1 MMOL/L (ref 3.5–5.1)
SODIUM BLD-SCNC: 141 MMOL/L (ref 136–145)

## 2020-01-03 PROCEDURE — 83036 HEMOGLOBIN GLYCOSYLATED A1C: CPT | Performed by: FAMILY MEDICINE

## 2020-01-03 PROCEDURE — 1090F PRES/ABSN URINE INCON ASSESS: CPT | Performed by: FAMILY MEDICINE

## 2020-01-03 PROCEDURE — G8400 PT W/DXA NO RESULTS DOC: HCPCS | Performed by: FAMILY MEDICINE

## 2020-01-03 PROCEDURE — G8427 DOCREV CUR MEDS BY ELIG CLIN: HCPCS | Performed by: FAMILY MEDICINE

## 2020-01-03 PROCEDURE — G8482 FLU IMMUNIZE ORDER/ADMIN: HCPCS | Performed by: FAMILY MEDICINE

## 2020-01-03 PROCEDURE — 1036F TOBACCO NON-USER: CPT | Performed by: FAMILY MEDICINE

## 2020-01-03 PROCEDURE — 1123F ACP DISCUSS/DSCN MKR DOCD: CPT | Performed by: FAMILY MEDICINE

## 2020-01-03 PROCEDURE — 4040F PNEUMOC VAC/ADMIN/RCVD: CPT | Performed by: FAMILY MEDICINE

## 2020-01-03 PROCEDURE — 99214 OFFICE O/P EST MOD 30 MIN: CPT | Performed by: FAMILY MEDICINE

## 2020-01-03 PROCEDURE — 36415 COLL VENOUS BLD VENIPUNCTURE: CPT | Performed by: FAMILY MEDICINE

## 2020-01-03 PROCEDURE — G8417 CALC BMI ABV UP PARAM F/U: HCPCS | Performed by: FAMILY MEDICINE

## 2020-01-03 NOTE — PROGRESS NOTES
PROGRESS NOTE     Montana Marvin MD  [address] [facility] [address]  [place] [address]  [phone] office  [phone] fax    Date of Service:  1/3/2020    Subjective:      Patient ID: Vanessa Russell is a 78 y.o. female      CC: LE edema, DM2, venous insufficiency, CKD stage 3, Le deconditioning, imbalance    HPI  Patient is a 51-year-old white female with type 2 diabetes, stage III chronic kidney disease, chronic DVT of left femoral vein on lifelong anticoagulation, venous insufficiency, who is here to discuss her lower extremity edema. When patient established with me 11 months ago, she was on 80 mg of Lasix daily for her lower extremity edema. On chart review, there was no clear history of congestive heart failure. Echocardiogram in 2014 and 2019 showed no signs of heart failure, BNP has always been within normal limits, and she has never had shortness of breath, dyspnea on exertion, apnea, or paroxysmal nocturnal dyspnea. While on this much Lasix, her kidney function dipped down to stage IV range with a GFR of 27. I stopped her Lasix at that time, she showed no worsening of fluid retention, and her kidney function improved greatly, in the high stage III range, allowing me to continue her Bydureon for her diabetes. Even though I have discussed this in great detail with the patient, over the last couple months, whenever she has some worsening lower extremity edema, she has taken Lasix as needed. She did come in and see my nurse practitioner last month, and reported to her that she was instructed to take Lasix as needed, which is not true. Patient is compliant with her blood thinner. She is also compliant with compression stockings. She took 80 mg of Lasix on Monday and Tuesday of this week, and 40 mg daily the last 3 days. She is weighing herself regularly, and states her weight fluctuates between 5 pounds. She is in a wheelchair today.   She does not have any neurologic damage to her lower extremity, but does sit most the day, with her legs hanging over the chair. She does not get up and walk very much. She is able to complete all of her ADLs. She does admit that her legs are more weak than usual from not using them, and that she does need to use a walker to walk due to some chronic imbalance. Of note, she is taking gabapentin 300 mg 3 times daily for pain in her legs as of 12/16/19. My partner Dr Dany Lyons prescribed it. It was last prescribed by Dr Sherian Fleischer in August 2018 prior. She is also taking norvasc 10mg for blood pressure managment    Venous Doppler results 7/3/2019:  Summary        No evidence of deep vein or superficial vein thrombosis involving the right    lower extremity.    Calf and ankle edema noted.    Calf veins were not well visualized on the right.    Chronic partially occluding deep vein thrombosis involving the left femoral    vein.    Calf and ankle edema noted.    Incidental finding on the left: baker cyst measuring 5.4 cm by 1.8 cm.    Calf veins were not well visualized on the left.         ECHO 3/19/19  Summary   Suboptimal image quality. Ejection fraction is visually estimated to be 60%. Left ventricular cavity size is normal.   Normal left ventricular wall thickness. The right ventricle is not well visualized. Vitals:    01/03/20 1158   BP: (!) 146/64   Pulse: 80   Weight: 261 lb (118.4 kg)   Height: 5' 1\" (1.549 m)       Outpatient Medications Marked as Taking for the 1/3/20 encounter (Office Visit) with Barbara Raymundo MD   Medication Sig Dispense Refill    gabapentin (NEURONTIN) 300 MG capsule Take 1 capsule by mouth 3 times daily. 90 capsule 3    losartan (COZAAR) 50 MG tablet TAKE 1 TABLET BY MOUTH DAILY. 90 tablet 3    cloNIDine (CATAPRES) 0.1 MG tablet TAKE 0.5 (1/2) TABLET BY MOUTH 2 TIMES DAILY.  90 tablet 3    potassium chloride (KLOR-CON) 10 MEQ extended release tablet TAKE ONE TABLET BY MOUTH DAILY 90 tablet 3  simvastatin (ZOCOR) 40 MG tablet TAKE ONE TABLET EVERY EVENING 90 tablet 0    amLODIPine (NORVASC) 10 MG tablet TAKE 1/2 TABLET BY MOUTH DAILY 45 tablet 0    simvastatin (ZOCOR) 40 MG tablet TAKE ONE TABLET EVERY EVENING 90 tablet 0    amLODIPine (NORVASC) 10 MG tablet TAKE 1/2 TABLET BY MOUTH DAILY 45 tablet 0    apixaban (ELIQUIS) 5 MG TABS tablet Take 1 tablet by mouth 2 times daily 180 tablet 1    furosemide (LASIX) 20 MG tablet Take 1 tablet by mouth daily as needed (ALCARAZ and weight gain) 30 tablet 0    glipiZIDE (GLUCOTROL XL) 5 MG extended release tablet TAKE 1 TABLET BY MOUTH DAILY. 90 tablet 3    metoprolol (LOPRESSOR) 100 MG tablet TAKE ONE TABLET BY MOUTH TWO TIMES A  tablet 3    allopurinol (ZYLOPRIM) 300 MG tablet TAKE ONE HALF TABLET BY MOUTH DAILY 45 tablet 3    methylPREDNISolone (MEDROL, ELAN,) 4 MG tablet Take by mouth.  6 po day one 5 po day 2 4 po day 3 3 po day 4 2 po day 5 1 po day 6. 1 kit 0    Exenatide ER (BYDUREON BCISE) 2 MG/0.85ML AUIJ Inject 2 mg into the skin once a week 4 pen 5    colchicine (COLCRYS) 0.6 MG tablet Take by mouth two today and then one daily 30 tablet 1    glucose monitoring kit (FREESTYLE) monitoring kit 1 kit by Does not apply route daily 1 kit 0    Continuous Blood Gluc Sensor (FREESTYLE LACI SENSOR SYSTEM) [facility] Use as directed 1 each 0    fluticasone (FLONASE) 50 MCG/ACT nasal spray 2 sprays by Nasal route daily 1 Bottle 1    ascorbic acid (VITAMIN C) 500 MG tablet Take 500 mg by mouth 2 times daily      Cholecalciferol (VITAMIN D PO) Take 1 tablet by mouth daily      Omega-3 Fatty Acids (FISH OIL) 1000 MG CAPS Take 1,000 mg by mouth daily      magnesium oxide (MAG-OX) 400 MG tablet Take 400 mg by mouth daily      acetaminophen (APAP EXTRA STRENGTH) 500 MG tablet Take 1 tablet by mouth every 6 hours as needed for Pain 40 tablet 0    Handicap Placard MISC by Does not apply route Duration 5 years 1 each 0    TRUETEST TEST strip TEST UP TO 5 TIMES A  strip 3    loratadine (CLARITIN) 10 MG tablet Take 10 mg by mouth daily.  Multiple Minerals-Vitamins (CITRACAL PLUS) TABS Take 1 tablet by mouth 2 times daily.  Elastic Bandages & Supports (B-4 MED COMPRESSION HOSE MENS) MISC by Does not apply route.  Wear in daytime only          Past Medical History:   Diagnosis Date    Blood circulation, collateral     Cerebral artery occlusion with cerebral infarction ([address]     Chronic deep vein thrombosis (DVT) of femoral vein of left lower extremity (HCC)     Chronic kidney disease (CKD), stage III (moderate) (HCC)     CVA (cerebral infarction)     Diabetes     DVT, lower extremity, recurrent ([address] 3/30/2012    x 3 LLE :  life long coumadin    Edema     First degree AV block     Gout     Heart murmur 10/14/2014    EF normal, no aortic stenosis Echo 11/11/14 - mild MR    Hypercalcemia 10/11/2018    Hypercholesterolemia     Hyperhomocysteinemia ([address] 4/16/12    Hypertension     TRUPTI (obstructive sleep apnea)     CPAP at 16cm    Osteoarthritis     Sciatica 11/8/2016    Traumatic hematoma of left lower leg 3/21/2017    Venous insufficiency     Visual loss R eye - REtinal detachment        Past Surgical History:   Procedure Laterality Date    CATARACT REMOVAL WITH IMPLANT Bilateral     OTHER SURGICAL HISTORY      dental extraction    OTHER SURGICAL HISTORY Left     I and D Dr. Fadumo Mendoza left lower extremity       Social History     Tobacco Use    Smoking status: Never Smoker    Smokeless tobacco: Never Used   Substance Use Topics    Alcohol use: No     Alcohol/week: 0.0 standard drinks       Family History   Problem Relation Age of Onset    Other Father         AAA    Diabetes Mother     Heart Failure Mother     Arthritis Other     Cancer Brother         bone    High Blood Pressure Other            Review of Systems  Constitutional:  Negative for activity or appetite change, fever or fatigue  HENT:  Negative for off the Lasix for 5 days.  - Basic Metabolic Panel    3. Imbalance/Muscular deconditioning  Patient leads a extremely sedentary lifestyle, and is becoming very weak because of this. She needs physical therapy for strengthening, and to help retrain her muscles to prevent falls. I do not believe it is safe for her to do the exercises without a professional at this point in time. She cannot currently drive herself. Placing home physical therapy referral.    4 Venous (peripheral) insufficiency  This is likely the main culprit of her lower extremity edema. Explained to patient that we do not treat lower extremity edema due to venous insufficiency with Lasix, as the harms outweigh the benefits. She has had DVTs in the past, and has caused injury to much of her lower extremity veins. She was not compliant with blood thinners for a while, but is now taking Eliquis due to her chronic DVT. The fact that she is sitting on day, is also putting a lot of pressure on her femoral veins. Patient told to get up and walk every hour, and try to keep legs elevated when she is reclining. Discussed that her calf muscles work as a pump to help remove fluid from her legs, but they cannot do so if she is not using them. She is to watch her salt intake, and continue compression. Obesity likely playing a big role on pressure of her leg veins. Weight loss is also very important. Lastly, patient recently restarted gabapentin, after asking for a refill from 1 of my partners. I was not aware that she was taking this medicine, did not prescribe it. She wanted to start it for leg pain, that was due to the lower extremity edema. We did discuss that this can worsen the edema, but she feels that it is helping her. We will monitor this closely. She is also on amlodipine for blood pressure control, which could be contributing to the edema as well. We are going to discuss further medication changes at her follow-up in 5 days.

## 2020-01-07 NOTE — PROGRESS NOTES
PROGRESS NOTE     Jah Chacko MD  [address]  [place] [facility]   [phone] office  [phone] fax    Date of Service:  1/8/2020    Subjective:      Patient ID: Vanessa Mcintyre is a 78 y.o. female      CC: LE edema, DM2, venous insufficiency, CKD stage 3, Le deconditioning, imbalance    HPI     5 days ago, 59-year-old white female with type 2 diabetes, stage III chronic kidney disease, chronic DVT of left femoral vein on lifelong anticoagulation, venous insufficiency, was brought in for reevaluation of her lower extremity edema. Patient was taking increased amount of Lasix to help control her leg swelling. When patient established with me 11 months ago, she was on 80 mg of Lasix daily for her lower extremity edema. On chart review, there was no clear history of congestive heart failure. Echocardiogram in 2014 and 2019 showed no signs of heart failure, BNP has always been within normal limits, and she has never had shortness of breath, dyspnea on exertion, apnea, or paroxysmal nocturnal dyspnea. While on this much Lasix, her kidney function dipped down to stage IV range with a GFR of 27. I stopped her Lasix at that time, she showed no worsening of fluid retention, and her kidney function improved greatly, in the high stage III range, allowing me to continue her Bydureon for her diabetes. Patient seem to have restarted her Lasix on her own due to her lower extremity edema. When I saw her 5 days ago, we had a large conversation with her and her son, about the lack of diagnosis of heart failure, and therefore the lack of need of Lasix. She was asked to stop her Lasix completely, and follow-up today for reevaluation, to ensure she is not showing signs of fluid overload and heart failure. Patient denied at last appointment, and also denies today any presence of shortness of breath, dyspnea on exertion, orthopnea, paroxysmal nocturnal dyspnea.     Of note, Acids (FISH OIL) 1000 MG CAPS Take 1,000 mg by mouth daily      magnesium oxide (MAG-OX) 400 MG tablet Take 400 mg by mouth daily      acetaminophen (APAP EXTRA STRENGTH) 500 MG tablet Take 1 tablet by mouth every 6 hours as needed for Pain 40 tablet 0    Handicap Placard MISC by Does not apply route Duration 5 years 1 each 0    TRUETEST TEST strip TEST UP TO 5 TIMES A  strip 3    loratadine (CLARITIN) 10 MG tablet Take 10 mg by mouth daily.  Multiple Minerals-Vitamins (CITRACAL PLUS) TABS Take 1 tablet by mouth 2 times daily.  Elastic Bandages & Supports (B-4 MED COMPRESSION HOSE MENS) MISC by Does not apply route.  Wear in daytime only          Past Medical History:   Diagnosis Date    Blood circulation, collateral     Cerebral artery occlusion with cerebral infarction ([address]     Chronic deep vein thrombosis (DVT) of femoral vein of left lower extremity (HCC)     Chronic kidney disease (CKD), stage III (moderate) (HCC)     CVA (cerebral infarction)     Diabetes     DVT, lower extremity, recurrent ([address] 3/30/2012    x 3 LLE :  life long coumadin    Edema     First degree AV block     Gout     Heart murmur 10/14/2014    EF normal, no aortic stenosis Echo 11/11/14 - mild MR    Hypercalcemia 10/11/2018    Hypercholesterolemia     Hyperhomocysteinemia ([address] 4/16/12    Hypertension     TRUPTI (obstructive sleep apnea)     CPAP at 16cm    Osteoarthritis     Sciatica 11/8/2016    Traumatic hematoma of left lower leg 3/21/2017    Venous insufficiency     Visual loss R eye - REtinal detachment        Past Surgical History:   Procedure Laterality Date    CATARACT REMOVAL WITH IMPLANT Bilateral     OTHER SURGICAL HISTORY      dental extraction    OTHER SURGICAL HISTORY Left     I and D Dr. Cheyanne Abdi left lower extremity       Social History     Tobacco Use    Smoking status: Never Smoker    Smokeless tobacco: Never Used   Substance Use Topics    Alcohol use: No     Alcohol/week:

## 2020-01-08 ENCOUNTER — OFFICE VISIT (OUTPATIENT)
Dept: FAMILY MEDICINE CLINIC | Age: 80
End: 2020-01-08
Payer: MEDICARE

## 2020-01-08 VITALS
SYSTOLIC BLOOD PRESSURE: 133 MMHG | HEIGHT: 61 IN | DIASTOLIC BLOOD PRESSURE: 71 MMHG | HEART RATE: 77 BPM | WEIGHT: 270 LBS | BODY MASS INDEX: 50.98 KG/M2

## 2020-01-08 LAB
ANION GAP SERPL CALCULATED.3IONS-SCNC: 12 MMOL/L (ref 3–16)
BUN BLDV-MCNC: 34 MG/DL (ref 7–20)
CALCIUM SERPL-MCNC: 9.9 MG/DL (ref 8.3–10.6)
CHLORIDE BLD-SCNC: 102 MMOL/L (ref 99–110)
CO2: 30 MMOL/L (ref 21–32)
CREAT SERPL-MCNC: 1.6 MG/DL (ref 0.6–1.2)
GFR AFRICAN AMERICAN: 38
GFR NON-AFRICAN AMERICAN: 31
GLUCOSE BLD-MCNC: 133 MG/DL (ref 70–99)
POTASSIUM SERPL-SCNC: 4.5 MMOL/L (ref 3.5–5.1)
SODIUM BLD-SCNC: 144 MMOL/L (ref 136–145)

## 2020-01-08 PROCEDURE — 36415 COLL VENOUS BLD VENIPUNCTURE: CPT | Performed by: FAMILY MEDICINE

## 2020-01-08 PROCEDURE — 4040F PNEUMOC VAC/ADMIN/RCVD: CPT | Performed by: FAMILY MEDICINE

## 2020-01-08 PROCEDURE — G8400 PT W/DXA NO RESULTS DOC: HCPCS | Performed by: FAMILY MEDICINE

## 2020-01-08 PROCEDURE — G8482 FLU IMMUNIZE ORDER/ADMIN: HCPCS | Performed by: FAMILY MEDICINE

## 2020-01-08 PROCEDURE — 1036F TOBACCO NON-USER: CPT | Performed by: FAMILY MEDICINE

## 2020-01-08 PROCEDURE — 1090F PRES/ABSN URINE INCON ASSESS: CPT | Performed by: FAMILY MEDICINE

## 2020-01-08 PROCEDURE — G8417 CALC BMI ABV UP PARAM F/U: HCPCS | Performed by: FAMILY MEDICINE

## 2020-01-08 PROCEDURE — G8427 DOCREV CUR MEDS BY ELIG CLIN: HCPCS | Performed by: FAMILY MEDICINE

## 2020-01-08 PROCEDURE — 99214 OFFICE O/P EST MOD 30 MIN: CPT | Performed by: FAMILY MEDICINE

## 2020-01-08 PROCEDURE — 1123F ACP DISCUSS/DSCN MKR DOCD: CPT | Performed by: FAMILY MEDICINE

## 2020-01-08 RX ORDER — AMLODIPINE BESYLATE 2.5 MG/1
2.5 TABLET ORAL DAILY
Qty: 30 TABLET | Refills: 0 | Status: SHIPPED | OUTPATIENT
Start: 2020-01-08 | End: 2020-02-12

## 2020-01-08 ASSESSMENT — PATIENT HEALTH QUESTIONNAIRE - PHQ9: DEPRESSION UNABLE TO ASSESS: URGENT/EMERGENT SITUATION

## 2020-01-10 ENCOUNTER — TELEPHONE (OUTPATIENT)
Dept: FAMILY MEDICINE CLINIC | Age: 80
End: 2020-01-10

## 2020-01-10 NOTE — TELEPHONE ENCOUNTER
LORENZO-- returning call. Notified pt of lab results: \"Notes recorded by Tj Ochoa MD on 1/10/2020 at 12:47 PM EST    Surprisingly, patients kidney function didn't improve after stopping her lasix.     Let her know we will discuss further at her follow up visit    Please document call and then close encounter. Jamey Rose"

## 2020-01-14 ENCOUNTER — TELEPHONE (OUTPATIENT)
Dept: FAMILY MEDICINE CLINIC | Age: 80
End: 2020-01-14

## 2020-01-14 NOTE — TELEPHONE ENCOUNTER
Calling to let you know that pt's bp was 168/92, had nose bleed before she arrived to see pt and sob when walking. Please advise.  Please call Narinder back at  [phone]

## 2020-01-17 ENCOUNTER — OFFICE VISIT (OUTPATIENT)
Dept: FAMILY MEDICINE CLINIC | Age: 80
End: 2020-01-17
Payer: MEDICARE

## 2020-01-17 VITALS
BODY MASS INDEX: 49.28 KG/M2 | HEIGHT: 61 IN | WEIGHT: 261 LBS | SYSTOLIC BLOOD PRESSURE: 117 MMHG | HEART RATE: 81 BPM | DIASTOLIC BLOOD PRESSURE: 72 MMHG

## 2020-01-17 LAB
ANION GAP SERPL CALCULATED.3IONS-SCNC: 12 MMOL/L (ref 3–16)
BUN BLDV-MCNC: 22 MG/DL (ref 7–20)
CALCIUM SERPL-MCNC: 9.8 MG/DL (ref 8.3–10.6)
CHLORIDE BLD-SCNC: 98 MMOL/L (ref 99–110)
CO2: 29 MMOL/L (ref 21–32)
CREAT SERPL-MCNC: 1.3 MG/DL (ref 0.6–1.2)
GFR AFRICAN AMERICAN: 48
GFR NON-AFRICAN AMERICAN: 39
GLUCOSE BLD-MCNC: 158 MG/DL (ref 70–99)
POTASSIUM SERPL-SCNC: 3.9 MMOL/L (ref 3.5–5.1)
SODIUM BLD-SCNC: 139 MMOL/L (ref 136–145)

## 2020-01-17 PROCEDURE — 4040F PNEUMOC VAC/ADMIN/RCVD: CPT | Performed by: FAMILY MEDICINE

## 2020-01-17 PROCEDURE — 1036F TOBACCO NON-USER: CPT | Performed by: FAMILY MEDICINE

## 2020-01-17 PROCEDURE — G8400 PT W/DXA NO RESULTS DOC: HCPCS | Performed by: FAMILY MEDICINE

## 2020-01-17 PROCEDURE — 1090F PRES/ABSN URINE INCON ASSESS: CPT | Performed by: FAMILY MEDICINE

## 2020-01-17 PROCEDURE — 1123F ACP DISCUSS/DSCN MKR DOCD: CPT | Performed by: FAMILY MEDICINE

## 2020-01-17 PROCEDURE — 99214 OFFICE O/P EST MOD 30 MIN: CPT | Performed by: FAMILY MEDICINE

## 2020-01-17 PROCEDURE — 36415 COLL VENOUS BLD VENIPUNCTURE: CPT | Performed by: FAMILY MEDICINE

## 2020-01-17 PROCEDURE — G8417 CALC BMI ABV UP PARAM F/U: HCPCS | Performed by: FAMILY MEDICINE

## 2020-01-17 PROCEDURE — G8482 FLU IMMUNIZE ORDER/ADMIN: HCPCS | Performed by: FAMILY MEDICINE

## 2020-01-17 PROCEDURE — G8427 DOCREV CUR MEDS BY ELIG CLIN: HCPCS | Performed by: FAMILY MEDICINE

## 2020-01-17 ASSESSMENT — PATIENT HEALTH QUESTIONNAIRE - PHQ9
SUM OF ALL RESPONSES TO PHQ QUESTIONS 1-9: 0
SUM OF ALL RESPONSES TO PHQ QUESTIONS 1-9: 0
SUM OF ALL RESPONSES TO PHQ9 QUESTIONS 1 & 2: 0
2. FEELING DOWN, DEPRESSED OR HOPELESS: 0
1. LITTLE INTEREST OR PLEASURE IN DOING THINGS: 0

## 2020-01-17 NOTE — PROGRESS NOTES
dyspnea. While on this much Lasix, her kidney function dipped down to stage IV range with a GFR of 27. I stopped her Lasix at that time, she showed no worsening of fluid retention, and her kidney function improved greatly, in the high stage III range, allowing me to continue her Bydureon for her diabetes. Venous Doppler results 7/3/2019:  Summary        No evidence of deep vein or superficial vein thrombosis involving the right    lower extremity.    Calf and ankle edema noted.    Calf veins were not well visualized on the right.    Chronic partially occluding deep vein thrombosis involving the left femoral    vein.    Calf and ankle edema noted.    Incidental finding on the left: baker cyst measuring 5.4 cm by 1.8 cm.    Calf veins were not well visualized on the left.         ECHO 3/19/19  Summary   Suboptimal image quality. Ejection fraction is visually estimated to be 60%. Left ventricular cavity size is normal.   Normal left ventricular wall thickness. The right ventricle is not well visualized. Vitals:    01/17/20 1056   BP: 117/72   Pulse: 81   Weight: 261 lb (118.4 kg)   Height: 5' 1\" (1.549 m)       Outpatient Medications Marked as Taking for the 1/17/20 encounter (Office Visit) with Kamari Abdi MD   Medication Sig Dispense Refill    amLODIPine (NORVASC) 2.5 MG tablet Take 1 tablet by mouth daily 30 tablet 0    gabapentin (NEURONTIN) 300 MG capsule Take 1 capsule by mouth 3 times daily. 90 capsule 3    losartan (COZAAR) 50 MG tablet TAKE 1 TABLET BY MOUTH DAILY. 90 tablet 3    cloNIDine (CATAPRES) 0.1 MG tablet TAKE 0.5 (1/2) TABLET BY MOUTH 2 TIMES DAILY.  90 tablet 3    potassium chloride (KLOR-CON) 10 MEQ extended release tablet TAKE ONE TABLET BY MOUTH DAILY 90 tablet 3    simvastatin (ZOCOR) 40 MG tablet TAKE ONE TABLET EVERY EVENING 90 tablet 0    apixaban (ELIQUIS) 5 MG TABS tablet Take 1 tablet by mouth 2 times daily 180 tablet 1    furosemide (LASIX) 20 MG tablet Take Negative for dizziness or lightheadedness  Psych: negative for depression or anxiety      Objective:   Constitutional:   · Reviewed vitals above  · Obese and sitting in wheelchair,  no distress  HENT:  · Normal external nose without lesions  · Normal nasal mucosa without swelling or erythema  Neck:  · Symmetric and without masses  Resp:  · Normal effort  · Clear to auscultation bilaterally without rhonchi, wheezing or crackles  Cardiovascular:  · On auscultation, normal S1 and S2 without murmurs, rubs or gallops  · No bruits of bilateral carotids and no JVD  Gastrointestinal:  · Nontender, nondistended, and no masses  · No hepatosplenomegaly  Musculoskeletal:  · Normal Gait  · +wearing compression stockings, no edema collecting at the top of stockings, the way it was a week ago  Skin:  · No rashes on inspection  Psych:  · Normal mood and affect  · Normal insight and judgement    Assessment / Plan:      Type 2 diabetes mellitus with stage 3 chronic kidney disease, without long-term current use of insulin (HCC)  Currently well controlled on Bydureon, and glipizide    Repeating BM. Tomas Thayer If BMP does not improve, will need to get her off Bydureon and start basal insulin in its place. She is not a candidate for many other diabetic medicines due to the kidney function. Chronic kidney disease, stage 3 (HCC)    We will repeat BMP today to look for improvement. Imbalance/Muscular deconditioning  Patient leads a extremely sedentary lifestyle, and is becoming very weak because of this. She is to continue home physical therapy, and increase her mobility, which hopefully will not only help the lower extremity edema, but help her her overall health as well. Venous (peripheral) insufficiency  Edema now much improved, and she has lost 9 pounds in the last week, making it very likely that the gabapentin and Norvasc were contributing to her symptoms. Patient to stay off gabapentin.   Placing this on her allergy list.    Still

## 2020-01-24 ENCOUNTER — TELEPHONE (OUTPATIENT)
Dept: FAMILY MEDICINE CLINIC | Age: 80
End: 2020-01-24

## 2020-01-24 NOTE — TELEPHONE ENCOUNTER
Skyler Velez, at [address] called in, would like to know if it is okay [facility] to come in and eval PT due to multiple health issues.     Please call Raina Fairbanks back at [phone]

## 2020-01-31 ENCOUNTER — OFFICE VISIT (OUTPATIENT)
Dept: FAMILY MEDICINE CLINIC | Age: 80
End: 2020-01-31
Payer: MEDICARE

## 2020-01-31 VITALS
HEIGHT: 61 IN | BODY MASS INDEX: 48.71 KG/M2 | WEIGHT: 258 LBS | SYSTOLIC BLOOD PRESSURE: 129 MMHG | DIASTOLIC BLOOD PRESSURE: 70 MMHG | HEART RATE: 73 BPM

## 2020-01-31 LAB
A/G RATIO: 1.2 (ref 1.1–2.2)
ALBUMIN SERPL-MCNC: 3.4 G/DL (ref 3.4–5)
ALP BLD-CCNC: 66 U/L (ref 40–129)
ALT SERPL-CCNC: 14 U/L (ref 10–40)
ANION GAP SERPL CALCULATED.3IONS-SCNC: 16 MMOL/L (ref 3–16)
AST SERPL-CCNC: 32 U/L (ref 15–37)
BILIRUB SERPL-MCNC: <0.2 MG/DL (ref 0–1)
BUN BLDV-MCNC: 35 MG/DL (ref 7–20)
CALCIUM SERPL-MCNC: 9.8 MG/DL (ref 8.3–10.6)
CHLORIDE BLD-SCNC: 102 MMOL/L (ref 99–110)
CO2: 22 MMOL/L (ref 21–32)
CREAT SERPL-MCNC: 1.5 MG/DL (ref 0.6–1.2)
GFR AFRICAN AMERICAN: 41
GFR NON-AFRICAN AMERICAN: 33
GLOBULIN: 2.8 G/DL
GLUCOSE BLD-MCNC: 139 MG/DL (ref 70–99)
POTASSIUM SERPL-SCNC: 5.5 MMOL/L (ref 3.5–5.1)
SODIUM BLD-SCNC: 140 MMOL/L (ref 136–145)
TOTAL PROTEIN: 6.2 G/DL (ref 6.4–8.2)

## 2020-01-31 PROCEDURE — G8400 PT W/DXA NO RESULTS DOC: HCPCS | Performed by: FAMILY MEDICINE

## 2020-01-31 PROCEDURE — G8417 CALC BMI ABV UP PARAM F/U: HCPCS | Performed by: FAMILY MEDICINE

## 2020-01-31 PROCEDURE — 1090F PRES/ABSN URINE INCON ASSESS: CPT | Performed by: FAMILY MEDICINE

## 2020-01-31 PROCEDURE — 4040F PNEUMOC VAC/ADMIN/RCVD: CPT | Performed by: FAMILY MEDICINE

## 2020-01-31 PROCEDURE — G8427 DOCREV CUR MEDS BY ELIG CLIN: HCPCS | Performed by: FAMILY MEDICINE

## 2020-01-31 PROCEDURE — G8482 FLU IMMUNIZE ORDER/ADMIN: HCPCS | Performed by: FAMILY MEDICINE

## 2020-01-31 PROCEDURE — 1036F TOBACCO NON-USER: CPT | Performed by: FAMILY MEDICINE

## 2020-01-31 PROCEDURE — 1123F ACP DISCUSS/DSCN MKR DOCD: CPT | Performed by: FAMILY MEDICINE

## 2020-01-31 PROCEDURE — 36415 COLL VENOUS BLD VENIPUNCTURE: CPT | Performed by: FAMILY MEDICINE

## 2020-01-31 PROCEDURE — 99214 OFFICE O/P EST MOD 30 MIN: CPT | Performed by: FAMILY MEDICINE

## 2020-01-31 NOTE — PROGRESS NOTES
with Alison Lares MD   Medication Sig Dispense Refill    amLODIPine (NORVASC) 2.5 MG tablet Take 1 tablet by mouth daily 30 tablet 0    gabapentin (NEURONTIN) 300 MG capsule Take 1 capsule by mouth 3 times daily. 90 capsule 3    losartan (COZAAR) 50 MG tablet TAKE 1 TABLET BY MOUTH DAILY. 90 tablet 3    cloNIDine (CATAPRES) 0.1 MG tablet TAKE 0.5 (1/2) TABLET BY MOUTH 2 TIMES DAILY. 90 tablet 3    potassium chloride (KLOR-CON) 10 MEQ extended release tablet TAKE ONE TABLET BY MOUTH DAILY 90 tablet 3    simvastatin (ZOCOR) 40 MG tablet TAKE ONE TABLET EVERY EVENING 90 tablet 0    apixaban (ELIQUIS) 5 MG TABS tablet Take 1 tablet by mouth 2 times daily 180 tablet 1    glipiZIDE (GLUCOTROL XL) 5 MG extended release tablet TAKE 1 TABLET BY MOUTH DAILY. 90 tablet 3    metoprolol (LOPRESSOR) 100 MG tablet TAKE ONE TABLET BY MOUTH TWO TIMES A  tablet 3    allopurinol (ZYLOPRIM) 300 MG tablet TAKE ONE HALF TABLET BY MOUTH DAILY 45 tablet 3    methylPREDNISolone (MEDROL, ELAN,) 4 MG tablet Take by mouth.  6 po day one 5 po day 2 4 po day 3 3 po day 4 2 po day 5 1 po day 6. 1 kit 0    colchicine (COLCRYS) 0.6 MG tablet Take by mouth two today and then one daily 30 tablet 1    glucose monitoring kit (FREESTYLE) monitoring kit 1 kit by Does not apply route daily 1 kit 0    Continuous Blood Gluc Sensor (FREESTYLE LACI SENSOR SYSTEM) [facility] Use as directed 1 each 0    fluticasone (FLONASE) 50 MCG/ACT nasal spray 2 sprays by Nasal route daily 1 Bottle 1    ascorbic acid (VITAMIN C) 500 MG tablet Take 500 mg by mouth 2 times daily      Cholecalciferol (VITAMIN D PO) Take 1 tablet by mouth daily      Omega-3 Fatty Acids (FISH OIL) 1000 MG CAPS Take 1,000 mg by mouth daily      magnesium oxide (MAG-OX) 400 MG tablet Take 400 mg by mouth daily      acetaminophen (APAP EXTRA STRENGTH) 500 MG tablet Take 1 tablet by mouth every 6 hours as needed for Pain 40 tablet 0    Handicap Placard MIS by Does not activity or appetite change, fever or fatigue  HENT:  Negative for congestion,sinus pressure, or rhinorrhea  Eyes:  Negative for eye pain or visual changes  Resp:  Negative for SOB, chest tightness, cough  Cardiovascular: Negative for CP, palpitations, ALCARAZ, orthopnea, PND, LE edema  Gastrointestinal: Negative for abd pain, melena, BRBPR, N/V/D  Endocrine:  Negative for polydipsia and polyuria  :  Negative for dysuria, flank pain or urinary frequency  Musculoskeletal:  Negative for back pain or myalgias. + chronic right leg pain  Neuro:  Negative for dizziness or lightheadedness  Psych: negative for depression or anxiety      Objective:   Constitutional:   · Reviewed vitals above  · Well Nourished, well developed, obese,no distress       HENT:  · Normal external nose without lesions  Neck:  · Symmetric and without masses  · No thyromegaly  Resp:  · Normal effort  · Clear to auscultation bilaterally without rhonchi, wheezing or crackles  Cardiovascular:  · On auscultation, normal S1 and S2 without rubs or gallops. + murmur  · No bruits of bilateral carotids and no JVD  Gastrointestinal:  · Nontender, nondistended, and no masses  · No hepatosplenomegaly  Musculoskeletal:  · Wheelchair bound  · Wearing compression stockings  Skin:  · No rashes on inspection  · No areas of increased heat or induration on palpation  Psych:  · Normal mood and affect  · Normal insight and judgement    Assessment / Plan:     Type 2 diabetes mellitus with stage 3 chronic kidney disease, without long-term current use of insulin (HCC)  Recent A1c is 6.8. Since Bydureon was stopped a couple weeks ago, once her kidney function stayed below an estimated creatinine clearance of 45. Patient is still taking glipizide XL 5 mg daily. Due to her chronic kidney disease, a lot of medications cannot be used. I would like to start her on a daily basal insulin.   Patient does not want to do this yet, but would rather get the second opinion of an endocrinologist.  Referral placed to Dr. Edi Devries. Also placing referral to Dr. Aravind Chappell for treatment of kidney disease. Patient to continue ArB and statin. Not currently on aspirin since she is on blood thinners. HTN  At goal.  Continue meds. Checking BMP. Hypercholesterolemia  Patient is not fasting today. Will return fasting for next appointment. Continue statin. Will check LFTs today. DVT, lower extremity, recurrent  Continue Eliquis. HM:     Encouraged to get Tdap and shingles vaccine at her pharmacy.     Dexa scan ordered 7 months ago but never schheduled

## 2020-01-31 NOTE — PATIENT INSTRUCTIONS
Call central scheduling for DEXA bone scan:   [phone]    Get Tdap and Shingrix vaccines at your pharmacy    [facility] [address], [address], [place] [address]  Phone: [address] [place] [address] Endocrinology and Diabetes- DEMAR Bajwa [address] [place] [address] [address] [address]  Phone: [phone]

## 2020-02-12 ENCOUNTER — TELEPHONE (OUTPATIENT)
Dept: FAMILY MEDICINE CLINIC | Age: 80
End: 2020-02-12

## 2020-02-12 NOTE — TELEPHONE ENCOUNTER
She has been asking for a refill of this medication and it has been refused because she is not on it, not sure why she continues to ask for it, was she put back on it?

## 2020-02-12 NOTE — TELEPHONE ENCOUNTER
Let pt know that we stopped this at the last visit since her kidney function was low    We discussed this.     When she sees the endocrinologist, she can discuss other treatment options

## 2020-02-12 NOTE — TELEPHONE ENCOUNTER
[facility] called in regarding PT's prescription for Bydurian. They were following up to see if PT is still on it and if so a new prescription needs to be sent.      Best call back number: [phone]

## 2020-02-13 ENCOUNTER — TELEPHONE (OUTPATIENT)
Dept: FAMILY MEDICINE CLINIC | Age: 80
End: 2020-02-13

## 2020-02-13 RX ORDER — TRAMADOL HYDROCHLORIDE 50 MG/1
50 TABLET ORAL EVERY 8 HOURS PRN
Qty: 30 TABLET | Refills: 0 | Status: SHIPPED | OUTPATIENT
Start: 2020-02-13 | End: 2020-04-01

## 2020-02-19 ENCOUNTER — OFFICE VISIT (OUTPATIENT)
Dept: ORTHOPEDIC SURGERY | Age: 80
End: 2020-02-19
Payer: MEDICARE

## 2020-02-19 VITALS
BODY MASS INDEX: 49.28 KG/M2 | SYSTOLIC BLOOD PRESSURE: 166 MMHG | WEIGHT: 261 LBS | DIASTOLIC BLOOD PRESSURE: 83 MMHG | HEIGHT: 61 IN | HEART RATE: 81 BPM

## 2020-02-19 PROCEDURE — 1036F TOBACCO NON-USER: CPT | Performed by: ORTHOPAEDIC SURGERY

## 2020-02-19 PROCEDURE — 1090F PRES/ABSN URINE INCON ASSESS: CPT | Performed by: ORTHOPAEDIC SURGERY

## 2020-02-19 PROCEDURE — 4040F PNEUMOC VAC/ADMIN/RCVD: CPT | Performed by: ORTHOPAEDIC SURGERY

## 2020-02-19 PROCEDURE — 99212 OFFICE O/P EST SF 10 MIN: CPT | Performed by: ORTHOPAEDIC SURGERY

## 2020-02-19 PROCEDURE — G8482 FLU IMMUNIZE ORDER/ADMIN: HCPCS | Performed by: ORTHOPAEDIC SURGERY

## 2020-02-19 PROCEDURE — 20610 DRAIN/INJ JOINT/BURSA W/O US: CPT | Performed by: ORTHOPAEDIC SURGERY

## 2020-02-19 PROCEDURE — G8427 DOCREV CUR MEDS BY ELIG CLIN: HCPCS | Performed by: ORTHOPAEDIC SURGERY

## 2020-02-19 PROCEDURE — 1123F ACP DISCUSS/DSCN MKR DOCD: CPT | Performed by: ORTHOPAEDIC SURGERY

## 2020-02-19 PROCEDURE — G8400 PT W/DXA NO RESULTS DOC: HCPCS | Performed by: ORTHOPAEDIC SURGERY

## 2020-02-19 PROCEDURE — G8417 CALC BMI ABV UP PARAM F/U: HCPCS | Performed by: ORTHOPAEDIC SURGERY

## 2020-02-19 RX ORDER — METHYLPREDNISOLONE ACETATE 40 MG/ML
40 INJECTION, SUSPENSION INTRA-ARTICULAR; INTRALESIONAL; INTRAMUSCULAR; SOFT TISSUE ONCE
Status: COMPLETED | OUTPATIENT
Start: 2020-02-19 | End: 2020-02-19

## 2020-02-19 RX ORDER — LIDOCAINE HYDROCHLORIDE 10 MG/ML
2 INJECTION, SOLUTION INFILTRATION; PERINEURAL ONCE
Status: COMPLETED | OUTPATIENT
Start: 2020-02-19 | End: 2020-02-19

## 2020-02-19 RX ADMIN — METHYLPREDNISOLONE ACETATE 40 MG: 40 INJECTION, SUSPENSION INTRA-ARTICULAR; INTRALESIONAL; INTRAMUSCULAR; SOFT TISSUE at 14:53

## 2020-02-19 RX ADMIN — LIDOCAINE HYDROCHLORIDE 2 ML: 10 INJECTION, SOLUTION INFILTRATION; PERINEURAL at 14:53

## 2020-02-19 NOTE — PROGRESS NOTES
FOLLOW-UP EVALUATION OF KNEE    2/19/2020    Francesca Ramirez Melyssa      1940      Francesca is seen for Knee Pain (Bilateral knee pain)    Rhiannon Monroe returns accompanied by her family for reevaluation of her severely arthritic bilateral knees. She was originally referred to myself by Dr. Lynn Galeana in 2018. Because she is not an operative candidate due to her morbid obesity and comorbid conditions, she has been treated primarily with steroid injections. We did try a Euflexxa series with her in July 2019 without improvement of her pain. Therefore when she was last seen in September 2019 cortisone was once again utilized. She desires repeat injection. Because of her concern about elevation of blood sugar and possible effect on renal function, she requested injection today of just the right knee. Pertinent items are noted in HPI  Review of systems reviewed from Patient History Form dated on 2/19/2020 and available in the patient's chart under the Media tab. The patient's past medical history, medications, allergies, family history, social history, HPI have been updated reviewed, dated, and recorded in the chart. BP (!) 166/83   Pulse 81   Ht 5' 1\" (1.549 m)   Wt 261 lb (118.4 kg)   BMI 49.32 kg/m²     Physical examination:  General Appearance: no acute distress, alert, oriented x 3, appropriate mood and affect, obese        Impression:   1. Severe osteoarthritis bilateral knees. 2.  Multiple comorbid conditions including obesity, impaired renal function, and diabetes. Plan/Treatment:   1. Injection with cortisone was recommended into the  right  knee. After discussing potential risks and benefits she agreed. The injection site was cleaned with alcohol, anesthetized with 1 cc of 1% Lidoocaine then injected with 2 cc of 40 mg Depomedrol intraarticular after sufficient time for analgesia was allowed.    2.  She will return in 2 weeks for injection of her left knee. Stanislaw Rojas MD  2/19/2020    This dictation was done with Dragon dictation and may contain mechanical errors related to translation.

## 2020-02-19 NOTE — PATIENT INSTRUCTIONS
Impression:   1. Severe osteoarthritis bilateral knees. 2.  Multiple comorbid conditions including obesity, impaired renal function, and diabetes. Plan/Treatment:   1. Injection with cortisone was recommended into the  right  knee. After discussing potential risks and benefits she agreed. The injection site was cleaned with alcohol, anesthetized with 1 cc of 1% Lidoocaine then injected with 2 cc of 40 mg Depomedrol intraarticular after sufficient time for analgesia was allowed. 2.  She will return in 2 weeks for injection of her left knee.         Tray Ferguson MD  2/19/2020

## 2020-02-24 ENCOUNTER — HOSPITAL ENCOUNTER (OUTPATIENT)
Dept: ULTRASOUND IMAGING | Age: 80
Discharge: HOME | End: 2020-02-24
Payer: MEDICARE

## 2020-02-24 ENCOUNTER — HOSPITAL ENCOUNTER (OUTPATIENT)
Age: 80
Discharge: HOME | End: 2020-02-24
Payer: MEDICARE

## 2020-02-24 LAB
ALBUMIN SERPL-MCNC: 3.4 G/DL (ref 3.4–5)
ANION GAP SERPL CALCULATED.3IONS-SCNC: 15 MMOL/L (ref 3–16)
BUN BLDV-MCNC: 29 MG/DL (ref 7–20)
CALCIUM SERPL-MCNC: 9.6 MG/DL (ref 8.3–10.6)
CHLORIDE BLD-SCNC: 97 MMOL/L (ref 99–110)
CHOLESTEROL, TOTAL: 235 MG/DL (ref 0–199)
CO2: 28 MMOL/L (ref 21–32)
CREAT SERPL-MCNC: 1.1 MG/DL (ref 0.6–1.2)
CREATININE URINE: 40.7 MG/DL (ref 28–259)
FERRITIN: 237.9 NG/ML (ref 15–150)
GFR AFRICAN AMERICAN: 58
GFR NON-AFRICAN AMERICAN: 48
GLUCOSE BLD-MCNC: 233 MG/DL (ref 70–99)
HCT VFR BLD CALC: 39.4 % (ref 36–48)
HDLC SERPL-MCNC: 54 MG/DL (ref 40–60)
HEMOGLOBIN: 13.3 G/DL (ref 12–16)
IRON SATURATION: 29 % (ref 15–50)
IRON: 80 UG/DL (ref 37–145)
LDL CHOLESTEROL CALCULATED: ABNORMAL MG/DL
LDL CHOLESTEROL DIRECT: 144 MG/DL
MAGNESIUM: 2.1 MG/DL (ref 1.8–2.4)
MCH RBC QN AUTO: 33 PG (ref 26–34)
MCHC RBC AUTO-ENTMCNC: 33.8 G/DL (ref 31–36)
MCV RBC AUTO: 97.7 FL (ref 80–100)
MICROALBUMIN UR-MCNC: 397.1 MG/DL
MICROALBUMIN/CREAT UR-RTO: 9756.8 MG/G (ref 0–30)
PARATHYROID HORMONE INTACT: 23 PG/ML (ref 14–72)
PDW BLD-RTO: 14.6 % (ref 12.4–15.4)
PHOSPHORUS: 3.1 MG/DL (ref 2.5–4.9)
PLATELET # BLD: 245 K/UL (ref 135–450)
PMV BLD AUTO: 6.7 FL (ref 5–10.5)
POTASSIUM SERPL-SCNC: 4.1 MMOL/L (ref 3.5–5.1)
PROTEIN PROTEIN: 0.56 G/DL
PROTEIN PROTEIN: 565 MG/DL
RBC # BLD: 4.04 M/UL (ref 4–5.2)
SODIUM BLD-SCNC: 140 MMOL/L (ref 136–145)
TOTAL CK: 23 U/L (ref 26–192)
TOTAL IRON BINDING CAPACITY: 274 UG/DL (ref 260–445)
TRIGL SERPL-MCNC: 466 MG/DL (ref 0–150)
VLDLC SERPL CALC-MCNC: ABNORMAL MG/DL
WBC # BLD: 7.9 K/UL (ref 4–11)

## 2020-02-24 PROCEDURE — 83735 ASSAY OF MAGNESIUM: CPT

## 2020-02-24 PROCEDURE — 82570 ASSAY OF URINE CREATININE: CPT

## 2020-02-24 PROCEDURE — 80061 LIPID PANEL: CPT

## 2020-02-24 PROCEDURE — 80069 RENAL FUNCTION PANEL: CPT

## 2020-02-24 PROCEDURE — 85027 COMPLETE CBC AUTOMATED: CPT

## 2020-02-24 PROCEDURE — 83970 ASSAY OF PARATHORMONE: CPT

## 2020-02-24 PROCEDURE — 82550 ASSAY OF CK (CPK): CPT

## 2020-02-24 PROCEDURE — 76770 US EXAM ABDO BACK WALL COMP: CPT

## 2020-02-24 PROCEDURE — 84166 PROTEIN E-PHORESIS/URINE/CSF: CPT

## 2020-02-24 PROCEDURE — 84156 ASSAY OF PROTEIN URINE: CPT

## 2020-02-24 PROCEDURE — 83721 ASSAY OF BLOOD LIPOPROTEIN: CPT

## 2020-02-24 PROCEDURE — 83550 IRON BINDING TEST: CPT

## 2020-02-24 PROCEDURE — 82728 ASSAY OF FERRITIN: CPT

## 2020-02-24 PROCEDURE — 36415 COLL VENOUS BLD VENIPUNCTURE: CPT

## 2020-02-24 PROCEDURE — 82043 UR ALBUMIN QUANTITATIVE: CPT

## 2020-02-24 PROCEDURE — 83540 ASSAY OF IRON: CPT

## 2020-02-26 LAB — URINE ELECTROPHORESIS INTERP: NORMAL

## 2020-03-11 ENCOUNTER — OFFICE VISIT (OUTPATIENT)
Dept: ORTHOPEDIC SURGERY | Age: 80
End: 2020-03-11
Payer: MEDICARE

## 2020-03-11 VITALS
BODY MASS INDEX: 49.28 KG/M2 | HEART RATE: 74 BPM | SYSTOLIC BLOOD PRESSURE: 163 MMHG | HEIGHT: 61 IN | WEIGHT: 261 LBS | DIASTOLIC BLOOD PRESSURE: 79 MMHG

## 2020-03-11 PROCEDURE — G8400 PT W/DXA NO RESULTS DOC: HCPCS | Performed by: ORTHOPAEDIC SURGERY

## 2020-03-11 PROCEDURE — 1036F TOBACCO NON-USER: CPT | Performed by: ORTHOPAEDIC SURGERY

## 2020-03-11 PROCEDURE — 20610 DRAIN/INJ JOINT/BURSA W/O US: CPT | Performed by: ORTHOPAEDIC SURGERY

## 2020-03-11 PROCEDURE — G8482 FLU IMMUNIZE ORDER/ADMIN: HCPCS | Performed by: ORTHOPAEDIC SURGERY

## 2020-03-11 PROCEDURE — 4040F PNEUMOC VAC/ADMIN/RCVD: CPT | Performed by: ORTHOPAEDIC SURGERY

## 2020-03-11 PROCEDURE — G8417 CALC BMI ABV UP PARAM F/U: HCPCS | Performed by: ORTHOPAEDIC SURGERY

## 2020-03-11 PROCEDURE — 1123F ACP DISCUSS/DSCN MKR DOCD: CPT | Performed by: ORTHOPAEDIC SURGERY

## 2020-03-11 PROCEDURE — 99212 OFFICE O/P EST SF 10 MIN: CPT | Performed by: ORTHOPAEDIC SURGERY

## 2020-03-11 PROCEDURE — 1090F PRES/ABSN URINE INCON ASSESS: CPT | Performed by: ORTHOPAEDIC SURGERY

## 2020-03-11 PROCEDURE — G8427 DOCREV CUR MEDS BY ELIG CLIN: HCPCS | Performed by: ORTHOPAEDIC SURGERY

## 2020-03-11 RX ORDER — LIDOCAINE HYDROCHLORIDE 10 MG/ML
2 INJECTION, SOLUTION INFILTRATION; PERINEURAL ONCE
Status: COMPLETED | OUTPATIENT
Start: 2020-03-11 | End: 2020-03-11

## 2020-03-11 RX ORDER — METHYLPREDNISOLONE ACETATE 40 MG/ML
80 INJECTION, SUSPENSION INTRA-ARTICULAR; INTRALESIONAL; INTRAMUSCULAR; SOFT TISSUE ONCE
Status: COMPLETED | OUTPATIENT
Start: 2020-03-11 | End: 2020-03-11

## 2020-03-11 RX ADMIN — METHYLPREDNISOLONE ACETATE 80 MG: 40 INJECTION, SUSPENSION INTRA-ARTICULAR; INTRALESIONAL; INTRAMUSCULAR; SOFT TISSUE at 11:53

## 2020-03-11 RX ADMIN — LIDOCAINE HYDROCHLORIDE 2 ML: 10 INJECTION, SOLUTION INFILTRATION; PERINEURAL at 11:53

## 2020-03-11 NOTE — PATIENT INSTRUCTIONS
Impression:   1. Severe bilateral osteoarthritis of the knees. 2.  Morbid obesity. 3.  Diabetes mellitus. 4.  Bilateral venous stasis disease lower extremities. 5.  There may be contribution from peripheral neuropathy to her lower leg pain. Plan/Treatment:  1. Unfortunately I was unable to elucidate the reason for her pain then as listed in the impression  2. Injection with cortisone was recommended into the  Left  knee. After discussing potential risks and benefits she agreed. The injection site was cleaned with alcohol, anesthetized with 1 cc of 1% Lidoocaine then injected with 2 cc of 40 mg Depomedrol intraarticular after sufficient time for analgesia was allowed.            Dilma Solorio MD  3/11/2020

## 2020-03-11 NOTE — PROGRESS NOTES
FOLLOW-UP EVALUATION OF KNEE    3/11/2020    Francesca Cote      1940      Francesca is seen for Knee Pain (LT Knee pain  )      Pennie Jorge returns accompanied by her son's girlfriend for injection this time of her severely arthritic left knee. She does have bilateral severe osteoarthritis of the knees. She is not an operative candidate currently because of a BMI of 49. She also has diabetes and mild renal impairment. She was given just 1 injection into the right knee at last office visit because of fear of elevating her blood sugar. Her blood sugar did go to 298 following the injection and took a couple days to return to normal.  She now returns requesting the same for her left knee. She also complains of severe pain radiating from the knee to the feet bilaterally. She has had difficulty with venous stasis disease and was seen by Dr. Doyle Santiago previously. She developed a hematoma in the left leg which was treated operatively by Dr. Doyle Santiago. This apparently took a year to heal.  She does use elastic bandage wraps bilaterally from toes to knees. She is extremely sensitive if someone picks up her leg testing her skin. She has been told that her arterial circulation is adequate. She was treated previously with gabapentin with no improvement, and a complication of swelling. Pertinent items are noted in HPI  Review of systems reviewed from Patient History Form dated on 2/19/2020 and available in the patient's chart under the Media tab. The patient's past medical history, medications, allergies, family history, social history, HPI have been updated reviewed, dated, and recorded in the chart.      BP (!) 163/79   Pulse 74   Ht 5' 1\" (1.549 m)   Wt 261 lb (118.4 kg)   BMI 49.32 kg/m²     Physical examination:  General Appearance: no acute distress, alert, oriented x 3, appropriate mood and affect, obese  She has chronic bronzing discoloration of the left pretibial region and one small area of erythema at the proximal lateral calf anteriorly related to her previous scar. There are no open skin lesions. Neurovascular Status:  Intact sensation in dorsum of left foot. Range of Motion Extension Flexion Pules (0-4) Dorsalis  Pedis Posterior  Tibialis   Right           Degrees Right       Left             Degrees Left 2+            Impression:   1. Severe bilateral osteoarthritis of the knees. 2.  Morbid obesity. 3.  Diabetes mellitus. 4.  Bilateral venous stasis disease lower extremities. 5.  There may be contribution from peripheral neuropathy to her lower leg pain. Plan/Treatment:  1. Unfortunately I was unable to elucidate the reason for her pain then as listed in the impression  2. Injection with cortisone was recommended into the  Left  knee. After discussing potential risks and benefits she agreed. The injection site was cleaned with alcohol, anesthetized with 1 cc of 1% Lidoocaine then injected with 2 cc of 40 mg Depomedrol intraarticular after sufficient time for analgesia was allowed. Abbey Sidhu MD  3/11/2020    This dictation was done with Dragon dictation and may contain mechanical errors related to translation.

## 2020-03-17 ENCOUNTER — OFFICE VISIT (OUTPATIENT)
Dept: ENDOCRINOLOGY | Age: 80
End: 2020-03-17
Payer: MEDICARE

## 2020-03-17 VITALS
BODY MASS INDEX: 48.79 KG/M2 | HEART RATE: 72 BPM | HEIGHT: 61 IN | WEIGHT: 258.4 LBS | DIASTOLIC BLOOD PRESSURE: 91 MMHG | OXYGEN SATURATION: 94 % | SYSTOLIC BLOOD PRESSURE: 187 MMHG | RESPIRATION RATE: 18 BRPM

## 2020-03-17 LAB — HBA1C MFR BLD: 7.7 %

## 2020-03-17 PROCEDURE — G8427 DOCREV CUR MEDS BY ELIG CLIN: HCPCS | Performed by: INTERNAL MEDICINE

## 2020-03-17 PROCEDURE — 1090F PRES/ABSN URINE INCON ASSESS: CPT | Performed by: INTERNAL MEDICINE

## 2020-03-17 PROCEDURE — 1036F TOBACCO NON-USER: CPT | Performed by: INTERNAL MEDICINE

## 2020-03-17 PROCEDURE — 4040F PNEUMOC VAC/ADMIN/RCVD: CPT | Performed by: INTERNAL MEDICINE

## 2020-03-17 PROCEDURE — G8482 FLU IMMUNIZE ORDER/ADMIN: HCPCS | Performed by: INTERNAL MEDICINE

## 2020-03-17 PROCEDURE — 99204 OFFICE O/P NEW MOD 45 MIN: CPT | Performed by: INTERNAL MEDICINE

## 2020-03-17 PROCEDURE — 1123F ACP DISCUSS/DSCN MKR DOCD: CPT | Performed by: INTERNAL MEDICINE

## 2020-03-17 PROCEDURE — G8400 PT W/DXA NO RESULTS DOC: HCPCS | Performed by: INTERNAL MEDICINE

## 2020-03-17 PROCEDURE — G8417 CALC BMI ABV UP PARAM F/U: HCPCS | Performed by: INTERNAL MEDICINE

## 2020-03-17 RX ORDER — DULAGLUTIDE 0.75 MG/.5ML
0.75 INJECTION, SOLUTION SUBCUTANEOUS WEEKLY
Qty: 4 PEN | Refills: 3 | Status: SHIPPED | OUTPATIENT
Start: 2020-03-17 | End: 2020-04-23 | Stop reason: ALTCHOICE

## 2020-03-17 RX ORDER — FUROSEMIDE 20 MG/1
TABLET ORAL
COMMUNITY
End: 2020-03-25

## 2020-03-19 ENCOUNTER — TELEPHONE (OUTPATIENT)
Dept: ENDOCRINOLOGY | Age: 80
End: 2020-03-19

## 2020-03-19 NOTE — TELEPHONE ENCOUNTER
Pharmacy called in regards to Trulicity prescription. The prescription is 400 and is out of patient's budget. Vidurion affects the patient's kidney's. Is there another prescription within the patient's price range.

## 2020-03-25 ENCOUNTER — APPOINTMENT (OUTPATIENT)
Dept: CT IMAGING | Age: 80
End: 2020-03-25
Payer: MEDICARE

## 2020-03-25 ENCOUNTER — TELEPHONE (OUTPATIENT)
Dept: ENDOCRINOLOGY | Age: 80
End: 2020-03-25

## 2020-03-25 ENCOUNTER — HOSPITAL ENCOUNTER (OUTPATIENT)
Age: 80
Setting detail: OBSERVATION
Discharge: HOME HEALTH | End: 2020-03-26
Attending: EMERGENCY MEDICINE | Admitting: INTERNAL MEDICINE
Payer: MEDICARE

## 2020-03-25 ENCOUNTER — APPOINTMENT (OUTPATIENT)
Dept: GENERAL RADIOLOGY | Age: 80
End: 2020-03-25
Payer: MEDICARE

## 2020-03-25 PROBLEM — G45.9 TIA (TRANSIENT ISCHEMIC ATTACK): Status: ACTIVE | Noted: 2020-03-25

## 2020-03-25 LAB
ANION GAP SERPL CALCULATED.3IONS-SCNC: 10 MMOL/L (ref 3–16)
APTT: 36.5 SEC (ref 24.2–36.2)
BASOPHILS ABSOLUTE: 0.1 K/UL (ref 0–0.2)
BASOPHILS RELATIVE PERCENT: 0.7 %
BILIRUBIN URINE: NEGATIVE
BLOOD, URINE: NEGATIVE
BUN BLDV-MCNC: 28 MG/DL (ref 7–20)
CALCIUM SERPL-MCNC: 10.1 MG/DL (ref 8.3–10.6)
CHLORIDE BLD-SCNC: 103 MMOL/L (ref 99–110)
CLARITY: CLEAR
CO2: 25 MMOL/L (ref 21–32)
COLOR: YELLOW
CREAT SERPL-MCNC: 1.2 MG/DL (ref 0.6–1.2)
EOSINOPHILS ABSOLUTE: 0.2 K/UL (ref 0–0.6)
EOSINOPHILS RELATIVE PERCENT: 2.5 %
EPITHELIAL CELLS, UA: 2 /HPF (ref 0–5)
GFR AFRICAN AMERICAN: 52
GFR NON-AFRICAN AMERICAN: 43
GLUCOSE BLD-MCNC: 170 MG/DL (ref 70–99)
GLUCOSE URINE: NEGATIVE MG/DL
HCT VFR BLD CALC: 39.9 % (ref 36–48)
HEMOGLOBIN: 13.2 G/DL (ref 12–16)
HYALINE CASTS: 1 /LPF (ref 0–8)
INR BLD: 1.24 (ref 0.86–1.14)
KETONES, URINE: NEGATIVE MG/DL
LEUKOCYTE ESTERASE, URINE: NEGATIVE
LYMPHOCYTES ABSOLUTE: 1.4 K/UL (ref 1–5.1)
LYMPHOCYTES RELATIVE PERCENT: 16.6 %
MCH RBC QN AUTO: 32.6 PG (ref 26–34)
MCHC RBC AUTO-ENTMCNC: 33.2 G/DL (ref 31–36)
MCV RBC AUTO: 98.1 FL (ref 80–100)
MICROSCOPIC EXAMINATION: YES
MONOCYTES ABSOLUTE: 0.8 K/UL (ref 0–1.3)
MONOCYTES RELATIVE PERCENT: 10.1 %
NEUTROPHILS ABSOLUTE: 5.8 K/UL (ref 1.7–7.7)
NEUTROPHILS RELATIVE PERCENT: 70.1 %
NITRITE, URINE: NEGATIVE
PDW BLD-RTO: 15 % (ref 12.4–15.4)
PH UA: 7.5 (ref 5–8)
PLATELET # BLD: 185 K/UL (ref 135–450)
PMV BLD AUTO: 7 FL (ref 5–10.5)
POTASSIUM REFLEX MAGNESIUM: 4.1 MMOL/L (ref 3.5–5.1)
PROTEIN UA: >=300 MG/DL
PROTHROMBIN TIME: 14.4 SEC (ref 10–13.2)
RBC # BLD: 4.06 M/UL (ref 4–5.2)
RBC UA: 4 /HPF (ref 0–4)
SODIUM BLD-SCNC: 138 MMOL/L (ref 136–145)
SPECIFIC GRAVITY UA: 1.02 (ref 1–1.03)
TROPONIN: <0.01 NG/ML
URINE REFLEX TO CULTURE: ABNORMAL
URINE TYPE: ABNORMAL
UROBILINOGEN, URINE: 0.2 E.U./DL
WBC # BLD: 8.3 K/UL (ref 4–11)
WBC UA: 4 /HPF (ref 0–5)

## 2020-03-25 PROCEDURE — 85730 THROMBOPLASTIN TIME PARTIAL: CPT

## 2020-03-25 PROCEDURE — 6370000000 HC RX 637 (ALT 250 FOR IP): Performed by: EMERGENCY MEDICINE

## 2020-03-25 PROCEDURE — 99291 CRITICAL CARE FIRST HOUR: CPT

## 2020-03-25 PROCEDURE — 80048 BASIC METABOLIC PNL TOTAL CA: CPT

## 2020-03-25 PROCEDURE — 85610 PROTHROMBIN TIME: CPT

## 2020-03-25 PROCEDURE — 70496 CT ANGIOGRAPHY HEAD: CPT

## 2020-03-25 PROCEDURE — 93005 ELECTROCARDIOGRAM TRACING: CPT | Performed by: EMERGENCY MEDICINE

## 2020-03-25 PROCEDURE — 84484 ASSAY OF TROPONIN QUANT: CPT

## 2020-03-25 PROCEDURE — 99285 EMERGENCY DEPT VISIT HI MDM: CPT

## 2020-03-25 PROCEDURE — 81001 URINALYSIS AUTO W/SCOPE: CPT

## 2020-03-25 PROCEDURE — G0378 HOSPITAL OBSERVATION PER HR: HCPCS

## 2020-03-25 PROCEDURE — 6360000004 HC RX CONTRAST MEDICATION: Performed by: EMERGENCY MEDICINE

## 2020-03-25 PROCEDURE — 85025 COMPLETE CBC W/AUTO DIFF WBC: CPT

## 2020-03-25 PROCEDURE — 71045 X-RAY EXAM CHEST 1 VIEW: CPT

## 2020-03-25 PROCEDURE — 70450 CT HEAD/BRAIN W/O DYE: CPT

## 2020-03-25 RX ORDER — MULTIVIT-MIN/FA/LYCOPEN/LUTEIN .4-300-25
1 TABLET ORAL DAILY
Status: ON HOLD | COMMUNITY
End: 2020-04-28 | Stop reason: HOSPADM

## 2020-03-25 RX ORDER — SIMVASTATIN 40 MG
40 TABLET ORAL NIGHTLY
Status: ON HOLD | COMMUNITY
End: 2020-03-26 | Stop reason: HOSPADM

## 2020-03-25 RX ORDER — ASPIRIN 81 MG/1
324 TABLET, CHEWABLE ORAL ONCE
Status: COMPLETED | OUTPATIENT
Start: 2020-03-25 | End: 2020-03-25

## 2020-03-25 RX ADMIN — IOPAMIDOL 75 ML: 755 INJECTION, SOLUTION INTRAVENOUS at 20:26

## 2020-03-25 RX ADMIN — ASPIRIN 81 MG 324 MG: 81 TABLET ORAL at 22:08

## 2020-03-26 ENCOUNTER — TELEPHONE (OUTPATIENT)
Dept: FAMILY MEDICINE CLINIC | Age: 80
End: 2020-03-26

## 2020-03-26 ENCOUNTER — APPOINTMENT (OUTPATIENT)
Dept: MRI IMAGING | Age: 80
End: 2020-03-26
Payer: MEDICARE

## 2020-03-26 VITALS
HEART RATE: 78 BPM | SYSTOLIC BLOOD PRESSURE: 152 MMHG | WEIGHT: 251.99 LBS | TEMPERATURE: 98 F | HEIGHT: 61 IN | BODY MASS INDEX: 47.58 KG/M2 | OXYGEN SATURATION: 95 % | RESPIRATION RATE: 18 BRPM | DIASTOLIC BLOOD PRESSURE: 78 MMHG

## 2020-03-26 LAB
ANION GAP SERPL CALCULATED.3IONS-SCNC: 10 MMOL/L (ref 3–16)
BUN BLDV-MCNC: 27 MG/DL (ref 7–20)
CALCIUM SERPL-MCNC: 10.1 MG/DL (ref 8.3–10.6)
CHLORIDE BLD-SCNC: 102 MMOL/L (ref 99–110)
CHOLESTEROL, TOTAL: 261 MG/DL (ref 0–199)
CO2: 30 MMOL/L (ref 21–32)
CREAT SERPL-MCNC: 1.3 MG/DL (ref 0.6–1.2)
EKG ATRIAL RATE: 64 BPM
EKG DIAGNOSIS: NORMAL
EKG Q-T INTERVAL: 384 MS
EKG QRS DURATION: 86 MS
EKG QTC CALCULATION (BAZETT): 405 MS
EKG R AXIS: 47 DEGREES
EKG T AXIS: 19 DEGREES
EKG VENTRICULAR RATE: 67 BPM
ESTIMATED AVERAGE GLUCOSE: 174.3 MG/DL
GFR AFRICAN AMERICAN: 48
GFR NON-AFRICAN AMERICAN: 39
GLUCOSE BLD-MCNC: 104 MG/DL (ref 70–99)
GLUCOSE BLD-MCNC: 124 MG/DL (ref 70–99)
GLUCOSE BLD-MCNC: 194 MG/DL (ref 70–99)
GLUCOSE BLD-MCNC: 217 MG/DL (ref 70–99)
GLUCOSE BLD-MCNC: 228 MG/DL (ref 70–99)
HBA1C MFR BLD: 7.7 %
HCT VFR BLD CALC: 38.6 % (ref 36–48)
HDLC SERPL-MCNC: 53 MG/DL (ref 40–60)
HEMOGLOBIN: 12.9 G/DL (ref 12–16)
LDL CHOLESTEROL CALCULATED: ABNORMAL MG/DL
LDL CHOLESTEROL DIRECT: 168 MG/DL
MCH RBC QN AUTO: 32.8 PG (ref 26–34)
MCHC RBC AUTO-ENTMCNC: 33.5 G/DL (ref 31–36)
MCV RBC AUTO: 98.1 FL (ref 80–100)
PDW BLD-RTO: 14.9 % (ref 12.4–15.4)
PERFORMED ON: ABNORMAL
PLATELET # BLD: 182 K/UL (ref 135–450)
PMV BLD AUTO: 6.9 FL (ref 5–10.5)
POTASSIUM REFLEX MAGNESIUM: 4.1 MMOL/L (ref 3.5–5.1)
RBC # BLD: 3.94 M/UL (ref 4–5.2)
SODIUM BLD-SCNC: 142 MMOL/L (ref 136–145)
TRIGL SERPL-MCNC: 375 MG/DL (ref 0–150)
VLDLC SERPL CALC-MCNC: ABNORMAL MG/DL
WBC # BLD: 9.3 K/UL (ref 4–11)

## 2020-03-26 PROCEDURE — 94760 N-INVAS EAR/PLS OXIMETRY 1: CPT

## 2020-03-26 PROCEDURE — 80061 LIPID PANEL: CPT

## 2020-03-26 PROCEDURE — 92610 EVALUATE SWALLOWING FUNCTION: CPT

## 2020-03-26 PROCEDURE — G0378 HOSPITAL OBSERVATION PER HR: HCPCS

## 2020-03-26 PROCEDURE — 97116 GAIT TRAINING THERAPY: CPT | Performed by: PHYSICAL THERAPIST

## 2020-03-26 PROCEDURE — 2580000003 HC RX 258: Performed by: NURSE PRACTITIONER

## 2020-03-26 PROCEDURE — 97530 THERAPEUTIC ACTIVITIES: CPT | Performed by: PHYSICAL THERAPIST

## 2020-03-26 PROCEDURE — 97166 OT EVAL MOD COMPLEX 45 MIN: CPT

## 2020-03-26 PROCEDURE — 36415 COLL VENOUS BLD VENIPUNCTURE: CPT

## 2020-03-26 PROCEDURE — 83036 HEMOGLOBIN GLYCOSYLATED A1C: CPT

## 2020-03-26 PROCEDURE — 80048 BASIC METABOLIC PNL TOTAL CA: CPT

## 2020-03-26 PROCEDURE — 6370000000 HC RX 637 (ALT 250 FOR IP): Performed by: NURSE PRACTITIONER

## 2020-03-26 PROCEDURE — 70551 MRI BRAIN STEM W/O DYE: CPT

## 2020-03-26 PROCEDURE — 97530 THERAPEUTIC ACTIVITIES: CPT

## 2020-03-26 PROCEDURE — 97535 SELF CARE MNGMENT TRAINING: CPT

## 2020-03-26 PROCEDURE — 97162 PT EVAL MOD COMPLEX 30 MIN: CPT | Performed by: PHYSICAL THERAPIST

## 2020-03-26 PROCEDURE — 85027 COMPLETE CBC AUTOMATED: CPT

## 2020-03-26 PROCEDURE — 93010 ELECTROCARDIOGRAM REPORT: CPT | Performed by: INTERNAL MEDICINE

## 2020-03-26 RX ORDER — METOPROLOL TARTRATE 50 MG/1
100 TABLET, FILM COATED ORAL 2 TIMES DAILY
Status: DISCONTINUED | OUTPATIENT
Start: 2020-03-26 | End: 2020-03-26 | Stop reason: HOSPADM

## 2020-03-26 RX ORDER — MULTIVIT-MIN/FA/LYCOPEN/LUTEIN .4-300-25
1 TABLET ORAL DAILY
Status: DISCONTINUED | OUTPATIENT
Start: 2020-03-26 | End: 2020-03-26 | Stop reason: SDUPTHER

## 2020-03-26 RX ORDER — ASPIRIN 81 MG/1
81 TABLET ORAL DAILY
Status: DISCONTINUED | OUTPATIENT
Start: 2020-03-26 | End: 2020-03-26 | Stop reason: HOSPADM

## 2020-03-26 RX ORDER — ATORVASTATIN CALCIUM 20 MG/1
20 TABLET, FILM COATED ORAL DAILY
Status: DISCONTINUED | OUTPATIENT
Start: 2020-03-26 | End: 2020-03-26 | Stop reason: HOSPADM

## 2020-03-26 RX ORDER — LANOLIN ALCOHOL/MO/W.PET/CERES
250 CREAM (GRAM) TOPICAL DAILY
Status: DISCONTINUED | OUTPATIENT
Start: 2020-03-26 | End: 2020-03-26 | Stop reason: HOSPADM

## 2020-03-26 RX ORDER — LOSARTAN POTASSIUM 50 MG/1
50 TABLET ORAL DAILY
Status: DISCONTINUED | OUTPATIENT
Start: 2020-03-26 | End: 2020-03-26 | Stop reason: HOSPADM

## 2020-03-26 RX ORDER — FLUTICASONE PROPIONATE 50 MCG
2 SPRAY, SUSPENSION (ML) NASAL DAILY
Status: DISCONTINUED | OUTPATIENT
Start: 2020-03-26 | End: 2020-03-26 | Stop reason: HOSPADM

## 2020-03-26 RX ORDER — ASPIRIN 300 MG/1
300 SUPPOSITORY RECTAL DAILY
Status: DISCONTINUED | OUTPATIENT
Start: 2020-03-26 | End: 2020-03-26 | Stop reason: HOSPADM

## 2020-03-26 RX ORDER — ATORVASTATIN CALCIUM 20 MG/1
40 TABLET, FILM COATED ORAL DAILY
Qty: 30 TABLET | Refills: 3 | Status: SHIPPED | OUTPATIENT
Start: 2020-03-27 | End: 2020-04-23 | Stop reason: ALTCHOICE

## 2020-03-26 RX ORDER — SODIUM CHLORIDE 0.9 % (FLUSH) 0.9 %
10 SYRINGE (ML) INJECTION EVERY 12 HOURS SCHEDULED
Status: DISCONTINUED | OUTPATIENT
Start: 2020-03-26 | End: 2020-03-26 | Stop reason: HOSPADM

## 2020-03-26 RX ORDER — POLYETHYLENE GLYCOL 3350 17 G/17G
17 POWDER, FOR SOLUTION ORAL DAILY PRN
Status: DISCONTINUED | OUTPATIENT
Start: 2020-03-26 | End: 2020-03-26 | Stop reason: HOSPADM

## 2020-03-26 RX ORDER — SODIUM CHLORIDE 0.9 % (FLUSH) 0.9 %
10 SYRINGE (ML) INJECTION PRN
Status: DISCONTINUED | OUTPATIENT
Start: 2020-03-26 | End: 2020-03-26 | Stop reason: HOSPADM

## 2020-03-26 RX ORDER — ASPIRIN 81 MG/1
81 TABLET ORAL DAILY
Qty: 30 TABLET | Refills: 3 | Status: ON HOLD | OUTPATIENT
Start: 2020-03-27 | End: 2020-06-12 | Stop reason: HOSPADM

## 2020-03-26 RX ORDER — NICOTINE POLACRILEX 4 MG
15 LOZENGE BUCCAL PRN
Status: DISCONTINUED | OUTPATIENT
Start: 2020-03-26 | End: 2020-03-26 | Stop reason: HOSPADM

## 2020-03-26 RX ORDER — CETIRIZINE HYDROCHLORIDE 10 MG/1
5 TABLET ORAL DAILY
Status: DISCONTINUED | OUTPATIENT
Start: 2020-03-26 | End: 2020-03-26 | Stop reason: HOSPADM

## 2020-03-26 RX ORDER — ASCORBIC ACID 500 MG
1000 TABLET ORAL 2 TIMES DAILY
Status: DISCONTINUED | OUTPATIENT
Start: 2020-03-26 | End: 2020-03-26 | Stop reason: HOSPADM

## 2020-03-26 RX ORDER — PROMETHAZINE HYDROCHLORIDE 25 MG/1
12.5 TABLET ORAL EVERY 6 HOURS PRN
Status: DISCONTINUED | OUTPATIENT
Start: 2020-03-26 | End: 2020-03-26 | Stop reason: HOSPADM

## 2020-03-26 RX ORDER — ONDANSETRON 2 MG/ML
4 INJECTION INTRAMUSCULAR; INTRAVENOUS EVERY 6 HOURS PRN
Status: DISCONTINUED | OUTPATIENT
Start: 2020-03-26 | End: 2020-03-26 | Stop reason: HOSPADM

## 2020-03-26 RX ORDER — LABETALOL HYDROCHLORIDE 5 MG/ML
10 INJECTION, SOLUTION INTRAVENOUS EVERY 10 MIN PRN
Status: DISCONTINUED | OUTPATIENT
Start: 2020-03-26 | End: 2020-03-26 | Stop reason: HOSPADM

## 2020-03-26 RX ORDER — DEXTROSE MONOHYDRATE 50 MG/ML
100 INJECTION, SOLUTION INTRAVENOUS PRN
Status: DISCONTINUED | OUTPATIENT
Start: 2020-03-26 | End: 2020-03-26 | Stop reason: HOSPADM

## 2020-03-26 RX ORDER — AMLODIPINE BESYLATE 5 MG/1
2.5 TABLET ORAL DAILY
Status: DISCONTINUED | OUTPATIENT
Start: 2020-03-26 | End: 2020-03-26 | Stop reason: HOSPADM

## 2020-03-26 RX ORDER — M-VIT,TX,IRON,MINS/CALC/FOLIC 27MG-0.4MG
1 TABLET ORAL 2 TIMES DAILY
Status: DISCONTINUED | OUTPATIENT
Start: 2020-03-26 | End: 2020-03-26 | Stop reason: HOSPADM

## 2020-03-26 RX ORDER — DEXTROSE MONOHYDRATE 25 G/50ML
12.5 INJECTION, SOLUTION INTRAVENOUS PRN
Status: DISCONTINUED | OUTPATIENT
Start: 2020-03-26 | End: 2020-03-26 | Stop reason: HOSPADM

## 2020-03-26 RX ORDER — VITAMIN B COMPLEX
1000 TABLET ORAL DAILY
Status: DISCONTINUED | OUTPATIENT
Start: 2020-03-26 | End: 2020-03-26 | Stop reason: HOSPADM

## 2020-03-26 RX ADMIN — AMLODIPINE BESYLATE 2.5 MG: 5 TABLET ORAL at 09:03

## 2020-03-26 RX ADMIN — VITAMIN D, TAB 1000IU (100/BT) 1000 UNITS: 25 TAB at 09:03

## 2020-03-26 RX ADMIN — FLUTICASONE PROPIONATE 2 SPRAY: 50 SPRAY, METERED NASAL at 09:02

## 2020-03-26 RX ADMIN — ASPIRIN 81 MG: 81 TABLET, COATED ORAL at 09:02

## 2020-03-26 RX ADMIN — INSULIN LISPRO 4 UNITS: 100 INJECTION, SOLUTION INTRAVENOUS; SUBCUTANEOUS at 12:34

## 2020-03-26 RX ADMIN — APIXABAN 5 MG: 5 TABLET, FILM COATED ORAL at 09:03

## 2020-03-26 RX ADMIN — METOPROLOL TARTRATE 100 MG: 50 TABLET ORAL at 09:05

## 2020-03-26 RX ADMIN — OXYCODONE HYDROCHLORIDE AND ACETAMINOPHEN 1000 MG: 500 TABLET ORAL at 09:05

## 2020-03-26 RX ADMIN — MULTIPLE VITAMINS W/ MINERALS TAB 1 TABLET: TAB at 09:04

## 2020-03-26 RX ADMIN — LOSARTAN POTASSIUM 50 MG: 50 TABLET, FILM COATED ORAL at 09:03

## 2020-03-26 RX ADMIN — Medication 10 ML: at 09:06

## 2020-03-26 RX ADMIN — Medication 300 MG: at 09:02

## 2020-03-26 RX ADMIN — ATORVASTATIN CALCIUM 20 MG: 20 TABLET, FILM COATED ORAL at 09:03

## 2020-03-26 RX ADMIN — CETIRIZINE HYDROCHLORIDE 5 MG: 10 TABLET, FILM COATED ORAL at 09:03

## 2020-03-26 ASSESSMENT — PAIN SCALES - GENERAL
PAINLEVEL_OUTOF10: 0

## 2020-03-26 NOTE — PROGRESS NOTES
[facility]  Personalized Stroke Treatment Plan  My Stroke Type:   [] Ischemic Stroke (Blockage of blood flow to the brain)     [] TIA - Transient Ischemic Attack (mini-stroke)    Personal risk factors you can control include:    [] Alcohol Abuse: check with your physician before any alcohol consumption. [] Atrial fibrillation: may cause blood clots. [] Drug Abuse: Seek help, talk with your doctor  [] Clotting Disorder  [x] Diabetes  [x] Family history of stroke or heart disease  [x] High Blood Pressure/Hypertension: work with your physician. [x] High cholesterol: monitor cholesterol levels with your physician. [x] Overweight/Obesity: work with your physician for your ideal body weight. [x] Physical Inactivity: get regular exercise as directed by your physician. [x] Personal history of previous TIA or stroke  [x] Poor Diet; decrease salt (sodium) in your diet, follow diet directed by physician. [] Smoking: Cigarette/Cigar: stop smoking. Follow up with your physician is important after discharge. TAKE all medications as prescribed. Do not stop taking any medications   without talking to your physician. BE FAST is a simple way to remember the main symptoms of stroke. Recognizing these symptoms helps you know when to call for medical help. BE FAST stands for:                                                 B Balance problems                                                 E Eyes, vision lost        F  Face Drooping      A  Arm Weakness        S  Speech Difficulty      T  Time to Call 9-1-1  DO NOT DELAY THIS! Educated patient and/or family on personal risk factors for stroke/TIA. Included ways to reduce the risk for recurrent stroke. [employer]'s Stroke treatment and prevention, Managing your recovery  notebook  provided to patient. The notebook includes, but not limited to, sections addressing warning signs & symptoms of a stroke.  The need to call EMS ([phone]) immediately if signs

## 2020-03-26 NOTE — TELEPHONE ENCOUNTER
Pharmacy states that they dont know until they run it she sad you can send a rx in for both and they can run and see and then let the pt decide from there

## 2020-03-26 NOTE — PROGRESS NOTES
Education: PT Role;General Safety  Patient Education: pt adamant she is going directly home at discharge  Barriers to Learning: word finding issues  REQUIRES PT FOLLOW UP: Yes  Activity Tolerance  Activity Tolerance: Patient limited by endurance       Patient Diagnosis(es): The primary encounter diagnosis was Aphasia. Diagnoses of TIA (transient ischemic attack) and Pulmonary nodules were also pertinent to this visit. has a past medical history of Blood circulation, collateral, Cerebral artery occlusion with cerebral infarction [facility]), Chronic deep vein thrombosis (DVT) of femoral vein of left lower extremity (HCC), Chronic kidney disease (CKD), stage III (moderate) ([facility] [address] CVA (cerebral infarction), Diabetes, DVT, lower extremity, recurrent ([facility] [address] Edema, First degree AV block, Gout, Heart murmur, Hypercalcemia, Hypercholesterolemia, Hyperhomocysteinemia (HCC), Hypertension, TRUPTI (obstructive sleep apnea), Osteoarthritis, Sciatica, Traumatic hematoma of left lower leg, Venous insufficiency, and Visual loss R eye - REtinal detachment. has a past surgical history that includes other surgical history; Cataract removal with implant (Bilateral); and other surgical history (Left). Restrictions  Restrictions/Precautions  Restrictions/Precautions: Up as Tolerated  Vision/Hearing  Vision: Impaired(detached retina in R eye)  Hearing: Within functional limits     Subjective  General  Chart Reviewed: Yes  Patient assessed for rehabilitation services?: Yes  Additional Pertinent Hx: per MD note:  78 y.o. female with PMHx of CVA, DM, HTN, HLD, chronic DVT on Eliquis and TRUPTI on CPAP presented to [facility] with difficulty with word finding. EMS was called by pt's daughter who noticed that patient was having difficulty with finding her words while on the phone with her today at 1800 today. Pt was last seen well by her son at 56. Patient denies unilateral weakness. No difficulty with ambulation or coordination.   Symptoms have improved and patient feels back to baseline. Response To Previous Treatment: Not applicable  Family / Caregiver Present: No  Follows Commands: Within Functional Limits  General Comment  Comments: pt agreeable to working with therapy; multiple c/o about many different things  Subjective  Subjective: pt reports pain in back and knees due arthritis; reports chronic pain  Pain Screening  Patient Currently in Pain: No          Orientation  Orientation  Overall Orientation Status: (word finding issues)  Social/Functional History  Social/Functional History  Lives With: Daughter(and son in law)  Type of Home: House  Home Layout: Two level, Able to Live on Main level with bedroom/bathroom  Home Access: (stair lift into home)  Bathroom Shower/Tub: Tub/Shower unit  Bathroom Toilet: Handicap height  Bathroom Equipment: Tub transfer bench, Grab bars in shower  Home Equipment: Rolling walker, Wheelchair-manual  ADL Assistance: Independent  Homemaking Responsibilities: No  Ambulation Assistance: Independent  Transfer Assistance: Independent  Active : No  Cognition   Cognition  Overall Cognitive Status: WFL    Objective          AROM RLE (degrees)  RLE AROM: WFL  AROM LLE (degrees)  LLE AROM : WFL  Strength RLE  Strength RLE: WFL  Comment: somwhat limited by pain in B LEs  Strength LLE  Strength LLE: WFL  Comment: somwhat limited by pain in B LEs     Sensation  Overall Sensation Status: (reports neuropathy B LEs)  Bed mobility  Supine to Sit: 2 Person assistance;Maximum assistance; Moderate assistance(pt reports sleeping in lift chair)  Sit to Supine: Unable to assess(up in chair after session)  Transfers  Sit to Stand: Minimal Assistance;Stand by assistance(initially was min A then progress to SBA)  Stand to sit: Contact guard assistance  Ambulation  Ambulation?: Yes  Ambulation 1  Surface: level tile  Device: Rolling Walker  Assistance: Minimal assistance;Stand by assistance;Contact guard assistance(initially min A then progressed to CGA/SBA)  Quality of Gait: slow pace with flexed posture; no LOB but increased lateral sway due to body habitus  Distance: 27' and 25'     Balance  Comments: Sup for sitting balance; CGA/SBA for standing with walker        Plan   Plan  Times per week: 1-2 more visits  Current Treatment Recommendations: Functional Mobility Training  Safety Devices  Type of devices: Call light within reach, Chair alarm in place, Left in chair, Nurse notified    G-Code       OutComes Score                                                  AM-PAC Score  AM-PAC Inpatient Mobility Raw Score : 17 (03/26/20 1312)  AM-PAC Inpatient T-Scale Score : 42.13 (03/26/20 1312)  Mobility Inpatient CMS 0-100% Score: 50.57 (03/26/20 1312)  Mobility Inpatient CMS G-Code Modifier : CK (03/26/20 1312)          Goals  Short term goals  Time Frame for Short term goals: by discharge  Short term goal 1: transfers MI  Short term goal 2: amb 48' with RW sup/MI  Patient Goals   Patient goals : to return home       Therapy Time   Individual Concurrent Group Co-treatment   Time In 1128         Time Out 1215         Minutes 100 [facility], PT, 4774   ISAURO CASTRO, PT

## 2020-03-26 NOTE — DISCHARGE SUMMARY
neurology. TTE also unremarkable. Add on aspirin and increased statin to high dose. Diabetes  Relatively well controlled with A1c of 7.7. Resume home medications at discharge. Hypertension  Stable, continue home meds. Chronic DVTs  Continue Eliquis. Hyperlipidemia  Continue statin, increased to high dose. Morbid  Obesity  Body mass index is 47.61 kg/m². Counseled on effects of weight on overall health and chronic medical conditions and discussed weight loss. Discharge Medications:   Current Discharge Medication List      START taking these medications    Details   aspirin 81 MG EC tablet Take 1 tablet by mouth daily  Qty: 30 tablet, Refills: 3      atorvastatin (LIPITOR) 20 MG tablet Take 2 tablets by mouth daily  Qty: 30 tablet, Refills: 3           Current Discharge Medication List        Current Discharge Medication List      CONTINUE these medications which have NOT CHANGED    Details   Multiple Vitamins-Minerals (CENTRUM SILVER ADULT 50+) TABS Take 1 tablet by mouth daily      ELIQUIS 5 MG TABS tablet TAKE 1 TABLET BY MOUTH 2 TIMES DAILY  Qty: 180 tablet, Refills: 1    Associated Diagnoses: Chronic deep vein thrombosis (DVT) of femoral vein of left lower extremity (HCC)      amLODIPine (NORVASC) 2.5 MG tablet TAKE 1 TABLET BY MOUTH DAILY  Qty: 90 tablet, Refills: 1    Associated Diagnoses: Essential hypertension, benign      losartan (COZAAR) 50 MG tablet TAKE 1 TABLET BY MOUTH DAILY. Qty: 90 tablet, Refills: 3    Associated Diagnoses: Essential hypertension, benign; Type 2 diabetes mellitus with stage 4 chronic kidney disease, without long-term current use of insulin ([facility])      cloNIDine (CATAPRES) 0.1 MG tablet TAKE 0.5 (1/2) TABLET BY MOUTH 2 TIMES DAILY.   Qty: 90 tablet, Refills: 3      potassium chloride (KLOR-CON) 10 MEQ extended release tablet TAKE ONE TABLET BY MOUTH DAILY  Qty: 90 tablet, Refills: 3      glipiZIDE (GLUCOTROL XL) 5 MG extended release tablet TAKE 1 TABLET BY Discharge Exam:  /77   Pulse 76   Temp 97.3 °F (36.3 °C) (Oral)   Resp 18   Ht 5' 1\" (1.549 m)   Wt 251 lb 15.8 oz (114.3 kg)   SpO2 91%   BMI 47.61 kg/m²     General appearance: No apparent distress, appears stated age and cooperative. Obese  HEENT: Pupils equal, round, and reactive to light. Conjunctivae/corneas clear. Neck: Supple, with full range of motion. Trachea midline. Respiratory:  Normal respiratory effort. Clear to auscultation, bilaterally without Rales/Wheezes/Rhonchi. Cardiovascular: Regular rate and rhythm with normal S1/S2 without murmurs, rubs or gallops. Abdomen: Soft, non-tender, non-distended with normal bowel sounds. Musculoskelatal: No clubbing, cyanosis or edema bilaterally. Full range of motion without deformity. Skin: Skin color, texture, turgor normal.  Erythema bilateral lower extremities, chronic per patient  Neurologic:  Neurovascularly intact without any focal sensory/motor deficits. Cranial nerves: II-XII intact, grossly non-focal.  Psychiatric: Alert and oriented, thought content appropriate, normal insight    Pertinent Studies During Hospital Stay:    Radiology:  Ct Head Wo Contrast    Result Date: 3/25/2020  EXAMINATION: CT OF THE HEAD WITHOUT CONTRAST  3/25/2020 8:08 pm TECHNIQUE: CT of the head was performed without the administration of intravenous contrast. Dose modulation, iterative reconstruction, and/or weight based adjustment of the mA/kV was utilized to reduce the radiation dose to as low as reasonably achievable. COMPARISON: January 2007 HISTORY: ORDERING SYSTEM PROVIDED HISTORY: expressive aphasia TECHNOLOGIST PROVIDED HISTORY: Reason for exam:->expressive aphasia Has a \"code stroke\" or \"stroke alert\" been called? ->Yes Reason for Exam: expressive aphasia FINDINGS: BRAIN/VENTRICLES: Vascular calcifications are noted. Ventricles and sulci are more prominent compared to the prior.   Left-sided encephalomalacia is noted, greatest in the posterior frontal perisylvian region. Surrounding gliotic changes are noted. This is likely related to large prior infarct. . On the right, low-density in the periventricular white matter is likely due to small vessel ischemic change. There is no acute hemorrhage. Low-density in the anterior inferior external capsule region on the right is noted. This is stable. No new hyperdense vessel is noted. ORBITS: No acute orbital abnormality is noted. There is no deviation of the pupils. SINUSES: No fluid levels are noted SOFT TISSUES/SKULL:  Hyperostosis of the skull is noted. No acute soft tissue abnormality is noted No acute intracranial abnormality. Large area of left hemispheric encephalomalacia likely related to old infarcts with surrounding gliotic change. Mild small-vessel ischemic changes are also noted. There are no findings diagnostic of an acute infarct at this time. There is no acute hemorrhage Critical results were called by Dr. Mortimer Augusta to [address] on 3/25/2020 at 20:34. Xr Chest Portable    Result Date: 3/25/2020  EXAMINATION: ONE XRAY VIEW OF THE CHEST 3/25/2020 8:17 pm COMPARISON: None. HISTORY: ORDERING SYSTEM PROVIDED HISTORY: AMS TECHNOLOGIST PROVIDED HISTORY: Reason for exam:->AMS Reason for Exam: ams, slurred speech Acuity: Acute Type of Exam: Initial FINDINGS: The cardiac silhouette is mildly prominent. The lungs are clear. No infiltrate, pleural fluid or evidence of overt failure. Osteoarthritic changes at the left glenohumeral joint. No acute cardiopulmonary disease.      Vascular Carotid Duplex Bilateral    Result Date: 3/26/2020  Vascular Carotid Procedure -- PRELIMINARY SONOGRAPHER REPORT --   Demographics   Patient Name       Reyna PONCE   Date of Study      03/26/2020         Gender              Female   Patient Number     [id number]         Date of Birth       1940   Visit Number       [id number]          Age                 78 year(s)   Accession Number [id number]          Room Number         9078   Corporate ID       [id number]            Sonographer         Luisa Young, RVT                                                            CCT   Ordering Physician Remi, [address] [facility], [address] [facility]           Physician           Surg  Procedure Type of Study:   Cerebral:Carotid, VL CAROTID DUPLEX BILATERAL. Tech Comments Right The right internal carotid artery demonstrates no significant stenosis. There is tortuosity of the right internal carotid artery. The right vertebral artery demonstrates normal antegrade flow. Left The left internal carotid artery demonstrates no significant stenosis. There is tortuosity of the left internal carotid artery. The left vertebral artery demonstrates normal antegrade flow. Velocities are measured in cm/s ; Diameters are measured in mm Carotid Right Measurements +---------+----+----+-----+----+ ! Location ! PSV ! EDV ! Angle! RI  ! +---------+----+----+-----+----+ ! Prox CCA !53  !1.96!60   !0.96! +---------+----+----+-----+----+ ! Mid CCA  !49. 2!9.91!90   !0.82! +---------+----+----+-----+----+ ! Dist CCA !42. 1!8.49! 60   !0.8 ! +---------+----+----+-----+----+ ! Prox ICA !41.2!6.98!60   !0.83! +---------+----+----+-----+----+ ! Mid ICA  !37.4!8.55!0    !0.77! +---------+----+----+-----+----+ ! Dist ICA !35.3!6.63!0    !0.81! +---------+----+----+-----+----+ ! Prox ECA !74.3!    !60   !    ! +---------+----+----+-----+----+ ! Shun Taylorors. 7!7.02!60   !0.82! +---------+----+----+-----+----+   - There is antegrade vertebral flow noted on the right side. - Additional Measurements:ICAPSV/CCAPSV 0.84. ICAEDV/CCAEDV 4.36. Carotid Left Measurements +---------+----+----+-----+----+ ! Location ! PSV ! EDV ! Angle! RI  ! +---------+----+----+-----+----+ ! Prox CCA !66.3!8.8 ! 60   !0.87! +---------+----+----+-----+----+ ! Mid CCA  !52.8!8.49! 60   !0.84! +---------+----+----+-----+----+ ! Dist CCA !42. 1!6.91!60   !0.84! intracranial hemorrhage, or significant mass effect. 2. Moderate area of encephalomalacia and gliosis centered within left frontoparietal lobe, likely from previous infarct. 3. Chronic small vessel ischemic white matter disease and diffuse cerebral volume loss.        Last Labs on Discharge:     Recent Results (from the past 24 hour(s))   CBC Auto Differential    Collection Time: 03/25/20  8:46 PM   Result Value Ref Range    WBC 8.3 4.0 - 11.0 K/uL    RBC 4.06 4.00 - 5.20 M/uL    Hemoglobin 13.2 12.0 - 16.0 g/dL    Hematocrit 39.9 36.0 - 48.0 %    MCV 98.1 80.0 - 100.0 fL    MCH 32.6 26.0 - 34.0 pg    MCHC 33.2 31.0 - 36.0 g/dL    RDW 15.0 12.4 - 15.4 %    Platelets 093 632 - 006 K/uL    MPV 7.0 5.0 - 10.5 fL    Neutrophils % 70.1 %    Lymphocytes % 16.6 %    Monocytes % 10.1 %    Eosinophils % 2.5 %    Basophils % 0.7 %    Neutrophils Absolute 5.8 1.7 - 7.7 K/uL    Lymphocytes Absolute 1.4 1.0 - 5.1 K/uL    Monocytes Absolute 0.8 0.0 - 1.3 K/uL    Eosinophils Absolute 0.2 0.0 - 0.6 K/uL    Basophils Absolute 0.1 0.0 - 0.2 K/uL   Basic Metabolic Panel w/ Reflex to MG    Collection Time: 03/25/20  8:46 PM   Result Value Ref Range    Sodium 138 136 - 145 mmol/L    Potassium reflex Magnesium 4.1 3.5 - 5.1 mmol/L    Chloride 103 99 - 110 mmol/L    CO2 25 21 - 32 mmol/L    Anion Gap 10 3 - 16    Glucose 170 (H) 70 - 99 mg/dL    BUN 28 (H) 7 - 20 mg/dL    CREATININE 1.2 0.6 - 1.2 mg/dL    GFR Non- 43 (A) >60    GFR  52 (A) >60    Calcium 10.1 8.3 - 10.6 mg/dL   Troponin    Collection Time: 03/25/20  8:46 PM   Result Value Ref Range    Troponin <0.01 <0.01 ng/mL   Urinalysis Reflex to Culture    Collection Time: 03/25/20  8:46 PM   Result Value Ref Range    Color, UA YELLOW Straw/Yellow    Clarity, UA Clear Clear    Glucose, Ur Negative Negative mg/dL    Bilirubin Urine Negative Negative    Ketones, Urine Negative Negative mg/dL    Specific Gravity, UA 1.020 1.005 - 1.030    Blood, Urine

## 2020-03-26 NOTE — DISCHARGE INSTR - COC
Continuity of Care Form    Patient Name: Kip Mcintyre   :  1940  MRN:  [id number]    Admit date:  3/25/2020  Discharge date:  ***    Code Status Order: Full Code   Advance Directives:   [facility] Documentation     Date/Time Healthcare Directive Type of Healthcare Directive Copy in [facility] [address] Agent's Name Healthcare Agent's Phone Number    [phone]  No, patient does not have an advance directive for healthcare treatment -- -- -- -- --          Admitting Physician:  Colin Ponce MD  PCP: Ankit Pritchard MD    Discharging Nurse: [facility] Unit/Room#: K6H-6725/6357-71  Discharging Unit Phone Number: ***    Emergency Contact:   Extended Emergency Contact Information  Primary Emergency Contact: Supriya Mckeon [address] Phone: [phone]  Relation: Child    Past Surgical History:  Past Surgical History:   Procedure Laterality Date    CATARACT REMOVAL WITH IMPLANT Bilateral     OTHER SURGICAL HISTORY      dental extraction    OTHER SURGICAL HISTORY Left     I and D Dr. Janiya Barney left lower extremity       Immunization History:   Immunization History   Administered Date(s) Administered    DTaP 10/02/2008    Influenza Whole 2010    Influenza, High Dose (Fluzone 65 yrs and older) 10/07/2011, 2012, 10/11/2013, 2014, 2016, 10/24/2017    Influenza, Quadv, IM, (6 mo and older Fluzone, Flulaval, Fluarix and 3 yrs and older Afluria) 09/10/2019    Influenza, [place], Recombinant, IM PF (Flublok 18 yrs and older) 10/10/2018    Pneumococcal Conjugate 13-valent (Laura Bighorn) 2016    Pneumococcal Polysaccharide ([address]) 10/02/2008       Active Problems:  Patient Active Problem List   Diagnosis Code    Edema R60.9    Hypercholesterolemia E78.00    Gout M10.9    TRUPTI (obstructive sleep apnea) G47.33    Osteoarthritis M19.90    Hyperhomocysteinemia ([address] E72.11    Essential BM: ***    Intake/Output Summary (Last 24 hours) at 3/26/2020 1502  Last data filed at 3/26/2020 0511  Gross per 24 hour   Intake 540 ml   Output 800 ml   Net -260 ml     I/O last 3 completed shifts:   In: 5 [P.O.:540]  Out: 800 [Urine:800]    Safety Concerns:     [address] [employer] Safety Concerns:182237788}    Impairments/Disabilities:      [address] [employer] Impairments/Disabilities:137301967}    Nutrition Therapy:  Current Nutrition Therapy:   [address] [employer] Diet List:994976323}    Routes of Feeding: {CHP DME Other Feedings:968896885}  Liquids: {Slp liquid thickness:29898}  Daily Fluid Restriction: {CHP DME Yes amt example:260244189}  Last Modified Barium Swallow with Video (Video Swallowing Test): {Done Not Done AVDE:937146398}    Treatments at the Time of Hospital Discharge:   Respiratory Treatments: ***  Oxygen Therapy:  {Therapy; copd oxygen:15011}  Ventilator:    [address] [employer] RNAT:006603468}    Rehab Therapies: Physical Therapy, Occupational Therapy and Nurse  Weight Bearing Status/Restrictions: [facility] Weight Bearin}  Other Medical Equipment (for information only, NOT a DME order):  {EQUIPMENT:962518075}  Other Treatments: ***    Patient's personal belongings (please select all that are sent with patient):  {CHP DME Belongings:724452074}    RN SIGNATURE:  {Esignature:312164597}    CASE MANAGEMENT/SOCIAL WORK SECTION    Inpatient Status Date: ***    Readmission Risk Assessment Score:  0    Discharging to Facility/ Agency   Name:  [facility] care    Address: [address] [place] [address], [address] [place] [address]  Phone: [phone]  Fax: [phone]    / signature: Electronically signed by CHONG Truong on 3/26/2020 at 3:03 PM      PHYSICIAN SECTION    Prognosis: Good    Condition at Discharge: Stable    Rehab Potential (if transferring to Rehab): Good    Recommended Labs or Other Treatments After Discharge: Continue current care    Physician Certification: I certify the above information and

## 2020-03-26 NOTE — PROGRESS NOTES
pain. Anticipate pt needing up to Min/Mod A for ADLs at this time. Pt will benefit from skilled OT services at this time. Anticipate pt safe to return home with initial 24hr assist, [address], and progression in therapy. Prognosis: Fair  Decision Making: Medium Complexity  Exam: see above  Assistance / Modification: RW  OT Education: OT Role;Plan of Care  REQUIRES OT FOLLOW UP: Yes  Activity Tolerance  Activity Tolerance: Patient Tolerated treatment well;Patient limited by pain  Safety Devices  Safety Devices in place: Yes  Type of devices: Call light within reach;Nurse notified; Left in chair;Gait belt; Chair alarm in place           Patient Diagnosis(es): The primary encounter diagnosis was Aphasia. Diagnoses of TIA (transient ischemic attack) and Pulmonary nodules were also pertinent to this visit. has a past medical history of Blood circulation, collateral, Cerebral artery occlusion with cerebral infarction [facility]), Chronic deep vein thrombosis (DVT) of femoral vein of left lower extremity (HCC), Chronic kidney disease (CKD), stage III (moderate) ([facility] [address] CVA (cerebral infarction), Diabetes, DVT, lower extremity, recurrent ([address] Edema, First degree AV block, Gout, Heart murmur, Hypercalcemia, Hypercholesterolemia, Hyperhomocysteinemia (HCC), Hypertension, TRUPTI (obstructive sleep apnea), Osteoarthritis, Sciatica, Traumatic hematoma of left lower leg, Venous insufficiency, and Visual loss R eye - REtinal detachment. has a past surgical history that includes other surgical history; Cataract removal with implant (Bilateral); and other surgical history (Left).            Restrictions  Restrictions/Precautions  Restrictions/Precautions: Up as Tolerated    Subjective   General  Chart Reviewed: Yes  Patient assessed for rehabilitation services?: Yes  Additional Pertinent Hx: per ED note, This is a 78 y.o. female with pertinent past medical history of prior CVA in 2006, diabetes, chronic DVTs anticoagulated on Eliquis who Mobility Training, Safety Education & Training, Equipment Evaluation, Education, & procurement, Self-Care / ADL      AM-PAC Score        AM-PAC Inpatient Daily Activity Raw Score: 18 (03/26/20 1311)  AM-PAC Inpatient ADL T-Scale Score : 38.66 (03/26/20 1311)  ADL Inpatient CMS 0-100% Score: 46.65 (03/26/20 1311)  ADL Inpatient CMS G-Code Modifier : CK (03/26/20 1311)    Goals  Short term goals  Time Frame for Short term goals: prior to D/C  Short term goal 1: complete functional mobility and transfers with supervision and AD as needed  Short term goal 2: complete toileting with supervision  Short term goal 3: complete grooming with supervision  Short term goal 4: complete bathing and dressing with supervision  Short term goal 5: tolerate B UE exercises x10 reps for increased strength and endurance with ADLs  Long term goals  Time Frame for Long term goals : STG=LTG  Patient Goals   Patient goals : return home       Therapy Time   Individual Concurrent Group Co-treatment   Time In 1130         Time Out 1215         Minutes 45         Timed Code Treatment Minutes: 30 Minutes(15 minute eval)       AMADO Scott/L

## 2020-03-26 NOTE — PROGRESS NOTES
4 Eyes Skin Assessment     The patient is being assess for  Admission    I agree that 2 RN's have performed a thorough Head to Toe Skin Assessment on the patient. ALL assessment sites listed below have been assessed. Redness to mandy area, abdominal folds, and under the breasts . interdry applied     Areas assessed by both nurses:   [x]   Head, Face, and Ears   [x]   Shoulders, Back, and Chest  [x]   Arms, Elbows, and Hands   [x]   Coccyx, Sacrum, and IschIum  [x]   Legs, Feet, and Heels        Does the Patient have Skin Breakdown?   Yes LDA WOUND CARE was Initiated documentation include the Mandy-wound, Wound Assessment, Measurements, Dressing Treatment, Drainage, and Color\",         Amrit Prevention initiated:  Yes   Wound Care Orders initiated:  Yes      [address]  nurse consulted for Pressure Injury (Stage 3,4, Unstageable, DTI, NWPT, and Complex wounds), New and Established Ostomies:  No      Nurse 1 eSignature: Electronically signed by Sharon Perkins RN on 3/26/20 at 1:24 AM EDT    **SHARE this note so that the co-signing nurse is able to place an eSignature**    Nurse 2 eSignature: Electronically signed by Yomi Munoz RN on 3/26/20 at 4:52 AM EDT

## 2020-03-26 NOTE — CARE COORDINATION
DISCHARGE SUMMARY     DATE OF DISCHARGE: 03/26/2020    DISCHARGE DESTINATION: Discharge to home with family. AGENCY:   [address]  [address] [place] [address], [address] [place] [address] Drive  Phone: [phone]  Fax: [phone]  Services: Physical Therapy, Occupational Therapy, Nursing    TRANSPORTATION: Family will transport home at discharge today. COMMENTS: Patient is already set up with     Respectfully submittedSavita LISW-S  [facility]   [phone]    Electronically signed by CHONG Truong on 3/26/2020 at 3:01 PM

## 2020-03-26 NOTE — H&P
Dr. Neomi Ormond left lower extremity       Medications Prior to Admission:      Prior to Admission medications    Medication Sig Start Date End Date Taking? Authorizing Provider   simvastatin (ZOCOR) 40 MG tablet Take 40 mg by mouth nightly   Yes Historical Provider, MD   Multiple Vitamins-Minerals (CENTRUM SILVER ADULT 50+) TABS Take 1 tablet by mouth daily   Yes Historical Provider, MD   ELIQUIS 5 MG TABS tablet TAKE 1 TABLET BY MOUTH 2 TIMES DAILY 2/12/20  Yes Adair Villar MD   amLODIPine (NORVASC) 2.5 MG tablet TAKE 1 TABLET BY MOUTH DAILY 2/12/20  Yes Adair Villar MD   losartan (COZAAR) 50 MG tablet TAKE 1 TABLET BY MOUTH DAILY. 11/26/19  Yes ROGERS Alonso - CNP   cloNIDine (CATAPRES) 0.1 MG tablet TAKE 0.5 (1/2) TABLET BY MOUTH 2 TIMES DAILY. 11/26/19  Yes Sergio Flank, APRN - CNP   potassium chloride (KLOR-CON) 10 MEQ extended release tablet TAKE ONE TABLET BY MOUTH DAILY 11/26/19  Yes Hortencia Vinson APRN - CNP   glipiZIDE (GLUCOTROL XL) 5 MG extended release tablet TAKE 1 TABLET BY MOUTH DAILY. 4/15/19  Yes Adair Villar MD   metoprolol (LOPRESSOR) 100 MG tablet TAKE ONE TABLET BY MOUTH TWO TIMES A DAY 4/15/19  Yes Adair Villar MD   allopurinol (ZYLOPRIM) 300 MG tablet TAKE ONE HALF TABLET BY MOUTH DAILY 4/15/19  Yes Adair Villar MD   ascorbic acid (VITAMIN C) 1000 MG tablet Take 1,000 mg by mouth 2 times daily    Yes Historical Provider, MD   Cholecalciferol (VITAMIN D PO) Take 1,000 Units by mouth daily    Yes Historical Provider, MD   Omega-3 Fatty Acids (FISH OIL) 1000 MG CAPS Take 1,000 mg by mouth daily   Yes Historical Provider, MD   Magnesium Oxide 250 MG TABS Take 250 mg by mouth daily    Yes Historical Provider, MD   loratadine (CLARITIN) 10 MG tablet Take 10 mg by mouth daily. Yes Historical Provider, MD   Multiple Minerals-Vitamins (CITRACAL PLUS) TABS Take 1 tablet by mouth 2 times daily.      Yes Historical Provider, MD   Dulaglutide without rebound or guarding. Normal bowel sounds. Musculoskeletal:  No clubbing, cyanosis or edema bilaterally. Full range of motion without deformity. Skin: Skin color, texture, turgor normal.  No rashes or lesions. Neurologic:  Neurovascularly intact without any focal sensory/motor deficits. Cranial nerves: II-XII intact, grossly non-focal.  Psychiatric:  Alert and oriented, thought content appropriate, normal insight  Capillary Refill: Brisk,< 3 seconds   Peripheral Pulses: +2 palpable, equal bilaterally       Labs:     Recent Labs     03/25/20 2046   WBC 8.3   HGB 13.2   HCT 39.9        Recent Labs     03/25/20 2046      K 4.1      CO2 25   BUN 28*   CREATININE 1.2   CALCIUM 10.1     No results for input(s): AST, ALT, BILIDIR, BILITOT, ALKPHOS in the last 72 hours. Recent Labs     03/25/20 2046   INR 1.24*     Recent Labs     03/25/20 2046   Orlean Jessica <0.01       Urinalysis:      Lab Results   Component Value Date    NITRU Negative 03/25/2020    WBCUA 4 03/25/2020    RBCUA 4 03/25/2020    BLOODU Negative 03/25/2020    SPECGRAV 1.020 03/25/2020    GLUCOSEU Negative 03/25/2020       Radiology:     EKG:  I have reviewed the EKG with the following interpretation: Sinus rhythm, ventricular rate 67. No ST elevation. XR CHEST PORTABLE   Final Result   No acute cardiopulmonary disease. CTA HEAD NECK W CONTRAST   Final Result   Atherosclerotic change in the cervical vessels without focal significant   arterial narrowing      Lung nodules as described. Correlate with CT chest.      Heterogeneous parotid tissue bilaterally. This is nonspecific however can be   seen in patients with mixed connective tissue disorder. Question mild focal narrowing in a distal inferior sylvian fissure MCA branch   on the left      Otherwise no focal significant arterial narrowing is noted.          CT Head WO Contrast   Final Result   Large area of left hemispheric encephalomalacia likely related to old   infarcts with surrounding gliotic change. Mild small-vessel ischemic changes   are also noted. There are no findings diagnostic of an acute infarct at this time. There is no acute hemorrhage      Critical results were called by Dr. Naye Noriega to [address] on   3/25/2020 at 20:34.             ASSESSMENT:    C/Kathleen Landers 1106 Problems    Diagnosis Date Noted    TIA (transient ischemic attack) [G45.9] 03/25/2020    Type 2 diabetes mellitus with chronic kidney disease, without long-term current use of insulin ([facility] [address] [E11.22] 10/29/2015    Essential hypertension, benign [I10] 10/29/2013    Hypercholesterolemia [E78.00]          PLAN:      Possible TIA/CVA  - with symptoms: aphasia  - admit to OBS to RO TIA/CVA  - symptoms resolved so no indication for tPA  - head CT neg for acute pathology, large area of left hemispheric encephalomalacia likely related to old infarct.  - CTA head/neck: Atherosclerotic change in the cervical vessels without focal significant arterial narrowing. Question mild focal narrowing in a distal inferior sylvian fissure MCA branch on the left. Otherwise no focal significant arterial narrowing is noted. - MRI, ECHO, carotid dopplers  - ASA, statin  - consult neurology   - check lipids, HBA1c  - allow permissive HTN, SBP < 220, DBP < 110    Diabetes mellitus II   -hold home po meds, SSI and CCD    Essential (primary) hypertension   - continue home meds   - monitor blood pressure    Hyperlipidemia   - No current evidence of Rhabdomyolysis or other adverse effects  - Continue statin therapy while in the hospital    Chronic DVTs  - continue eliquis    DVT Prophylaxis: Eliquis  Diet: NPO  Code Status: Full Code    PT/OT Eval Status: Pending    Dispo - Observation       [address], APRN - CNP    Thank you Devorah Eubanks MD for the opportunity to be involved in this patient's care.  If you have any questions or concerns please feel free to contact me at ([phone])

## 2020-03-26 NOTE — ED PROVIDER NOTES
[facility] PROVIDER NOTE    Patient Identification  Pt Name: Leslie Garcia  MRN: [id number]  Eloisa 1940  Date of evaluation: 3/25/2020  Provider: Bill Haji DO  PCP: Bon Bragg MD    Chief Complaint  Altered Mental Status (daughter called ems because she had phoned the patient and the patient was having a hard time getting her words out. Has stroke hx in 2006 but NIH scale is 0 on arrival.)      HPI  (History provided by patient)  This is a 78 y.o. female with pertinent past medical history of prior CVA in 2006, diabetes, chronic DVTs anticoagulated on Eliquis who was brought in by EMS transportation for word finding difficulty. EMS called by patient's daughter who while speaking with her on the phone around 1800 today noticed the patient was having difficulty finding words which she has never had before. Was last seen well by son at 56 today. Nothing seems to make the symptoms worse, they have steadily been improving on arrival to the emergency department. Severity mild. ROS    Const:  No fevers, no chills, no generalized weakness  Skin:  No rash, no lesions  Eyes:  No visual changes, no blurry or double vision, no pain  ENT:  No sore throat, no difficulty swallowing, no ear pan, no sinus pain or congestion  Card:  No chest pain, no palpitations, no edema  Resp:  No shortness of breath, no cough, no wheezing  Abd:  No abdominal pain, no nausea, no vomiting, no diarrhea  Genitourinary:  No dysuria, no hematuria, no vaginal discharge, no vaginal bleeding  MSK:  No joint pain, +myalgia  Neuro:  No focal weakness, no headache, no paresthesia, +speech changes    All other systems reviewed and negative unless otherwise noted in HPI      I have reviewed the following nursing documentation:  Allergies: Latex; Ace inhibitors; Gabapentin; Hydrochlorothiazide; Neosporin [neomycin-bacitracin zn-polymyx];  Penicillins; Sulfa antibiotics; and Tape route daily    GLIPIZIDE (GLUCOTROL XL) 5 MG EXTENDED RELEASE TABLET    TAKE 1 TABLET BY MOUTH DAILY. LORATADINE (CLARITIN) 10 MG TABLET    Take 10 mg by mouth daily. LOSARTAN (COZAAR) 50 MG TABLET    TAKE 1 TABLET BY MOUTH DAILY. MAGNESIUM OXIDE 250 MG TABS    Take 250 mg by mouth daily     METOPROLOL (LOPRESSOR) 100 MG TABLET    TAKE ONE TABLET BY MOUTH TWO TIMES A DAY    MULTIPLE MINERALS-VITAMINS (CITRACAL PLUS) TABS    Take 1 tablet by mouth 2 times daily. MULTIPLE VITAMINS-MINERALS (CENTRUM SILVER ADULT 50+) TABS    Take 1 tablet by mouth daily    OMEGA-3 FATTY ACIDS (FISH OIL) 1000 MG CAPS    Take 1,000 mg by mouth daily    POTASSIUM CHLORIDE (KLOR-CON) 10 MEQ EXTENDED RELEASE TABLET    TAKE ONE TABLET BY MOUTH DAILY    SIMVASTATIN (ZOCOR) 40 MG TABLET    Take 40 mg by mouth nightly       Social history:  reports that she has never smoked. She has never used smokeless tobacco. She reports that she does not drink alcohol or use drugs. Family history:    Family History   Problem Relation Age of Onset    Other Father         AAA    Diabetes Mother     Heart Failure Mother     Arthritis Other     Cancer Brother         bone    High Blood Pressure Other          Exam  ED Triage Vitals   BP Temp Temp Source Pulse Resp SpO2 Height Weight   03/25/20 1944 03/25/20 1944 03/25/20 1944 03/25/20 1944 03/25/20 1944 03/25/20 2000 03/25/20 1944 03/25/20 1944   (!) 160/63 98.2 °F (36.8 °C) Oral 70 20 96 % 5' 1\" (1.549 m) 264 lb 8.8 oz (120 kg)     Nursing note and vitals reviewed. Constitutional: Well developed, well nourished. Non-toxic in appearance. Obese  HENT:      Head: Normocephalic and atraumatic. Ears: External ears normal.      Nose: Nose normal.     Mouth: Membrane mucosa moist and pink. Eyes: Anicteric sclera. No discharge. Neck: Supple. Trachea midline. Cardiovascular: RRR; 1/6 systolic murmur LUSB, rubs, or gallops. Pulmonary/Chest: Effort normal. No respiratory distress. CTAB. No stridor. No wheezes. No rales. Abdominal: Soft. No distension. Nontender  Musculoskeletal: Moves all extremities. No gross deformity. Exquisite tenderness to palpation of bilateral calves, compartments soft  Neurological: Alert and orientedx4. Face symmetric. Speech is clear with mild expressive aphasia. No dysarthria. CN 2-12 intact. 5/5 motor and sensation grossly intact all extremities. No pronator drift. Normal finger to nose, normal heel to shin. Downward Babinskis. Skin: Warm and dry. No rash. Psychiatric: Normal mood and affect. Behavior is normal.    Procedures      EKG    EKG was reviewed by emergency department physician in the absence of a cardiologist    Narrow complex sinus rhythm, rate 67, normal axis, normal [place] and QRS intervals, normal Qtc, no ST elevations or depressions, normal t-wave morphology, impression NSR, no STEMI, no significant change from comparison 2/25/2019      Radiology  XR CHEST PORTABLE   Final Result   No acute cardiopulmonary disease. CTA HEAD NECK W CONTRAST   Final Result   Atherosclerotic change in the cervical vessels without focal significant   arterial narrowing      Lung nodules as described. Correlate with CT chest.      Heterogeneous parotid tissue bilaterally. This is nonspecific however can be   seen in patients with mixed connective tissue disorder. Question mild focal narrowing in a distal inferior sylvian fissure MCA branch   on the left      Otherwise no focal significant arterial narrowing is noted. CT Head WO Contrast   Final Result   Large area of left hemispheric encephalomalacia likely related to old   infarcts with surrounding gliotic change. Mild small-vessel ischemic changes   are also noted. There are no findings diagnostic of an acute infarct at this time. There is no acute hemorrhage      Critical results were called by Dr. Garfield Gamez to [address] on   3/25/2020 at 20:34.              Labs  Results for orders placed or performed during the hospital encounter of 03/25/20   CBC Auto Differential   Result Value Ref Range    WBC 8.3 4.0 - 11.0 K/uL    RBC 4.06 4.00 - 5.20 M/uL    Hemoglobin 13.2 12.0 - 16.0 g/dL    Hematocrit 39.9 36.0 - 48.0 %    MCV 98.1 80.0 - 100.0 fL    MCH 32.6 26.0 - 34.0 pg    MCHC 33.2 31.0 - 36.0 g/dL    RDW 15.0 12.4 - 15.4 %    Platelets 811 744 - 824 K/uL    MPV 7.0 5.0 - 10.5 fL    Neutrophils % 70.1 %    Lymphocytes % 16.6 %    Monocytes % 10.1 %    Eosinophils % 2.5 %    Basophils % 0.7 %    Neutrophils Absolute 5.8 1.7 - 7.7 K/uL    Lymphocytes Absolute 1.4 1.0 - 5.1 K/uL    Monocytes Absolute 0.8 0.0 - 1.3 K/uL    Eosinophils Absolute 0.2 0.0 - 0.6 K/uL    Basophils Absolute 0.1 0.0 - 0.2 K/uL   Basic Metabolic Panel w/ Reflex to MG   Result Value Ref Range    Sodium 138 136 - 145 mmol/L    Potassium reflex Magnesium 4.1 3.5 - 5.1 mmol/L    Chloride 103 99 - 110 mmol/L    CO2 25 21 - 32 mmol/L    Anion Gap 10 3 - 16    Glucose 170 (H) 70 - 99 mg/dL    BUN 28 (H) 7 - 20 mg/dL    CREATININE 1.2 0.6 - 1.2 mg/dL    GFR Non- 43 (A) >60    GFR  52 (A) >60    Calcium 10.1 8.3 - 10.6 mg/dL   Troponin   Result Value Ref Range    Troponin <0.01 <0.01 ng/mL   Urinalysis Reflex to Culture   Result Value Ref Range    Color, UA YELLOW Straw/Yellow    Clarity, UA Clear Clear    Glucose, Ur Negative Negative mg/dL    Bilirubin Urine Negative Negative    Ketones, Urine Negative Negative mg/dL    Specific Gravity, UA 1.020 1.005 - 1.030    Blood, Urine Negative Negative    pH, UA 7.5 5.0 - 8.0    Protein, UA >=300 (A) Negative mg/dL    Urobilinogen, Urine 0.2 <2.0 E.U./dL    Nitrite, Urine Negative Negative    Leukocyte Esterase, Urine Negative Negative    Microscopic Examination YES     Urine Type Cleancatch     Urine Reflex to Culture Not Indicated    Protime-INR   Result Value Ref Range    Protime 14.4 (H) 10.0 - 13.2 sec    INR 1.24 (H) 0.86 - 1.14   APTT   Result Value Ref Range    aPTT 36.5 (H) 24.2 - 36.2 sec   Microscopic Urinalysis   Result Value Ref Range    Hyaline Casts, UA 1 0 - 8 /LPF    WBC, UA 4 0 - 5 /HPF    RBC, UA 4 0 - 4 /HPF    Epithelial Cells, UA 2 0 - 5 /HPF       Screenings  NIH Stroke Scale  Interval: Baseline  Level of Consciousness (1a. ): Alert  LOC Questions (1b. ): Answers both correctly  LOC Commands (1c. ): Performs both tasks correctly  Best Gaze (2. ): Normal  Visual (3. ): No visual loss  Facial Palsy (4. ): Normal symmetrical movement  Motor Arm, Left (5a. ): No drift  Motor Arm, Right (5b. ): No drift  Motor Leg, Left (6a. ): No drift  Motor Leg, Right (6b. ): No drift  Limb Ataxia (7. ): Absent  Sensory (8. ): Normal  Best Language (9. ): Mild to moderate aphasia  Dysarthria (10. ): Normal  Extinction and Inattention (11): No abnormality  Total: 1Glasgow Coma Scale  Eye Opening: Spontaneous  Best Verbal Response: Oriented  Best Motor Response: Obeys commands  Fer Coma Scale Score: 15       MDM and ED Course    Patient afebrile and nontoxic. Arrived with mild expressive a aphasia which on frequent re-evaluations at bedside appeared rapidly resolving. A stroke alert was activated. CT of the head shows no evidence of hemorrhage or mass-effect, discussed imaging at length with radiologist, she has extensive encephalomalacia from prior CVA and small infarct in this region cannot be excluded completely. CTA without any large vessel occlusion, incidentally noted pulmonary nodule. Cardiac work-up is reassuring, very low suspicion for ACS or malignant dysrhythmia. Urinalysis not indicative of infection, nothing to suggest CNS infection, patient is not septic. I discussed case and CT/CTA imaging at length with Dr. Cortes Schultz for  stroke team, patient has rapidly improving minor deficits and is currently on Eliquis, she is not an appropriate candidate for thrombolytics.   I discussed this with patient and her family and they are in agreement. On my final reevaluation at bedside patient's symptoms appeared completely resolved. She was given aspirin. Will admit to internal medicine service for suspected transient ischemic attack. I did call patient's daughter [place] at [phone] to inform her of patient's admission. Incidentally noted on CT imaging or pulmonary nodules. I discussed this finding with both patient and her daughter including need for close follow-up. Critical Care Time    Upon my evaluation, this patient had a high probability of imminent or life-threatening deterioration due to acute cerebrovascular accident which required my direct attention, intervention, and personal management. The total critical care time personally spent while evaluating and treating this patient was at least 32 minutes exclusive of any time spent doing separately billable procedures. This includes time at the bedside, data interpretation, medication management, monitoring for potential decompensation and physician consultation. Specifics of interventions taken and potentially life-threatening diagnostic considerations are listed above in the medical decision making. Final Impression  1. Aphasia    2. TIA (transient ischemic attack)    3. Pulmonary nodules        Blood pressure (!) 159/65, pulse 69, temperature 98.2 °F (36.8 °C), temperature source Oral, resp. rate 16, height 5' 1\" (1.549 m), weight 264 lb 8.8 oz (120 kg), SpO2 94 %, not currently breastfeeding. Disposition:  DISPOSITION Decision To Admit 03/25/2020 09:40:34 PM      Patient Referrals:  No follow-up provider specified. Discharge Medications:  New Prescriptions    No medications on file       Discontinued Medications:  Discontinued Medications    CONTINUOUS BLOOD GLUC SENSOR ([address] [place] [address]) MISC    Use as directed    ELASTIC BANDAGES & SUPPORTS (B-4 MED COMPRESSION HOSE MENS) MISC    by Does not apply route.  Wear in daytime only     FUROSEMIDE

## 2020-03-26 NOTE — ED NOTES
ED SBAR report provider to [facility], [address] [place] [address]. Patient to be transported to Room 5125 via stretcher by ED tech. Patient transported with bedside cardiac monitor and with IV medications infusing. IV site clean, dry, and intact. MEWS score and pain assessed and documented. Updated patient and family on plan of care.      Augustin Humphreys RN  03/25/20 5001

## 2020-03-26 NOTE — TELEPHONE ENCOUNTER
[facility] called to adv that Estella Bragg has been admitted for TIA.      Best call back number: [phone]

## 2020-03-26 NOTE — PROGRESS NOTES
3. Chronic small vessel ischemic white matter disease and diffuse cerebral   volume loss. 3/26/2020 d/w Dr. Bruce Mims: SLP eval order received; symptoms resolved; plan to proceed with BSE only      Date of Eval: 3/26/2020  Evaluating Therapist: Adair Lennon    Current Diet level:  Current Diet : Regular  Current Liquid Diet : Thin      Primary Complaint  Patient Complaint: patient denies dysphagia    Pain:  Pain Level: 0    Reason for Referral  Bernabe Gonzalez was referred for a bedside swallow evaluation to assess the efficiency of her swallow function, identify signs and symptoms of aspiration and make recommendations regarding safe dietary consistencies, effective compensatory strategies, and safe eating environment. Impression  Dysphagia Diagnosis: Mild pharyngeal stage dysphagia   · Accepted and tolerated evaluation at bedside. · Patient alert and cooperative; follows directions; participates in conversation well; recalls daily information; oriented x4. SLP eval held d/t resolution of symptoms and negative Brain MRI; MD in agreement. · Mild pharyngeal stage dysphagia characterized by delayed swallow and decreased laryngeal elevation without overt sign/symptoms of penetration/aspiration. Dysphagia Outcome Severity Scale: Level 5: Mild dysphagia- Distant supervision. May need one diet consistency restricted     Treatment Plan  Requires SLP Intervention: Yes  Duration/Frequency of Treatment: ST to follow-up 1-3 times for dysphagia during acute admission  D/C Recommendations: (skilled ST  does not appear indicated upon discharge)    Recommended Diet and Intervention  Diet Solids Recommendation: Regular  Liquid Consistency Recommendation:  Thin  Recommended Form of Meds: PO  Compensatory Swallowing Strategies: Upright as possible for all oral intake;Remain upright for 30-45 minutes after meals  Therapeutic Interventions: Diet tolerance monitoring;Patient/Family education    Treatment/Goals  Dysphagia Goals:

## 2020-03-26 NOTE — CONSULTS
Neurology Consult Note  Reason for Consult: TIA    Chief complaint: trouble talking    Dr Alix Martinez MD asked me to see Helder Tarango in consultation for evaluation of TIA    History of Present Illness:  Helder Tarango is a 78 y.o. female who presents with trouble talking. I obtained my information via interview w/ the patient, supplemented by chart review. The patient says that she called her daughter last evening around 1800 to speak w/ her about her rabbit, Ingrid March, as she thought something may have been wrong w/ him. During their conversation, she and her daughter recognized that she started having a difficult time getting the right words out, her sentences sounding jumbled. No other new reported symptoms at the time. It is not exactly clear how long this lasted, though by the time she arrived to the ED shortly after 1930, it was felt that her symptoms has resolved and was back to baseline. BP was 160/63. CT head w/out any acute findings. CTA w/o LVO. She has been stable. The patient says she did suffer a stroke in 2006, which at the time did affect her language, though recovered well. Is on anticoagulation for DVTs. Has chronic BLE pain/weakness, saying it is both related to neuropathy and vascular disease.       Medical History:  Past Medical History:   Diagnosis Date    Blood circulation, collateral     Cerebral artery occlusion with cerebral infarction (HCC)     Chronic deep vein thrombosis (DVT) of femoral vein of left lower extremity (HCC)     Chronic kidney disease (CKD), stage III (moderate) (HCC)     CVA (cerebral infarction)     Diabetes     DVT, lower extremity, recurrent (HCC) 3/30/2012    x 3 LLE :  life long coumadin    Edema     First degree AV block     Gout     Heart murmur 10/14/2014    EF normal, no aortic stenosis Echo 11/11/14 - mild MR    Hypercalcemia 10/11/2018    Hypercholesterolemia     Hyperhomocysteinemia ([facility] [address] 4/16/12    Hypertension     TRUPTI (obstructive sleep apnea)     CPAP at 16cm    Osteoarthritis     Sciatica 11/8/2016    Traumatic hematoma of left lower leg 3/21/2017    Venous insufficiency     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 Horowitz Model left lower extremity     Scheduled Meds:   amLODIPine  2.5 mg Oral Daily    ascorbic acid  1,000 mg Oral BID    Vitamin D  1,000 Units Oral Daily    apixaban  5 mg Oral BID    fluticasone  2 spray Nasal Daily    insulin lispro  0-12 Units Subcutaneous TID WC    insulin lispro  0-6 Units Subcutaneous Nightly    cetirizine  5 mg Oral Daily    losartan  50 mg Oral Daily    magnesium oxide  300 mg Oral Daily    metoprolol  100 mg Oral BID    therapeutic multivitamin-minerals  1 tablet Oral BID    atorvastatin  20 mg Oral Daily    sodium chloride flush  10 mL Intravenous 2 times per day    aspirin  81 mg Oral Daily    Or    aspirin  300 mg Rectal Daily     Medications Prior to Admission:   simvastatin (ZOCOR) 40 MG tablet, Take 40 mg by mouth nightly  Multiple Vitamins-Minerals (CENTRUM SILVER ADULT 50+) TABS, Take 1 tablet by mouth daily  ELIQUIS 5 MG TABS tablet, TAKE 1 TABLET BY MOUTH 2 TIMES DAILY  amLODIPine (NORVASC) 2.5 MG tablet, TAKE 1 TABLET BY MOUTH DAILY  losartan (COZAAR) 50 MG tablet, TAKE 1 TABLET BY MOUTH DAILY. cloNIDine (CATAPRES) 0.1 MG tablet, TAKE 0.5 (1/2) TABLET BY MOUTH 2 TIMES DAILY. potassium chloride (KLOR-CON) 10 MEQ extended release tablet, TAKE ONE TABLET BY MOUTH DAILY  glipiZIDE (GLUCOTROL XL) 5 MG extended release tablet, TAKE 1 TABLET BY MOUTH DAILY.   metoprolol (LOPRESSOR) 100 MG tablet, TAKE ONE TABLET BY MOUTH TWO TIMES A DAY  allopurinol (ZYLOPRIM) 300 MG tablet, TAKE ONE HALF TABLET BY MOUTH DAILY  ascorbic acid (VITAMIN C) 1000 MG tablet, Take 1,000 mg by mouth 2 times daily   Cholecalciferol (VITAMIN D PO), Take 1,000 Units by mouth daily   Omega-3 Fatty Acids (FISH OIL) 1000 MG CAPS, Take 1,000 mg by mouth daily  Magnesium Oxide 250 MG TABS, Take 250 mg by mouth daily   loratadine (CLARITIN) 10 MG tablet, Take 10 mg by mouth daily. Multiple Minerals-Vitamins (CITRACAL PLUS) TABS, Take 1 tablet by mouth 2 times daily. Dulaglutide (TRULICITY) 2.38 AC/8.8UM SOPN, Inject 0.75 mg into the skin once a week  fluticasone (FLONASE) 50 MCG/ACT nasal spray, 2 sprays by Nasal route daily  acetaminophen (APAP EXTRA STRENGTH) 500 MG tablet, Take 1 tablet by mouth every 6 hours as needed for Pain    Allergies   Allergen Reactions    Latex Rash     Swelling lesions      Ace Inhibitors     Gabapentin      LE edema    Hydrochlorothiazide     Neosporin [Neomycin-Bacitracin Zn-Polymyx]     Penicillins     Sulfa Antibiotics     Tape [Adhesive Tape] Other (See Comments)     Sores     Family History   Problem Relation Age of Onset    Other Father         AAA    Diabetes Mother     Heart Failure Mother     Arthritis Other     Cancer Brother         bone    High Blood Pressure Other      Social History     Tobacco Use   Smoking Status Never Smoker   Smokeless Tobacco Never Used     Social History     Substance and Sexual Activity   Drug Use No     Social History     Substance and Sexual Activity   Alcohol Use No    Alcohol/week: 0.0 standard drinks     ROS:  Constitutional- No weight loss or fevers  Eyes- No diplopia. No photophobia. Ears/nose/throat- No dysphagia. No Dysarthria  Cardiovascular- No palpitations. No chest pain  Respiratory- No dyspnea. No Cough  Gastrointestinal- No Abdominal pain. No Vomiting. Genitourinary- No incontinence. No urinary retention  Musculoskeletal- No myalgia. + arthralgia  Skin- No rash. No easy bruising. Psychiatric- No depression. No anxiety  Endocrine- hx diabetes. No thyroid issues.   Hematologic- No bleeding difficulty. No fatigue  Neurologic- No new weakness. No Headache. Exam:  Blood pressure (!) 175/96, pulse 76, temperature 97.9 °F (36.6 °C), temperature source Oral, resp. rate 18, height 5' 1\" (1.549 m), weight 251 lb 15.8 oz (114.3 kg), SpO2 95 %, not currently breastfeeding. Constitutional    Vital signs: BP, HR, and RR reviewed   General alert, no distress, well-nourished  Eyes: unable to visualize the fundi  Cardiovascular: pulses symmetric in all 4 extremities. Psychiatric: cooperative with examination, no psychotic behavior noted. Neurologic  Mental status:   orientation to person, place, time, situation. General fund of knowledge grossly intact   Memory grossly intact   Attention intact as able to attend well to the exam     Language mild word finding trouble. Comprehension intact; follows simple commands  Cranial nerves:   CN2: visual fields full  CN 3,4,6: extraocular muscles intact  CN5: facial sensation symmetric   CN7: face symmetric without dysarthria  CN8: hearing grossly intact  CN9: palate elevated symmetrically  CN11: trap full strength on shoulder shrug  CN12: tongue midline with protrusion  Strength: good strength in her BUE. BLE weakness noted, which is chronic per patient. No new weakness. Deep tendon reflexes: diminished in all 4 extremities  Sensory: painful neuropathy in her distal BLE. Cerebellar/coordination: finger nose finger normal without ataxia  Tone: normal in all 4 extremities  Gait: deferred at this time given weakness. Labs  Glucose 104  Na 142  K 4.1  BUN 27  Creatinine 1.3    WBC 9.3K  Hg 12.9  Platelets 837    Lipid Panel  Cholesterol, Total 261 (H)   HDL Cholesterol 53   LDL Direct 168 (H)   Triglycerides 375 (H)     HgA1c 7.7    UA no evidence of infection    Studies  MRI brain w/o 3/26/20, independently reviewed  No acute infarct. Moderate area of encephalomalacia and gliosis involving the L MCA territory.         CTA head/neck 3/25/20,

## 2020-03-26 NOTE — PROGRESS NOTES
emphasized . The notebook also provides education on Stroke community resources and stroke advocacy. The need for follow-up after discharge was highlighted with patient/family with them being able to repeat understanding of the importance of this.       Electronically signed by Radha Ridley RN on 3/26/2020 at 5:15 PM

## 2020-03-27 ENCOUNTER — VIRTUAL VISIT (OUTPATIENT)
Dept: FAMILY MEDICINE CLINIC | Age: 80
End: 2020-03-27
Payer: MEDICARE

## 2020-03-27 ENCOUNTER — TELEPHONE (OUTPATIENT)
Dept: FAMILY MEDICINE CLINIC | Age: 80
End: 2020-03-27

## 2020-03-27 PROCEDURE — 99214 OFFICE O/P EST MOD 30 MIN: CPT | Performed by: FAMILY MEDICINE

## 2020-03-27 RX ORDER — LIRAGLUTIDE 6 MG/ML
INJECTION SUBCUTANEOUS
Qty: 2 PEN | Refills: 3 | Status: SHIPPED | OUTPATIENT
Start: 2020-03-27 | End: 2020-04-02

## 2020-03-27 NOTE — TELEPHONE ENCOUNTER
Jay Mora was admitted to [facility] for a TIA and she was discharged today, she cannot talk in complete sentences and her Daughter \"Sissy\" called crying because she doesn't know what to do for her, she was not released with any services from home health and she want to know what to do about her hospital follow up     Also [facility] and they are wanting her medications addressed, there were changes made and she wants to know if they should fill for them or something else:    The Hospital Discontinued her Simvastatin 40mg and switched her to Atorvastatin 20mg    They put her on a Aspirin 81mg but they wanted to verify that this is ok because she is on Eliquis 5mg    They also discontinued her bydureon and switched to trulicity and it is not covered- they said victoza or ozempic are covered- can they get a script for one of these please

## 2020-03-27 NOTE — PROGRESS NOTES
NOTE - this visit conducted via audiovisual means : MyChart, Doxy, etc, in accordance with CMS guidelines. Visit initiated at patient or caregiver request with permission to bill to insurer granted. No chief complaint on file. Family History   Problem Relation Age of Onset    Other Father         AAA    Diabetes Mother     Heart Failure Mother     Arthritis Other     Cancer Brother         bone    High Blood Pressure Other      Social History     Socioeconomic History    Marital status:       Spouse name: Kieran Nevarez Number of children: 3    Years of education: Not on file    Highest education level: Not on file   Occupational History    Occupation: retired Dept    Social Needs    Financial resource strain: Not on file    Food insecurity     Worry: Not on file     Inability: Not on file   [employer] needs     Medical: Not on file     Non-medical: Not on file   Tobacco Use    Smoking status: Never Smoker    Smokeless tobacco: Never Used   Substance and Sexual Activity    Alcohol use: No     Alcohol/week: 0.0 standard drinks    Drug use: No    Sexual activity: Not on file   Lifestyle    Physical activity     Days per week: Not on file     Minutes per session: Not on file    Stress: Not on file   Relationships    Social connections     Talks on phone: Not on file     Gets together: Not on file     Attends Nondenominational service: Not on file     Active member of club or organization: Not on file     Attends meetings of clubs or organizations: Not on file     Relationship status: Not on file    Intimate partner violence     Fear of current or ex partner: Not on file     Emotionally abused: Not on file     Physically abused: Not on file     Forced sexual activity: Not on file   Other Topics Concern    Not on file   Social History Narrative    Not on file       Current Outpatient Medications:     Liraglutide (VICTOZA) 18 MG/3ML SOPN SC injection, 0.6mg daily for 3 days and

## 2020-03-27 NOTE — TELEPHONE ENCOUNTER
Contact this patient -- request video visit and place on schedule.   Reply desired video type and phone number

## 2020-03-31 ENCOUNTER — TELEPHONE (OUTPATIENT)
Dept: FAMILY MEDICINE CLINIC | Age: 80
End: 2020-03-31

## 2020-03-31 NOTE — TELEPHONE ENCOUNTER
Daughter and pt do not want nurse or therapist going into home due to corona virus. Wanting to know if you okay  phone visits from nurse and possibly the therapist. Please advise.  Please call Rolanda Doll back at  [phone]

## 2020-04-01 NOTE — TELEPHONE ENCOUNTER
Pharmacy called and said the patient has taken bydureon in the past then trulicity which is not covered and now [address]. Patient does not want to take a daily shot. She  And the pharmacy asked if the patient can go back on bydureon.

## 2020-04-02 RX ORDER — EXENATIDE 2 MG/.65ML
INJECTION, SUSPENSION, EXTENDED RELEASE SUBCUTANEOUS
Qty: 4 PEN | Refills: 3 | Status: ON HOLD
Start: 2020-04-02 | End: 2020-06-12 | Stop reason: HOSPADM

## 2020-04-09 ENCOUNTER — TELEPHONE (OUTPATIENT)
Dept: FAMILY MEDICINE CLINIC | Age: 80
End: 2020-04-09

## 2020-04-10 NOTE — TELEPHONE ENCOUNTER
I put in a referral for external speech therapy. I assume [facility] can do in home speech therapy? Please print out and fax request. Let me know if this is not what they need. Please document call and then close encounter.   thanks

## 2020-04-14 ENCOUNTER — TELEPHONE (OUTPATIENT)
Dept: FAMILY MEDICINE CLINIC | Age: 80
End: 2020-04-14

## 2020-04-14 RX ORDER — CEPHALEXIN 500 MG/1
500 CAPSULE ORAL 2 TIMES DAILY
Qty: 20 CAPSULE | Refills: 0 | Status: ON HOLD | OUTPATIENT
Start: 2020-04-14 | End: 2020-04-28 | Stop reason: HOSPADM

## 2020-04-14 NOTE — TELEPHONE ENCOUNTER
Phone visit was scheduled today, but due to increased redness and swellling in legs, unsure if one or both, pt is requesting an in home visit. Needs order for in home visit  instead of phone visit. Please advise.  Please call Silvano Wesley back at [phone]    Please address ASA

## 2020-04-14 NOTE — TELEPHONE ENCOUNTER
Face time. Erythema right lower extremity. No fevers chills nausea vomiting or change in general systemic symptoms. Will give Bactroban. Has used Ceftin in the past.  Refilled. Reviewed medications. Follow back up 48 hours. Area to be outlined in ink.   Home health return on Thursday

## 2020-04-15 NOTE — PROGRESS NOTES
the 4/16/20 encounter (Appointment) with Venson Sicard, MD   Medication Sig Dispense Refill    mupirocin (BACTROBAN) 2 % ointment Apply 3 times dailyto open sore 30 g 1    cephALEXin (KEFLEX) 500 MG capsule Take 1 capsule by mouth 2 times daily for 10 days 20 capsule 0    Exenatide (BYDUREON) 2 MG PEN 2 mg weekly 4 pen 3    traMADol (ULTRAM) 50 MG tablet Take 1 tablet by mouth every 8 hours as needed for Pain for up to 30 days. 90 tablet 0    aspirin 81 MG EC tablet Take 1 tablet by mouth daily 30 tablet 3    atorvastatin (LIPITOR) 20 MG tablet Take 2 tablets by mouth daily 30 tablet 3    Multiple Vitamins-Minerals (CENTRUM SILVER ADULT 50+) TABS Take 1 tablet by mouth daily      ELIQUIS 5 MG TABS tablet TAKE 1 TABLET BY MOUTH 2 TIMES DAILY 180 tablet 1    amLODIPine (NORVASC) 2.5 MG tablet TAKE 1 TABLET BY MOUTH DAILY 90 tablet 1    losartan (COZAAR) 50 MG tablet TAKE 1 TABLET BY MOUTH DAILY. 90 tablet 3    cloNIDine (CATAPRES) 0.1 MG tablet TAKE 0.5 (1/2) TABLET BY MOUTH 2 TIMES DAILY. 90 tablet 3    potassium chloride (KLOR-CON) 10 MEQ extended release tablet TAKE ONE TABLET BY MOUTH DAILY 90 tablet 3    glipiZIDE (GLUCOTROL XL) 5 MG extended release tablet TAKE 1 TABLET BY MOUTH DAILY.  90 tablet 3    metoprolol (LOPRESSOR) 100 MG tablet TAKE ONE TABLET BY MOUTH TWO TIMES A  tablet 3    allopurinol (ZYLOPRIM) 300 MG tablet TAKE ONE HALF TABLET BY MOUTH DAILY 45 tablet 3    fluticasone (FLONASE) 50 MCG/ACT nasal spray 2 sprays by Nasal route daily 1 Bottle 1    ascorbic acid (VITAMIN C) 1000 MG tablet Take 1,000 mg by mouth 2 times daily       Cholecalciferol (VITAMIN D PO) Take 1,000 Units by mouth daily       Omega-3 Fatty Acids (FISH OIL) 1000 MG CAPS Take 1,000 mg by mouth daily      Magnesium Oxide 250 MG TABS Take 250 mg by mouth daily       acetaminophen (APAP EXTRA STRENGTH) 500 MG tablet Take 1 tablet by mouth every 6 hours as needed for Pain 40 tablet 0             Past

## 2020-04-16 ENCOUNTER — TELEPHONE (OUTPATIENT)
Dept: FAMILY MEDICINE CLINIC | Age: 80
End: 2020-04-16

## 2020-04-16 ENCOUNTER — VIRTUAL VISIT (OUTPATIENT)
Dept: FAMILY MEDICINE CLINIC | Age: 80
End: 2020-04-16
Payer: MEDICARE

## 2020-04-16 LAB
B-TYPE NATRIURETIC PEPTIDE: 238 PG/ML
BASOPHILS ABSOLUTE: 0.1 /ΜL
BASOPHILS RELATIVE PERCENT: 1 %
BUN BLDV-MCNC: 25 MG/DL
CALCIUM SERPL-MCNC: 10.1 MG/DL
CHLORIDE BLD-SCNC: 104 MMOL/L
CO2: 27 MMOL/L
CREAT SERPL-MCNC: 1.74 MG/DL
EOSINOPHILS ABSOLUTE: 0.3 /ΜL
EOSINOPHILS RELATIVE PERCENT: 5 %
GFR CALCULATED: 27
GLUCOSE BLD-MCNC: 158 MG/DL
HCT VFR BLD CALC: 34 % (ref 36–46)
HEMOGLOBIN: 11.4 G/DL (ref 12–16)
LYMPHOCYTES ABSOLUTE: 1.4 /ΜL
LYMPHOCYTES RELATIVE PERCENT: 19 %
MCH RBC QN AUTO: 33 PG
MCHC RBC AUTO-ENTMCNC: 33.5 G/DL
MCV RBC AUTO: 98.5 FL
MONOCYTES ABSOLUTE: 0.7 /ΜL
MONOCYTES RELATIVE PERCENT: 10 %
NEUTROPHILS ABSOLUTE: 4.7 /ΜL
NEUTROPHILS RELATIVE PERCENT: ABNORMAL
PDW BLD-RTO: 14.5 %
PLATELET # BLD: 264 K/ΜL
PMV BLD AUTO: 7.3 FL
POTASSIUM SERPL-SCNC: 3.7 MMOL/L
RBC # BLD: 3.45 10^6/ΜL
SODIUM BLD-SCNC: 140 MMOL/L
WBC # BLD: 7.2 10^3/ML

## 2020-04-16 PROCEDURE — 99213 OFFICE O/P EST LOW 20 MIN: CPT | Performed by: INTERNAL MEDICINE

## 2020-04-16 NOTE — TELEPHONE ENCOUNTER
Message left. Recheck this weekend, thank you for labs. Possible iv antibiotics at home.   If mukherjee, fever, systemic sxs would have go to er

## 2020-04-18 RX ORDER — CLINDAMYCIN HYDROCHLORIDE 300 MG/1
300 CAPSULE ORAL 3 TIMES DAILY
Qty: 30 CAPSULE | Refills: 0 | Status: ON HOLD
Start: 2020-04-18 | End: 2020-04-28 | Stop reason: HOSPADM

## 2020-04-21 LAB
CREAT SERPL-MCNC: 1.68 MG/DL (ref 0.44–1.03)
GFR AFRICAN AMERICAN: 32 ML/MIN/1.73 M2
GFR NON-AFRICAN AMERICAN: 28 ML/MIN/1.73 M2

## 2020-04-23 ENCOUNTER — APPOINTMENT (OUTPATIENT)
Dept: CT IMAGING | Age: 80
DRG: 640 | End: 2020-04-23
Payer: MEDICARE

## 2020-04-23 ENCOUNTER — APPOINTMENT (OUTPATIENT)
Dept: GENERAL RADIOLOGY | Age: 80
DRG: 640 | End: 2020-04-23
Payer: MEDICARE

## 2020-04-23 ENCOUNTER — HOSPITAL ENCOUNTER (INPATIENT)
Age: 80
LOS: 6 days | Discharge: NURSING, REHAB OR LONG-TERM CARE | DRG: 640 | End: 2020-04-29
Attending: EMERGENCY MEDICINE | Admitting: INTERNAL MEDICINE
Payer: MEDICARE

## 2020-04-23 PROBLEM — G93.41 ACUTE METABOLIC ENCEPHALOPATHY: Status: ACTIVE | Noted: 2020-04-23

## 2020-04-23 LAB
A/G RATIO: 0.9 (ref 1.1–2.2)
ALBUMIN SERPL-MCNC: 3.2 G/DL (ref 3.4–5)
ALP BLD-CCNC: 66 U/L (ref 40–129)
ALT SERPL-CCNC: 16 U/L (ref 10–40)
ANION GAP SERPL CALCULATED.3IONS-SCNC: 12 MMOL/L (ref 3–16)
AST SERPL-CCNC: 24 U/L (ref 15–37)
BASOPHILS ABSOLUTE: 0.1 K/UL (ref 0–0.2)
BASOPHILS RELATIVE PERCENT: 1 %
BILIRUB SERPL-MCNC: 0.3 MG/DL (ref 0–1)
BUN BLDV-MCNC: 24 MG/DL (ref 7–20)
CALCIUM SERPL-MCNC: 12.6 MG/DL (ref 8.3–10.6)
CHLORIDE BLD-SCNC: 100 MMOL/L (ref 99–110)
CO2: 30 MMOL/L (ref 21–32)
CREAT SERPL-MCNC: 1.7 MG/DL (ref 0.6–1.2)
EKG ATRIAL RATE: 74 BPM
EKG DIAGNOSIS: NORMAL
EKG P AXIS: 8 DEGREES
EKG P-R INTERVAL: 242 MS
EKG Q-T INTERVAL: 370 MS
EKG QRS DURATION: 84 MS
EKG QTC CALCULATION (BAZETT): 410 MS
EKG R AXIS: 12 DEGREES
EKG T AXIS: 9 DEGREES
EKG VENTRICULAR RATE: 74 BPM
EOSINOPHILS ABSOLUTE: 0.3 K/UL (ref 0–0.6)
EOSINOPHILS RELATIVE PERCENT: 4 %
GFR AFRICAN AMERICAN: 35
GFR NON-AFRICAN AMERICAN: 29
GLOBULIN: 3.7 G/DL
GLUCOSE BLD-MCNC: 109 MG/DL (ref 70–99)
GLUCOSE BLD-MCNC: 112 MG/DL (ref 70–99)
GLUCOSE BLD-MCNC: 117 MG/DL (ref 70–99)
HCT VFR BLD CALC: 38.6 % (ref 36–48)
HEMOGLOBIN: 13 G/DL (ref 12–16)
INR BLD: 1.63 (ref 0.86–1.14)
LYMPHOCYTES ABSOLUTE: 1.2 K/UL (ref 1–5.1)
LYMPHOCYTES RELATIVE PERCENT: 14.8 %
MCH RBC QN AUTO: 33.1 PG (ref 26–34)
MCHC RBC AUTO-ENTMCNC: 33.8 G/DL (ref 31–36)
MCV RBC AUTO: 98 FL (ref 80–100)
MONOCYTES ABSOLUTE: 0.9 K/UL (ref 0–1.3)
MONOCYTES RELATIVE PERCENT: 10.5 %
NEUTROPHILS ABSOLUTE: 5.6 K/UL (ref 1.7–7.7)
NEUTROPHILS RELATIVE PERCENT: 69.7 %
PARATHYROID HORMONE INTACT: 6.9 PG/ML (ref 14–72)
PDW BLD-RTO: 15.7 % (ref 12.4–15.4)
PERFORMED ON: ABNORMAL
PERFORMED ON: ABNORMAL
PLATELET # BLD: 223 K/UL (ref 135–450)
PMV BLD AUTO: 6.8 FL (ref 5–10.5)
POTASSIUM REFLEX MAGNESIUM: 4 MMOL/L (ref 3.5–5.1)
PROTHROMBIN TIME: 19 SEC (ref 10–13.2)
RBC # BLD: 3.93 M/UL (ref 4–5.2)
SODIUM BLD-SCNC: 142 MMOL/L (ref 136–145)
TOTAL PROTEIN: 6.9 G/DL (ref 6.4–8.2)
TROPONIN: <0.01 NG/ML
VITAMIN D 25-HYDROXY: 17.6 NG/ML
WBC # BLD: 8.1 K/UL (ref 4–11)

## 2020-04-23 PROCEDURE — 84484 ASSAY OF TROPONIN QUANT: CPT

## 2020-04-23 PROCEDURE — 70450 CT HEAD/BRAIN W/O DYE: CPT

## 2020-04-23 PROCEDURE — 82306 VITAMIN D 25 HYDROXY: CPT

## 2020-04-23 PROCEDURE — 2580000003 HC RX 258: Performed by: INTERNAL MEDICINE

## 2020-04-23 PROCEDURE — 6360000004 HC RX CONTRAST MEDICATION: Performed by: EMERGENCY MEDICINE

## 2020-04-23 PROCEDURE — 80053 COMPREHEN METABOLIC PANEL: CPT

## 2020-04-23 PROCEDURE — 82542 COL CHROMOTOGRAPHY QUAL/QUAN: CPT

## 2020-04-23 PROCEDURE — 6370000000 HC RX 637 (ALT 250 FOR IP): Performed by: INTERNAL MEDICINE

## 2020-04-23 PROCEDURE — 85610 PROTHROMBIN TIME: CPT

## 2020-04-23 PROCEDURE — 2700000000 HC OXYGEN THERAPY PER DAY

## 2020-04-23 PROCEDURE — 94660 CPAP INITIATION&MGMT: CPT

## 2020-04-23 PROCEDURE — 71045 X-RAY EXAM CHEST 1 VIEW: CPT

## 2020-04-23 PROCEDURE — 70496 CT ANGIOGRAPHY HEAD: CPT

## 2020-04-23 PROCEDURE — 36415 COLL VENOUS BLD VENIPUNCTURE: CPT

## 2020-04-23 PROCEDURE — 96365 THER/PROPH/DIAG IV INF INIT: CPT

## 2020-04-23 PROCEDURE — 99285 EMERGENCY DEPT VISIT HI MDM: CPT

## 2020-04-23 PROCEDURE — 2060000000 HC ICU INTERMEDIATE R&B

## 2020-04-23 PROCEDURE — 93010 ELECTROCARDIOGRAM REPORT: CPT | Performed by: INTERNAL MEDICINE

## 2020-04-23 PROCEDURE — 93005 ELECTROCARDIOGRAM TRACING: CPT | Performed by: EMERGENCY MEDICINE

## 2020-04-23 PROCEDURE — 85025 COMPLETE CBC W/AUTO DIFF WBC: CPT

## 2020-04-23 PROCEDURE — 83036 HEMOGLOBIN GLYCOSYLATED A1C: CPT

## 2020-04-23 PROCEDURE — 6360000002 HC RX W HCPCS: Performed by: INTERNAL MEDICINE

## 2020-04-23 PROCEDURE — 83970 ASSAY OF PARATHORMONE: CPT

## 2020-04-23 RX ORDER — ASPIRIN 81 MG/1
81 TABLET ORAL DAILY
Status: DISCONTINUED | OUTPATIENT
Start: 2020-04-24 | End: 2020-04-25

## 2020-04-23 RX ORDER — DEXTROSE MONOHYDRATE 50 MG/ML
100 INJECTION, SOLUTION INTRAVENOUS PRN
Status: DISCONTINUED | OUTPATIENT
Start: 2020-04-23 | End: 2020-04-29 | Stop reason: HOSPADM

## 2020-04-23 RX ORDER — HYDRALAZINE HYDROCHLORIDE 20 MG/ML
10 INJECTION INTRAMUSCULAR; INTRAVENOUS EVERY 6 HOURS PRN
Status: DISCONTINUED | OUTPATIENT
Start: 2020-04-23 | End: 2020-04-29 | Stop reason: HOSPADM

## 2020-04-23 RX ORDER — OXYCODONE HYDROCHLORIDE 5 MG/1
2.5 TABLET ORAL EVERY 6 HOURS PRN
Status: ACTIVE | OUTPATIENT
Start: 2020-04-23 | End: 2020-04-24

## 2020-04-23 RX ORDER — SODIUM CHLORIDE 0.9 % (FLUSH) 0.9 %
10 SYRINGE (ML) INJECTION EVERY 12 HOURS SCHEDULED
Status: DISCONTINUED | OUTPATIENT
Start: 2020-04-23 | End: 2020-04-29 | Stop reason: HOSPADM

## 2020-04-23 RX ORDER — ACETAMINOPHEN 650 MG/1
650 SUPPOSITORY RECTAL EVERY 6 HOURS PRN
Status: DISCONTINUED | OUTPATIENT
Start: 2020-04-23 | End: 2020-04-29 | Stop reason: HOSPADM

## 2020-04-23 RX ORDER — ONDANSETRON 2 MG/ML
4 INJECTION INTRAMUSCULAR; INTRAVENOUS EVERY 6 HOURS PRN
Status: DISCONTINUED | OUTPATIENT
Start: 2020-04-23 | End: 2020-04-29 | Stop reason: HOSPADM

## 2020-04-23 RX ORDER — DEXTROSE MONOHYDRATE 25 G/50ML
12.5 INJECTION, SOLUTION INTRAVENOUS PRN
Status: DISCONTINUED | OUTPATIENT
Start: 2020-04-23 | End: 2020-04-29 | Stop reason: HOSPADM

## 2020-04-23 RX ORDER — NICOTINE POLACRILEX 4 MG
15 LOZENGE BUCCAL PRN
Status: DISCONTINUED | OUTPATIENT
Start: 2020-04-23 | End: 2020-04-29 | Stop reason: HOSPADM

## 2020-04-23 RX ORDER — FUROSEMIDE 10 MG/ML
40 INJECTION INTRAMUSCULAR; INTRAVENOUS ONCE
Status: COMPLETED | OUTPATIENT
Start: 2020-04-23 | End: 2020-04-23

## 2020-04-23 RX ORDER — METOPROLOL TARTRATE 50 MG/1
100 TABLET, FILM COATED ORAL 2 TIMES DAILY
Status: DISCONTINUED | OUTPATIENT
Start: 2020-04-23 | End: 2020-04-24

## 2020-04-23 RX ORDER — CALCIUM CITRATE/VITAMIN D3 200MG-6.25
1 TABLET ORAL 2 TIMES DAILY
Status: DISCONTINUED | OUTPATIENT
Start: 2020-04-23 | End: 2020-04-23 | Stop reason: CLARIF

## 2020-04-23 RX ORDER — CHLORAL HYDRATE 500 MG
1000 CAPSULE ORAL DAILY
Status: DISCONTINUED | OUTPATIENT
Start: 2020-04-24 | End: 2020-04-23

## 2020-04-23 RX ORDER — AMLODIPINE BESYLATE 5 MG/1
2.5 TABLET ORAL DAILY
Status: DISCONTINUED | OUTPATIENT
Start: 2020-04-24 | End: 2020-04-24

## 2020-04-23 RX ORDER — LANOLIN ALCOHOL/MO/W.PET/CERES
200 CREAM (GRAM) TOPICAL DAILY
Status: DISCONTINUED | OUTPATIENT
Start: 2020-04-24 | End: 2020-04-24

## 2020-04-23 RX ORDER — M-VIT,TX,IRON,MINS/CALC/FOLIC 27MG-0.4MG
1 TABLET ORAL DAILY
Status: DISCONTINUED | OUTPATIENT
Start: 2020-04-24 | End: 2020-04-24

## 2020-04-23 RX ORDER — LOSARTAN POTASSIUM 50 MG/1
50 TABLET ORAL DAILY
Status: DISCONTINUED | OUTPATIENT
Start: 2020-04-24 | End: 2020-04-24

## 2020-04-23 RX ORDER — CETIRIZINE HYDROCHLORIDE 10 MG/1
5 TABLET ORAL DAILY
Status: DISCONTINUED | OUTPATIENT
Start: 2020-04-24 | End: 2020-04-29 | Stop reason: HOSPADM

## 2020-04-23 RX ORDER — CYANOCOBALAMIN (VITAMIN B-12) 1000 MCG
1 TABLET, EXTENDED RELEASE ORAL 2 TIMES DAILY
Status: ON HOLD | COMMUNITY
End: 2020-04-28 | Stop reason: HOSPADM

## 2020-04-23 RX ORDER — PROMETHAZINE HYDROCHLORIDE 25 MG/1
12.5 TABLET ORAL EVERY 6 HOURS PRN
Status: DISCONTINUED | OUTPATIENT
Start: 2020-04-23 | End: 2020-04-29 | Stop reason: HOSPADM

## 2020-04-23 RX ORDER — ACETAMINOPHEN 325 MG/1
650 TABLET ORAL EVERY 6 HOURS PRN
Status: DISCONTINUED | OUTPATIENT
Start: 2020-04-23 | End: 2020-04-29 | Stop reason: HOSPADM

## 2020-04-23 RX ORDER — ALLOPURINOL 100 MG/1
150 TABLET ORAL DAILY
Status: DISCONTINUED | OUTPATIENT
Start: 2020-04-24 | End: 2020-04-29 | Stop reason: HOSPADM

## 2020-04-23 RX ORDER — ASPIRIN 81 MG/1
324 TABLET, CHEWABLE ORAL ONCE
Status: DISCONTINUED | OUTPATIENT
Start: 2020-04-23 | End: 2020-04-23

## 2020-04-23 RX ORDER — FLUTICASONE PROPIONATE 50 MCG
2 SPRAY, SUSPENSION (ML) NASAL DAILY
Status: DISCONTINUED | OUTPATIENT
Start: 2020-04-24 | End: 2020-04-29 | Stop reason: HOSPADM

## 2020-04-23 RX ORDER — ATORVASTATIN CALCIUM 40 MG/1
40 TABLET, FILM COATED ORAL NIGHTLY
Status: DISCONTINUED | OUTPATIENT
Start: 2020-04-23 | End: 2020-04-29 | Stop reason: HOSPADM

## 2020-04-23 RX ORDER — ASCORBIC ACID 500 MG
1000 TABLET ORAL 2 TIMES DAILY
Status: DISCONTINUED | OUTPATIENT
Start: 2020-04-23 | End: 2020-04-24

## 2020-04-23 RX ORDER — ATORVASTATIN CALCIUM 40 MG/1
40 TABLET, FILM COATED ORAL DAILY
COMMUNITY
Start: 2020-03-27 | End: 2020-05-27 | Stop reason: SDUPTHER

## 2020-04-23 RX ORDER — POLYETHYLENE GLYCOL 3350 17 G/17G
17 POWDER, FOR SOLUTION ORAL DAILY PRN
Status: DISCONTINUED | OUTPATIENT
Start: 2020-04-23 | End: 2020-04-26

## 2020-04-23 RX ORDER — SODIUM CHLORIDE 0.9 % (FLUSH) 0.9 %
10 SYRINGE (ML) INJECTION PRN
Status: DISCONTINUED | OUTPATIENT
Start: 2020-04-23 | End: 2020-04-29 | Stop reason: HOSPADM

## 2020-04-23 RX ADMIN — FUROSEMIDE 40 MG: 10 INJECTION, SOLUTION INTRAMUSCULAR; INTRAVENOUS at 17:01

## 2020-04-23 RX ADMIN — ATORVASTATIN CALCIUM 40 MG: 40 TABLET, FILM COATED ORAL at 19:57

## 2020-04-23 RX ADMIN — IOPAMIDOL 75 ML: 755 INJECTION, SOLUTION INTRAVENOUS at 13:43

## 2020-04-23 RX ADMIN — APIXABAN 5 MG: 5 TABLET, FILM COATED ORAL at 19:58

## 2020-04-23 RX ADMIN — METOPROLOL TARTRATE 100 MG: 50 TABLET, FILM COATED ORAL at 19:58

## 2020-04-23 RX ADMIN — OYSTER SHELL CALCIUM WITH VITAMIN D 1 TABLET: 500; 200 TABLET, FILM COATED ORAL at 19:58

## 2020-04-23 RX ADMIN — SODIUM CHLORIDE, PRESERVATIVE FREE 10 ML: 5 INJECTION INTRAVENOUS at 19:59

## 2020-04-23 RX ADMIN — OXYCODONE HYDROCHLORIDE AND ACETAMINOPHEN 1000 MG: 500 TABLET ORAL at 19:58

## 2020-04-23 ASSESSMENT — PAIN SCALES - GENERAL
PAINLEVEL_OUTOF10: 0
PAINLEVEL_OUTOF10: 0

## 2020-04-23 NOTE — ED PROVIDER NOTES
[facility]  eMERGENCY dEPARTMENT eNCOUnter      Pt Name: Joseline Strong  MRN: [id number]  [place] 1940  Date of evaluation: 4/23/2020  Provider: Sabrina Crowder MD    CHIEF COMPLAINT       Chief Complaint   Patient presents with    Altered Mental Status     patient brought to ED via EMS from home. per EMS they were called to patient home for altered mental status by pt grandson and home health nurse. Per family patient was unable to do her normal ADL's (checking glucose) that she normally does. pt arrived alert and oriented x4. HISTORY OF PRESENT ILLNESS   (Location/Symptom, Timing/Onset,Context/Setting, Quality, Duration, Modifying Factors, Severity)  Note limiting factors. Joseline Strong is a 78 y.o. female who presents to the emergency department      HPI    NursingNotes were reviewed. REVIEW OF SYSTEMS    (2-9 systems for level 4, 10 or more for level 5)     Review of Systems    Except as noted above the remainder of the review of systems was reviewed and negative.        PAST MEDICAL HISTORY     Past Medical History:   Diagnosis Date    Blood circulation, collateral     Cerebral artery occlusion with cerebral infarction ([address]     Chronic deep vein thrombosis (DVT) of femoral vein of left lower extremity (HCC)     Chronic kidney disease (CKD), stage III (moderate) (HCC)     CVA (cerebral infarction)     Diabetes     DVT, lower extremity, recurrent ([address] 3/30/2012    x 3 LLE :  life long coumadin    Edema     First degree AV block     Gout     Heart murmur 10/14/2014    EF normal, no aortic stenosis Echo 11/11/14 - mild MR    Hypercalcemia 10/11/2018    Hypercholesterolemia     Hyperhomocysteinemia ([address] 4/16/12    Hypertension     TRUPTI (obstructive sleep apnea)     CPAP at 16cm    Osteoarthritis     Sciatica 11/8/2016    Traumatic hematoma of left lower leg 3/21/2017    Venous insufficiency     Visual loss R eye - REtinal detachment          SURGICALHISTORY       Past Surgical History:   Procedure Laterality Date    CATARACT REMOVAL WITH IMPLANT Bilateral     OTHER SURGICAL HISTORY      dental extraction    OTHER SURGICAL HISTORY Left     I and D Dr. Aldana Score left lower extremity         CURRENT MEDICATIONS       Previous Medications    ACETAMINOPHEN (APAP EXTRA STRENGTH) 500 MG TABLET    Take 1 tablet by mouth every 6 hours as needed for Pain    ALLOPURINOL (ZYLOPRIM) 300 MG TABLET    TAKE ONE HALF TABLET BY MOUTH DAILY    AMLODIPINE (NORVASC) 2.5 MG TABLET    TAKE 1 TABLET BY MOUTH DAILY    ASCORBIC ACID (VITAMIN C) 1000 MG TABLET    Take 1,000 mg by mouth 2 times daily     ASPIRIN 81 MG EC TABLET    Take 1 tablet by mouth daily    ATORVASTATIN (LIPITOR) 20 MG TABLET    Take 2 tablets by mouth daily    CAPSAICIN IN LIDOCAINE VEHICLE 0.25 % CREA    Apply peasize up to bid to painful area leg    CEPHALEXIN (KEFLEX) 500 MG CAPSULE    Take 1 capsule by mouth 2 times daily for 10 days    CHOLECALCIFEROL (VITAMIN D PO)    Take 1,000 Units by mouth daily     CLINDAMYCIN (CLEOCIN) 300 MG CAPSULE    Take 1 capsule by mouth 3 times daily for 10 days    CLONIDINE (CATAPRES) 0.1 MG TABLET    TAKE 0.5 (1/2) TABLET BY MOUTH 2 TIMES DAILY. DULAGLUTIDE (TRULICITY) 2.47 VN/8.1KX SOPN    Inject 0.75 mg into the skin once a week    ELIQUIS 5 MG TABS TABLET    TAKE 1 TABLET BY MOUTH 2 TIMES DAILY    EXENATIDE (BYDUREON) 2 MG PEN    2 mg weekly    FLUTICASONE (FLONASE) 50 MCG/ACT NASAL SPRAY    2 sprays by Nasal route daily    GLIPIZIDE (GLUCOTROL XL) 5 MG EXTENDED RELEASE TABLET    TAKE 1 TABLET BY MOUTH DAILY. LORATADINE (CLARITIN) 10 MG TABLET    Take 10 mg by mouth daily. LOSARTAN (COZAAR) 50 MG TABLET    TAKE 1 TABLET BY MOUTH DAILY.     MAGNESIUM OXIDE 250 MG TABS    Take 250 mg by mouth daily     METOPROLOL (LOPRESSOR) 100 MG TABLET    TAKE ONE TABLET BY MOUTH TWO TIMES A DAY    MULTIPLE MINERALS-VITAMINS (CITRACAL PLUS) TABS    Take 1 tablet by mouth 2 times daily. MULTIPLE VITAMINS-MINERALS (CENTRUM SILVER ADULT 50+) TABS    Take 1 tablet by mouth daily    OMEGA-3 FATTY ACIDS (FISH OIL) 1000 MG CAPS    Take 1,000 mg by mouth daily    POTASSIUM CHLORIDE (KLOR-CON) 10 MEQ EXTENDED RELEASE TABLET    TAKE ONE TABLET BY MOUTH DAILY    TRAMADOL (ULTRAM) 50 MG TABLET    Take 1 tablet by mouth every 8 hours as needed for Pain for up to 30 days. ALLERGIES     Latex; Ace inhibitors; Gabapentin; Hydrochlorothiazide; Neosporin [neomycin-bacitracin zn-polymyx]; Penicillins; Sulfa antibiotics; and Tape Angelica Shadow tape]    FAMILY HISTORY       Family History   Problem Relation Age of Onset    Other Father         AAA    Diabetes Mother     Heart Failure Mother     Arthritis Other     Cancer Brother         bone    High Blood Pressure Other           SOCIAL HISTORY       Social History     Socioeconomic History    Marital status:       Spouse name: Shannon Ornelas Number of children: 3    Years of education: None    Highest education level: None   Occupational History    Occupation: retired Dept    Social Needs    Financial resource strain: None    Food insecurity     Worry: None     Inability: None    Transportation needs     Medical: None     Non-medical: None   Tobacco Use    Smoking status: Never Smoker    Smokeless tobacco: Never Used   Substance and Sexual Activity    Alcohol use: No     Alcohol/week: 0.0 standard drinks    Drug use: No    Sexual activity: None   Lifestyle    Physical activity     Days per week: None     Minutes per session: None    Stress: None   Relationships    Social connections     Talks on phone: None     Gets together: None     Attends Hinduism service: None     Active member of club or organization: None     Attends meetings of clubs or organizations: None     Relationship status: None    Intimate partner violence     Fear of current or ex partner: None     Emotionally abused: None vascular congestion. No evidence for overt edema. CT HEAD WO CONTRAST   Preliminary Result   Stable moderate low-attenuation changes within the left frontal lobe likely   representing encephalomalacia. Additional stable chronic ischemic changes identified within the right   frontal lobe involving the external capsule. Unremarkable CT angiogram of the head. Stable 5 mm nodule within the right upper lobe and 4 mm nodule within the   left upper lobe. CT chest should be considered. Results of the examination were discussed with Dr. Valentina Shelley on 04/23/2020   at 2:20 p.m. RECOMMENDATIONS:   Fleischner Society guidelines for follow-up and management of incidentally   detected pulmonary nodules:      Multiple Solid Nodules:      Nodule size less than 6 mm   In a low-risk patient, no routine follow-up. In a high-risk patient, optional CT at 12 months. CTA HEAD NECK W CONTRAST   Preliminary Result   Stable moderate low-attenuation changes within the left frontal lobe likely   representing encephalomalacia. Additional stable chronic ischemic changes identified within the right   frontal lobe involving the external capsule. Unremarkable CT angiogram of the head. Stable 5 mm nodule within the right upper lobe and 4 mm nodule within the   left upper lobe. CT chest should be considered. Results of the examination were discussed with Dr. Valentina Shelley on 04/23/2020   at 2:20 p.m. RECOMMENDATIONS:   Fleischner Society guidelines for follow-up and management of incidentally   detected pulmonary nodules:      Multiple Solid Nodules:      Nodule size less than 6 mm   In a low-risk patient, no routine follow-up. In a high-risk patient, optional CT at 12 months.                ED BEDSIDE ULTRASOUND:   Performed by ED Physician - none    LABS:  Labs Reviewed   CBC WITH AUTO DIFFERENTIAL - Abnormal; Notable for the following components:       Result Value    RBC 3.93

## 2020-04-23 NOTE — ED NOTES
Patient bed linen changed and patient repositioned in bed to position of comfort at this time. Patient resting comfortably in bed. Patient denies any other wants or needs at this time. Call light within reach. Will continue to monitor.       Candi Mcclellan RN  04/23/20 [id number]

## 2020-04-23 NOTE — H&P
Laterality Date    CATARACT REMOVAL WITH IMPLANT Bilateral     OTHER SURGICAL HISTORY      dental extraction    OTHER SURGICAL HISTORY Left     I and D Dr. Elly Pederson left lower extremity       Medications (prior to admission):  Prior to Admission medications    Medication Sig Start Date End Date Taking? Authorizing Provider   clindamycin (CLEOCIN) 300 MG capsule Take 1 capsule by mouth 3 times daily for 10 days 4/18/20 4/28/20  Korin Bunch MD   Capsaicin in Lidocaine Vehicle 0.25 % CREA Apply peasize up to bid to painful area leg 4/18/20   Korin Bunch MD   cephALEXin [facility]) 500 MG capsule Take 1 capsule by mouth 2 times daily for 10 days 4/14/20 4/24/20  Korin Bunch MD   Exenatide (BYDUREON) 2 MG PEN 2 mg weekly 4/2/20   Keiry Mckeon MD   traMADol (ULTRAM) 50 MG tablet Take 1 tablet by mouth every 8 hours as needed for Pain for up to 30 days. 4/1/20 5/1/20  Ashwini Burgess MD   aspirin 81 MG EC tablet Take 1 tablet by mouth daily 3/27/20   Lavelle Valadez MD   atorvastatin (LIPITOR) 20 MG tablet Take 2 tablets by mouth daily 3/27/20   Lavelle Valadez MD   Multiple Vitamins-Minerals (CENTRUM SILVER ADULT 50+) TABS Take 1 tablet by mouth daily    Historical Provider, MD   Dulaglutide (TRULICITY) 5.36 CO/9.3QL SOPN Inject 0.75 mg into the skin once a week 3/17/20   Keiry Mckeon MD   ELIQUIS 5 MG TABS tablet TAKE 1 TABLET BY MOUTH 2 TIMES DAILY 2/12/20   Allegra Jene Dance, MD   amLODIPine (NORVASC) 2.5 MG tablet TAKE 1 TABLET BY MOUTH DAILY 2/12/20   Allegra Jene Dance, MD   losartan (COZAAR) 50 MG tablet TAKE 1 TABLET BY MOUTH DAILY. 11/26/19   Claude Couch APRN - CNP   cloNIDine (CATAPRES) 0.1 MG tablet TAKE 0.5 (1/2) TABLET BY MOUTH 2 TIMES DAILY.  11/26/19   Claude Couch APRN - CNP   potassium chloride (KLOR-CON) 10 MEQ extended release tablet TAKE ONE TABLET BY MOUTH DAILY 11/26/19   Claude Couch, APRN - CNP   glipiZIDE (GLUCOTROL XL) 5 MG extended release tablet 92%   BMI 50.30 kg/m²   Gen/overall appearance: Not in acute distress. Alert. Oriented x3. Head: Normocephalic, atraumatic  Eyes: EOMI, good acuity  ENT: Oral mucosa moist  Neck: No JVD, thyromegaly  CVS: Nml S1S2, no MRG, RRR  Pulm: Clear bilaterally. No crackles/wheezes  Gastrointestinal: Soft, NT/ND, +BS  Musculoskeletal: No edema. Warm  Neuro: No focal deficit. Moves extremity spontaneously. Psychiatry: Appropriate affect. Not agitated. Skin: Warm, dry with normal turgor. No rash  Capillary refill: Brisk,< 3 seconds   Peripheral Pulses: +2 palpable, equal bilaterally       Labs/imaging/EKG:  CBC:   Recent Labs     04/23/20  1340   WBC 8.1   HGB 13.0        BMP:    Recent Labs     04/21/20  1120 04/23/20  1340   NA  --  142   K  --  4.0   CL  --  100   CO2  --  30   BUN  --  24*   CREATININE 1.68* 1.7*   GLUCOSE  --  112*     Hepatic:   Recent Labs     04/23/20  1340   AST 24   ALT 16   BILITOT 0.3   ALKPHOS 66       Ct Head Wo Contrast    Result Date: 4/23/2020  EXAMINATION: CTA OF THE HEAD AND NECK WITH CONTRAST; CT OF THE HEAD WITHOUT CONTRAST 4/23/2020 1:39 pm: TECHNIQUE: CTA of the head and neck was performed with the administration of intravenous contrast. Multiplanar reformatted images are provided for review. MIP images are provided for review. Stenosis of the internal carotid arteries measured using NASCET criteria. Dose modulation, iterative reconstruction, and/or weight based adjustment of the mA/kV was utilized to reduce the radiation dose to as low as reasonably achievable.; CT of the head was performed without the administration of intravenous contrast. Dose modulation, iterative reconstruction, and/or weight based adjustment of the mA/kV was utilized to reduce the radiation dose to as low as reasonably achievable. COMPARISON: CT head 3/25/2020 HISTORY: ORDERING SYSTEM PROVIDED HISTORY: stroke TECHNOLOGIST PROVIDED HISTORY: Reason for exam:->stroke Reason for Exam: confusion/forgetful. iterative reconstruction, and/or weight based adjustment of the mA/kV was utilized to reduce the radiation dose to as low as reasonably achievable.; CT of the head was performed without the administration of intravenous contrast. Dose modulation, iterative reconstruction, and/or weight based adjustment of the mA/kV was utilized to reduce the radiation dose to as low as reasonably achievable. COMPARISON: CT head 3/25/2020 HISTORY: ORDERING SYSTEM PROVIDED HISTORY: stroke TECHNOLOGIST PROVIDED HISTORY: Reason for exam:->stroke Reason for Exam: confusion/forgetful. code stroke Acuity: Acute Type of Exam: Initial FINDINGS: CTA NECK: AORTIC ARCH/ARCH VESSELS: No dissection or arterial injury. No significant stenosis of the brachiocephalic or subclavian arteries. CAROTID ARTERIES: No dissection, arterial injury, or hemodynamically significant stenosis by NASCET criteria. VERTEBRAL ARTERIES: No dissection, arterial injury, or significant stenosis. SOFT TISSUES: There is a stable 4 mm nodule within the left upper lobe and 5 mm nodule within the right upper lobe. No cervical or superior mediastinal lymphadenopathy. The larynx and pharynx are unremarkable. No acute abnormality of the salivary and thyroid glands. BONES: No acute osseous abnormality. CTA HEAD: ANTERIOR CIRCULATION: No significant stenosis of the intracranial internal carotid, anterior cerebral, or middle cerebral arteries. No aneurysm. POSTERIOR CIRCULATION: No significant stenosis of the vertebral, basilar, or posterior cerebral arteries. No aneurysm. OTHER: No dural venous sinus thrombosis on this non-dedicated study. CT HEAD: BRAIN: There are low attenuation changes within the left frontal lobe that most likely represents encephalomalacia. There remain low attenuation changes within the left frontal lobe likely representing gliosis/chronic ischemia. No significant new abnormality is detected. No significant change is identified.  There is an area of low attenuation within the right external capsule within the right frontal lobe best identified on axial image number 28. No significant change is identified. No evidence of intracranial hemorrhage or hydrocephalus. No extra-axial collection is detected. The orbits are intact. No acute osseous abnormality is detected. Stable moderate low-attenuation changes within the left frontal lobe likely representing encephalomalacia. Additional stable chronic ischemic changes identified within the right frontal lobe involving the external capsule. Unremarkable CT angiogram of the head. Stable 5 mm nodule within the right upper lobe and 4 mm nodule within the left upper lobe. CT chest should be considered. Results of the examination were discussed with Dr. Kelvin Chaudhary on 04/23/2020 at 2:20 p.m. RECOMMENDATIONS: Fleischner Society guidelines for follow-up and management of incidentally detected pulmonary nodules: Multiple Solid Nodules: Nodule size less than 6 mm In a low-risk patient, no routine follow-up. In a high-risk patient, optional CT at 12 months. Cta Head Neck W Contrast    Result Date: 3/25/2020  EXAMINATION: CTA OF THE HEAD AND NECK WITH CONTRAST 3/25/2020 8:09 pm: TECHNIQUE: CTA of the head and neck was performed with the administration of intravenous contrast. Multiplanar reformatted images are provided for review. MIP images are provided for review. Stenosis of the internal carotid arteries measured using NASCET criteria. Dose modulation, iterative reconstruction, and/or weight based adjustment of the mA/kV was utilized to reduce the radiation dose to as low as reasonably achievable. COMPARISON: None. HISTORY: ORDERING SYSTEM PROVIDED HISTORY: expressive aphasia TECHNOLOGIST PROVIDED HISTORY: Reason for exam:->expressive aphasia Reason for Exam: Altered Mental Status (daughter called ems because she had phoned the patient and the patient was having a hard time getting her words out.  Has stroke hx in Distal internal carotid arteries are patent without focal significant narrowing. Vascular calcifications are noted. Anterior cerebral arteries are patent without focal narrowing. Middle cerebral arteries are patent without focal significant narrowing. There is a suggested small focus of mild narrowing involving an inferior sylvian fissure branch in the left MCA. POSTERIOR CIRCULATION: Distal left vertebral artery is larger than the right. Both are patent without focal significant narrowing. Basilar artery is widely patent. Posterior cerebral arteries are widely patent bilaterally. OTHER: Dural sinuses are patent. BRAIN: Brain parenchymal detail is limited due to artifact from slice thickness. No new areas of abnormal low density are noted. Atherosclerotic change in the cervical vessels without focal significant arterial narrowing Lung nodules as described. Correlate with CT chest. Heterogeneous parotid tissue bilaterally. This is nonspecific however can be seen in patients with mixed connective tissue disorder. Question mild focal narrowing in a distal inferior sylvian fissure MCA branch on the left Otherwise no focal significant arterial narrowing is noted. Mri Brain Without Contrast    Result Date: 3/26/2020  EXAMINATION: MRI OF THE BRAIN WITHOUT CONTRAST  3/26/2020 8:43 am TECHNIQUE: Multiplanar multisequence MRI of the brain was performed without the administration of intravenous contrast. COMPARISON: CT head on March 25, 2020. HISTORY: ORDERING SYSTEM PROVIDED HISTORY: TIA TECHNOLOGIST PROVIDED HISTORY: Reason for exam:->TIA Reason for Exam: TIA Acuity: Acute Type of Exam: Subsequent/Follow-up Additional signs and symptoms:  Difficulty finding words x3/25/2020 and aphasia. FINDINGS: INTRACRANIAL STRUCTURES/VENTRICLES: There is no acute infarct. Moderate area of encephalomalacia and gliosis centered within the left frontoparietal lobe from previous chronic infarct.  There is mild-to-moderate amount of nonspecific T2 hyperintense white matter lesions bilaterally, which are most often attributed to chronic small vessel ischemic white matter disease. Moderate prominence of ventricles and sulci is compatible with cerebral volume loss. No abnormal susceptibility artifact is identified. No midline shift. No significant mass effect. ORBITS: The visualized portion of the orbits demonstrate no acute abnormality. SINUSES: The visualized paranasal sinuses and mastoid air cells are well aerated. BONES/SOFT TISSUES: No significant abnormalities. 1. No acute infarct, intracranial hemorrhage, or significant mass effect. 2. Moderate area of encephalomalacia and gliosis centered within left frontoparietal lobe, likely from previous infarct. 3. Chronic small vessel ischemic white matter disease and diffuse cerebral volume loss. EKG: Normal sinus rhythm. Nonspecific T changes in inferior leads. No ST changes. I reviewed EKG. Discussed with ER provider.       Thank you Herlinda Villegas MD for the opportunity to be involved in this patient's care.    -----------------------------  Isabella Pendleton MD  [facility]

## 2020-04-24 ENCOUNTER — APPOINTMENT (OUTPATIENT)
Dept: MRI IMAGING | Age: 80
DRG: 640 | End: 2020-04-24
Payer: MEDICARE

## 2020-04-24 LAB
A/G RATIO: 0.8 (ref 1.1–2.2)
ALBUMIN SERPL-MCNC: 2.7 G/DL (ref 3.4–5)
ALP BLD-CCNC: 59 U/L (ref 40–129)
ALT SERPL-CCNC: 14 U/L (ref 10–40)
ANION GAP SERPL CALCULATED.3IONS-SCNC: 14 MMOL/L (ref 3–16)
ANION GAP SERPL CALCULATED.3IONS-SCNC: 16 MMOL/L (ref 3–16)
AST SERPL-CCNC: 23 U/L (ref 15–37)
BILIRUB SERPL-MCNC: 0.3 MG/DL (ref 0–1)
BILIRUBIN URINE: NEGATIVE
BLOOD, URINE: NEGATIVE
BUN BLDV-MCNC: 25 MG/DL (ref 7–20)
BUN BLDV-MCNC: 29 MG/DL (ref 7–20)
CALCIUM SERPL-MCNC: 11.8 MG/DL (ref 8.3–10.6)
CALCIUM SERPL-MCNC: 12.6 MG/DL (ref 8.3–10.6)
CHLORIDE BLD-SCNC: 99 MMOL/L (ref 99–110)
CHLORIDE BLD-SCNC: 99 MMOL/L (ref 99–110)
CHOLESTEROL, TOTAL: 164 MG/DL (ref 0–199)
CLARITY: CLEAR
CO2: 26 MMOL/L (ref 21–32)
CO2: 28 MMOL/L (ref 21–32)
COLOR: YELLOW
CREAT SERPL-MCNC: 1.9 MG/DL (ref 0.6–1.2)
CREAT SERPL-MCNC: 2.3 MG/DL (ref 0.6–1.2)
CREATININE URINE: 61 MG/DL (ref 28–259)
EPITHELIAL CELLS, UA: 1 /HPF (ref 0–5)
ESTIMATED AVERAGE GLUCOSE: 165.7 MG/DL
GFR AFRICAN AMERICAN: 25
GFR AFRICAN AMERICAN: 31
GFR NON-AFRICAN AMERICAN: 20
GFR NON-AFRICAN AMERICAN: 25
GLOBULIN: 3.5 G/DL
GLUCOSE BLD-MCNC: 120 MG/DL (ref 70–99)
GLUCOSE BLD-MCNC: 137 MG/DL (ref 70–99)
GLUCOSE BLD-MCNC: 176 MG/DL (ref 70–99)
GLUCOSE BLD-MCNC: 229 MG/DL (ref 70–99)
GLUCOSE BLD-MCNC: 233 MG/DL (ref 70–99)
GLUCOSE BLD-MCNC: 260 MG/DL (ref 70–99)
GLUCOSE URINE: NEGATIVE MG/DL
HBA1C MFR BLD: 7.4 %
HCT VFR BLD CALC: 38 % (ref 36–48)
HDLC SERPL-MCNC: 46 MG/DL (ref 40–60)
HEMOGLOBIN: 12.8 G/DL (ref 12–16)
HYALINE CASTS: 5 /LPF (ref 0–8)
KETONES, URINE: NEGATIVE MG/DL
LDL CHOLESTEROL CALCULATED: 74 MG/DL
LEUKOCYTE ESTERASE, URINE: NEGATIVE
MAGNESIUM: 2.4 MG/DL (ref 1.8–2.4)
MCH RBC QN AUTO: 32.9 PG (ref 26–34)
MCHC RBC AUTO-ENTMCNC: 33.8 G/DL (ref 31–36)
MCV RBC AUTO: 97.3 FL (ref 80–100)
MICROSCOPIC EXAMINATION: YES
NITRITE, URINE: NEGATIVE
PDW BLD-RTO: 15.5 % (ref 12.4–15.4)
PERFORMED ON: ABNORMAL
PH UA: 7 (ref 5–8)
PHOSPHORUS: 3.8 MG/DL (ref 2.5–4.9)
PLATELET # BLD: 228 K/UL (ref 135–450)
PMV BLD AUTO: 7.1 FL (ref 5–10.5)
POTASSIUM SERPL-SCNC: 3.7 MMOL/L (ref 3.5–5.1)
POTASSIUM SERPL-SCNC: 3.8 MMOL/L (ref 3.5–5.1)
PROTEIN PROTEIN: 0.78 G/DL
PROTEIN PROTEIN: 780 MG/DL
PROTEIN UA: >=300 MG/DL
RBC # BLD: 3.91 M/UL (ref 4–5.2)
RBC UA: 8 /HPF (ref 0–4)
REASON FOR REJECTION: NORMAL
REJECTED TEST: NORMAL
SODIUM BLD-SCNC: 141 MMOL/L (ref 136–145)
SODIUM BLD-SCNC: 141 MMOL/L (ref 136–145)
SODIUM URINE: 75 MMOL/L
SPECIFIC GRAVITY UA: 1.02 (ref 1–1.03)
TOTAL PROTEIN: 6.2 G/DL (ref 6.4–8.2)
TRIGL SERPL-MCNC: 219 MG/DL (ref 0–150)
URINE TYPE: ABNORMAL
UROBILINOGEN, URINE: 0.2 E.U./DL
VLDLC SERPL CALC-MCNC: 44 MG/DL
WBC # BLD: 8.7 K/UL (ref 4–11)
WBC UA: 4 /HPF (ref 0–5)

## 2020-04-24 PROCEDURE — 36415 COLL VENOUS BLD VENIPUNCTURE: CPT

## 2020-04-24 PROCEDURE — 97166 OT EVAL MOD COMPLEX 45 MIN: CPT

## 2020-04-24 PROCEDURE — 83735 ASSAY OF MAGNESIUM: CPT

## 2020-04-24 PROCEDURE — 97535 SELF CARE MNGMENT TRAINING: CPT

## 2020-04-24 PROCEDURE — 84100 ASSAY OF PHOSPHORUS: CPT

## 2020-04-24 PROCEDURE — 82570 ASSAY OF URINE CREATININE: CPT

## 2020-04-24 PROCEDURE — 70551 MRI BRAIN STEM W/O DYE: CPT

## 2020-04-24 PROCEDURE — 80053 COMPREHEN METABOLIC PANEL: CPT

## 2020-04-24 PROCEDURE — 97530 THERAPEUTIC ACTIVITIES: CPT

## 2020-04-24 PROCEDURE — 84165 PROTEIN E-PHORESIS SERUM: CPT

## 2020-04-24 PROCEDURE — 6360000002 HC RX W HCPCS: Performed by: INTERNAL MEDICINE

## 2020-04-24 PROCEDURE — 97530 THERAPEUTIC ACTIVITIES: CPT | Performed by: PHYSICAL THERAPIST

## 2020-04-24 PROCEDURE — 85027 COMPLETE CBC AUTOMATED: CPT

## 2020-04-24 PROCEDURE — 84156 ASSAY OF PROTEIN URINE: CPT

## 2020-04-24 PROCEDURE — 84300 ASSAY OF URINE SODIUM: CPT

## 2020-04-24 PROCEDURE — 2060000000 HC ICU INTERMEDIATE R&B

## 2020-04-24 PROCEDURE — 84166 PROTEIN E-PHORESIS/URINE/CSF: CPT

## 2020-04-24 PROCEDURE — 6370000000 HC RX 637 (ALT 250 FOR IP): Performed by: INTERNAL MEDICINE

## 2020-04-24 PROCEDURE — 2580000003 HC RX 258: Performed by: INTERNAL MEDICINE

## 2020-04-24 PROCEDURE — 81001 URINALYSIS AUTO W/SCOPE: CPT

## 2020-04-24 PROCEDURE — 80061 LIPID PANEL: CPT

## 2020-04-24 PROCEDURE — 97163 PT EVAL HIGH COMPLEX 45 MIN: CPT | Performed by: PHYSICAL THERAPIST

## 2020-04-24 PROCEDURE — 94760 N-INVAS EAR/PLS OXIMETRY 1: CPT

## 2020-04-24 PROCEDURE — 92610 EVALUATE SWALLOWING FUNCTION: CPT

## 2020-04-24 PROCEDURE — 84155 ASSAY OF PROTEIN SERUM: CPT

## 2020-04-24 PROCEDURE — 82542 COL CHROMOTOGRAPHY QUAL/QUAN: CPT

## 2020-04-24 RX ORDER — AMLODIPINE BESYLATE 10 MG/1
10 TABLET ORAL DAILY
Status: DISCONTINUED | OUTPATIENT
Start: 2020-04-25 | End: 2020-04-29 | Stop reason: HOSPADM

## 2020-04-24 RX ORDER — CALCITONIN SALMON 200 [USP'U]/ML
200 INJECTION, SOLUTION INTRAMUSCULAR; SUBCUTANEOUS ONCE
Status: COMPLETED | OUTPATIENT
Start: 2020-04-25 | End: 2020-04-25

## 2020-04-24 RX ORDER — CALCITONIN SALMON 200 [USP'U]/ML
200 INJECTION, SOLUTION INTRAMUSCULAR; SUBCUTANEOUS ONCE
Status: COMPLETED | OUTPATIENT
Start: 2020-04-24 | End: 2020-04-24

## 2020-04-24 RX ORDER — CARVEDILOL 12.5 MG/1
12.5 TABLET ORAL 2 TIMES DAILY WITH MEALS
Status: DISCONTINUED | OUTPATIENT
Start: 2020-04-24 | End: 2020-04-27

## 2020-04-24 RX ORDER — CALCITONIN SALMON 200 [USP'U]/ML
4 INJECTION, SOLUTION INTRAMUSCULAR; SUBCUTANEOUS 2 TIMES DAILY
Status: DISCONTINUED | OUTPATIENT
Start: 2020-04-24 | End: 2020-04-24 | Stop reason: DRUGHIGH

## 2020-04-24 RX ORDER — SODIUM CHLORIDE 9 MG/ML
INJECTION, SOLUTION INTRAVENOUS CONTINUOUS
Status: DISCONTINUED | OUTPATIENT
Start: 2020-04-24 | End: 2020-04-27

## 2020-04-24 RX ADMIN — METOPROLOL TARTRATE 100 MG: 50 TABLET, FILM COATED ORAL at 08:56

## 2020-04-24 RX ADMIN — APIXABAN 5 MG: 5 TABLET, FILM COATED ORAL at 20:10

## 2020-04-24 RX ADMIN — CALCITONIN SALMON 200 UNITS: 200 INJECTION, SOLUTION INTRAMUSCULAR; SUBCUTANEOUS at 15:03

## 2020-04-24 RX ADMIN — Medication 200 MG: at 08:56

## 2020-04-24 RX ADMIN — HYDRALAZINE HYDROCHLORIDE 10 MG: 20 INJECTION INTRAMUSCULAR; INTRAVENOUS at 19:08

## 2020-04-24 RX ADMIN — LOSARTAN POTASSIUM 50 MG: 50 TABLET, FILM COATED ORAL at 08:56

## 2020-04-24 RX ADMIN — MULTIPLE VITAMINS W/ MINERALS TAB 1 TABLET: TAB at 08:55

## 2020-04-24 RX ADMIN — CALCITONIN SALMON 200 UNITS: 200 INJECTION, SOLUTION INTRAMUSCULAR; SUBCUTANEOUS at 15:04

## 2020-04-24 RX ADMIN — INSULIN LISPRO 2 UNITS: 100 INJECTION, SOLUTION INTRAVENOUS; SUBCUTANEOUS at 12:48

## 2020-04-24 RX ADMIN — ALLOPURINOL 150 MG: 100 TABLET ORAL at 08:55

## 2020-04-24 RX ADMIN — SODIUM CHLORIDE, PRESERVATIVE FREE 10 ML: 5 INJECTION INTRAVENOUS at 08:58

## 2020-04-24 RX ADMIN — CARVEDILOL 12.5 MG: 12.5 TABLET, FILM COATED ORAL at 18:36

## 2020-04-24 RX ADMIN — ATORVASTATIN CALCIUM 40 MG: 40 TABLET, FILM COATED ORAL at 20:10

## 2020-04-24 RX ADMIN — CETIRIZINE HYDROCHLORIDE 5 MG: 10 TABLET, FILM COATED ORAL at 08:56

## 2020-04-24 RX ADMIN — SODIUM CHLORIDE: 9 INJECTION, SOLUTION INTRAVENOUS at 16:38

## 2020-04-24 RX ADMIN — ASPIRIN 81 MG: 81 TABLET, COATED ORAL at 08:56

## 2020-04-24 RX ADMIN — INSULIN LISPRO 1 UNITS: 100 INJECTION, SOLUTION INTRAVENOUS; SUBCUTANEOUS at 20:10

## 2020-04-24 RX ADMIN — APIXABAN 5 MG: 5 TABLET, FILM COATED ORAL at 08:56

## 2020-04-24 RX ADMIN — AMLODIPINE BESYLATE 2.5 MG: 5 TABLET ORAL at 08:57

## 2020-04-24 RX ADMIN — OXYCODONE HYDROCHLORIDE AND ACETAMINOPHEN 1000 MG: 500 TABLET ORAL at 08:55

## 2020-04-24 ASSESSMENT — PAIN SCALES - GENERAL: PAINLEVEL_OUTOF10: 0

## 2020-04-24 NOTE — CONSULTS
by mouth daily       acetaminophen (APAP EXTRA STRENGTH) 500 MG tablet Take 1 tablet by mouth every 6 hours as needed for Pain 40 tablet 0    loratadine (CLARITIN) 10 MG tablet Take 10 mg by mouth daily. Allergies:  Latex; Ace inhibitors; Gabapentin; Hydrochlorothiazide; Neosporin [neomycin-bacitracin zn-polymyx]; Penicillins; Sulfa antibiotics; and Tape [employer] tape]    Social History:    Social History     Socioeconomic History    Marital status:       Spouse name: George Newby Number of children: 3    Years of education: Not on file    Highest education level: Not on file   Occupational History    Occupation: retired Dept    Social Needs    Financial resource strain: Not on file    Food insecurity     Worry: Not on file     Inability: Not on file   [employer] needs     Medical: Not on file     Non-medical: Not on file   Tobacco Use    Smoking status: Never Smoker    Smokeless tobacco: Never Used   Substance and Sexual Activity    Alcohol use: No     Alcohol/week: 0.0 standard drinks    Drug use: No    Sexual activity: Not on file   Lifestyle    Physical activity     Days per week: Not on file     Minutes per session: Not on file    Stress: Not on file   Relationships    Social connections     Talks on phone: Not on file     Gets together: Not on file     Attends Mosque service: Not on file     Active member of club or organization: Not on file     Attends meetings of clubs or organizations: Not on file     Relationship status: Not on file    Intimate partner violence     Fear of current or ex partner: Not on file     Emotionally abused: Not on file     Physically abused: Not on file     Forced sexual activity: Not on file   Other Topics Concern    Not on file   Social History Narrative    Not on file       Family History:       Problem Relation Age of Onset    Other Father         AAA    Diabetes Mother     Heart Failure Mother     Arthritis Other     x 1 dose  -repeat calcium level    DOUGLAS: likely pre-renal due to hypocalcemia, cannot exclude GN for now given degree of proteinuria  -check urine studies  -start IVF asa paulino    CKD stage 3:followed by Dr. Frankie Paris Scr mid to high 1 range  -presumed DN/HTn NS  -hold losartan for now  -d/c high dose vitamin C  -continue BP/BS control  -decrease dose of Eliquis    Nephrotic range proteinuria: last UPC is 13  -will repeat, check serologies if indicated  -hold ARB for now    HTN: not at goal, add carvedilol, increase amlodipine    H/o DVT:   -recommend dose reduction to 2.5mg bid     DM2: per primary    Anemia: no YANCI indicated      Thank you for allowing me to participate in the care of this patient. I will continue to follow along. Please call with questions.     Karo Cole MD

## 2020-04-24 NOTE — PROGRESS NOTES
resume home ST services upon discharge. Dysphagia Outcome Severity Scale: Level 5: Mild dysphagia- Distant supervision. May need one diet consistency restricted     Treatment Plan  Requires SLP Intervention: Yes  Duration/Frequency of Treatment: ST to follow-up 1-3 times for dysphagia during acute admission  D/C Recommendations: Home ST    Recommended Diet and Intervention  Diet Solids Recommendation: Regular  Liquid Consistency Recommendation: Thin  Recommended Form of Meds: PO  Compensatory Swallowing Strategies: Upright as possible for all oral intake;Remain upright for 30-45 minutes after meals  Therapeutic Interventions: Diet tolerance monitoring;Patient/Family education    Treatment/Goals  Dysphagia Goals: The patient will tolerate recommended diet without observed clinical signs of aspiration; The patient/caregiver will demonstrate understanding of compensatory strategies for improved swallowing safety. General  Chart Reviewed: Yes  Subjective: Accepted and tolerated evaluation at bedside  Behavior/Cognition: Alert; Cooperative;Pleasant mood  Communication Observation: Aphasia(patient noted with decreased word-finding - patient reports decreased word-finding is variable/intermittent)  Follows Directions: Simple  Dentition: Dentures top;Dentures bottom  Patient Positioning: Upright in bed  Baseline Vocal Quality: Normal  Volitional Cough: Strong  Volitional Swallow: Delayed  Consistencies Administered: Dysphagia Pureed (Dysphagia I); Thin - straw;Reg solid    Vision/Hearing  Vision: Within Functional Limits  Hearing: Within functional limits    Oral Motor Deficits  Labial ROM: Reduced right(trace)  Labial Symmetry: Abnormal symmetry right(trace)  Lingual ROM: Reduced right(trace)  Lingual Symmetry: Abnormal symmetry right(trace)    Oral Phase Dysfunction  Impaired Mastication: Reg Solid(prolonged but appears adequate)     Indicators of Pharyngeal Phase Dysfunction  Delayed Swallow:  All  Decreased Laryngeal

## 2020-04-24 NOTE — PROGRESS NOTES
history of Blood circulation, collateral, Cerebral artery occlusion with cerebral infarction [facility]), Chronic deep vein thrombosis (DVT) of femoral vein of left lower extremity (HCC), Chronic kidney disease (CKD), stage III (moderate) ([address] CVA (cerebral infarction), Diabetes, DVT, lower extremity, recurrent ([facility] [address] Edema, First degree AV block, Gout, Heart murmur, Hypercalcemia, Hypercholesterolemia, Hyperhomocysteinemia (HCC), Hypertension, TRUPTI (obstructive sleep apnea), Osteoarthritis, Sciatica, Traumatic hematoma of left lower leg, Venous insufficiency, and Visual loss R eye - REtinal detachment. has a past surgical history that includes other surgical history; Cataract removal with implant (Bilateral); and other surgical history (Left). Restrictions  Restrictions/Precautions  Restrictions/Precautions: Fall Risk  Vision/Hearing  Vision: Within Functional Limits  Hearing: Within functional limits     Subjective  General  Chart Reviewed: Yes  Patient assessed for rehabilitation services?: Yes  Additional Pertinent Hx: per H&P note: This is a pleasant 78 y.o. 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 Patient was last known well around 8 PM on 4/22/2020. She denies any extremity weakness. On presentation to the emergency room, patient appears to be alert and oriented x4, although noted to have decreased ability to perform activities of daily living such as checking her blood sugar at home. She was noted to have significantly elevated calcium level of 12.6 in the emergency room. Her creatinine is about her baseline of 1.7.   CT-head revealed stable moderate low-attenuation changes within the

## 2020-04-24 NOTE — PROGRESS NOTES
Occupational Therapy   Occupational Therapy Initial Assessment  Date: 2020   Patient Name: Karen Monahan  MRN: [id number]     : 1940    Date of Service: 2020    Discharge Recommendations:  Patient would benefit from continued therapy after discharge, 5-7 sessions per week  OT Equipment Recommendations  Other: defer to [address] [place] [address] scored a  on the AM-PAC ADL Inpatient form. Current research shows that an AM-PAC score of 17 or less is typically not associated with a discharge to the patient's home setting. Based on the patients AM-PAC score and their current ADL deficits, it is recommended that the patient have 5-7 sessions per week of Occupational Therapy at d/c to increase the patients independence. If patient discharges prior to next session this note will serve as a discharge summary. Please see below for the latest assessment towards goals. Assessment   Performance deficits / Impairments: Decreased functional mobility ; Decreased balance;Decreased ROM; Decreased endurance;Decreased strength;Decreased cognition  Assessment: 77 y/o female admitted 2020 with acute metabolic encephalopathy suspect secondary to significant hypercalcemia. PTA pt lives at home with family. Family assist with ADLs but pt reports she can ambulate with RW. Today, pt oriented to self only. Pt intermittently follows commands with repetition and with delayed processing. Pt sit <> stands ranged from min A- max A x 2 pending pt effort. Pt with forward flex posture when standing. Anticipate pt will require up to max A for ADLs at this time. Pt is functioning below baseline and when benefit from skilled therapy at this time.    Prognosis: Fair  Decision Making: Medium Complexity  History: see above  OT Education: OT Role;Plan of Care  REQUIRES OT FOLLOW UP: Yes  Activity Tolerance  Activity Tolerance: Treatment limited secondary to decreased cognition  Safety Devices  Safety Devices in assistance  Transfer Comments: max A x 2 to stand from bed to mamadou gaston, min A to stand from [facility] to [address] [place] [address]. Forward flex posture and slow to stand     Cognition  Overall Cognitive Status: Exceptions  Arousal/Alertness: Delayed responses to stimuli  Following Commands: Inconsistently follows commands; Follows one step commands with repetition; Follows one step commands with increased time  Attention Span: Attends with cues to redirect  Memory: Decreased recall of recent events;Decreased short term memory  Safety Judgement: Decreased awareness of need for assistance;Decreased awareness of need for safety  Insights: Decreased awareness of deficits  Initiation: Requires cues for some  Sequencing: Requires cues for some  Perception  Overall Perceptual Status: WFL     Sensation  Overall Sensation Status: (reports rocio feet hypersensitive)        LUE AROM (degrees)  LUE General AROM: grossly functional  Left Hand AROM (degrees)  Left Hand AROM: WFL  RUE AROM (degrees)  RUE General AROM: grossly functional  Right Hand AROM (degrees)  Right Hand AROM: WFL     Plan   Plan  Times per week: 3-5  Current Treatment Recommendations: Strengthening, Endurance Training, ROM, Self-Care / ADL, Balance Training, Gait Training, Stair training, Functional Mobility Training    AM-PAC Score  AM-[facility] Inpatient Daily Activity Raw Score: 9 (04/24/20 1413)  -PAC Inpatient ADL T-Scale Score : 25.33 (04/24/20 1413)  ADL Inpatient CMS 0-100% Score: 79.59 (04/24/20 1413)  ADL Inpatient CMS G-Code Modifier : CL (04/24/20 1413)    Goals  Short term goals  Time Frame for Short term goals: Prior to DC:   Short term goal 1: Pt will complete ADL transfers/mobility with SBA  Short term goal 2: Pt will tolerate standing > 3 min for functional task with SBA  Short term goal 3: Pt will complete toileting with SBA  Short term goal 4: Pt will complete LB dressing with min A  Long term goals  Time Frame for Long term goals : stgs=ltgs  Patient Goals   Patient goals : to return home       Therapy Time   Individual Concurrent Group Co-treatment   Time In 5618         Time Out 1415         Minutes 70         Timed Code Treatment Minutes: 70 Minutes     This note to serve as OT d/c summary if pt is d/c-ed prior to next therapy session.     Hilda Street, OTR/L

## 2020-04-24 NOTE — PROGRESS NOTES
Patient has critical calcium level of 12.6. Notified OLIVIER Hermosillo of critical lab value. No new orders at this time. Will continue to monitor patient.

## 2020-04-24 NOTE — PROGRESS NOTES
Speech Language Pathology    Evaluation attempted. Patient preparing for diagnostics; RN requests ST to re-attempt after patient returns from diagnostics. ST to re-attempt as schedule permits unless otherwise notified. Thank you. Kathryn Mendoza, Jane Bailey, #[id number]  Speech-Language Pathologist  Portable phone: [phone]

## 2020-04-25 LAB
ANION GAP SERPL CALCULATED.3IONS-SCNC: 12 MMOL/L (ref 3–16)
BUN BLDV-MCNC: 28 MG/DL (ref 7–20)
CALCIUM IONIZED: 1.38 MMOL/L (ref 1.12–1.32)
CALCIUM SERPL-MCNC: 10.6 MG/DL (ref 8.3–10.6)
CHLORIDE BLD-SCNC: 104 MMOL/L (ref 99–110)
CO2: 24 MMOL/L (ref 21–32)
CREAT SERPL-MCNC: 2 MG/DL (ref 0.6–1.2)
GFR AFRICAN AMERICAN: 29
GFR NON-AFRICAN AMERICAN: 24
GLUCOSE BLD-MCNC: 184 MG/DL (ref 70–99)
GLUCOSE BLD-MCNC: 214 MG/DL (ref 70–99)
GLUCOSE BLD-MCNC: 215 MG/DL (ref 70–99)
GLUCOSE BLD-MCNC: 216 MG/DL (ref 70–99)
GLUCOSE BLD-MCNC: 235 MG/DL (ref 70–99)
PERFORMED ON: ABNORMAL
PH VENOUS: 7.5 (ref 7.35–7.45)
POTASSIUM SERPL-SCNC: 3.7 MMOL/L (ref 3.5–5.1)
SODIUM BLD-SCNC: 140 MMOL/L (ref 136–145)

## 2020-04-25 PROCEDURE — 6360000002 HC RX W HCPCS: Performed by: INTERNAL MEDICINE

## 2020-04-25 PROCEDURE — 6370000000 HC RX 637 (ALT 250 FOR IP): Performed by: INTERNAL MEDICINE

## 2020-04-25 PROCEDURE — 2580000003 HC RX 258: Performed by: INTERNAL MEDICINE

## 2020-04-25 PROCEDURE — 94760 N-INVAS EAR/PLS OXIMETRY 1: CPT

## 2020-04-25 PROCEDURE — 80048 BASIC METABOLIC PNL TOTAL CA: CPT

## 2020-04-25 PROCEDURE — 2060000000 HC ICU INTERMEDIATE R&B

## 2020-04-25 PROCEDURE — 82330 ASSAY OF CALCIUM: CPT

## 2020-04-25 PROCEDURE — 36415 COLL VENOUS BLD VENIPUNCTURE: CPT

## 2020-04-25 RX ORDER — HEPARIN SODIUM 5000 [USP'U]/ML
5000 INJECTION, SOLUTION INTRAVENOUS; SUBCUTANEOUS 2 TIMES DAILY
Status: DISCONTINUED | OUTPATIENT
Start: 2020-04-26 | End: 2020-04-28

## 2020-04-25 RX ADMIN — ATORVASTATIN CALCIUM 40 MG: 40 TABLET, FILM COATED ORAL at 21:13

## 2020-04-25 RX ADMIN — INSULIN LISPRO 2 UNITS: 100 INJECTION, SOLUTION INTRAVENOUS; SUBCUTANEOUS at 09:03

## 2020-04-25 RX ADMIN — SODIUM CHLORIDE: 9 INJECTION, SOLUTION INTRAVENOUS at 09:00

## 2020-04-25 RX ADMIN — INSULIN LISPRO 1 UNITS: 100 INJECTION, SOLUTION INTRAVENOUS; SUBCUTANEOUS at 21:13

## 2020-04-25 RX ADMIN — ASPIRIN 81 MG: 81 TABLET, COATED ORAL at 08:52

## 2020-04-25 RX ADMIN — INSULIN LISPRO 2 UNITS: 100 INJECTION, SOLUTION INTRAVENOUS; SUBCUTANEOUS at 17:41

## 2020-04-25 RX ADMIN — SODIUM CHLORIDE, PRESERVATIVE FREE 10 ML: 5 INJECTION INTRAVENOUS at 09:03

## 2020-04-25 RX ADMIN — SODIUM CHLORIDE: 9 INJECTION, SOLUTION INTRAVENOUS at 17:28

## 2020-04-25 RX ADMIN — HYDRALAZINE HYDROCHLORIDE 10 MG: 20 INJECTION INTRAMUSCULAR; INTRAVENOUS at 03:02

## 2020-04-25 RX ADMIN — CALCITONIN SALMON 200 UNITS: 200 INJECTION, SOLUTION INTRAMUSCULAR; SUBCUTANEOUS at 03:02

## 2020-04-25 RX ADMIN — CETIRIZINE HYDROCHLORIDE 5 MG: 10 TABLET, FILM COATED ORAL at 08:52

## 2020-04-25 RX ADMIN — INSULIN LISPRO 2 UNITS: 100 INJECTION, SOLUTION INTRAVENOUS; SUBCUTANEOUS at 13:02

## 2020-04-25 RX ADMIN — SODIUM CHLORIDE, PRESERVATIVE FREE 10 ML: 5 INJECTION INTRAVENOUS at 21:13

## 2020-04-25 RX ADMIN — CALCITONIN SALMON 200 UNITS: 200 INJECTION, SOLUTION INTRAMUSCULAR; SUBCUTANEOUS at 03:03

## 2020-04-25 RX ADMIN — FLUTICASONE PROPIONATE 2 SPRAY: 50 SPRAY, METERED NASAL at 08:52

## 2020-04-25 RX ADMIN — CARVEDILOL 12.5 MG: 12.5 TABLET, FILM COATED ORAL at 17:29

## 2020-04-25 RX ADMIN — CARVEDILOL 12.5 MG: 12.5 TABLET, FILM COATED ORAL at 08:53

## 2020-04-25 RX ADMIN — ALLOPURINOL 150 MG: 100 TABLET ORAL at 08:53

## 2020-04-25 RX ADMIN — AMLODIPINE BESYLATE 10 MG: 10 TABLET ORAL at 08:53

## 2020-04-25 RX ADMIN — APIXABAN 5 MG: 5 TABLET, FILM COATED ORAL at 08:52

## 2020-04-25 ASSESSMENT — PAIN SCALES - GENERAL
PAINLEVEL_OUTOF10: 0
PAINLEVEL_OUTOF10: 0

## 2020-04-25 NOTE — PROGRESS NOTES
NEPHROLOGY PROGRESS NOTE    Patient: Naeem Higginbotham MRN: [id number]     YOB: 1940  Age: 78 y.o. Sex: female    Unit: [address] Room/Bed: R1M-3138/5108-01 Location: [address] [place] [address]     Admitting Physician: Ritu Castro    Primary Care Physician: Mary Crystal MD          LOS: 2 days       Reason for evaluation:   DOUGLAS/Hypercalcemia      SUBJECTIVE:      The patient was seen and examined. Notes and labs reviewed. There were no complications over night. Patient's review of systems: has severe leg pain (soft tissue) with difficulty walking, denies SOB      OBJECTIVE:     Vitals:    04/25/20 0324 04/25/20 0743 04/25/20 1115 04/25/20 1525   BP:  (!) 158/71 (!) 151/65 (!) 146/69   Pulse:  80 78 82   Resp:  16 12 16   Temp:  98.1 °F (36.7 °C) 96.3 °F (35.7 °C) 97.8 °F (36.6 °C)   TempSrc:  Axillary Axillary Oral   SpO2:  91% 92% 92%   Weight: 248 lb 3.8 oz (112.6 kg)      Height:           Intake and Output:      Intake/Output Summary (Last 24 hours) at 4/25/2020 1650  Last data filed at 4/25/2020 1546  Gross per 24 hour   Intake 2647.52 ml   Output 950 ml   Net 1697.52 ml       Continuous Infusions:   sodium chloride 125 mL/hr at 04/24/20 1730    dextrose         Exam:   CONSTITUTIONAL/PSYCHIATRY: morbidly obese, awake, alert and oriented x3. Not in acute distress   EYES: Conjunctivae: normal. Pupils: reactive to light  RESPIRATORY: Respiratory effort: normal. Auscultation: diminished air movement  CARDIOVASCULAR: Auscultation: RRR. Edema: 0-trace  GASTROINTESTINAL: Soft, nontender, nondistended. Fairly obese abdomen  EXTREMITIES:  No cyanosis or clubbing. SKIN: Warm and dry.  Has multiple ecchymotic areas and chronic discoloration in legs      LABS:   RFP:   Recent Labs     04/24/20  0344 04/24/20  1400 04/25/20  0444    141 140   K 3.7 3.8 3.7   CL 99 99 104   CO2 28 26 24   BUN 25* 29* 28*   CREATININE 1.9* 2.3* 2.0*   CALCIUM 12.6* 11.8* 10.6   MG 2.40  -- --    PHOS 3.8  --   --    GFRAA 31* 25* 29*       Liver panel:  Recent Labs     04/23/20  1340 04/24/20  0344   AST 24 23   ALT 16 14       CBC:   Recent Labs     04/23/20  1340 04/24/20  0344   WBC 8.1 8.7   HGB 13.0 12.8   HCT 38.6 38.0   MCV 98.0 97.3    228         ASSESSMENT and PLAN:     1. Hypercalcemia: This is recurrent in nature (has had episodes of hypercalcemia since 2015). PTH suppressed, vitamin D low. She has been on Ca/vitD supplement but there is likely an additional factor. S/P calcitonin x2. Ca is trending down nicely   - Check UPC/SPEP/UPEP/PTHrP and add an ACE level   - Continue to hold calcium supplements/MVI   - Continue IVF +++   - Will use a reduced dose pamidronate if there is rebound     2. DOUGLAS on CKD3 (baseline Cr ~1.5; followed by Dr Dory Alcocer): DOUGLAS is likely pre-renal in the setting of hypercalcemia, cannot exclude GN for now given degree of proteinuria   - IVF per above   - Continue to hold losartan for now     3. Nephrotic range proteinuria: last UACR as up to 9,756 in 2/20 (was relatively normal in 3/2018), UPCR is 12.7 now with albumin of 2.7. This can still be related to diabetic kidney disease (DKD), however, we should r/o other causes of glomerulopathies.    - Check serology w/u (MOLLY, C3, C4, ESR, ANCA, HepC, anti PLA2R Ab)   - She will likely need a kidney biopsy     4.  HTN: not at goal, add carvedilol, increase amlodipine     5. H/o DVT (last episode 2yrs ago): on Eliquis 2.5mg bid   - Will need to hold for 72hrs starting 4/26/20 (place on s/q heparin) in case the kidney biopsy is pursued            Signed By: Eli Yarbrough MD

## 2020-04-25 NOTE — PLAN OF CARE
Problem: Falls - Risk of:  Goal: Will remain free from falls  Description: Will remain free from falls  4/25/2020 0514 by Kayleigh Watkins RN  Outcome: Ongoing  4/24/2020 1936 by Stacy Silverio RN  Outcome: Ongoing  Goal: Absence of physical injury  Description: Absence of physical injury  4/25/2020 0514 by Kayleigh Watkins RN  Outcome: Ongoing  4/24/2020 1936 by Stacy Silverio RN  Outcome: Ongoing

## 2020-04-26 LAB
ANION GAP SERPL CALCULATED.3IONS-SCNC: 12 MMOL/L (ref 3–16)
ANTI-NUCLEAR ANTIBODY (ANA): NEGATIVE
BUN BLDV-MCNC: 30 MG/DL (ref 7–20)
C3 COMPLEMENT: 127.6 MG/DL (ref 90–180)
C4 COMPLEMENT: 51.6 MG/DL (ref 10–40)
CALCIUM SERPL-MCNC: 9.8 MG/DL (ref 8.3–10.6)
CHLORIDE BLD-SCNC: 102 MMOL/L (ref 99–110)
CO2: 22 MMOL/L (ref 21–32)
CREAT SERPL-MCNC: 2.1 MG/DL (ref 0.6–1.2)
GFR AFRICAN AMERICAN: 27
GFR NON-AFRICAN AMERICAN: 23
GLUCOSE BLD-MCNC: 155 MG/DL (ref 70–99)
GLUCOSE BLD-MCNC: 174 MG/DL (ref 70–99)
GLUCOSE BLD-MCNC: 226 MG/DL (ref 70–99)
GLUCOSE BLD-MCNC: 235 MG/DL (ref 70–99)
GLUCOSE BLD-MCNC: 239 MG/DL (ref 70–99)
HEPATITIS C ANTIBODY INTERPRETATION: NORMAL
PERFORMED ON: ABNORMAL
POTASSIUM SERPL-SCNC: 3.5 MMOL/L (ref 3.5–5.1)
SEDIMENTATION RATE, ERYTHROCYTE: 112 MM/HR (ref 0–30)
SODIUM BLD-SCNC: 136 MMOL/L (ref 136–145)

## 2020-04-26 PROCEDURE — 2580000003 HC RX 258: Performed by: INTERNAL MEDICINE

## 2020-04-26 PROCEDURE — 6370000000 HC RX 637 (ALT 250 FOR IP): Performed by: INTERNAL MEDICINE

## 2020-04-26 PROCEDURE — 86038 ANTINUCLEAR ANTIBODIES: CPT

## 2020-04-26 PROCEDURE — 6360000002 HC RX W HCPCS: Performed by: INTERNAL MEDICINE

## 2020-04-26 PROCEDURE — 80048 BASIC METABOLIC PNL TOTAL CA: CPT

## 2020-04-26 PROCEDURE — 82164 ANGIOTENSIN I ENZYME TEST: CPT

## 2020-04-26 PROCEDURE — 36415 COLL VENOUS BLD VENIPUNCTURE: CPT

## 2020-04-26 PROCEDURE — 86160 COMPLEMENT ANTIGEN: CPT

## 2020-04-26 PROCEDURE — 94760 N-INVAS EAR/PLS OXIMETRY 1: CPT

## 2020-04-26 PROCEDURE — 86255 FLUORESCENT ANTIBODY SCREEN: CPT

## 2020-04-26 PROCEDURE — 85652 RBC SED RATE AUTOMATED: CPT

## 2020-04-26 PROCEDURE — 86803 HEPATITIS C AB TEST: CPT

## 2020-04-26 PROCEDURE — 2060000000 HC ICU INTERMEDIATE R&B

## 2020-04-26 RX ORDER — POLYETHYLENE GLYCOL 3350 17 G/17G
17 POWDER, FOR SOLUTION ORAL DAILY
Status: DISCONTINUED | OUTPATIENT
Start: 2020-04-26 | End: 2020-04-29 | Stop reason: HOSPADM

## 2020-04-26 RX ORDER — DOCUSATE SODIUM 100 MG/1
100 CAPSULE, LIQUID FILLED ORAL 2 TIMES DAILY
Status: DISCONTINUED | OUTPATIENT
Start: 2020-04-26 | End: 2020-04-29 | Stop reason: HOSPADM

## 2020-04-26 RX ADMIN — HEPARIN SODIUM 5000 UNITS: 5000 INJECTION INTRAVENOUS; SUBCUTANEOUS at 08:28

## 2020-04-26 RX ADMIN — CARVEDILOL 12.5 MG: 12.5 TABLET, FILM COATED ORAL at 17:42

## 2020-04-26 RX ADMIN — AMLODIPINE BESYLATE 10 MG: 10 TABLET ORAL at 08:30

## 2020-04-26 RX ADMIN — HYDRALAZINE HYDROCHLORIDE 10 MG: 20 INJECTION INTRAMUSCULAR; INTRAVENOUS at 23:57

## 2020-04-26 RX ADMIN — SODIUM CHLORIDE, PRESERVATIVE FREE 10 ML: 5 INJECTION INTRAVENOUS at 08:30

## 2020-04-26 RX ADMIN — DOCUSATE SODIUM 100 MG: 100 CAPSULE, LIQUID FILLED ORAL at 13:14

## 2020-04-26 RX ADMIN — DOCUSATE SODIUM 100 MG: 100 CAPSULE, LIQUID FILLED ORAL at 21:43

## 2020-04-26 RX ADMIN — INSULIN LISPRO 2 UNITS: 100 INJECTION, SOLUTION INTRAVENOUS; SUBCUTANEOUS at 17:42

## 2020-04-26 RX ADMIN — INSULIN LISPRO 1 UNITS: 100 INJECTION, SOLUTION INTRAVENOUS; SUBCUTANEOUS at 21:45

## 2020-04-26 RX ADMIN — ALLOPURINOL 150 MG: 100 TABLET ORAL at 08:28

## 2020-04-26 RX ADMIN — FLUTICASONE PROPIONATE 2 SPRAY: 50 SPRAY, METERED NASAL at 13:14

## 2020-04-26 RX ADMIN — CARVEDILOL 12.5 MG: 12.5 TABLET, FILM COATED ORAL at 08:28

## 2020-04-26 RX ADMIN — POLYETHYLENE GLYCOL 3350 17 G: 17 POWDER, FOR SOLUTION ORAL at 13:14

## 2020-04-26 RX ADMIN — INSULIN LISPRO 1 UNITS: 100 INJECTION, SOLUTION INTRAVENOUS; SUBCUTANEOUS at 08:33

## 2020-04-26 RX ADMIN — SODIUM CHLORIDE, PRESERVATIVE FREE 10 ML: 5 INJECTION INTRAVENOUS at 21:44

## 2020-04-26 RX ADMIN — CETIRIZINE HYDROCHLORIDE 5 MG: 10 TABLET, FILM COATED ORAL at 08:29

## 2020-04-26 RX ADMIN — ATORVASTATIN CALCIUM 40 MG: 40 TABLET, FILM COATED ORAL at 21:43

## 2020-04-26 RX ADMIN — INSULIN LISPRO 2 UNITS: 100 INJECTION, SOLUTION INTRAVENOUS; SUBCUTANEOUS at 13:18

## 2020-04-26 RX ADMIN — HEPARIN SODIUM 5000 UNITS: 5000 INJECTION INTRAVENOUS; SUBCUTANEOUS at 21:43

## 2020-04-26 ASSESSMENT — PAIN SCALES - WONG BAKER
WONGBAKER_NUMERICALRESPONSE: 0

## 2020-04-26 NOTE — PLAN OF CARE
Problem: Falls - Risk of:  Goal: Will remain free from falls  Description: Will remain free from falls  Outcome: Ongoing  Note: Patient's bed is in a low position, call light in reach, and bed alarms on. Will continue to monitor     Problem: Safety:  Goal: Free from accidental physical injury  Description: Free from accidental physical injury  Outcome: Ongoing  Note: Patient's bed is in a low position, call light in reach, and bed alarms on. Will continue to monitor     Problem: Pain:  Goal: Patient's pain/discomfort is manageable  Description: Patient's pain/discomfort is manageable  Outcome: Ongoing  Note: Will monitor patient's pain levels and treat with appropriate PRN pain medications.

## 2020-04-26 NOTE — PROGRESS NOTES
Rales/Wheezes/Rhonchi. Cardiovascular: Regular rate and rhythm with normal S1/S2 without murmurs, rubs or gallops. Abdomen: Soft, non-tender, non-distended with normal bowel sounds. Musculoskeletal: No clubbing, cyanosis or edema bilaterally. Full range of motion without deformity. Skin: Multiple bruises over her arms and hands, left hand with large bruise secondary to IV stick  Neurologic:  Neurovascularly intact without any focal sensory/motor deficits. Cranial nerves: II-XII intact, grossly non-focal.  Psychiatric: Alert and oriented x3  Capillary Refill: Brisk,< 3 seconds   Peripheral Pulses: +2 palpable, equal bilaterally       Labs:   Recent Labs     04/23/20  1340 04/24/20  0344   WBC 8.1 8.7   HGB 13.0 12.8   HCT 38.6 38.0    228     Recent Labs     04/24/20  0344 04/24/20  1400 04/25/20  0444 04/26/20  0437    141 140 136   K 3.7 3.8 3.7 3.5   CL 99 99 104 102   CO2 28 26 24 22   BUN 25* 29* 28* 30*   CREATININE 1.9* 2.3* 2.0* 2.1*   CALCIUM 12.6* 11.8* 10.6 9.8   PHOS 3.8  --   --   --      Recent Labs     04/23/20  1340 04/24/20  0344   AST 24 23   ALT 16 14   BILITOT 0.3 0.3   ALKPHOS 66 59     Recent Labs     04/23/20  1340   INR 1.63*     Recent Labs     04/23/20  1340   TROPONINI <0.01       Urinalysis:      Lab Results   Component Value Date    NITRU Negative 04/24/2020    WBCUA 4 04/24/2020    RBCUA 8 04/24/2020    BLOODU Negative 04/24/2020    SPECGRAV 1.017 04/24/2020    GLUCOSEU Negative 04/24/2020       Radiology:  MRI BRAIN WO CONTRAST   Final Result   Chronic microvascular disease and remote left frontal lobe infarct. No acute intracranial abnormality. XR CHEST PORTABLE   Final Result   Shallow inflation with mild vascular congestion. No evidence for overt edema. CT HEAD WO CONTRAST   Final Result   Stable moderate low-attenuation changes within the left frontal lobe likely   representing encephalomalacia.       Additional stable chronic ischemic changes identified within the right   frontal lobe involving the external capsule. Unremarkable CT angiogram of the head. Stable 5 mm nodule within the right upper lobe and 4 mm nodule within the   left upper lobe. CT chest should be considered. Results of the examination were discussed with Dr. Catalina Lopez on 04/23/2020   at 2:20 p.m. RECOMMENDATIONS:   Fleischner Society guidelines for follow-up and management of incidentally   detected pulmonary nodules:      Multiple Solid Nodules:      Nodule size less than 6 mm   In a low-risk patient, no routine follow-up. In a high-risk patient, optional CT at 12 months. CTA HEAD NECK W CONTRAST   Final Result   Stable moderate low-attenuation changes within the left frontal lobe likely   representing encephalomalacia. Additional stable chronic ischemic changes identified within the right   frontal lobe involving the external capsule. Unremarkable CT angiogram of the head. Stable 5 mm nodule within the right upper lobe and 4 mm nodule within the   left upper lobe. CT chest should be considered. Results of the examination were discussed with Dr. Catalina Lopez on 04/23/2020   at 2:20 p.m. RECOMMENDATIONS:   Fleischner Society guidelines for follow-up and management of incidentally   detected pulmonary nodules:      Multiple Solid Nodules:      Nodule size less than 6 mm   In a low-risk patient, no routine follow-up. In a high-risk patient, optional CT at 12 months. Assessment/Plan:  Acute metabolic encephalopathy. Suspect secondary to significant hypercalcemia. MRI negative for pathology, almost resolved    Hypercalcemia in the setting of underlying CKD stage III and on calcium supplements  PTH low , PTHrP pending and 25-hydroxy vitamin D level low. Hold vitamin D supplement. Chest x-ray with some pulmonary congestion; give 1 dose of IV Lasix 40 mg.  Recheck calcium level now trending down.   Nephrology has been consulted to assist with evaluation. SPEP pending    CKD stage III/IV, renal function worsened initially and now stabilizing around 2. May need renal biopsy, has been switched from eliquis to sub q heparin, nephrotic range proteinuria now    Stable 5 mm nodule within the right upper lobe and 4 mm nodule within the left upper lobe for which CT-chest was recommended. 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     DVT Prophylaxis: Subq heparin  Diet: DIET CARB CONTROL; Carb Control: 4 carb choices (60 gms)/meal  Code Status: Full Code    PT/OT Eval Status: Ordered    Dispo - Adalid Bucio MD

## 2020-04-26 NOTE — PROGRESS NOTES
NEPHROLOGY PROGRESS NOTE    Patient: Laura Woods MRN: [id number]     YOB: 1940  Age: 78 y.o. Sex: female    Unit: [address] Room/Bed: [id number]-4273/5108-01 Location: [address] [place] [address]     Admitting Physician: Tiffanie Kowalski    Primary Care Physician: Boy Lane MD          LOS: 3 days       Reason for evaluation:   DOUGLAS/Hypercalcemia      SUBJECTIVE:      The patient was seen and examined. Notes and labs reviewed. There were no complications over night. Patient's review of systems: has severe leg pain (soft tissue) with difficulty walking, denies dyspnea       OBJECTIVE:     Vitals:    04/25/20 2333 04/26/20 0507 04/26/20 0759 04/26/20 1258   BP: (!) 142/66 (!) 140/59 (!) 188/64 124/69   Pulse: 81 80 91 81   Resp: 16 16 20 20   Temp: 98.4 °F (36.9 °C) 98.2 °F (36.8 °C) 97.8 °F (36.6 °C) 98 °F (36.7 °C)   TempSrc: Oral Oral Oral Oral   SpO2: 93% 91% 90% 92%   Weight:  259 lb 11.2 oz (117.8 kg)     Height:           Intake and Output:      Intake/Output Summary (Last 24 hours) at 4/26/2020 1304  Last data filed at 4/26/2020 0954  Gross per 24 hour   Intake 1948 ml   Output 550 ml   Net 1398 ml       Continuous Infusions:   sodium chloride Stopped (04/25/20 1828)    dextrose         Exam:   CONSTITUTIONAL/PSYCHIATRY: morbidly obese, awake, alert and oriented x3. Not in acute distress   EYES: Conjunctivae: normal. Pupils: reactive to light  RESPIRATORY: Respiratory effort: normal. Auscultation: diminished air movement  CARDIOVASCULAR: Auscultation: RRR. Edema: trace  GASTROINTESTINAL: Soft, nontender, nondistended. Fairly obese abdomen  EXTREMITIES:  No cyanosis or clubbing. SKIN: Warm and dry.  Has multiple ecchymotic areas and chronic discoloration in legs      LABS:   RFP:   Recent Labs     04/24/20  0344 04/24/20  1400 04/25/20  0444 04/26/20  0437    141 140 136   K 3.7 3.8 3.7 3.5   CL 99 99 104 102   CO2 28 26 24 22   BUN 25* 29* 28* 30*   CREATININE 1.9* 2. 3* 2.0* 2.1*   CALCIUM 12.6* 11.8* 10.6 9.8   MG 2.40  --   --   --    PHOS 3.8  --   --   --    GFRAA 31* 25* 29* 27*       Liver panel:  Recent Labs     04/23/20  1340 04/24/20  0344   AST 24 23   ALT 16 14       CBC:   Recent Labs     04/23/20  1340 04/24/20  0344   WBC 8.1 8.7   HGB 13.0 12.8   HCT 38.6 38.0   MCV 98.0 97.3    228         ASSESSMENT and PLAN:     1. Hypercalcemia: This is recurrent in nature (has had episodes of hypercalcemia since 2015). PTH suppressed, vitamin D low. She has been on Ca/vitD supplement but there is likely an additional factor. S/P calcitonin x2. Ca is trending down nicely   - Check UPC/SPEP/UPEP/PTHrP and add an ACE level- all pending    - Continue to hold calcium supplements/MVI   - Continue IVF +++   - Will use a reduced dose pamidronate if there is rebound   Ca 9.8      2. DOUGLAS on CKD3 (baseline Cr ~1.5; followed by Dr Sun Liu): DOUGLAS is likely pre-renal in the setting of hypercalcemia, cannot exclude GN for now given degree of proteinuria   - IVF per above   - Continue to hold losartan for now   Cr 2.1      3. Nephrotic range proteinuria: last UACR as up to 9,756 in 2/20 (was relatively normal in 3/2018), UPCR is 12.7 now with albumin of 2.7. This can still be related to diabetic kidney disease (DKD), however, we should r/o other causes of glomerulopathies.    - Check serology w/u (MOLLY, C3, C4, ESR, ANCA, HepC, anti PLA2R Ab)   - She will likely need a kidney biopsy     4.  HTN:  Better control , on carvedilol, and amlodipine     5. H/o DVT (last episode 2yrs ago): on Eliquis 2.5mg bid   - Will need to hold for 72hrs starting 4/26/20 (place on s/q heparin) in case the kidney biopsy is pursued        Signed By: Elizabeth Armas MD

## 2020-04-27 LAB
ANGIOTENSIN CONVERTING ENZYME: 41 U/L (ref 9–67)
ANION GAP SERPL CALCULATED.3IONS-SCNC: 12 MMOL/L (ref 3–16)
BUN BLDV-MCNC: 32 MG/DL (ref 7–20)
CALCIUM SERPL-MCNC: 8.9 MG/DL (ref 8.3–10.6)
CHLORIDE BLD-SCNC: 105 MMOL/L (ref 99–110)
CO2: 22 MMOL/L (ref 21–32)
CREAT SERPL-MCNC: 2.2 MG/DL (ref 0.6–1.2)
GFR AFRICAN AMERICAN: 26
GFR NON-AFRICAN AMERICAN: 22
GLUCOSE BLD-MCNC: 175 MG/DL (ref 70–99)
GLUCOSE BLD-MCNC: 178 MG/DL (ref 70–99)
GLUCOSE BLD-MCNC: 229 MG/DL (ref 70–99)
GLUCOSE BLD-MCNC: 233 MG/DL (ref 70–99)
GLUCOSE BLD-MCNC: 306 MG/DL (ref 70–99)
PERFORMED ON: ABNORMAL
POTASSIUM SERPL-SCNC: 3.5 MMOL/L (ref 3.5–5.1)
PTH RELATED PEPTIDE: 4 PMOL/L (ref 0–3.4)
SODIUM BLD-SCNC: 139 MMOL/L (ref 136–145)

## 2020-04-27 PROCEDURE — 97129 THER IVNTJ 1ST 15 MIN: CPT

## 2020-04-27 PROCEDURE — 84155 ASSAY OF PROTEIN SERUM: CPT

## 2020-04-27 PROCEDURE — 6370000000 HC RX 637 (ALT 250 FOR IP): Performed by: INTERNAL MEDICINE

## 2020-04-27 PROCEDURE — 80048 BASIC METABOLIC PNL TOTAL CA: CPT

## 2020-04-27 PROCEDURE — 6360000002 HC RX W HCPCS: Performed by: INTERNAL MEDICINE

## 2020-04-27 PROCEDURE — 84166 PROTEIN E-PHORESIS/URINE/CSF: CPT

## 2020-04-27 PROCEDURE — 2580000003 HC RX 258: Performed by: INTERNAL MEDICINE

## 2020-04-27 PROCEDURE — 97530 THERAPEUTIC ACTIVITIES: CPT

## 2020-04-27 PROCEDURE — 84156 ASSAY OF PROTEIN URINE: CPT

## 2020-04-27 PROCEDURE — 84165 PROTEIN E-PHORESIS SERUM: CPT

## 2020-04-27 PROCEDURE — 92526 ORAL FUNCTION THERAPY: CPT

## 2020-04-27 PROCEDURE — 97535 SELF CARE MNGMENT TRAINING: CPT

## 2020-04-27 PROCEDURE — 2060000000 HC ICU INTERMEDIATE R&B

## 2020-04-27 PROCEDURE — 94761 N-INVAS EAR/PLS OXIMETRY MLT: CPT

## 2020-04-27 RX ORDER — CARVEDILOL 25 MG/1
25 TABLET ORAL 2 TIMES DAILY WITH MEALS
Status: DISCONTINUED | OUTPATIENT
Start: 2020-04-27 | End: 2020-04-29 | Stop reason: HOSPADM

## 2020-04-27 RX ADMIN — INSULIN LISPRO 2 UNITS: 100 INJECTION, SOLUTION INTRAVENOUS; SUBCUTANEOUS at 08:15

## 2020-04-27 RX ADMIN — ATORVASTATIN CALCIUM 40 MG: 40 TABLET, FILM COATED ORAL at 22:06

## 2020-04-27 RX ADMIN — INSULIN LISPRO 2 UNITS: 100 INJECTION, SOLUTION INTRAVENOUS; SUBCUTANEOUS at 12:18

## 2020-04-27 RX ADMIN — AMLODIPINE BESYLATE 10 MG: 10 TABLET ORAL at 08:14

## 2020-04-27 RX ADMIN — SODIUM CHLORIDE, PRESERVATIVE FREE 10 ML: 5 INJECTION INTRAVENOUS at 22:09

## 2020-04-27 RX ADMIN — POLYETHYLENE GLYCOL 3350 17 G: 17 POWDER, FOR SOLUTION ORAL at 10:24

## 2020-04-27 RX ADMIN — INSULIN LISPRO 2 UNITS: 100 INJECTION, SOLUTION INTRAVENOUS; SUBCUTANEOUS at 22:06

## 2020-04-27 RX ADMIN — INSULIN LISPRO 1 UNITS: 100 INJECTION, SOLUTION INTRAVENOUS; SUBCUTANEOUS at 17:57

## 2020-04-27 RX ADMIN — SODIUM CHLORIDE, PRESERVATIVE FREE 10 ML: 5 INJECTION INTRAVENOUS at 10:27

## 2020-04-27 RX ADMIN — HEPARIN SODIUM 5000 UNITS: 5000 INJECTION INTRAVENOUS; SUBCUTANEOUS at 08:15

## 2020-04-27 RX ADMIN — CETIRIZINE HYDROCHLORIDE 5 MG: 10 TABLET, FILM COATED ORAL at 08:14

## 2020-04-27 RX ADMIN — HEPARIN SODIUM 5000 UNITS: 5000 INJECTION INTRAVENOUS; SUBCUTANEOUS at 22:06

## 2020-04-27 RX ADMIN — SODIUM CHLORIDE: 9 INJECTION, SOLUTION INTRAVENOUS at 02:26

## 2020-04-27 RX ADMIN — DOCUSATE SODIUM 100 MG: 100 CAPSULE, LIQUID FILLED ORAL at 10:24

## 2020-04-27 RX ADMIN — CARVEDILOL 25 MG: 25 TABLET, FILM COATED ORAL at 17:57

## 2020-04-27 RX ADMIN — ALLOPURINOL 150 MG: 100 TABLET ORAL at 08:12

## 2020-04-27 RX ADMIN — CARVEDILOL 12.5 MG: 12.5 TABLET, FILM COATED ORAL at 08:12

## 2020-04-27 RX ADMIN — DOCUSATE SODIUM 100 MG: 100 CAPSULE, LIQUID FILLED ORAL at 22:06

## 2020-04-27 ASSESSMENT — PAIN SCALES - WONG BAKER
WONGBAKER_NUMERICALRESPONSE: 0

## 2020-04-27 NOTE — PROGRESS NOTES
reach;Nurse notified; Left in chair;Chair alarm in place         Patient Diagnosis(es): The primary encounter diagnosis was Altered mental status, unspecified altered mental status type. A diagnosis of Acute on chronic renal insufficiency was also pertinent to this visit. has a past medical history of Blood circulation, collateral, Cerebral artery occlusion with cerebral infarction [facility]), Chronic deep vein thrombosis (DVT) of femoral vein of left lower extremity (HCC), Chronic kidney disease (CKD), stage III (moderate) ([facility] [address] CVA (cerebral infarction), Diabetes, DVT, lower extremity, recurrent ([facility] [address] Edema, First degree AV block, Gout, Heart murmur, Hypercalcemia, Hypercholesterolemia, Hyperhomocysteinemia (HCC), Hypertension, TRUPTI (obstructive sleep apnea), Osteoarthritis, Sciatica, Traumatic hematoma of left lower leg, Venous insufficiency, and Visual loss R eye - REtinal detachment. has a past surgical history that includes other surgical history; Cataract removal with implant (Bilateral); and other surgical history (Left). Restrictions  Restrictions/Precautions  Restrictions/Precautions: Fall Risk  Subjective   General  Chart Reviewed: Yes  Patient assessed for rehabilitation services?: Yes  Additional Pertinent Hx: 77 y/o female admitted 4/23/2020 with acute metabolic encephalopathy suspect secondary to significant hypercalcemia. MRI showed no acute intracranial abnormality. CT-head revealed stable moderate low-attenuation changes within the left frontal lobe likely representing encephalomalacia,. 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 Pt agreeable to OT/PT co-tx.  Pt c/o abdominal pain, \"cramping\" and

## 2020-04-27 NOTE — PROGRESS NOTES
appears to be alert and oriented x4, although noted to have decreased ability to perform activities of daily living such as checking her blood sugar at home. Kadi Hay was noted to have significantly elevated calcium level of 12.6 in the emergency room.  Her creatinine is about her baseline of 1.7.  CT-head revealed stable moderate low-attenuation changes within the left frontal lobe likely representing encephalomalacia, stable 5 mm nodule within the right upper lobe and 4 mm nodule within the left upper lobe for which CT-chest was recommended; otherwise, no acute pathology noted. 4/23/2020 CXR:   Impression   Shallow inflation with mild vascular congestion.  No evidence for overt edema. 4/24/2020 Brain MRI:  Impression   Chronic microvascular disease and remote left frontal lobe infarct.       No acute intracranial abnormality. 4/26/2020 MD notes:  Patient appears to be back to her baseline. She no longer has any sort of confusion or dysarthria whatsoever. Discussed likely discharge tomorrow if she remains this way. Will need to see if she requires a short rehab stay before going home where she can just go home with home care       Subjective:     Current diet  Current Diet : Regular  Current Liquid Diet : Thin    Comments regarding tolerating Current Diet:   Good tolerance reported  RN reports no apparent word-finding impairments as well      Objective:     Pain   Patient Currently in Pain: Denies    Cognitive/Behavior   Behavior/Cognition: Alert, Cooperative, Pleasant mood    Presentations   Consistencies Administered: Thin - straw, Reg solid    Positioning   Upright in bed    PO Trials:  · Thin Liquids: delayed swallow; decreased laryngeal elevation; no overt signs/symptoms of penetration/aspiration  · Puree: NA   · Soft food: NA  · Regular food: prolonged mastication; delayed swallow; decreased laryngeal elevation; no overt signs/symptoms of penetration/aspiration    Dysphagia Tx:   PO trials:   Tolerating

## 2020-04-27 NOTE — PROGRESS NOTES
Hospitalist Progress Note      PCP: Alda Claudio MD    Date of Admission: 4/23/2020    Chief Complaint: [address] Course: This is a pleasant 78 y.o. 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 Patient was last known well around 8 PM on 4/22/2020. She denies any extremity weakness. On presentation to the emergency room, patient appears to be alert and oriented x4, although noted to have decreased ability to perform activities of daily living such as checking her blood sugar at home. She was noted to have significantly elevated calcium level of 12.6 in the emergency room. Her creatinine is about her baseline of 1.7. CT-head revealed stable moderate low-attenuation changes within the left frontal lobe likely representing encephalomalacia, stable 5 mm nodule within the right upper lobe and 4 mm nodule within the left upper lobe for which CT-chest was recommended; otherwise, no acute pathology noted. 4/24  Patient asking when she can go home. Patient appears to still have episodes of dysarthria and word finding difficulty. MRI has returned negative for any acute CVA. Patient's calcium level still elevated at 12.6. Nephrology has been consulted for the DOUGLAS along with the hypercalcemia. Labs show a vitamin D deficiency along with a decreased level of PTH. Will need to rule out malignancy as cause for hypercalcemia, SPEP and UPEP ordered. 4/25  Patient still appears slightly confused. She does not believe that she is currently in the hospital even though she states that I am wearing a white coat and have medical ID badges.   Discussed case with patient's daughter and she cooperative. HEENT: Pupils equal, round, and reactive to light. Conjunctivae/corneas clear. Neck: Supple, with full range of motion. No jugular venous distention. Trachea midline. Respiratory:  Normal respiratory effort. Clear to auscultation, bilaterally without Rales/Wheezes/Rhonchi. Cardiovascular: Regular rate and rhythm with normal S1/S2 without murmurs, rubs or gallops. Abdomen: Soft, non-tender, non-distended with normal bowel sounds. Musculoskeletal: No clubbing, cyanosis or edema bilaterally. Full range of motion without deformity. Skin: Multiple bruises over her arms and hands, left hand with large bruise secondary to IV stick  Neurologic:  Neurovascularly intact without any focal sensory/motor deficits. Cranial nerves: II-XII intact, grossly non-focal.  Psychiatric: Alert and oriented x3  Capillary Refill: Brisk,< 3 seconds   Peripheral Pulses: +2 palpable, equal bilaterally       Labs:   No results for input(s): WBC, HGB, HCT, PLT in the last 72 hours. Recent Labs     04/25/20  0444 04/26/20  0437 04/27/20  0439    136 139   K 3.7 3.5 3.5    102 105   CO2 24 22 22   BUN 28* 30* 32*   CREATININE 2.0* 2.1* 2.2*   CALCIUM 10.6 9.8 8.9     No results for input(s): AST, ALT, BILIDIR, BILITOT, ALKPHOS in the last 72 hours. No results for input(s): INR in the last 72 hours. No results for input(s): Nga Dearth in the last 72 hours. Urinalysis:      Lab Results   Component Value Date    NITRU Negative 04/24/2020    WBCUA 4 04/24/2020    RBCUA 8 04/24/2020    BLOODU Negative 04/24/2020    SPECGRAV 1.017 04/24/2020    GLUCOSEU Negative 04/24/2020       Radiology:  MRI BRAIN WO CONTRAST   Final Result   Chronic microvascular disease and remote left frontal lobe infarct. No acute intracranial abnormality. XR CHEST PORTABLE   Final Result   Shallow inflation with mild vascular congestion. No evidence for overt edema.          CT HEAD WO CONTRAST   Final Result   Stable

## 2020-04-27 NOTE — CARE COORDINATION
Case Management:  Follow up with patient re discharge needs and therapy recommendations for continued skilled therapy in a facility. Patient tells cm that she is going home and has help at home to assist her. Discussed the need for 2 person assist and patient tells cm she has that at home. Asked permission to call her daughter to discuss discharge needs and patient agrees. Called daughter and she tells cm that she and her son are at home but he works from home and she works outside the home. Discussed skilled facility with daughter and she will discuss with patient and follow up with cm tomorrow. Informed daughter of Medicare web site form nursing facility comparisons.   Electronically signed by Natalie Mitchell on 4/27/2020 at 12:01 PM

## 2020-04-27 NOTE — PROGRESS NOTES
Continue to hold losartan for now     3. Nephrotic range proteinuria: last UACR as up to 9,756 in 2/20 (was relatively normal in 3/2018), UPCR is 12.7 now with albumin of 2.7. This can still be related to diabetic kidney disease (DKD), however, we should r/o other causes of glomerulopathies.    - Check serology w/u (MOLLY, C3, C4, ESR, ANCA, HepC, anti PLA2R Ab)   - Negative MOLLY, C3, Hep C, other labs pending.   - + ESR    4. HTN:  Better control , on carvedilol, and amlodipine     5. H/o DVT (last episode 2yrs ago): on Eliquis 2.5mg bid; ? Possible Bx        Recommendations:  Hypercalcemia; suspected immobility/supplements. DOUGLAS in likely setting of PARVEZ from CTA 4/24/20  MS is not appropriate to make decision on a bx right now; I don't think one should be made in this acute setting. ESR elevated but unclear cause  Would hold IVF, repeat urine studies, cont hold ARB  Checking SIFE pending. Reversible work up ordered - Ammonia, B12/Folate, TSH  BCx, UCx, CXR, CRP, repeat ESR       Can have proteinuria in inflammatory state; unsure what is causing hers, it doesn't appear like a GN with no significant hematuria (mostly likely gomes placement)       4/23/20 Stable 5 mm nodule within the right upper lobe and 4 mm nodule within the  left upper lobe.  CT chest should be considered. May need to consider malignancy and eval with CT Chest non contrast if possible as cause for ESR. Thank you for involving us in the care of this patient, please call with any questions.     Signed:  Nik Galicia M.D.   [facility]  Office Number: [phone]  Fax Number: [phone]  Answering Service: [phone]  4/27/2020, 4:00 PM

## 2020-04-28 ENCOUNTER — APPOINTMENT (OUTPATIENT)
Dept: ULTRASOUND IMAGING | Age: 80
DRG: 640 | End: 2020-04-28
Payer: MEDICARE

## 2020-04-28 ENCOUNTER — APPOINTMENT (OUTPATIENT)
Dept: GENERAL RADIOLOGY | Age: 80
DRG: 640 | End: 2020-04-28
Payer: MEDICARE

## 2020-04-28 ENCOUNTER — APPOINTMENT (OUTPATIENT)
Dept: CT IMAGING | Age: 80
DRG: 640 | End: 2020-04-28
Payer: MEDICARE

## 2020-04-28 LAB
ALBUMIN SERPL-MCNC: 2.1 G/DL (ref 3.1–4.9)
ALBUMIN SERPL-MCNC: 2.4 G/DL (ref 3.4–5)
ALPHA-1-GLOBULIN: 0.2 G/DL (ref 0.2–0.4)
ALPHA-2-GLOBULIN: 1.3 G/DL (ref 0.4–1.1)
AMMONIA: 24 UMOL/L (ref 11–51)
ANCA IFA: NORMAL
ANION GAP SERPL CALCULATED.3IONS-SCNC: 11 MMOL/L (ref 3–16)
BASOPHILS ABSOLUTE: 0 K/UL (ref 0–0.2)
BASOPHILS RELATIVE PERCENT: 0.4 %
BETA GLOBULIN: 0.8 G/DL (ref 0.9–1.6)
BILIRUBIN URINE: NEGATIVE
BLOOD, URINE: ABNORMAL
BUN BLDV-MCNC: 36 MG/DL (ref 7–20)
C-REACTIVE PROTEIN: 13.5 MG/L (ref 0–5.1)
CALCIUM SERPL-MCNC: 8.7 MG/DL (ref 8.3–10.6)
CHLORIDE BLD-SCNC: 105 MMOL/L (ref 99–110)
CHLORIDE URINE RANDOM: 50 MMOL/L
CLARITY: CLEAR
CO2: 22 MMOL/L (ref 21–32)
COLOR: YELLOW
CREAT SERPL-MCNC: 2 MG/DL (ref 0.6–1.2)
CREATININE URINE: 57.3 MG/DL (ref 28–259)
EOSINOPHIL,URINE: NORMAL
EOSINOPHILS ABSOLUTE: 0.2 K/UL (ref 0–0.6)
EOSINOPHILS RELATIVE PERCENT: 2.8 %
EPITHELIAL CELLS, UA: 1 /HPF (ref 0–5)
ESTIMATED AVERAGE GLUCOSE: 162.8 MG/DL
FOLATE: >20 NG/ML (ref 4.78–24.2)
GAMMA GLOBULIN: 0.4 G/DL (ref 0.6–1.8)
GFR AFRICAN AMERICAN: 29
GFR NON-AFRICAN AMERICAN: 24
GLUCOSE BLD-MCNC: 149 MG/DL (ref 70–99)
GLUCOSE BLD-MCNC: 180 MG/DL (ref 70–99)
GLUCOSE BLD-MCNC: 218 MG/DL (ref 70–99)
GLUCOSE BLD-MCNC: 232 MG/DL (ref 70–99)
GLUCOSE BLD-MCNC: 269 MG/DL (ref 70–99)
GLUCOSE URINE: 100 MG/DL
HBA1C MFR BLD: 7.3 %
HCT VFR BLD CALC: 30.3 % (ref 36–48)
HEMOGLOBIN: 10.3 G/DL (ref 12–16)
HIV AG/AB: NORMAL
HIV ANTIGEN: NORMAL
HIV-1 ANTIBODY: NORMAL
HIV-2 AB: NORMAL
HYALINE CASTS: 4 /LPF (ref 0–8)
KETONES, URINE: NEGATIVE MG/DL
LEUKOCYTE ESTERASE, URINE: NEGATIVE
LYMPHOCYTES ABSOLUTE: 1.2 K/UL (ref 1–5.1)
LYMPHOCYTES RELATIVE PERCENT: 13.4 %
MAGNESIUM: 2.2 MG/DL (ref 1.8–2.4)
MCH RBC QN AUTO: 33.1 PG (ref 26–34)
MCHC RBC AUTO-ENTMCNC: 33.9 G/DL (ref 31–36)
MCV RBC AUTO: 97.5 FL (ref 80–100)
MICROSCOPIC EXAMINATION: YES
MONOCYTES ABSOLUTE: 1.1 K/UL (ref 0–1.3)
MONOCYTES RELATIVE PERCENT: 12.1 %
NEUTROPHILS ABSOLUTE: 6.2 K/UL (ref 1.7–7.7)
NEUTROPHILS RELATIVE PERCENT: 71.3 %
NITRITE, URINE: NEGATIVE
PDW BLD-RTO: 15.6 % (ref 12.4–15.4)
PERFORMED ON: ABNORMAL
PH UA: 6.5 (ref 5–8)
PHOSPHORUS: 3.1 MG/DL (ref 2.5–4.9)
PLATELET # BLD: 213 K/UL (ref 135–450)
PMV BLD AUTO: 7 FL (ref 5–10.5)
POTASSIUM SERPL-SCNC: 3.5 MMOL/L (ref 3.5–5.1)
PROTEIN PROTEIN: 480 MG/DL
PROTEIN UA: >=300 MG/DL
PTH RELATED PEPTIDE: 3.8 PMOL/L (ref 0–3.4)
RBC # BLD: 3.1 M/UL (ref 4–5.2)
RBC UA: 59 /HPF (ref 0–4)
SEDIMENTATION RATE, ERYTHROCYTE: 115 MM/HR (ref 0–30)
SODIUM BLD-SCNC: 138 MMOL/L (ref 136–145)
SODIUM URINE: 58 MMOL/L
SPE/IFE INTERPRETATION: NORMAL
SPECIFIC GRAVITY UA: 1.01 (ref 1–1.03)
TOTAL CK: 54 U/L (ref 26–192)
TOTAL PROTEIN: 4.8 G/DL (ref 6.4–8.2)
TOTAL SYPHILLIS IGG/IGM: NORMAL
TSH SERPL DL<=0.05 MIU/L-ACNC: 3.76 UIU/ML (ref 0.27–4.2)
UREA NITROGEN, UR: 496.7 MG/DL (ref 800–1666)
URIC ACID, SERUM: 5.8 MG/DL (ref 2.6–6)
URINE ELECTROPHORESIS INTERP: NORMAL
URINE TYPE: ABNORMAL
UROBILINOGEN, URINE: 0.2 E.U./DL
VITAMIN B-12: 336 PG/ML (ref 211–911)
WBC # BLD: 8.7 K/UL (ref 4–11)
WBC UA: 5 /HPF (ref 0–5)

## 2020-04-28 PROCEDURE — 80069 RENAL FUNCTION PANEL: CPT

## 2020-04-28 PROCEDURE — 84540 ASSAY OF URINE/UREA-N: CPT

## 2020-04-28 PROCEDURE — 6370000000 HC RX 637 (ALT 250 FOR IP): Performed by: INTERNAL MEDICINE

## 2020-04-28 PROCEDURE — 84156 ASSAY OF PROTEIN URINE: CPT

## 2020-04-28 PROCEDURE — 82550 ASSAY OF CK (CPK): CPT

## 2020-04-28 PROCEDURE — 87390 HIV-1 AG IA: CPT

## 2020-04-28 PROCEDURE — 97530 THERAPEUTIC ACTIVITIES: CPT | Performed by: PHYSICAL THERAPIST

## 2020-04-28 PROCEDURE — 82746 ASSAY OF FOLIC ACID SERUM: CPT

## 2020-04-28 PROCEDURE — 82140 ASSAY OF AMMONIA: CPT

## 2020-04-28 PROCEDURE — 86255 FLUORESCENT ANTIBODY SCREEN: CPT

## 2020-04-28 PROCEDURE — 97110 THERAPEUTIC EXERCISES: CPT

## 2020-04-28 PROCEDURE — 6360000002 HC RX W HCPCS: Performed by: INTERNAL MEDICINE

## 2020-04-28 PROCEDURE — 82570 ASSAY OF URINE CREATININE: CPT

## 2020-04-28 PROCEDURE — 81001 URINALYSIS AUTO W/SCOPE: CPT

## 2020-04-28 PROCEDURE — 85025 COMPLETE CBC W/AUTO DIFF WBC: CPT

## 2020-04-28 PROCEDURE — 76770 US EXAM ABDO BACK WALL COMP: CPT

## 2020-04-28 PROCEDURE — 85652 RBC SED RATE AUTOMATED: CPT

## 2020-04-28 PROCEDURE — 71250 CT THORAX DX C-: CPT

## 2020-04-28 PROCEDURE — 84300 ASSAY OF URINE SODIUM: CPT

## 2020-04-28 PROCEDURE — 71045 X-RAY EXAM CHEST 1 VIEW: CPT

## 2020-04-28 PROCEDURE — 83735 ASSAY OF MAGNESIUM: CPT

## 2020-04-28 PROCEDURE — 86780 TREPONEMA PALLIDUM: CPT

## 2020-04-28 PROCEDURE — 2580000003 HC RX 258: Performed by: INTERNAL MEDICINE

## 2020-04-28 PROCEDURE — 84443 ASSAY THYROID STIM HORMONE: CPT

## 2020-04-28 PROCEDURE — 83036 HEMOGLOBIN GLYCOSYLATED A1C: CPT

## 2020-04-28 PROCEDURE — 84550 ASSAY OF BLOOD/URIC ACID: CPT

## 2020-04-28 PROCEDURE — 87205 SMEAR GRAM STAIN: CPT

## 2020-04-28 PROCEDURE — 82607 VITAMIN B-12: CPT

## 2020-04-28 PROCEDURE — 2060000000 HC ICU INTERMEDIATE R&B

## 2020-04-28 PROCEDURE — 86702 HIV-2 ANTIBODY: CPT

## 2020-04-28 PROCEDURE — 86140 C-REACTIVE PROTEIN: CPT

## 2020-04-28 PROCEDURE — 86701 HIV-1ANTIBODY: CPT

## 2020-04-28 PROCEDURE — 97530 THERAPEUTIC ACTIVITIES: CPT

## 2020-04-28 PROCEDURE — 36415 COLL VENOUS BLD VENIPUNCTURE: CPT

## 2020-04-28 PROCEDURE — 94760 N-INVAS EAR/PLS OXIMETRY 1: CPT

## 2020-04-28 PROCEDURE — 82436 ASSAY OF URINE CHLORIDE: CPT

## 2020-04-28 RX ORDER — CARVEDILOL 25 MG/1
25 TABLET ORAL 2 TIMES DAILY WITH MEALS
Qty: 60 TABLET | Refills: 3 | DISCHARGE
Start: 2020-04-28 | End: 2020-05-27 | Stop reason: SDUPTHER

## 2020-04-28 RX ORDER — POLYETHYLENE GLYCOL 3350 17 G/17G
17 POWDER, FOR SOLUTION ORAL DAILY
Qty: 527 G | Refills: 1 | DISCHARGE
Start: 2020-04-29 | End: 2020-04-29 | Stop reason: HOSPADM

## 2020-04-28 RX ORDER — PSEUDOEPHEDRINE HCL 30 MG
100 TABLET ORAL 2 TIMES DAILY PRN
Qty: 30 CAPSULE | Refills: 0 | DISCHARGE
Start: 2020-04-28 | End: 2020-05-27 | Stop reason: SDUPTHER

## 2020-04-28 RX ORDER — GLIPIZIDE 5 MG/1
2.5 TABLET ORAL
Status: DISCONTINUED | OUTPATIENT
Start: 2020-04-28 | End: 2020-04-29 | Stop reason: HOSPADM

## 2020-04-28 RX ORDER — AMLODIPINE BESYLATE 10 MG/1
10 TABLET ORAL DAILY
Qty: 30 TABLET | Refills: 3 | DISCHARGE
Start: 2020-04-29 | End: 2020-05-27 | Stop reason: SDUPTHER

## 2020-04-28 RX ADMIN — GLIPIZIDE 2.5 MG: 5 TABLET ORAL at 17:52

## 2020-04-28 RX ADMIN — CARVEDILOL 25 MG: 25 TABLET, FILM COATED ORAL at 08:37

## 2020-04-28 RX ADMIN — SODIUM CHLORIDE, PRESERVATIVE FREE 10 ML: 5 INJECTION INTRAVENOUS at 09:20

## 2020-04-28 RX ADMIN — ATORVASTATIN CALCIUM 40 MG: 40 TABLET, FILM COATED ORAL at 20:10

## 2020-04-28 RX ADMIN — HEPARIN SODIUM 5000 UNITS: 5000 INJECTION INTRAVENOUS; SUBCUTANEOUS at 10:24

## 2020-04-28 RX ADMIN — CARVEDILOL 25 MG: 25 TABLET, FILM COATED ORAL at 17:41

## 2020-04-28 RX ADMIN — DOCUSATE SODIUM 100 MG: 100 CAPSULE, LIQUID FILLED ORAL at 20:10

## 2020-04-28 RX ADMIN — INSULIN LISPRO 2 UNITS: 100 INJECTION, SOLUTION INTRAVENOUS; SUBCUTANEOUS at 17:41

## 2020-04-28 RX ADMIN — APIXABAN 5 MG: 5 TABLET, FILM COATED ORAL at 20:10

## 2020-04-28 RX ADMIN — CETIRIZINE HYDROCHLORIDE 5 MG: 10 TABLET, FILM COATED ORAL at 10:24

## 2020-04-28 RX ADMIN — SODIUM CHLORIDE, PRESERVATIVE FREE 10 ML: 5 INJECTION INTRAVENOUS at 20:14

## 2020-04-28 RX ADMIN — ONDANSETRON 4 MG: 2 INJECTION INTRAMUSCULAR; INTRAVENOUS at 09:19

## 2020-04-28 RX ADMIN — POLYETHYLENE GLYCOL 3350 17 G: 17 POWDER, FOR SOLUTION ORAL at 10:24

## 2020-04-28 RX ADMIN — INSULIN LISPRO 2 UNITS: 100 INJECTION, SOLUTION INTRAVENOUS; SUBCUTANEOUS at 12:34

## 2020-04-28 RX ADMIN — INSULIN LISPRO 1 UNITS: 100 INJECTION, SOLUTION INTRAVENOUS; SUBCUTANEOUS at 08:37

## 2020-04-28 RX ADMIN — INSULIN LISPRO 2 UNITS: 100 INJECTION, SOLUTION INTRAVENOUS; SUBCUTANEOUS at 20:09

## 2020-04-28 RX ADMIN — DOCUSATE SODIUM 100 MG: 100 CAPSULE, LIQUID FILLED ORAL at 10:24

## 2020-04-28 RX ADMIN — ALLOPURINOL 150 MG: 100 TABLET ORAL at 10:25

## 2020-04-28 RX ADMIN — AMLODIPINE BESYLATE 10 MG: 10 TABLET ORAL at 10:25

## 2020-04-28 ASSESSMENT — PAIN SCALES - WONG BAKER: WONGBAKER_NUMERICALRESPONSE: 0

## 2020-04-28 ASSESSMENT — PAIN SCALES - GENERAL
PAINLEVEL_OUTOF10: 0
PAINLEVEL_OUTOF10: 0

## 2020-04-28 NOTE — DISCHARGE SUMMARY
nodule within the right upper lobe and 4 mm nodule within the   left upper lobe. CT chest should be considered. Results of the examination were discussed with Dr. Catalina Lopez on 04/23/2020   at 2:20 p.m. RECOMMENDATIONS:   Fleischner Society guidelines for follow-up and management of incidentally   detected pulmonary nodules:      Multiple Solid Nodules:      Nodule size less than 6 mm   In a low-risk patient, no routine follow-up. In a high-risk patient, optional CT at 12 months. CTA HEAD NECK W CONTRAST   Final Result   Stable moderate low-attenuation changes within the left frontal lobe likely   representing encephalomalacia. Additional stable chronic ischemic changes identified within the right   frontal lobe involving the external capsule. Unremarkable CT angiogram of the head. Stable 5 mm nodule within the right upper lobe and 4 mm nodule within the   left upper lobe. CT chest should be considered. Results of the examination were discussed with Dr. Catalina Lopez on 04/23/2020   at 2:20 p.m. RECOMMENDATIONS:   Fleischner Society guidelines for follow-up and management of incidentally   detected pulmonary nodules:      Multiple Solid Nodules:      Nodule size less than 6 mm   In a low-risk patient, no routine follow-up. In a high-risk patient, optional CT at 12 months. Disposition:  SNF     Condition at Discharge: Stable    Discharge Instructions/Follow-up:  PCP, Nephro    Code Status:  Full Code     Activity: activity as tolerated    Diet: diabetic diet and renal diet      Labs:  For convenience and continuity at follow-up the following most recent labs are provided:      CBC:    Lab Results   Component Value Date    WBC 8.7 04/28/2020    HGB 10.3 04/28/2020    HCT 30.3 04/28/2020     04/28/2020       Renal:    Lab Results   Component Value Date     04/29/2020    K 3.6 04/29/2020    K 4.0 04/23/2020     04/29/2020    CO2 21 04/29/2020 BUN 43 04/29/2020    CREATININE 2.2 04/29/2020    CALCIUM 8.1 04/29/2020    PHOS 3.1 04/28/2020       Discharge Medications:     Current Discharge Medication List           Details   carvedilol (COREG) 25 MG tablet Take 1 tablet by mouth 2 times daily (with meals)  Qty: 60 tablet, Refills: 3      polyethylene glycol (GLYCOLAX) 17 g packet Take 17 g by mouth daily  Qty: 527 g, Refills: 1      docusate sodium (COLACE, DULCOLAX) 100 MG CAPS Take 100 mg by mouth 2 times daily as needed for Constipation  Qty: 30 capsule, Refills: 0              Details   apixaban (ELIQUIS) 5 MG TABS tablet Take 0.5 tablets by mouth 2 times daily  Qty: 180 tablet, Refills: 1    Associated Diagnoses: Chronic deep vein thrombosis (DVT) of femoral vein of left lower extremity (HCC)      amLODIPine (NORVASC) 10 MG tablet Take 1 tablet by mouth daily  Qty: 30 tablet, Refills: 3              Details   atorvastatin (LIPITOR) 40 MG tablet Take 40 mg by mouth daily      Exenatide (BYDUREON) 2 MG PEN 2 mg weekly  Qty: 4 pen, Refills: 3      aspirin 81 MG EC tablet Take 1 tablet by mouth daily  Qty: 30 tablet, Refills: 3      glipiZIDE (GLUCOTROL XL) 5 MG extended release tablet TAKE 1 TABLET BY MOUTH DAILY. Qty: 90 tablet, Refills: 3      allopurinol (ZYLOPRIM) 300 MG tablet TAKE ONE HALF TABLET BY MOUTH DAILY  Qty: 45 tablet, Refills: 3    Associated Diagnoses: Gout, unspecified cause, unspecified chronicity, unspecified site      ascorbic acid (VITAMIN C) 1000 MG tablet Take 1,000 mg by mouth 2 times daily       Omega-3 Fatty Acids (FISH OIL) 1000 MG CAPS Take 1,000 mg by mouth daily      Magnesium Oxide 250 MG TABS Take 250 mg by mouth daily       acetaminophen (APAP EXTRA STRENGTH) 500 MG tablet Take 1 tablet by mouth every 6 hours as needed for Pain  Qty: 40 tablet, Refills: 0      loratadine (CLARITIN) 10 MG tablet Take 10 mg by mouth daily.                Future Appointments   Date Time Provider Saloni Lopez   7/23/2020  2:00 PM Gianni Rm

## 2020-04-28 NOTE — DISCHARGE INSTR - COC
 Essential hypertension, benign I10    Type 2 diabetes mellitus with chronic kidney disease, without long-term current use of insulin ([facility]) E11.22    Hypercalcemia E83.52    Primary osteoarthritis of both knees M17.0    First degree AV block I44.0    Allergic rhinitis J30.9    Venous (peripheral) insufficiency I87.2    Facet arthropathy, lumbar M47.816    DDD (degenerative disc disease), lumbar M51.36    Arthritis of right hip M16.11    Chronic kidney disease, stage 3 ([facility]) N18.3    Morbid obesity with BMI of 45.0-49.9, adult ([facility]) E66.01, Z68.42    H/O: CVA (cerebrovascular accident) Z80.78    Chronic deep vein thrombosis (DVT) of femoral vein of left lower extremity ([facility]) I82.512    TIA (transient ischemic attack) G45.9    Acute metabolic encephalopathy X44.97       Isolation/Infection:   Isolation          No Isolation        Patient Infection Status     None to display          Nurse Assessment:  Last Vital Signs: BP (!) 92/54 Comment: RN manual check  Pulse 73   Temp 98.2 °F (36.8 °C) (Oral)   Resp 18   Ht 5' 1\" (1.549 m)   Wt 258 lb 6.1 oz (117.2 kg)   SpO2 90%   BMI 48.82 kg/m²     Last documented pain score (0-10 scale): Pain Level: 0  Last Weight:   Wt Readings from Last 1 Encounters:   04/28/20 258 lb 6.1 oz (117.2 kg)     Mental Status:  oriented, alert, coherent, logical, thought processes intact and able to concentrate and follow conversation    IV Access:  - None    Nursing Mobility/ADLs:  Walking   Dependent  Transfer  Dependent  Bathing  Dependent  Dressing  Dependent  Toileting  Dependent  Feeding  Dependent  Med Admin  Assisted  Med Delivery   whole    Wound Care Documentation and Therapy:  Wound 03/28/17 Leg Lateral;Left;Lower;Proximal # 1  (Active)   Number of days: 1127        Elimination:  Continence:   · Bowel:  Yes  · Bladder: No  Urinary Catheter: None   Colostomy/Ileostomy/Ileal Conduit: No       Date of Last BM: 4/29/2020    Intake/Output Summary (Last 24 hours) at 4/28/2020 1332  Last data filed at 4/28/2020 1225  Gross per 24 hour   Intake 2070 ml   Output 1075 ml   Net 995 ml     I/O last 3 completed shifts: In: 9538 [P.O.:1120; I.V.:650]  Out: 950 [Urine:950]    Safety Concerns: At Risk for Falls    Impairments/Disabilities:      None    Nutrition Therapy:  Current Nutrition Therapy:   - Oral Diet:  Carb Control 4 carbs/meal (1800kcals/day)    Routes of Feeding: Oral  Liquids: No Restrictions  Daily Fluid Restriction: no  Last Modified Barium Swallow with Video (Video Swallowing Test): not done    Treatments at the Time of Hospital Discharge:   Respiratory Treatments: n/a  Oxygen Therapy:  is not on home oxygen therapy.   Ventilator:    - No ventilator support    Rehab Therapies: Physical Therapy and Occupational Therapy  Weight Bearing Status/Restrictions: No weight bearing restirctions  Other Medical Equipment (for information only, NOT a DME order):  wheelchair  Other Treatments: n/a    Patient's personal belongings (please select all that are sent with patient):  None    RN SIGNATURE:  Electronically signed by Esmer Manley RN on 4/29/20 at 11:14 AM EDT    CASE MANAGEMENT/SOCIAL WORK SECTION    Inpatient Status Date: 4/23/2020     Readmission Risk Assessment Score:  Readmission Risk              Risk of Unplanned Readmission:        17           Discharging to Facility/ Agency   · Name: [facility]   · Address:  [address] [place], [place] [address], [id number]  · Phone:  [phone]  · Fax: [phone]    Dialysis Facility (if applicable)   · Name:  · Address:  · Dialysis Schedule:  · Phone:  · Fax:    / signature:Electronically signed by Martin Kaur on 4/29/2020 at 12:20 PM    PHYSICIAN SECTION    Prognosis: {Prognosis:[id number]}    Condition at Discharge: [address] Patient Condition:[id number]}    Rehab Potential (if transferring to Rehab): {Prognosis:5004563917}    Recommended Labs or Other Treatments After Discharge: ***    Physician Certification:

## 2020-04-29 VITALS
TEMPERATURE: 98 F | HEIGHT: 61 IN | SYSTOLIC BLOOD PRESSURE: 111 MMHG | OXYGEN SATURATION: 93 % | HEART RATE: 74 BPM | DIASTOLIC BLOOD PRESSURE: 67 MMHG | BODY MASS INDEX: 48.87 KG/M2 | RESPIRATION RATE: 15 BRPM | WEIGHT: 258.82 LBS

## 2020-04-29 PROBLEM — K80.20 CHOLELITHIASES: Status: ACTIVE | Noted: 2020-04-29

## 2020-04-29 LAB
ANION GAP SERPL CALCULATED.3IONS-SCNC: 13 MMOL/L (ref 3–16)
BUN BLDV-MCNC: 43 MG/DL (ref 7–20)
CALCIUM SERPL-MCNC: 8.1 MG/DL (ref 8.3–10.6)
CHLORIDE BLD-SCNC: 103 MMOL/L (ref 99–110)
CO2: 21 MMOL/L (ref 21–32)
CREAT SERPL-MCNC: 2.2 MG/DL (ref 0.6–1.2)
GFR AFRICAN AMERICAN: 26
GFR NON-AFRICAN AMERICAN: 22
GLUCOSE BLD-MCNC: 100 MG/DL (ref 70–99)
GLUCOSE BLD-MCNC: 130 MG/DL (ref 70–99)
GLUCOSE BLD-MCNC: 239 MG/DL (ref 70–99)
PERFORMED ON: ABNORMAL
PERFORMED ON: ABNORMAL
POTASSIUM SERPL-SCNC: 3.6 MMOL/L (ref 3.5–5.1)
SARS-COV-2, NAAT: NOT DETECTED
SODIUM BLD-SCNC: 137 MMOL/L (ref 136–145)

## 2020-04-29 PROCEDURE — 6370000000 HC RX 637 (ALT 250 FOR IP): Performed by: INTERNAL MEDICINE

## 2020-04-29 PROCEDURE — 97530 THERAPEUTIC ACTIVITIES: CPT

## 2020-04-29 PROCEDURE — 80048 BASIC METABOLIC PNL TOTAL CA: CPT

## 2020-04-29 PROCEDURE — 94760 N-INVAS EAR/PLS OXIMETRY 1: CPT

## 2020-04-29 PROCEDURE — 2580000003 HC RX 258: Performed by: INTERNAL MEDICINE

## 2020-04-29 PROCEDURE — U0002 COVID-19 LAB TEST NON-CDC: HCPCS

## 2020-04-29 RX ORDER — SACCHAROMYCES BOULARDII 250 MG
250 CAPSULE ORAL 2 TIMES DAILY
Qty: 20 CAPSULE | Refills: 0 | DISCHARGE
Start: 2020-04-29 | End: 2020-05-09

## 2020-04-29 RX ORDER — CIPROFLOXACIN 500 MG/1
500 TABLET, FILM COATED ORAL 2 TIMES DAILY
Qty: 10 TABLET | Refills: 0 | DISCHARGE
Start: 2020-04-29 | End: 2020-04-29 | Stop reason: SDUPTHER

## 2020-04-29 RX ORDER — CIPROFLOXACIN 500 MG/1
500 TABLET, FILM COATED ORAL DAILY
Qty: 5 TABLET | Refills: 0 | DISCHARGE
Start: 2020-04-29 | End: 2020-05-04

## 2020-04-29 RX ADMIN — AMLODIPINE BESYLATE 10 MG: 10 TABLET ORAL at 08:45

## 2020-04-29 RX ADMIN — ALLOPURINOL 150 MG: 100 TABLET ORAL at 08:45

## 2020-04-29 RX ADMIN — APIXABAN 5 MG: 5 TABLET, FILM COATED ORAL at 08:45

## 2020-04-29 RX ADMIN — SODIUM CHLORIDE, PRESERVATIVE FREE 10 ML: 5 INJECTION INTRAVENOUS at 08:46

## 2020-04-29 RX ADMIN — INSULIN LISPRO 2 UNITS: 100 INJECTION, SOLUTION INTRAVENOUS; SUBCUTANEOUS at 12:38

## 2020-04-29 RX ADMIN — GLIPIZIDE 2.5 MG: 5 TABLET ORAL at 05:23

## 2020-04-29 RX ADMIN — CARVEDILOL 25 MG: 25 TABLET, FILM COATED ORAL at 08:45

## 2020-04-29 RX ADMIN — ACETAMINOPHEN 650 MG: 325 TABLET, FILM COATED ORAL at 05:22

## 2020-04-29 RX ADMIN — DOCUSATE SODIUM 100 MG: 100 CAPSULE, LIQUID FILLED ORAL at 08:45

## 2020-04-29 RX ADMIN — CETIRIZINE HYDROCHLORIDE 5 MG: 10 TABLET, FILM COATED ORAL at 08:45

## 2020-04-29 ASSESSMENT — PAIN - FUNCTIONAL ASSESSMENT
PAIN_FUNCTIONAL_ASSESSMENT: ACTIVITIES ARE NOT PREVENTED
PAIN_FUNCTIONAL_ASSESSMENT: PREVENTS OR INTERFERES SOME ACTIVE ACTIVITIES AND ADLS

## 2020-04-29 ASSESSMENT — PAIN DESCRIPTION - PAIN TYPE
TYPE: ACUTE PAIN
TYPE: ACUTE PAIN

## 2020-04-29 ASSESSMENT — PAIN DESCRIPTION - ONSET
ONSET: ON-GOING
ONSET: ON-GOING

## 2020-04-29 ASSESSMENT — PAIN DESCRIPTION - ORIENTATION
ORIENTATION: LEFT
ORIENTATION: LEFT

## 2020-04-29 ASSESSMENT — PAIN DESCRIPTION - FREQUENCY
FREQUENCY: INTERMITTENT
FREQUENCY: CONTINUOUS

## 2020-04-29 ASSESSMENT — PAIN DESCRIPTION - DESCRIPTORS
DESCRIPTORS: ACHING
DESCRIPTORS: ACHING;CONSTANT

## 2020-04-29 ASSESSMENT — PAIN DESCRIPTION - LOCATION
LOCATION: HAND
LOCATION: HAND

## 2020-04-29 ASSESSMENT — PAIN DESCRIPTION - PROGRESSION
CLINICAL_PROGRESSION: NOT CHANGED
CLINICAL_PROGRESSION: NOT CHANGED

## 2020-04-29 ASSESSMENT — PAIN SCALES - WONG BAKER
WONGBAKER_NUMERICALRESPONSE: 0

## 2020-04-29 ASSESSMENT — PAIN SCALES - GENERAL
PAINLEVEL_OUTOF10: 0
PAINLEVEL_OUTOF10: 10
PAINLEVEL_OUTOF10: 3

## 2020-04-29 NOTE — PROGRESS NOTES
was Altered mental status, unspecified altered mental status type. Diagnoses of Acute on chronic renal insufficiency, Essential hypertension, benign, Type 2 diabetes mellitus with stage 4 chronic kidney disease, without long-term current use of insulin ([address] and Chronic deep vein thrombosis (DVT) of femoral vein of left lower extremity ([address] were also pertinent to this visit. has a past medical history of Blood circulation, collateral, Cerebral artery occlusion with cerebral infarction [facility]), Chronic deep vein thrombosis (DVT) of femoral vein of left lower extremity (HCC), Chronic kidney disease (CKD), stage III (moderate) ([address] CVA (cerebral infarction), Diabetes, DVT, lower extremity, recurrent ([address] Edema, First degree AV block, Gout, Heart murmur, Hypercalcemia, Hypercholesterolemia, Hyperhomocysteinemia (HCC), Hypertension, TRUPTI (obstructive sleep apnea), Osteoarthritis, Sciatica, Traumatic hematoma of left lower leg, Venous insufficiency, and Visual loss R eye - REtinal detachment. has a past surgical history that includes other surgical history; Cataract removal with implant (Bilateral); and other surgical history (Left). Restrictions  Restrictions/Precautions  Restrictions/Precautions: Fall Risk  Subjective   General  Chart Reviewed: Yes  Patient assessed for rehabilitation services?: Yes  Additional Pertinent Hx: 79 y/o female admitted 4/23/2020 with acute metabolic encephalopathy suspect secondary to significant hypercalcemia. MRI showed no acute intracranial abnormality. CT-head revealed stable moderate low-attenuation changes within the left frontal lobe likely representing encephalomalacia,.  PMHx: CVA, history of recurrent DVT on chronic anticoagulation with Eliquis, essential hypertension, obstructive sleep apnea on CPAP, CKD stage III/IV, osteoarthritis, uncontrolled diabetes type 2 with hyperglycemia   Family / Caregiver Present: No  Referring Practitioner: Vi Chongi, data.)  Insights: Decreased awareness of deficits(Simultaneous filing. User may not have seen previous data.)  Initiation: Requires cues for some(Simultaneous filing. User may not have seen previous data.)  Sequencing: Requires cues for some(Simultaneous filing. User may not have seen previous data.)                    Type of ROM/Therapeutic Exercise  Comment: encouraged BUE AROM ex's throughout the day. Pt demonstrated ability to complete forward sh flexion, x8 reps.                     Plan   Plan  Times per week: 3-5  Current Treatment Recommendations: Strengthening, Endurance Training, ROM, Self-Care / ADL, Balance Training, Gait Training, Stair training, Functional Mobility Training  AM-PAC Score        AM-[facility] Inpatient Daily Activity Raw Score: 10 (04/29/20 1014)  AM-PAC Inpatient ADL T-Scale Score : 27.31 (04/29/20 1014)  ADL Inpatient CMS 0-100% Score: 74.7 (04/29/20 1014)  ADL Inpatient CMS G-Code Modifier : CL (04/29/20 1014)    Goals  Short term goals  Time Frame for Short term goals: Status: goals ongoing  Short term goal 1: Pt will complete ADL transfers/mobility with SBA  Short term goal 2: Pt will tolerate standing > 3 min for functional task with SBA  Short term goal 3: Pt will complete toileting with SBA  Short term goal 4: Pt will complete LB dressing with min A  Long term goals  Time Frame for Long term goals : stgs=ltgs  Patient Goals   Patient goals : to return home       Therapy Time   Individual Concurrent Group Co-treatment   Time In 0937         Time Out 1005         Minutes [address] SAM/L,515

## 2020-04-29 NOTE — PROGRESS NOTES
Physical Therapy  Facility/Department: [address] PROGRESSIVE CARE  Daily Treatment Note  NAME: Valdo Chávez  : 1940  MRN: [id number]    Date of Service: 2020    Discharge Recommendations:  Patient would benefit from continued therapy after discharge, 3-5 sessions per week   PT Equipment Recommendations  Equipment Needed: No  Francesca Pulido scored a  on the AM-PAC short mobility form. Current research shows that an AM-PAC score of 17 or less is typically not associated with a discharge to the patient's home setting. Based on the patients AM-PAC score and their current functional mobility deficits, it is recommended that the patient have 3-5 sessions per week of Physical Therapy at d/c to increase the patients independence. If patient discharges prior to next session this note will serve as a discharge summary. Please see below for the latest assessment towards goals. Assessment   Body structures, Functions, Activity limitations: Decreased functional mobility   Assessment: pt is a 77 yo female who was adm to [facility] with AMS; pt at baseline lives with her daughter and able to amb with assist from daughter. Patient continues to function below her baseline mobility level, continues to fatigue quickly, continues to self limit. Pt is a high fall risk and will need continued therapy at discharge at 3-5x/week. Prognosis: Good;Fair  Clinical Presentation: evolving  PT Education: PT Role;Plan of Care  Barriers to Learning: confusion; delayed processing  REQUIRES PT FOLLOW UP: Yes  Activity Tolerance  Activity Tolerance: Patient limited by endurance; Patient limited by cognitive status     Patient Diagnosis(es): The primary encounter diagnosis was Altered mental status, unspecified altered mental status type.  Diagnoses of Acute on chronic renal insufficiency, Essential hypertension, benign, Type 2 diabetes mellitus with stage 4 chronic kidney disease, without long-term current use of insulin within the left frontal lobe likely representing encephalomalacia, stable 5 mm nodule within the right upper lobe and 4 mm nodule within the left upper lobe for which CT-chest was recommended; otherwise, no acute pathology noted. Current hospital issues:  acute metabolic encephalopathy and hypercalcemia,  Response To Previous Treatment: Patient with no complaints from previous session. Family / Caregiver Present: No  Subjective  Subjective: Pt is agreeable to PT           Orientation  Orientation  Overall Orientation Status: Within Normal Limits     Cognition   Cognition  Overall Cognitive Status: Exceptions  Following Commands: Follows one step commands with increased time  Attention Span: Attends with cues to redirect  Memory: Decreased recall of recent events;Decreased short term memory  Safety Judgement: Decreased awareness of need for assistance;Decreased awareness of need for safety  Insights: Decreased awareness of deficits  Initiation: Requires cues for some  Sequencing: Requires cues for some    Objective   Bed mobility  Rolling to Left: Maximum assistance  Rolling to Right: Maximum assistance  Supine to Sit: Maximum assistance  Sit to Supine: Unable to assess(in recliner at end of session)  Scooting: Maximal assistance  Comment: pericare performed for BM - bed sore noted and nursing performed skin check, applied zinc barrier  Transfers  Sit to Stand: Minimal Assistance;2 Person Assistance  Stand to sit: Minimal Assistance;2 Person Assistance  Bed to Chair: Dependent/Total(stedy)  Comment: stood within stedy x 50 seconds.   Ambulation  Ambulation?: No     Balance  Sitting - Static: Fair;+  Sitting - Dynamic: Fair;+  Standing - Static: Fair;-           AM-PAC Score  AM-PAC Inpatient Mobility Raw Score : 9 (04/29/20 1008)  AM-PAC Inpatient T-Scale Score : 30.55 (04/29/20 1008)  Mobility Inpatient CMS 0-100% Score: 81.38 (04/29/20 1008)  Mobility Inpatient CMS G-Code Modifier : CM (04/29/20 1008) Goals  Short term goals  Time Frame for Short term goals: by discharge - all goals ongoing as of 4/29/20  Short term goal 1: no bed mob goal as pt does not sleep in a bed  Short term goal 2: transfers min A of2 with walker  Short term goal 3: amb 8' with RW mod A  Patient Goals   Patient goals : pt unable to state a goal    Plan    Plan  Times per week: 3-5  Current Treatment Recommendations: Functional Mobility Training  Safety Devices  Type of devices:  All fall risk precautions in place, Call light within reach, Chair alarm in place, Gait belt, Patient at risk for falls, Nurse notified, Left in chair     Therapy Time   Individual Concurrent Group Co-treatment   Time In 0937         Time Out 1005         Minutes 28         Timed Code Treatment Minutes: 7 [facility], PT

## 2020-04-29 NOTE — DISCHARGE SUMMARY
the DOUGLAS along with the hypercalcemia. Labs show a vitamin D deficiency along with a decreased level of PTH. Will need to rule out malignancy as cause for hypercalcemia, SPEP and UPEP ordered.      4/25  Patient still appears slightly confused. She does not believe that she is currently in the hospital even though she states that I am wearing a white coat and have medical ID badges. Discussed case with patient's daughter and she states that she has not been the same since he was last discharged in March. She remains confused at home as well. The patient's word finding difficulty and dysarthria have gone away.     4/26  Patient appears to be back to her baseline. She no longer has any sort of confusion or dysarthria whatsoever. Discussed likely discharge tomorrow if she remains this way. Will need to see if she requires a short rehab stay before going home where she can just go home with home care    4/27/20 Appears to be close to her baseline. Will discuss with nephrology regarding possible renal biopsy, this is not been ordered by nephro yet. Patients  PTH related peptide returned slightly elevated. 4/29 Patient had CT chest/abd/pelvis which showed atelectasis or pneumonia. Likely atelectasis as patient does not move around much.  Also showed possible colitis, will DC patient on cipro although only indicator for infection is elevated inflammatory markers, vitals remain normal, WBC normal. Patient does have a few other findings including renal mass/cyst, retroperitoneal nodule, and adrenal nodules that will need follow up    Acute metabolic encephalopathy.  Suspect secondary to significant hypercalcemia.  MRI negative for pathology,  resolved     Hypercalcemia in the setting of underlying CKD stage III and on calcium supplements  PTH low , PTHrP pending and 25-hydroxy vitamin D level low.  Hold vitamin D supplement.  Chest x-ray with some pulmonary congestion; give 1 dose of IV Lasix 40 mg.  Recheck

## 2020-04-29 NOTE — PROGRESS NOTES
NEPHROLOGY PROGRESS NOTE    Patient: Dustin Nash MRN: [id number]     YOB: 1940  Age: 78 y.o. Sex: female    Unit: [address] Room/Bed: I0R-0761/5108-01 Location: [address] [place] [address]     Admitting Physician: Christy Rodriguez    Primary Care Physician: Erednira Nogueira MD          LOS: 6 days       Reason for evaluation:   DOUGLAS/Hypercalcemia    This is a patient with significant past medical history of CKD stage 3 baseline scr is mid 1 range, followed by Dr. Justin Mccray, DM2, recurrent DVT on eliquis, CVA,  who presents with AMS, which was noted to be resolved by the time she presented to the hospital.  In ER calcium level was noted to be 12.6. She is admitted for management of hypercalcemia. She is on calcium supplements, as well as MVI. She reports constipation but denies any AP. She is currently alert and awake and responds appropriately. She has 2 lung nodules. She denies taking extra calcium supplements. PTH is suppressed. Denies any N/V/D. Denies f/chills/or decreased PO intake. In ER, she received lasix, denies SOB, CXR is negative.       Interval History (Chart/Data reviewed):  Scr is largely stable, UOP adequate, concern of colitis on CT A/P. Has been started on     + Nausea, + Chills, + Constipation, + Malaise  Denies cp/sob. Updated at bedside: Patient & RN, no family a bedside. Past medical, family, and social histories were reviewed as previously documented. Updates were made as necessary. Review of Systems ROS with pertinent positives and negatives listed in interval history.   OBJECTIVE:     Vitals:    04/29/20 0737 04/29/20 0907 04/29/20 0943 04/29/20 1134   BP: 111/67      Pulse: 75  80 74   Resp: 15      Temp: 98 °F (36.7 °C)      TempSrc: Oral      SpO2: 93% 93%     Weight:       Height:           Intake and Output:      Intake/Output Summary (Last 24 hours) at 4/29/2020 1251  Last data filed at 4/29/2020 0907  Gross per 24 hour   Intake 660 ml Output 200 ml   Net 460 ml       Continuous Infusions:   dextrose         Exam:   CONSTITUTIONAL/PSYCHIATRY: morbidly obese, awake, alert and oriented x3. Not in acute distress   EYES: Conjunctivae: normal. Pupils: reactive to light  RESPIRATORY: Respiratory effort: normal. Auscultation: diminished air movement  CARDIOVASCULAR: Auscultation: RRR. Edema: trace  GASTROINTESTINAL: Soft, nontender, nondistended. Fairly obese abdomen  EXTREMITIES:  No cyanosis or clubbing. SKIN: Warm and dry. Has multiple ecchymotic areas and chronic discoloration in legs      LABS:   RFP:   Recent Labs     04/27/20  0439 04/28/20  0440 04/29/20  0433    138 137   K 3.5 3.5 3.6    105 103   CO2 22 22 21   BUN 32* 36* 43*   CREATININE 2.2* 2.0* 2.2*   CALCIUM 8.9 8.7 8.1*   MG  --  2.20  --    PHOS  --  3.1  --    GFRAA 26* 29* 26*       Liver panel:  No results for input(s): ALB, TP, AST, ALT in the last 72 hours. Invalid input(s): TBIL    CBC:   Recent Labs     04/28/20  0440   WBC 8.7   HGB 10.3*   HCT 30.3*   MCV 97.5            ASSESSMENT and PLAN:     1. Hypercalcemia: This is recurrent in nature (has had episodes of hypercalcemia since 2015). PTH suppressed, vitamin D low. She has been on Ca/vitD supplement but there is likely an additional factor. S/P calcitonin x2. Ca is trending down nicely   - SIFE/UPEP/ACE-I level all normal.    - PTH appropriately suppressed, PTHrp some but not significantly elevated   - 4/23/20 Stable 5 mm nodule within the right upper lobe and 4 mm nodule within the  left upper lobe.  CT chest should be considered. - Continue to hold calcium supplements/MVI       2. DOUGLAS on CKD3 (baseline Cr ~1.5; followed by Dr Madi Mead): DOUGLAS is likely pre-renal in the setting of hypercalcemia, cannot exclude GN for now given degree of proteinuria. Received contrast 4/23/20   - Continue to hold losartan for now     3.  Nephrotic range proteinuria: last UACR as up to 9,756 in 2/20 (was

## 2020-04-30 ENCOUNTER — TELEPHONE (OUTPATIENT)
Dept: PRIMARY CARE CLINIC | Age: 80
End: 2020-04-30

## 2020-04-30 ENCOUNTER — CARE COORDINATION (OUTPATIENT)
Dept: CASE MANAGEMENT | Age: 80
End: 2020-04-30

## 2020-04-30 LAB — MISCELLANEOUS LAB TEST ORDER: NORMAL

## 2020-05-27 ENCOUNTER — CARE COORDINATION (OUTPATIENT)
Dept: CASE MANAGEMENT | Age: 80
End: 2020-05-27

## 2020-05-27 ENCOUNTER — VIRTUAL VISIT (OUTPATIENT)
Dept: FAMILY MEDICINE CLINIC | Age: 80
End: 2020-05-27
Payer: MEDICARE

## 2020-05-27 PROCEDURE — 99214 OFFICE O/P EST MOD 30 MIN: CPT | Performed by: NURSE PRACTITIONER

## 2020-05-27 RX ORDER — PSEUDOEPHEDRINE HCL 30 MG
100 TABLET ORAL 2 TIMES DAILY PRN
Qty: 30 CAPSULE | Refills: 2 | Status: ON HOLD
Start: 2020-05-27 | End: 2020-06-12 | Stop reason: HOSPADM

## 2020-05-27 RX ORDER — ERGOCALCIFEROL 1.25 MG/1
50000 CAPSULE ORAL WEEKLY
Qty: 4 CAPSULE | Refills: 0 | Status: SHIPPED
Start: 2020-05-27 | End: 2020-05-27 | Stop reason: CLARIF

## 2020-05-27 RX ORDER — CLINDAMYCIN HYDROCHLORIDE 300 MG/1
300 CAPSULE ORAL 3 TIMES DAILY
Qty: 21 CAPSULE | Refills: 0 | Status: ON HOLD | OUTPATIENT
Start: 2020-05-27 | End: 2020-06-12 | Stop reason: HOSPADM

## 2020-05-27 RX ORDER — FUROSEMIDE 20 MG/1
20 TABLET ORAL DAILY PRN
Qty: 30 TABLET | Refills: 0 | Status: ON HOLD
Start: 2020-05-27 | End: 2020-06-12 | Stop reason: HOSPADM

## 2020-05-27 RX ORDER — ATORVASTATIN CALCIUM 40 MG/1
40 TABLET, FILM COATED ORAL DAILY
Qty: 30 TABLET | Refills: 2 | Status: ON HOLD
Start: 2020-05-27 | End: 2020-06-12 | Stop reason: HOSPADM

## 2020-05-27 RX ORDER — AMLODIPINE BESYLATE 10 MG/1
10 TABLET ORAL DAILY
Qty: 30 TABLET | Refills: 3 | Status: SHIPPED
Start: 2020-05-27 | End: 2020-05-27 | Stop reason: DRUGHIGH

## 2020-05-27 RX ORDER — AMLODIPINE BESYLATE 2.5 MG/1
2.5 TABLET ORAL DAILY
Qty: 30 TABLET | Refills: 0 | Status: ON HOLD
Start: 2020-05-27 | End: 2020-06-12 | Stop reason: HOSPADM

## 2020-05-27 RX ORDER — GLIPIZIDE 5 MG/1
TABLET, FILM COATED, EXTENDED RELEASE ORAL
Qty: 90 TABLET | Refills: 3 | Status: ON HOLD
Start: 2020-05-27 | End: 2020-06-12 | Stop reason: HOSPADM

## 2020-05-27 RX ORDER — ALLOPURINOL 300 MG/1
TABLET ORAL
Qty: 45 TABLET | Refills: 3 | Status: ON HOLD
Start: 2020-05-27 | End: 2020-06-12 | Stop reason: HOSPADM

## 2020-05-27 RX ORDER — CARVEDILOL 25 MG/1
25 TABLET ORAL 2 TIMES DAILY WITH MEALS
Qty: 60 TABLET | Refills: 3 | Status: ON HOLD
Start: 2020-05-27 | End: 2020-06-12 | Stop reason: HOSPADM

## 2020-05-27 NOTE — CARE COORDINATION
Toni 45 Transitions Initial Follow Up Call    Call within 2 business days of discharge: Yes    Patient: Ananda Bocanegra Patient : 1940   MRN: [id number]  Reason for Admission:  Metabolic Encephalopathy  Discharge Date: 20 RARS: Readmission Risk Score: 20      Last Discharge [employer]       Complaint Diagnosis Description Type Department Provider    20 Altered Mental Status Altered mental status, unspecified altered mental status type . .. ED to Hosp-Admission (Discharged) (ADMITTED) 550 Del Trammell MD; Sissy Zabala. .. Per Patientping patient discharged from snf on 20. Home number listed is to daughter and caregiver Julio Alston. Tina Santa stated patient is doing good. Patient is very happy to be home. No c/o of pain, confusion. Patient does test her BS. Range unknown. Last A1C 20 was 7.3. Patient has increased BLE edema and sob on exertion. Patient is not on a diuretic. Prior to admission Anushka stated patient would wear diabetic socks and family would use ace wraps. She is not sure this continued when patient was in the skilled nursing facility. PCP virtual visit today. Tina Santa states her  is home with patient and a family friend will also be present to discuss edema. Appetite fair. Tina Santa was at work and could not review medications. She said someone called to tell her that she will be contacted soon to schedule a visit. She thought it was [employer]. Writer called [facility]. Per [facility] referral confirmed and patient will receive a call for a visit today or to schedule visit for tomorrow. Patient ambulates using a walker. She can also participate in ADL's. Daughter Tina Santa updated. Will follow up after virtual visit and Annette Shine visit.  HERO MaldonadoN RN  Care Transition Nurse [phone]         Spoke with: Tina Santa Bobby/Leandro    Facility: [facility]    Non-face-to-face services provided:  Obtained and reviewed discharge

## 2020-05-27 NOTE — PROGRESS NOTES
[x] No visualized signs of difficulty breathing or respiratory distress        [] Abnormal-       Neurological:        [x] No Facial Asymmetry (Cranial nerve 7 motor function) (limited exam to video visit)          [x] No gaze palsy        [] Abnormal-         Skin:        [] No significant exanthematous lesions or discoloration noted on facial skin         [x] Abnormal-  + swelling and erythema to left elbow and wrist  + erythema and swelling to left lower extremity. Psychiatric:       [x] Normal Affect [x] No Hallucinations        [] Abnormal-     Other pertinent observable physical exam findings-     ASSESSMENT/PLAN:  1. Hypercholesterolemia  Refilled medication.  - atorvastatin (LIPITOR) 40 MG tablet; Take 1 tablet by mouth daily  Dispense: 30 tablet; Refill: 2    2. Drug-induced constipation  Patient takes as needed when on tramadol.  - docusate (COLACE, DULCOLAX) 100 MG CAPS; Take 100 mg by mouth 2 times daily as needed (as needed for constipation)  Dispense: 30 capsule; Refill: 2    3. Chronic deep vein thrombosis (DVT) of femoral vein of left lower extremity (HCC)  Refilled medication. - apixaban (ELIQUIS) 5 MG TABS tablet; Take 0.5 tablets by mouth 2 times daily  Dispense: 180 tablet; Refill: 1    4. Gout, unspecified cause, unspecified chronicity, unspecified site  Refilled medication. - allopurinol (ZYLOPRIM) 300 MG tablet; TAKE ONE HALF TABLET BY MOUTH DAILY  Dispense: 45 tablet; Refill: 3    5. Essential hypertension, benign  Overcontrolled per home blood pressure readings. Patient also has increased swelling to lower extremities. Will decrease amlodipine to 2.5 mg which was her previous dose in hopes to improve swelling also. Patient to monitor blood pressure readings and record for the next week. Patient to make follow up appointment in 1 week. - carvedilol (COREG) 25 MG tablet; Take 1 tablet by mouth 2 times daily (with meals)  Dispense: 60 tablet;  Refill: 3  - amLODIPine (NORVASC) 2.5

## 2020-06-01 ENCOUNTER — APPOINTMENT (OUTPATIENT)
Dept: GENERAL RADIOLOGY | Age: 80
DRG: 291 | End: 2020-06-01
Payer: MEDICARE

## 2020-06-01 ENCOUNTER — HOSPITAL ENCOUNTER (INPATIENT)
Age: 80
LOS: 11 days | Discharge: HOSPICE | DRG: 291 | End: 2020-06-12
Attending: INTERNAL MEDICINE | Admitting: INTERNAL MEDICINE
Payer: MEDICARE

## 2020-06-01 ENCOUNTER — TELEPHONE (OUTPATIENT)
Dept: FAMILY MEDICINE CLINIC | Age: 80
End: 2020-06-01

## 2020-06-01 ENCOUNTER — APPOINTMENT (OUTPATIENT)
Dept: CT IMAGING | Age: 80
DRG: 291 | End: 2020-06-01
Payer: MEDICARE

## 2020-06-01 PROBLEM — I50.31 ACUTE DIASTOLIC CHF (CONGESTIVE HEART FAILURE) (HCC): Status: ACTIVE | Noted: 2020-06-01

## 2020-06-01 LAB
A/G RATIO: 0.7 (ref 1.1–2.2)
ALBUMIN SERPL-MCNC: 2.8 G/DL (ref 3.4–5)
ALP BLD-CCNC: 60 U/L (ref 40–129)
ALT SERPL-CCNC: 12 U/L (ref 10–40)
AMORPHOUS: ABNORMAL /HPF
ANION GAP SERPL CALCULATED.3IONS-SCNC: 10 MMOL/L (ref 3–16)
AST SERPL-CCNC: 49 U/L (ref 15–37)
BACTERIA: ABNORMAL /HPF
BASOPHILS ABSOLUTE: 0.1 K/UL (ref 0–0.2)
BASOPHILS RELATIVE PERCENT: 0.7 %
BILIRUB SERPL-MCNC: <0.2 MG/DL (ref 0–1)
BILIRUBIN URINE: NEGATIVE
BLOOD, URINE: NEGATIVE
BUN BLDV-MCNC: 23 MG/DL (ref 7–20)
CALCIUM SERPL-MCNC: 9.1 MG/DL (ref 8.3–10.6)
CHLORIDE BLD-SCNC: 103 MMOL/L (ref 99–110)
CLARITY: CLEAR
CO2: 25 MMOL/L (ref 21–32)
COLOR: YELLOW
CREAT SERPL-MCNC: 1.8 MG/DL (ref 0.6–1.2)
D DIMER: 373 NG/ML DDU (ref 0–229)
EOSINOPHILS ABSOLUTE: 0.3 K/UL (ref 0–0.6)
EOSINOPHILS RELATIVE PERCENT: 3.7 %
EPITHELIAL CELLS, UA: ABNORMAL /HPF (ref 0–5)
FERRITIN: 239.4 NG/ML (ref 15–150)
GFR AFRICAN AMERICAN: 33
GFR NON-AFRICAN AMERICAN: 27
GLOBULIN: 3.8 G/DL
GLUCOSE BLD-MCNC: 110 MG/DL (ref 70–99)
GLUCOSE BLD-MCNC: 129 MG/DL (ref 70–99)
GLUCOSE URINE: NEGATIVE MG/DL
HCT VFR BLD CALC: 27.9 % (ref 36–48)
HEMOGLOBIN: 9.1 G/DL (ref 12–16)
HYALINE CASTS: ABNORMAL /LPF (ref 0–2)
INR BLD: 1.57 (ref 0.86–1.14)
IRON SATURATION: 16 % (ref 15–50)
IRON: 37 UG/DL (ref 37–145)
KETONES, URINE: NEGATIVE MG/DL
LACTIC ACID: 1 MMOL/L (ref 0.4–2)
LEUKOCYTE ESTERASE, URINE: NEGATIVE
LYMPHOCYTES ABSOLUTE: 0.8 K/UL (ref 1–5.1)
LYMPHOCYTES RELATIVE PERCENT: 11.3 %
MCH RBC QN AUTO: 32.6 PG (ref 26–34)
MCHC RBC AUTO-ENTMCNC: 32.6 G/DL (ref 31–36)
MCV RBC AUTO: 99.8 FL (ref 80–100)
MICROSCOPIC EXAMINATION: YES
MONOCYTES ABSOLUTE: 0.6 K/UL (ref 0–1.3)
MONOCYTES RELATIVE PERCENT: 9.1 %
MUCUS: ABNORMAL /LPF
NEUTROPHILS ABSOLUTE: 5.3 K/UL (ref 1.7–7.7)
NEUTROPHILS RELATIVE PERCENT: 75.2 %
NITRITE, URINE: NEGATIVE
PDW BLD-RTO: 16.4 % (ref 12.4–15.4)
PERFORMED ON: ABNORMAL
PH UA: 6 (ref 5–8)
PLATELET # BLD: 284 K/UL (ref 135–450)
PMV BLD AUTO: 6.9 FL (ref 5–10.5)
POTASSIUM SERPL-SCNC: 4.3 MMOL/L (ref 3.5–5.1)
PRO-BNP: 1765 PG/ML (ref 0–449)
PROTEIN UA: >=300 MG/DL
PROTHROMBIN TIME: 18.3 SEC (ref 10–13.2)
RBC # BLD: 2.8 M/UL (ref 4–5.2)
RBC UA: ABNORMAL /HPF (ref 0–4)
RENAL EPITHELIAL, UA: ABNORMAL /HPF (ref 0–1)
SODIUM BLD-SCNC: 138 MMOL/L (ref 136–145)
SPECIFIC GRAVITY UA: 1.02 (ref 1–1.03)
TOTAL IRON BINDING CAPACITY: 227 UG/DL (ref 260–445)
TOTAL PROTEIN: 6.6 G/DL (ref 6.4–8.2)
TROPONIN: <0.01 NG/ML
TROPONIN: <0.01 NG/ML
TSH SERPL DL<=0.05 MIU/L-ACNC: 6.38 UIU/ML (ref 0.27–4.2)
URINE REFLEX TO CULTURE: ABNORMAL
URINE TYPE: ABNORMAL
UROBILINOGEN, URINE: 0.2 E.U./DL
WBC # BLD: 7 K/UL (ref 4–11)
WBC UA: ABNORMAL /HPF (ref 0–5)

## 2020-06-01 PROCEDURE — 6370000000 HC RX 637 (ALT 250 FOR IP): Performed by: INTERNAL MEDICINE

## 2020-06-01 PROCEDURE — 93005 ELECTROCARDIOGRAM TRACING: CPT | Performed by: NURSE PRACTITIONER

## 2020-06-01 PROCEDURE — 83605 ASSAY OF LACTIC ACID: CPT

## 2020-06-01 PROCEDURE — 82728 ASSAY OF FERRITIN: CPT

## 2020-06-01 PROCEDURE — 84443 ASSAY THYROID STIM HORMONE: CPT

## 2020-06-01 PROCEDURE — 2580000003 HC RX 258: Performed by: INTERNAL MEDICINE

## 2020-06-01 PROCEDURE — 85379 FIBRIN DEGRADATION QUANT: CPT

## 2020-06-01 PROCEDURE — 1200000000 HC SEMI PRIVATE

## 2020-06-01 PROCEDURE — 99285 EMERGENCY DEPT VISIT HI MDM: CPT

## 2020-06-01 PROCEDURE — 80053 COMPREHEN METABOLIC PANEL: CPT

## 2020-06-01 PROCEDURE — 81001 URINALYSIS AUTO W/SCOPE: CPT

## 2020-06-01 PROCEDURE — 96374 THER/PROPH/DIAG INJ IV PUSH: CPT

## 2020-06-01 PROCEDURE — 84132 ASSAY OF SERUM POTASSIUM: CPT

## 2020-06-01 PROCEDURE — 83540 ASSAY OF IRON: CPT

## 2020-06-01 PROCEDURE — 85610 PROTHROMBIN TIME: CPT

## 2020-06-01 PROCEDURE — 83880 ASSAY OF NATRIURETIC PEPTIDE: CPT

## 2020-06-01 PROCEDURE — 83550 IRON BINDING TEST: CPT

## 2020-06-01 PROCEDURE — 85025 COMPLETE CBC W/AUTO DIFF WBC: CPT

## 2020-06-01 PROCEDURE — 36415 COLL VENOUS BLD VENIPUNCTURE: CPT

## 2020-06-01 PROCEDURE — 71045 X-RAY EXAM CHEST 1 VIEW: CPT

## 2020-06-01 PROCEDURE — 84484 ASSAY OF TROPONIN QUANT: CPT

## 2020-06-01 PROCEDURE — 6360000002 HC RX W HCPCS: Performed by: NURSE PRACTITIONER

## 2020-06-01 RX ORDER — ATORVASTATIN CALCIUM 40 MG/1
40 TABLET, FILM COATED ORAL DAILY
Status: DISCONTINUED | OUTPATIENT
Start: 2020-06-02 | End: 2020-06-11

## 2020-06-01 RX ORDER — CARVEDILOL 25 MG/1
25 TABLET ORAL 2 TIMES DAILY WITH MEALS
Status: DISCONTINUED | OUTPATIENT
Start: 2020-06-02 | End: 2020-06-03

## 2020-06-01 RX ORDER — AMLODIPINE BESYLATE 5 MG/1
2.5 TABLET ORAL DAILY
Status: DISCONTINUED | OUTPATIENT
Start: 2020-06-02 | End: 2020-06-03

## 2020-06-01 RX ORDER — TRAMADOL HYDROCHLORIDE 50 MG/1
50 TABLET ORAL 3 TIMES DAILY
Status: ON HOLD | COMMUNITY
End: 2020-06-12 | Stop reason: HOSPADM

## 2020-06-01 RX ORDER — SODIUM CHLORIDE 0.9 % (FLUSH) 0.9 %
10 SYRINGE (ML) INJECTION EVERY 12 HOURS SCHEDULED
Status: DISCONTINUED | OUTPATIENT
Start: 2020-06-01 | End: 2020-06-12 | Stop reason: HOSPADM

## 2020-06-01 RX ORDER — CLINDAMYCIN HYDROCHLORIDE 150 MG/1
300 CAPSULE ORAL 3 TIMES DAILY
Status: DISCONTINUED | OUTPATIENT
Start: 2020-06-01 | End: 2020-06-02

## 2020-06-01 RX ORDER — PROMETHAZINE HYDROCHLORIDE 25 MG/1
12.5 TABLET ORAL EVERY 6 HOURS PRN
Status: DISCONTINUED | OUTPATIENT
Start: 2020-06-01 | End: 2020-06-12 | Stop reason: HOSPADM

## 2020-06-01 RX ORDER — ASPIRIN 81 MG/1
81 TABLET ORAL DAILY
Status: DISCONTINUED | OUTPATIENT
Start: 2020-06-02 | End: 2020-06-11

## 2020-06-01 RX ORDER — GLIPIZIDE 5 MG/1
5 TABLET ORAL
Status: DISCONTINUED | OUTPATIENT
Start: 2020-06-02 | End: 2020-06-08

## 2020-06-01 RX ORDER — LANOLIN ALCOHOL/MO/W.PET/CERES
400 CREAM (GRAM) TOPICAL DAILY
Status: DISCONTINUED | OUTPATIENT
Start: 2020-06-02 | End: 2020-06-11

## 2020-06-01 RX ORDER — FUROSEMIDE 10 MG/ML
40 INJECTION INTRAMUSCULAR; INTRAVENOUS DAILY
Status: DISCONTINUED | OUTPATIENT
Start: 2020-06-02 | End: 2020-06-02

## 2020-06-01 RX ORDER — ACETAMINOPHEN 325 MG/1
650 TABLET ORAL EVERY 6 HOURS PRN
Status: DISCONTINUED | OUTPATIENT
Start: 2020-06-01 | End: 2020-06-12 | Stop reason: HOSPADM

## 2020-06-01 RX ORDER — ONDANSETRON 2 MG/ML
4 INJECTION INTRAMUSCULAR; INTRAVENOUS EVERY 6 HOURS PRN
Status: DISCONTINUED | OUTPATIENT
Start: 2020-06-01 | End: 2020-06-12 | Stop reason: HOSPADM

## 2020-06-01 RX ORDER — ASCORBIC ACID 500 MG
1000 TABLET ORAL 2 TIMES DAILY
Status: DISCONTINUED | OUTPATIENT
Start: 2020-06-01 | End: 2020-06-11

## 2020-06-01 RX ORDER — ACETAMINOPHEN 650 MG/1
650 SUPPOSITORY RECTAL EVERY 6 HOURS PRN
Status: DISCONTINUED | OUTPATIENT
Start: 2020-06-01 | End: 2020-06-12 | Stop reason: HOSPADM

## 2020-06-01 RX ORDER — POLYETHYLENE GLYCOL 3350 17 G/17G
17 POWDER, FOR SOLUTION ORAL DAILY PRN
Status: DISCONTINUED | OUTPATIENT
Start: 2020-06-01 | End: 2020-06-12 | Stop reason: HOSPADM

## 2020-06-01 RX ORDER — FUROSEMIDE 10 MG/ML
40 INJECTION INTRAMUSCULAR; INTRAVENOUS ONCE
Status: COMPLETED | OUTPATIENT
Start: 2020-06-01 | End: 2020-06-01

## 2020-06-01 RX ORDER — SODIUM CHLORIDE 0.9 % (FLUSH) 0.9 %
10 SYRINGE (ML) INJECTION PRN
Status: DISCONTINUED | OUTPATIENT
Start: 2020-06-01 | End: 2020-06-12 | Stop reason: HOSPADM

## 2020-06-01 RX ORDER — CHLORAL HYDRATE 500 MG
1000 CAPSULE ORAL DAILY
Status: DISCONTINUED | OUTPATIENT
Start: 2020-06-02 | End: 2020-06-01

## 2020-06-01 RX ORDER — DOCUSATE SODIUM 100 MG/1
100 CAPSULE, LIQUID FILLED ORAL 2 TIMES DAILY PRN
Status: DISCONTINUED | OUTPATIENT
Start: 2020-06-01 | End: 2020-06-12 | Stop reason: HOSPADM

## 2020-06-01 RX ADMIN — Medication 1000 MG: at 22:14

## 2020-06-01 RX ADMIN — Medication 2.5 MG: at 22:14

## 2020-06-01 RX ADMIN — CLINDAMYCIN HYDROCHLORIDE 300 MG: 150 CAPSULE ORAL at 22:14

## 2020-06-01 RX ADMIN — FUROSEMIDE 40 MG: 10 INJECTION, SOLUTION INTRAMUSCULAR; INTRAVENOUS at 15:45

## 2020-06-01 RX ADMIN — SODIUM CHLORIDE, PRESERVATIVE FREE 10 ML: 5 INJECTION INTRAVENOUS at 22:13

## 2020-06-01 ASSESSMENT — ENCOUNTER SYMPTOMS
COUGH: 0
GASTROINTESTINAL NEGATIVE: 1
SHORTNESS OF BREATH: 1
CHEST TIGHTNESS: 0
CHOKING: 0

## 2020-06-01 ASSESSMENT — PAIN SCALES - GENERAL
PAINLEVEL_OUTOF10: 5
PAINLEVEL_OUTOF10: 0

## 2020-06-01 NOTE — ED NOTES

## 2020-06-01 NOTE — TELEPHONE ENCOUNTER
Called daughter Davey Romero back to discuss. She states patient's oxygen levels were low in the 70s over night. She states when she came in to see her mom this morning she looked significantly worse from Friday with increased shortness of breath. Her oxygen this morning has ranged from 82-88% per her report. Patient did take her Lasix on Saturday and this morning as prescribed. She reports her weight is up 2#. Recommended that patient go to the ER for evaluation. Patient is resistant to that but agreeable to [facility]. Nessa- please see if patient can be seen in the [facility] today. If so please call daughter back at [phone] to schedule.

## 2020-06-01 NOTE — ED PROVIDER NOTES
PAST MEDICAL HISTORY         Diagnosis Date    Blood circulation, collateral     Cerebral artery occlusion with cerebral infarction (HCC)     Chronic deep vein thrombosis (DVT) of femoral vein of left lower extremity (HCC)     Chronic kidney disease (CKD), stage III (moderate) (HCC)     CVA (cerebral infarction)     Diabetes     DVT, lower extremity, recurrent (HCC) 3/30/2012    x 3 LLE :  life long coumadin    Edema     First degree AV block     Gout     Heart murmur 10/14/2014    EF normal, no aortic stenosis Echo 14 - mild MR    Hypercalcemia 10/11/2018    Hypercholesterolemia     Hyperhomocysteinemia ([facility] [address] 12    Hypertension     TRUPTI (obstructive sleep apnea)     CPAP at 16cm    Osteoarthritis     Sciatica 2016    Traumatic hematoma of left lower leg 3/21/2017    Venous insufficiency     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 Elly Pederson left lower extremity       CURRENT MEDICATIONS     [unfilled]    ALLERGIES     Latex; Ace inhibitors; Gabapentin; Hydrochlorothiazide; Neosporin [neomycin-bacitracin zn-polymyx]; Penicillins; Sulfa antibiotics; and Tape [adhesive tape]    FAMILY HISTORY           Problem Relation Age of Onset    Other Father         AAA    Diabetes Mother     Heart Failure Mother     Arthritis Other     Cancer Brother         bone    High Blood Pressure Other      Family Status   Relation Name Status    Father          Ruptured AAA    Mother      Other  (Not Specified)    Brother  (Not Specified)    Other  (Not Specified)        SOCIAL HISTORY      reports that she has never smoked. She has never used smokeless tobacco. She reports that she does not drink alcohol or use drugs.     PHYSICAL EXAM    (up to 7 for level 4, 8 or more Aster Lees, 06/01/2020 16:18, by Cruz Sanches  Performed at:  [facility]  [address] [place] [address]   Phone [phone]   D-DIMER, QUANTITATIVE - Abnormal; Notable for the following components:    D-Dimer, Quant 373 (*)     All other components within normal limits    Narrative:     Performed at:  [facility]  [address] [place] [address]   Phone [phone]   BRAIN NATRIURETIC PEPTIDE - Abnormal; Notable for the following components:    Pro-BNP 1,765 (*)     All other components within normal limits    Narrative:     Carole Lordlim tel. [phone],  Rejected K/Called to: ED JOSE Calhoun, 06/01/2020 16:18, by Cruz Sanches  Performed at:  [facility]  [address] [place] [address]   Phone [phone]   URINE RT REFLEX TO CULTURE - Abnormal; Notable for the following components:    Protein, UA >=300 (*)     All other components within normal limits    Narrative:     Performed at:  [facility]  [address] [place] [address]   Phone [phone]   PROTIME-INR - Abnormal; Notable for the following components:    Protime 18.3 (*)     INR 1.57 (*)     All other components within normal limits    Narrative:     Performed at:  [facility]  [address] [place] [address]   Phone [phone]   MICROSCOPIC URINALYSIS - Abnormal; Notable for the following components:    Hyaline Casts, UA 3-5 (*)     Mucus, UA Rare (*)     Bacteria, UA 2+ (*)     All other components within normal limits    Narrative:     Performed at:  [place] [address] [place] [address]   Phone [phone]   LACTIC ACID, PLASMA    Narrative:     Performed at:  [address] [place] [address]   Phone [phone]   TROPONIN ------------------------------------------------------------------  Bilateral carotid duplex: March 26, 2020  Summary        Duplex sonography with color flow enhancement was performed bilaterally on    the cervical carotid system.    No evidence of hemodynamically significant stenosis in the bilateral carotid    and vertebral arteries.    Tortuous distal internal carotid arteries with 90 degree turn on both sides        Signature        ------------------------------------------------------------------    Electronically signed by Liana James MD    (Interpreting physician) on 03/26/2020 at 04:32 PM    ------------------------------------------------------------------     CBC today is negative for leukocytosis. Mild anemia hemoglobin 9.1/27.9  Chest x-ray indicating findings are consistent with heart failure including cardiomegaly and small bilateral pleural effusions. Hazy bibasilar airspace disease atelectasis versus pneumonia    Output/diuresis after Lasix: 200ml    1755: I spoke with hospitalist, Dr. Noel Nunez who is accepted patient for admission. Given the patient's decline in GFR with chronic kidney disease were going to hold off on the CTA of the chest at this point in time and treat and further evaluate for acute on chronic heart failure. 1700: Patient and patient's family member have been updated on plans for admission. This dictation was performed with a verbal recognition program (DRAGON) and it was checked for errors. It is possible that there are still dictated errors within this office note. If so, please bring any errors to my attention for an addendum. All efforts were made to ensure that this office note is accurate. CONSULTS:  IP CONSULT TO HOSPITALIST    PROCEDURES:  Procedures    FINAL IMPRESSION      1. Shortness of breath    2. Hypoxia    3. Acute on chronic diastolic heart failure ([facility] [address]    4.  Chronic kidney disease, unspecified CKD stage          DISPOSITION/PLAN

## 2020-06-02 ENCOUNTER — APPOINTMENT (OUTPATIENT)
Dept: CT IMAGING | Age: 80
DRG: 291 | End: 2020-06-02
Payer: MEDICARE

## 2020-06-02 ENCOUNTER — CARE COORDINATION (OUTPATIENT)
Dept: CASE MANAGEMENT | Age: 80
End: 2020-06-02

## 2020-06-02 LAB
ANION GAP SERPL CALCULATED.3IONS-SCNC: 12 MMOL/L (ref 3–16)
BASOPHILS ABSOLUTE: 0.1 K/UL (ref 0–0.2)
BASOPHILS RELATIVE PERCENT: 1 %
BUN BLDV-MCNC: 23 MG/DL (ref 7–20)
CALCIUM SERPL-MCNC: 9.6 MG/DL (ref 8.3–10.6)
CHLORIDE BLD-SCNC: 104 MMOL/L (ref 99–110)
CHOLESTEROL, TOTAL: 103 MG/DL (ref 0–199)
CO2: 28 MMOL/L (ref 21–32)
CREAT SERPL-MCNC: 1.8 MG/DL (ref 0.6–1.2)
CREATININE URINE: 69.4 MG/DL (ref 28–259)
EKG ATRIAL RATE: 81 BPM
EKG DIAGNOSIS: NORMAL
EKG Q-T INTERVAL: 484 MS
EKG QRS DURATION: 86 MS
EKG QTC CALCULATION (BAZETT): 547 MS
EKG R AXIS: 52 DEGREES
EKG T AXIS: 100 DEGREES
EKG VENTRICULAR RATE: 77 BPM
EOSINOPHILS ABSOLUTE: 0.3 K/UL (ref 0–0.6)
EOSINOPHILS RELATIVE PERCENT: 4.6 %
FOLATE: 18.3 NG/ML (ref 4.78–24.2)
GFR AFRICAN AMERICAN: 33
GFR NON-AFRICAN AMERICAN: 27
GLUCOSE BLD-MCNC: 101 MG/DL (ref 70–99)
GLUCOSE BLD-MCNC: 108 MG/DL (ref 70–99)
GLUCOSE BLD-MCNC: 128 MG/DL (ref 70–99)
GLUCOSE BLD-MCNC: 133 MG/DL (ref 70–99)
GLUCOSE BLD-MCNC: 182 MG/DL (ref 70–99)
HCT VFR BLD CALC: 26.1 % (ref 36–48)
HDLC SERPL-MCNC: 44 MG/DL (ref 40–60)
HEMOGLOBIN: 8.4 G/DL (ref 12–16)
LDL CHOLESTEROL CALCULATED: 36 MG/DL
LYMPHOCYTES ABSOLUTE: 1.1 K/UL (ref 1–5.1)
LYMPHOCYTES RELATIVE PERCENT: 15.4 %
MAGNESIUM: 2.1 MG/DL (ref 1.8–2.4)
MCH RBC QN AUTO: 31.8 PG (ref 26–34)
MCHC RBC AUTO-ENTMCNC: 32.2 G/DL (ref 31–36)
MCV RBC AUTO: 99 FL (ref 80–100)
MICROALBUMIN UR-MCNC: 234.7 MG/DL
MICROALBUMIN/CREAT UR-RTO: 3381.8 MG/G (ref 0–30)
MONOCYTES ABSOLUTE: 0.7 K/UL (ref 0–1.3)
MONOCYTES RELATIVE PERCENT: 9.9 %
NEUTROPHILS ABSOLUTE: 4.8 K/UL (ref 1.7–7.7)
NEUTROPHILS RELATIVE PERCENT: 69.1 %
PDW BLD-RTO: 16.8 % (ref 12.4–15.4)
PERFORMED ON: ABNORMAL
PLATELET # BLD: 267 K/UL (ref 135–450)
PMV BLD AUTO: 6.7 FL (ref 5–10.5)
POTASSIUM SERPL-SCNC: 3.7 MMOL/L (ref 3.5–5.1)
RBC # BLD: 2.63 M/UL (ref 4–5.2)
SODIUM BLD-SCNC: 144 MMOL/L (ref 136–145)
TRANSFERRIN: 158 MG/DL (ref 200–360)
TRIGL SERPL-MCNC: 114 MG/DL (ref 0–150)
TROPONIN: 0.01 NG/ML
VITAMIN B-12: 490 PG/ML (ref 211–911)
VLDLC SERPL CALC-MCNC: 23 MG/DL
WBC # BLD: 7 K/UL (ref 4–11)

## 2020-06-02 PROCEDURE — 1200000000 HC SEMI PRIVATE

## 2020-06-02 PROCEDURE — 82570 ASSAY OF URINE CREATININE: CPT

## 2020-06-02 PROCEDURE — 83735 ASSAY OF MAGNESIUM: CPT

## 2020-06-02 PROCEDURE — 71250 CT THORAX DX C-: CPT

## 2020-06-02 PROCEDURE — 97166 OT EVAL MOD COMPLEX 45 MIN: CPT

## 2020-06-02 PROCEDURE — 97162 PT EVAL MOD COMPLEX 30 MIN: CPT

## 2020-06-02 PROCEDURE — 6360000002 HC RX W HCPCS: Performed by: INTERNAL MEDICINE

## 2020-06-02 PROCEDURE — 85025 COMPLETE CBC W/AUTO DIFF WBC: CPT

## 2020-06-02 PROCEDURE — 36415 COLL VENOUS BLD VENIPUNCTURE: CPT

## 2020-06-02 PROCEDURE — 82043 UR ALBUMIN QUANTITATIVE: CPT

## 2020-06-02 PROCEDURE — 80061 LIPID PANEL: CPT

## 2020-06-02 PROCEDURE — 6370000000 HC RX 637 (ALT 250 FOR IP): Performed by: INTERNAL MEDICINE

## 2020-06-02 PROCEDURE — 93010 ELECTROCARDIOGRAM REPORT: CPT | Performed by: INTERNAL MEDICINE

## 2020-06-02 PROCEDURE — 99222 1ST HOSP IP/OBS MODERATE 55: CPT | Performed by: INTERNAL MEDICINE

## 2020-06-02 PROCEDURE — 6370000000 HC RX 637 (ALT 250 FOR IP): Performed by: NURSE PRACTITIONER

## 2020-06-02 PROCEDURE — 80048 BASIC METABOLIC PNL TOTAL CA: CPT

## 2020-06-02 PROCEDURE — 84466 ASSAY OF TRANSFERRIN: CPT

## 2020-06-02 PROCEDURE — 82746 ASSAY OF FOLIC ACID SERUM: CPT

## 2020-06-02 PROCEDURE — 97530 THERAPEUTIC ACTIVITIES: CPT

## 2020-06-02 PROCEDURE — 2580000003 HC RX 258: Performed by: INTERNAL MEDICINE

## 2020-06-02 PROCEDURE — 82607 VITAMIN B-12: CPT

## 2020-06-02 RX ORDER — CLINDAMYCIN HYDROCHLORIDE 150 MG/1
300 CAPSULE ORAL 3 TIMES DAILY
Status: COMPLETED | OUTPATIENT
Start: 2020-06-02 | End: 2020-06-04

## 2020-06-02 RX ORDER — DEXTROSE MONOHYDRATE 25 G/50ML
12.5 INJECTION, SOLUTION INTRAVENOUS PRN
Status: DISCONTINUED | OUTPATIENT
Start: 2020-06-02 | End: 2020-06-12 | Stop reason: HOSPADM

## 2020-06-02 RX ORDER — TORSEMIDE 20 MG/1
10 TABLET ORAL DAILY
Status: DISCONTINUED | OUTPATIENT
Start: 2020-06-03 | End: 2020-06-03

## 2020-06-02 RX ORDER — NICOTINE POLACRILEX 4 MG
15 LOZENGE BUCCAL PRN
Status: DISCONTINUED | OUTPATIENT
Start: 2020-06-02 | End: 2020-06-12 | Stop reason: HOSPADM

## 2020-06-02 RX ORDER — FUROSEMIDE 10 MG/ML
20 INJECTION INTRAMUSCULAR; INTRAVENOUS ONCE
Status: COMPLETED | OUTPATIENT
Start: 2020-06-02 | End: 2020-06-02

## 2020-06-02 RX ORDER — DEXTROSE MONOHYDRATE 50 MG/ML
100 INJECTION, SOLUTION INTRAVENOUS PRN
Status: DISCONTINUED | OUTPATIENT
Start: 2020-06-02 | End: 2020-06-12 | Stop reason: HOSPADM

## 2020-06-02 RX ADMIN — Medication 2.5 MG: at 20:56

## 2020-06-02 RX ADMIN — CARVEDILOL 25 MG: 25 TABLET, FILM COATED ORAL at 09:34

## 2020-06-02 RX ADMIN — GLIPIZIDE 5 MG: 5 TABLET ORAL at 05:55

## 2020-06-02 RX ADMIN — ONDANSETRON 4 MG: 2 INJECTION INTRAMUSCULAR; INTRAVENOUS at 08:40

## 2020-06-02 RX ADMIN — FUROSEMIDE 20 MG: 10 INJECTION, SOLUTION INTRAMUSCULAR; INTRAVENOUS at 12:07

## 2020-06-02 RX ADMIN — ACETAMINOPHEN 650 MG: 325 TABLET ORAL at 20:56

## 2020-06-02 RX ADMIN — AMLODIPINE BESYLATE 2.5 MG: 5 TABLET ORAL at 09:34

## 2020-06-02 RX ADMIN — ASPIRIN 81 MG: 81 TABLET, COATED ORAL at 09:35

## 2020-06-02 RX ADMIN — Medication 1000 MG: at 20:56

## 2020-06-02 RX ADMIN — INSULIN LISPRO 1 UNITS: 100 INJECTION, SOLUTION INTRAVENOUS; SUBCUTANEOUS at 20:56

## 2020-06-02 RX ADMIN — Medication 400 MG: at 09:34

## 2020-06-02 RX ADMIN — Medication 1000 MG: at 09:34

## 2020-06-02 RX ADMIN — ATORVASTATIN CALCIUM 40 MG: 40 TABLET, FILM COATED ORAL at 09:35

## 2020-06-02 RX ADMIN — IRON SUCROSE 200 MG: 20 INJECTION, SOLUTION INTRAVENOUS at 15:12

## 2020-06-02 RX ADMIN — CLINDAMYCIN HYDROCHLORIDE 300 MG: 150 CAPSULE ORAL at 09:34

## 2020-06-02 RX ADMIN — FUROSEMIDE 40 MG: 10 INJECTION, SOLUTION INTRAMUSCULAR; INTRAVENOUS at 08:39

## 2020-06-02 RX ADMIN — DOCUSATE SODIUM 100 MG: 100 CAPSULE, LIQUID FILLED ORAL at 20:56

## 2020-06-02 RX ADMIN — CLINDAMYCIN HYDROCHLORIDE 300 MG: 150 CAPSULE ORAL at 20:56

## 2020-06-02 RX ADMIN — SODIUM CHLORIDE, PRESERVATIVE FREE 10 ML: 5 INJECTION INTRAVENOUS at 08:41

## 2020-06-02 RX ADMIN — CLINDAMYCIN HYDROCHLORIDE 300 MG: 150 CAPSULE ORAL at 15:12

## 2020-06-02 RX ADMIN — Medication 2.5 MG: at 09:34

## 2020-06-02 RX ADMIN — CARVEDILOL 25 MG: 25 TABLET, FILM COATED ORAL at 16:53

## 2020-06-02 ASSESSMENT — PAIN SCALES - GENERAL
PAINLEVEL_OUTOF10: 4
PAINLEVEL_OUTOF10: 0
PAINLEVEL_OUTOF10: 3
PAINLEVEL_OUTOF10: 4
PAINLEVEL_OUTOF10: 3

## 2020-06-02 ASSESSMENT — PAIN DESCRIPTION - PROGRESSION
CLINICAL_PROGRESSION: NOT CHANGED

## 2020-06-02 ASSESSMENT — PAIN - FUNCTIONAL ASSESSMENT
PAIN_FUNCTIONAL_ASSESSMENT: PREVENTS OR INTERFERES SOME ACTIVE ACTIVITIES AND ADLS

## 2020-06-02 ASSESSMENT — PAIN DESCRIPTION - FREQUENCY
FREQUENCY: CONTINUOUS

## 2020-06-02 ASSESSMENT — PAIN DESCRIPTION - ORIENTATION
ORIENTATION: RIGHT;LEFT
ORIENTATION: LEFT;RIGHT

## 2020-06-02 ASSESSMENT — PAIN DESCRIPTION - ONSET
ONSET: ON-GOING

## 2020-06-02 ASSESSMENT — PAIN DESCRIPTION - DIRECTION
RADIATING_TOWARDS: LEGS
RADIATING_TOWARDS: LEGS

## 2020-06-02 ASSESSMENT — PAIN DESCRIPTION - DESCRIPTORS
DESCRIPTORS: CONSTANT
DESCRIPTORS: STABBING;SHARP;SHOOTING
DESCRIPTORS: SHARP;SHOOTING;STABBING
DESCRIPTORS: CONSTANT

## 2020-06-02 ASSESSMENT — PAIN DESCRIPTION - LOCATION
LOCATION: LEG
LOCATION: FOOT
LOCATION: FOOT
LOCATION: LEG

## 2020-06-02 ASSESSMENT — PAIN DESCRIPTION - PAIN TYPE
TYPE: CHRONIC PAIN

## 2020-06-02 NOTE — ED NOTES
Report called to [facility].  Pt to be transported to room Kenn Main RN  06/01/20 2012

## 2020-06-02 NOTE — PROGRESS NOTES
Pt A&O x4, arrived to room at 2051. Educated on safety and call light use. Bed kept in low position. Safe environment maintained. Bedside table & call light in reach. Uses call light appropriately when needing assistance. Vitals signs are stable. Intake and output are being recorded, will continue to monitor.

## 2020-06-02 NOTE — H&P
easily obliterated with pressure      CXR:  I have reviewed the CXR with the following interpretation: cardiomegaly, pleural effusions    CBC   Recent Labs     06/01/20  1535   WBC 7.0   HGB 9.1*   HCT 27.9*         RENAL  Recent Labs     06/01/20  1535 06/01/20  1622     --    K  --  4.3     --    CO2 25  --    BUN 23*  --    CREATININE 1.8*  --      LFT'S  Recent Labs     06/01/20  1535   AST 49*   ALT 12   BILITOT <0.2   ALKPHOS 60     COAG  Recent Labs     06/01/20  1540   INR 1.57*     CARDIAC ENZYMES  Recent Labs     06/01/20  1535   TROPONINI <0.01       U/A:    Lab Results   Component Value Date    COLORU YELLOW 06/01/2020    WBCUA 0-2 06/01/2020    RBCUA 0-2 06/01/2020    MUCUS Rare 06/01/2020    BACTERIA 2+ 06/01/2020    CLARITYU Clear 06/01/2020    SPECGRAV 1.016 06/01/2020    LEUKOCYTESUR Negative 06/01/2020    BLOODU Negative 06/01/2020    GLUCOSEU Negative 06/01/2020    AMORPHOUS 1+ 06/01/2020       ABG  No results found for: JCS9UGU, BEART, S3XKLPPJ, PHART, THGBART, VJJ5UKQ, PO2ART, ZBY2QJE        Active Hospital Problems    Diagnosis Date Noted    Acute diastolic CHF (congestive heart failure) ([facility] [address] [I50.31] 06/01/2020         PHYSICIANS CERTIFICATION:    I certify that Augie Perez is expected to be hospitalized for > than 2 midnights based on the following assessment and plan:      ASSESSMENT/PLAN:    Acute hypoxic resp failure - O2 sats dropped to 85%, cont O2    Acute diastolic CHF - echo from 8/37/4528 reviewed with normal EF and grade I diastolic dysfn. BNP elevated, Received 40mg IV lasix, gomes placed in ER, monitor strict I/O and daily wts, low sodium diet ordered. Consulted cardio/CHF nurse. On coreg/ASA/eliquis/statin.  Will cont lasix 40mg IV daily    CKD III - creat at baseline, 1.8 today    Chronically elevated d dimer - on eliquis, can consider VQ scan if suspicious for PE    H/o DVT - on eliquis  Anemia - hb 9.1, check iron studies  DM II - controlled, cont home

## 2020-06-02 NOTE — CARE COORDINATION
Toni 45 Transitions Follow Up Call    2020    Patient: Lonnie Kennedy  Patient : 1940   MRN: [id number]  Reason for Admission:  Discharge Date: 20 RARS: Readmission Risk Score: 21  Per review of chart patient is currently admitted to the hospital. Post acute care signing off.  HERO EsquivelN RN  Care Transition Nurse [phone]                   Follow Up  Future Appointments   Date Time Provider Saloni Lopez   2020 10:20 AM Maryuri Salinas MD [facility]   2020  2:00 PM Milton Teran MD [address] [facility]       Artur Ramirez RN

## 2020-06-02 NOTE — PROGRESS NOTES
assistance  Gait Deviations: Slow Lizzie;Decreased step length;Decreased step height  Distance: 15' bed to toilet then 25' toilet to recliner  Stairs/Curb  Stairs?: No     Balance  Sitting - Static: Good  Sitting - Dynamic: Good  Standing - Static: Good;-(with RW)  Standing - Dynamic: Good;-(with RW)        Plan   Plan  Times per week: 3-5x/week  Current Treatment Recommendations: Functional Mobility Training  Safety Devices  Type of devices: Call light within reach, Gait belt, Left in chair, Chair alarm in place      AM-PAC Score  AM-PAC Inpatient Mobility Raw Score : 15 (06/02/20 1515)  AM-PAC Inpatient T-Scale Score : 39.45 (06/02/20 1515)  Mobility Inpatient CMS 0-100% Score: 57.7 (06/02/20 1515)  Mobility Inpatient CMS G-Code Modifier : CK (06/02/20 1515)          Goals  Short term goals  Time Frame for Short term goals: by acute discharge  Short term goal 1: bed mobility with SBA  Short term goal 2: sit<>Stand with SBA  Short term goal 3: ambulate >40' with RW and CGA  Patient Goals   Patient goals : go home instead of SNF       Therapy Time   Individual Concurrent Group Co-treatment   Time In 1445         Time Out 1515         Minutes 30         Timed Code Treatment Minutes: 15 Minutes       Markos Shine PT

## 2020-06-02 NOTE — PROGRESS NOTES
has documented it in 88% last few days and was 85% yesterday and hence brought her to the ER. CXR with congestion and received a dose of IV lasix and admitted for further diuresis. \"   Family / Caregiver Present: No  Referring Practitioner: Meryle Rana, APRN - CNP  Diagnosis: CHF  Subjective  Subjective: Pt seen bedside and agreeable to therapy. General Comment  Comments: Per RN ok for therapy. Social/Functional History  Social/Functional History  Lives With: Daughter(and son in law)  Type of Home: House  Home Layout: Two level, Performs ADL's on one level(laundry first floor)  Home Access: Ramped entrance  Bathroom Shower/Tub: Walk-in shower, Shower chair with back  Bathroom Toilet: Handicap height  Home Equipment: Rolling walker, Wheelchair-manual, Lift chair, 4 wheeled walker(transport chair)  Receives Help From: Home health(RN/OT/PT/speech)  ADL Assistance: (family assists with showers and LB dressing. Pt can dress UB and toilet self)  Homemaking Assistance: (pt does own laundry. Family cooks and does heavy cleaning)  Ambulation Assistance: Independent(with RW in home, W/C in community)  Transfer Assistance: Independent  Active : No  IADL Comments: sleeps in a lift chair  Additional Comments: Pt was home ~ 6 days from [facility] prior to readmit to [facility]       Objective   Vision: Impaired(R eye with detached retina and decreased vision)  Vision Exceptions: Wears glasses for reading  Hearing: Within functional limits    Orientation  Overall Orientation Status: Within Normal Limits     Balance  Sitting Balance: Stand by assistance  Standing Balance: Contact guard assistance  Functional Mobility  Functional Mobility Comments: bed > bathroom > chair with RW and CGA  Toilet Transfers  Toilet - Technique: Ambulating  Equipment Used: Standard toilet  Toilet Transfer: Contact guard assistance  Toilet Transfers Comments: use of grab bar support     ADL  Toileting: Dependent/Total(total (gomes).  CGA to stand from

## 2020-06-02 NOTE — PLAN OF CARE
Problem: Falls - Risk of:  Goal: Will remain free from falls  Description: Will remain free from falls  6/2/2020 0724 by Jose Riley RN  Outcome: Ongoing  Note: Will remain free from falls. Electronically signed by Jose Riley RN on 6/2/2020 at 7:24 AM    6/2/2020 0018 by Ronda Broussard RN  Outcome: Ongoing  Note: Fall risk assessment completed . Fall precautions in place, bed/ chair alarm on, side rails 2/4 up, call light in reach, educated pt on calling for assistance when needed, room clear of clutter. Pt verbalized understanding. Goal: Absence of physical injury  Description: Absence of physical injury  6/2/2020 0724 by Jose Riley RN  Outcome: Ongoing  Note: Absence of physical injury. Electronically signed by Jose Riley RN on 6/2/2020 at 7:24 AM    6/2/2020 0018 by Ronda Broussard RN  Outcome: Ongoing  Note: No falls noted this shift. Bed kept in low position. Safe environment maintained. Bedside table & call light in reach. Uses call light appropriately when needing assistance. Problem: Pain:  Goal: Pain level will decrease  Description: Pain level will decrease  6/2/2020 0724 by Jose Riley RN  Outcome: Ongoing  Note: Pain level will decrease. Electronically signed by Jose Riley RN on 6/2/2020 at 7:24 AM    6/2/2020 0018 by Ronda Broussard RN  Outcome: Ongoing  Note: Pain /discomfort being managed with PRN analgesics per MD orders. Patient able to express presence and absence of pain and rate pain appropriately using numerical scale. Goal: Control of acute pain  Description: Control of acute pain  6/2/2020 0724 by Jose Riley RN  Outcome: Ongoing  Note: Control of acute pain. Electronically signed by Jose Riley RN on 6/2/2020 at 7:24 AM    6/2/2020 0018 by Ronda Broussard RN  Outcome: Ongoing  Note: Pain /discomfort being managed with PRN analgesics per MD orders.  Patient able to express presence and absence of pain and rate pain appropriately self. Repositioned patient Q2H and assessed skin. Educated patient on importance of repositioning to prevent skin issues.

## 2020-06-02 NOTE — PROGRESS NOTES
Family would like MD to call and update daughter Nato Lovelace [phone].   Electronically signed by Mansoor Burrell RN on 6/2/2020 at 8:34 AM

## 2020-06-02 NOTE — CONSULTS
[address] [place] [address]                                  CONSULTATION    PATIENT NAME: Katie Alas                   :        1940  MED REC NO:   [id number]                          ROOM:       0184  ACCOUNT NO:   [de-identified]                           ADMIT DATE: 2020  PROVIDER:     Lexx Goodwin MD    CONSULT DATE:  2020    REASON FOR ADMISSION/CHIEF COMPLAINT:  Shortness of breath felt to be  secondary to volume overload. REASON FOR CONSULTATION:  Volume overload occurring in the setting of  CKD stage III with a baseline serum creatinine in the mid-1s. HISTORY OF PRESENT ILLNESS:  The patient is a 40-year-old morbidly obese  female with a past medical history significant for CKD with a baseline  serum creatinine in the mid-1s, sees me in the office, anemia of chronic  kidney disease, renal osteodystrophy, osteoarthritis, obstructive sleep  apnea, hypertension, hyperlipidemia, gout, history of DVTs in ,  lifelong Coumadin, CVA, diabetes, presented to the emergency department  with a chief complaint of hypoxemia and shortness of breath. She was  recently hospitalized here at [facility] in 2020 and was discharged  to a skilled nursing facility. The patient left from the skilled  nursing facility about a week ago and had been having shortness of  breath prior to discharge from the skilled nursing facility. At home,  she was noted to have O2 saturations in the mid-80s. She was brought  into the emergency department. A chest x-ray was ordered that shows  pulmonary congestion. She has received Lasix yesterday and today and  her breathing has improved with diuresis. Serum creatinine remains  relatively stable with a creatinine of 1.8.   She did have an episode of  DOUGLAS on her previous admission here at [facility] in 2020 but it was  felt to be due to losartan as well as contrast nephropathy. The patient  does have nephrotic range proteinuria which was felt to be secondary to  diabetic nephropathy. All serologies in the past have been negative  including UPEP and an SPEP. At the time of my evaluation, the patient  was resting comfortably in bed. She has a Tava[place] in place. She is  making adequate amount of urine and was eating lunch. REVIEW OF SYSTEMS:  Positive for shortness of breath and hypoxemia which  are improved with diuresis. The patient denies dizziness, syncope,  dysarthria, dysphagia. She has chronic lower extremity pain. Denies  abdominal pain, hematochezia, melena, chest pain, hemoptysis. She did  have some worsening lower extremity edema which is difficult to assess  given her obesity. PAST MEDICAL HISTORY:  1.  CKD stage III. 2.  Diabetes. 3.  Hypertension. 4.  Anemia of chronic kidney disease. 5.  Renal osteodystrophy. 6.  Arthritis. 7.  Hyperlipidemia. 8.  Gout. 9. DVTs. 10.  CVA. PAST SURGICAL HISTORY:  Cataract removal.    FAMILY HISTORY:  No family history of kidney disease. SOCIAL HISTORY:  The patient never smoked. ALLERGIES:  1. LATEX. 2.  ACE INHIBITOR. 3.  GABAPENTIN. 4.  HYDROCHLOROTHIAZIDE. 5.  NEOSPORIN. 6.  PENICILLIN. 7.  SULFA. 8.  ADHESIVE TAPE. CURRENT MEDICATIONS:  Include  1. Amlodipine. 2.  Eliquis. 3.  Aspirin. 4.  Lipitor. 5.  Coreg. 6.  Lasix. 7.  Glipizide. 8.  Ascorbic acid. 9.  Humalog. 10.  Mag oxide. PHYSICAL EXAMINATION:  VITAL SIGNS:  Blood pressure is 112/64 with a pulse of 76, temperature  98.9, oxygen saturation 93% on 2 L. GENERAL:  No apparent distress, conversant, clinically appears to be  mildly volume overloaded. HEENT:  Ears and nose appear externally without deformity. Mucous  membranes are moist.  Normocephalic, atraumatic. Eyes:  Anicteric  sclerae. Moist conjunctivae. No lid lag. Pupils are equal and  reactive to light. NECK:  Free range of motion. Supple.   No thyromegaly. CHEST:  Bibasilar crackles. No intercostal retractions. CARDIOVASCULAR:  Regular rate and rhythm. No rubs. ABDOMEN:  Soft, nontender. Bowel sounds are present. No organomegaly. EXTREMITIES:  Lower extremities:  Trace to +1 lower extremity edema. NEUROLOGICAL EXAMINATION:  No asterixis. No focal motor neurological  deficits. PSYCHIATRIC:  Appropriate affect. Alert, oriented to time, place, and  person. SKIN:  Loss of turgor. No rash. LABORATORY DATA:  Sodium 144, potassium 3.7, chloride 104, bicarb 28,  BUN 23, creatinine 1.8. Magnesium of 2.1 and a calcium 9.6. IMPRESSION:  1. Volume overload occurring in the setting of CKD stage III. The  patient's creatinine is close to baseline. The patient has received  Lasix yesterday and today and has diuresed well. Her breathing has  dramatically improved. We will order a CAT scan of the chest without  contrast to evaluate the degree of pulmonary edema or if there is other  pathology that might be contributing to the patient's shortness of  breath. The patient will probably need to be on diuretics chronically  to avoid this from happening in the future. We may have to sacrifice  some degree of kidney function in order to maintain volume status. 2.  Anemia of chronic kidney disease. We will check an iron panel. Administer IV iron if needed. 3.  Renal osteodystrophy. Continue to monitor phosphorus. 4.  Hypertension. Continue current antihypertensive and avoid  hypotension. 5.  Pulmonary edema. The patient has diuresed well with IV Lasix. We  will need to decide on the dose of the oral diuretics that she needs to  go home on. 6.  Recent history of DOUGLAS that has resolved and it was felt to be  secondary to contrast nephropathy and the use of ARB. 7.  Recent history of hypercalcemia which was felt to be secondary to  calcium supplements that has also resolved.   8.  Nephrotic range proteinuria felt to be secondary to

## 2020-06-02 NOTE — CONSULTS
reviewed. ALLERGIES:  Have been reviewed. PHYSICAL EXAMINATION:  VITAL SIGNS:  Pulse is 83 and slightly irregular, blood pressure 145/51,  respiration 14, oxygen saturation 100%, temperature is 98.7. CONSTITUTIONAL:  She is alert and oriented. HEENT:  Her neck is supple. I am unable to appreciate a jugular venous  pulse. She has bilateral carotid bruit versus a transmitted murmur from  her aortic region. No thyromegaly. Eyes show pale conjunctivae. No  icterus. CARDIAC:  Slightly irregular heart rhythm. She has a 2 to 3/6 systolic  murmur heard at the base of the heart as well as left upper sternal  border. LUNGS:  Diminished breath sounds. ABDOMEN:  Markedly distended. Evidence of skin edema. I could not  appreciate any ascites. No organomegaly. NEUROLOGICAL:  She is alert and oriented. No focal deficit appreciated. EXTREMITIES:  Show 3 to 4+ generalized edema of both lower extremities,  abdomen, presacral region. SKIN:  No rashes. LABORATORY DATA:  Sodium is 144, potassium 3.7, chloride 104, bicarb 28,  BUN is 23, creatinine 1.8. The proBNP was 1765. Cholesterol was 103,  LDL was 36. Albumin is 2.8. White count 7000, hemoglobin 8.4,  hematocrit is 36.1. The chest x-ray reveals pulmonary edema. EKG shows atrial fibrillation. Echocardiogram from 03/2020 showed normal ejection fraction of 70% and  grade 1 diastolic dysfunction. IMPRESSION:  1. This is a 75-year-old female who has presented with worsening  shortness of breath and generalized weight gain. Appears to have a  generalized anasarca with acute-on-chronic decompensated diastolic heart  failure. Etiology of this can be due to her underlying chronic kidney  disease and diastolic dysfunction caused by her hypertensive heart  disease. I cannot totally exclude a restrictive etiology of her  underlying cardiomyopathy. Her low albumin is also contributing to her  generalized anasarca.   2.  Atrial fibrillation with a

## 2020-06-02 NOTE — PROGRESS NOTES
4 Eyes Admission Assessment     I agree as the admission nurse that 2 RN's have performed a thorough Head to Toe Skin Assessment on the patient. ALL assessment sites listed below have been assessed on admission. BLE 3+ pitting edema, bounding pulses and blanchable redness plus flaky. Blanchable redness on coccyx and lower back. Scattered abrasions and bruising, pt is pallor. Areas assessed by both nurses:  [x]   Head, Face, and Ears   [x]   Shoulders, Back, and Chest  [x]   Arms, Elbows, and Hands   [x]   Coccyx, Sacrum, and Ischum  [x]   Legs, Feet, and Heels        Does the Patient have Skin Breakdown?   No         Amrit Prevention initiated:  NA   Wound Care Orders initiated:  NA      WOC nurse consulted for Pressure Injury (Stage 3,4, Unstageable, DTI, NWPT, and Complex wounds):  NA      Nurse 1 eSignature: Electronically signed by Johnathan Lewis RN on 6/1/20 at 9:59 PM EDT    **SHARE this note so that the co-signing nurse is able to place an eSignature**    Nurse 2 eSignature: Electronically signed by Mac Duarte RN on 6/1/20 at 10:14 PM EDT             ]

## 2020-06-03 LAB
ALBUMIN SERPL-MCNC: 2.9 G/DL (ref 3.4–5)
ANION GAP SERPL CALCULATED.3IONS-SCNC: 10 MMOL/L (ref 3–16)
BUN BLDV-MCNC: 27 MG/DL (ref 7–20)
CALCIUM SERPL-MCNC: 9.2 MG/DL (ref 8.3–10.6)
CHLORIDE BLD-SCNC: 102 MMOL/L (ref 99–110)
CO2: 29 MMOL/L (ref 21–32)
CREAT SERPL-MCNC: 2.3 MG/DL (ref 0.6–1.2)
GFR AFRICAN AMERICAN: 25
GFR NON-AFRICAN AMERICAN: 20
GLUCOSE BLD-MCNC: 101 MG/DL (ref 70–99)
GLUCOSE BLD-MCNC: 107 MG/DL (ref 70–99)
GLUCOSE BLD-MCNC: 127 MG/DL (ref 70–99)
GLUCOSE BLD-MCNC: 141 MG/DL (ref 70–99)
GLUCOSE BLD-MCNC: 93 MG/DL (ref 70–99)
HCT VFR BLD CALC: 25 % (ref 36–48)
HEMOGLOBIN: 8 G/DL (ref 12–16)
MAGNESIUM: 2.2 MG/DL (ref 1.8–2.4)
MCH RBC QN AUTO: 32.1 PG (ref 26–34)
MCHC RBC AUTO-ENTMCNC: 32 G/DL (ref 31–36)
MCV RBC AUTO: 100.3 FL (ref 80–100)
PDW BLD-RTO: 17.2 % (ref 12.4–15.4)
PERFORMED ON: ABNORMAL
PHOSPHORUS: 5.1 MG/DL (ref 2.5–4.9)
PLATELET # BLD: 257 K/UL (ref 135–450)
PMV BLD AUTO: 6.7 FL (ref 5–10.5)
POTASSIUM SERPL-SCNC: 4.1 MMOL/L (ref 3.5–5.1)
RBC # BLD: 2.49 M/UL (ref 4–5.2)
SODIUM BLD-SCNC: 141 MMOL/L (ref 136–145)
URIC ACID, SERUM: 7.3 MG/DL (ref 2.6–6)
WBC # BLD: 7.5 K/UL (ref 4–11)

## 2020-06-03 PROCEDURE — 97530 THERAPEUTIC ACTIVITIES: CPT

## 2020-06-03 PROCEDURE — 2580000003 HC RX 258: Performed by: INTERNAL MEDICINE

## 2020-06-03 PROCEDURE — 6360000002 HC RX W HCPCS: Performed by: NURSE PRACTITIONER

## 2020-06-03 PROCEDURE — 6360000002 HC RX W HCPCS: Performed by: INTERNAL MEDICINE

## 2020-06-03 PROCEDURE — 6370000000 HC RX 637 (ALT 250 FOR IP): Performed by: INTERNAL MEDICINE

## 2020-06-03 PROCEDURE — 36415 COLL VENOUS BLD VENIPUNCTURE: CPT

## 2020-06-03 PROCEDURE — P9047 ALBUMIN (HUMAN), 25%, 50ML: HCPCS | Performed by: INTERNAL MEDICINE

## 2020-06-03 PROCEDURE — 84550 ASSAY OF BLOOD/URIC ACID: CPT

## 2020-06-03 PROCEDURE — 2580000003 HC RX 258: Performed by: NURSE PRACTITIONER

## 2020-06-03 PROCEDURE — 1200000000 HC SEMI PRIVATE

## 2020-06-03 PROCEDURE — 97535 SELF CARE MNGMENT TRAINING: CPT

## 2020-06-03 PROCEDURE — 83735 ASSAY OF MAGNESIUM: CPT

## 2020-06-03 PROCEDURE — 94760 N-INVAS EAR/PLS OXIMETRY 1: CPT

## 2020-06-03 PROCEDURE — 85027 COMPLETE CBC AUTOMATED: CPT

## 2020-06-03 PROCEDURE — 6370000000 HC RX 637 (ALT 250 FOR IP): Performed by: NURSE PRACTITIONER

## 2020-06-03 PROCEDURE — 99233 SBSQ HOSP IP/OBS HIGH 50: CPT | Performed by: INTERNAL MEDICINE

## 2020-06-03 PROCEDURE — 2700000000 HC OXYGEN THERAPY PER DAY

## 2020-06-03 PROCEDURE — 80069 RENAL FUNCTION PANEL: CPT

## 2020-06-03 RX ORDER — ALBUMIN (HUMAN) 12.5 G/50ML
25 SOLUTION INTRAVENOUS EVERY 8 HOURS
Status: COMPLETED | OUTPATIENT
Start: 2020-06-03 | End: 2020-06-06

## 2020-06-03 RX ORDER — CARVEDILOL 12.5 MG/1
12.5 TABLET ORAL 2 TIMES DAILY WITH MEALS
Status: DISCONTINUED | OUTPATIENT
Start: 2020-06-03 | End: 2020-06-04

## 2020-06-03 RX ADMIN — ALBUMIN (HUMAN) 25 G: 0.25 INJECTION, SOLUTION INTRAVENOUS at 12:01

## 2020-06-03 RX ADMIN — CLINDAMYCIN HYDROCHLORIDE 300 MG: 150 CAPSULE ORAL at 20:02

## 2020-06-03 RX ADMIN — FUROSEMIDE 5 MG/HR: 10 INJECTION, SOLUTION INTRAMUSCULAR; INTRAVENOUS at 10:07

## 2020-06-03 RX ADMIN — CLINDAMYCIN HYDROCHLORIDE 300 MG: 150 CAPSULE ORAL at 08:47

## 2020-06-03 RX ADMIN — IRON SUCROSE 200 MG: 20 INJECTION, SOLUTION INTRAVENOUS at 08:53

## 2020-06-03 RX ADMIN — CLINDAMYCIN HYDROCHLORIDE 300 MG: 150 CAPSULE ORAL at 14:45

## 2020-06-03 RX ADMIN — Medication 2.5 MG: at 08:49

## 2020-06-03 RX ADMIN — ASPIRIN 81 MG: 81 TABLET, COATED ORAL at 08:49

## 2020-06-03 RX ADMIN — Medication 400 MG: at 08:53

## 2020-06-03 RX ADMIN — AMLODIPINE BESYLATE 2.5 MG: 5 TABLET ORAL at 08:49

## 2020-06-03 RX ADMIN — CARVEDILOL 25 MG: 25 TABLET, FILM COATED ORAL at 08:53

## 2020-06-03 RX ADMIN — Medication 1000 MG: at 20:03

## 2020-06-03 RX ADMIN — GLIPIZIDE 5 MG: 5 TABLET ORAL at 06:36

## 2020-06-03 RX ADMIN — Medication 2.5 MG: at 20:02

## 2020-06-03 RX ADMIN — TORSEMIDE 10 MG: 20 TABLET ORAL at 08:47

## 2020-06-03 RX ADMIN — Medication 1000 MG: at 08:50

## 2020-06-03 RX ADMIN — SODIUM CHLORIDE, PRESERVATIVE FREE 10 ML: 5 INJECTION INTRAVENOUS at 08:54

## 2020-06-03 RX ADMIN — ALBUMIN (HUMAN) 25 G: 0.25 INJECTION, SOLUTION INTRAVENOUS at 20:03

## 2020-06-03 RX ADMIN — ATORVASTATIN CALCIUM 40 MG: 40 TABLET, FILM COATED ORAL at 08:49

## 2020-06-03 RX ADMIN — CARVEDILOL 12.5 MG: 12.5 TABLET, FILM COATED ORAL at 18:01

## 2020-06-03 ASSESSMENT — PAIN SCALES - GENERAL
PAINLEVEL_OUTOF10: 0
PAINLEVEL_OUTOF10: 0

## 2020-06-03 NOTE — PROGRESS NOTES
06/03/2020    CALCIUM 9.2 06/03/2020    GFRAA 25 06/03/2020    GFRAA 57 04/16/2013    LABGLOM 20 06/03/2020    GLUCOSE 93 06/03/2020     Magnesium:    Lab Results   Component Value Date    MG 2.20 06/03/2020     Phosphorus:    Lab Results   Component Value Date    PHOS 5.1 06/03/2020     U/A:    Lab Results   Component Value Date    COLORU YELLOW 06/01/2020    PHUR 6.0 06/01/2020    WBCUA 0-2 06/01/2020    RBCUA 0-2 06/01/2020    MUCUS Rare 06/01/2020    BACTERIA 2+ 06/01/2020    CLARITYU Clear 06/01/2020    SPECGRAV 1.016 06/01/2020    LEUKOCYTESUR Negative 06/01/2020    UROBILINOGEN 0.2 06/01/2020    BILIRUBINUR Negative 06/01/2020    BLOODU Negative 06/01/2020    GLUCOSEU Negative 06/01/2020    AMORPHOUS 1+ 06/01/2020         IMPRESSION/RECOMMENDATIONS:      Active Problems:    Acute diastolic CHF (congestive heart failure) ([facility] [address]  Resolved Problems:    * No resolved hospital problems. *    1. Volume overload occurring in the setting of CKD stage III. The patient's creatinine is trending up with diuresis. Given findings on CT scan I feel patient will need further diuresis - The patient will probably need to be on diuretics chronically to avoid this from happening in the future. We may have to sacrifice some degree of kidney function in order to maintain volume status. 2.  Anemia of chronic kidney disease - iron deficient - continue IV iron    3. CKD/MBD - phosphorus at target    4. Hypertension. Continue current antihypertensive and avoid hypotension. Stop Amlodipine    5. Pulmonary edema - Agree with starting Lasix gtt/ Added IV albumin    6. Recent history of DOUGLAS that has resolved and it was felt to be secondary to contrast nephropathy and the use of ARB. 7.  Recent history of hypercalcemia which was felt to be secondary to calcium supplements that has also resolved. 8.  Nephrotic range proteinuria felt to be secondary to diabetic nephropathy. All serologic workup has been negative this far.

## 2020-06-03 NOTE — PLAN OF CARE
than 3 second cap refill noted in all extremities, body color appropriate for ethnicity and temperature warm, edema noted generalized, +3 pitting to the BLE, patient repositions  self, and daily weights are ordered Will continue to monitor and reassess. Problem: FLUID AND ELECTROLYTE IMBALANCE  Goal: Fluid and electrolyte balance are achieved/maintained  Outcome: Met This Shift  Note: Patient is compliant with his 1500 ml fluid restriction. Patient continues to diuresis, IV lasix remains in the patients medication list, O2 sat is at 90-92% on r. Mucus membranes are pink and moist. Medications and treatments have been given as ordered. Call light in reach. Problem: ACTIVITY INTOLERANCE/IMPAIRED MOBILITY  Goal: Mobility/activity is maintained at optimum level for patient  Outcome: Met This Shift  Note: Patient did work with therapy this shift, she sat up in the chair for a couple of hours.

## 2020-06-03 NOTE — PROGRESS NOTES
Occupational Therapy  Facility/Department: [address] ORTHOPEDICS  Daily Treatment Note  NAME: Do Seay  : 1940  MRN: [id number]    Date of Service: 6/3/2020    Discharge Recommendations:  24 hour supervision or assist, Home with Home health OT      It is recommended that the patient have 2-3 sessions per week of Occupational Therapy at d/c to increase the patient's independence. At this time, this patient demonstrates the endurance and safety to discharge home with [facility] and a follow up treatment frequency of 2-3x/wk. Please see assessment section for further patient specific details. If patient discharges prior to next session this note will serve as a discharge summary. Please see below for the latest assessment towards goals. Assessment   Performance deficits / Impairments: Decreased functional mobility ; Decreased strength;Decreased endurance;Decreased ADL status; Decreased balance;Decreased high-level IADLs  Assessment: Pt tolerated session fair this date, limited by decreased activity tolerance, endurance. Pt required up to [facility] for transfers, mob with RW and cues for safety. Pt required SB/CGA for standing balance for ADL's, for 2 min or less. Caregiver present stating pt will have assist with all tasks as needed, including toileting (dependent today, after BM). Pt to return home with 24/ Supervision/assist. Recommend HHOT. Will cont poc to maximize function and lessen caregiver burden. Treatment Diagnosis: CHF  Prognosis: Good  History: PTA pt was at home from [facility] for ~ 6 days prior to readmit to [facility]. PTA pt lives at home with family who assist with most ADLs, although pt able to toilet self. Pt uses RW for household distances PTA.    OT Education: OT Role;Plan of Care;Transfer Training;Energy Conservation  REQUIRES OT FOLLOW UP: Yes  Activity Tolerance  Activity Tolerance: Patient limited by fatigue  Safety Devices  Safety Devices in place: Yes(RNPaul)  Type of devices: AM-PAC Score        AM-PAC Inpatient Daily Activity Raw Score: 15 (06/03/20 1209)  AM-PAC Inpatient ADL T-Scale Score : 34.69 (06/03/20 1209)  ADL Inpatient CMS 0-100% Score: 56.46 (06/03/20 1209)  ADL Inpatient CMS G-Code Modifier : CK (06/03/20 1209)    Goals  Short term goals  Time Frame for Short term goals: Status: goals ongoing  Short term goal 1: Pt will tolerate standing > 2 min for functional task with SBA  Short term goal 2: Pt will complete ADL transfers/mobility with supervision  Short term goal 3: Pt will complete toileting with SBA  Short term goal 4: Pt will complete UE exercises in all planes to increase endurance   Long term goals  Time Frame for Long term goals : stgs=ltgs  Patient Goals   Patient goals : to return home       Therapy Time   Individual Concurrent Group Co-treatment   Time In 1110         Time Out 1205         Minutes 55              Taras SAM/COOKIE,[phone]

## 2020-06-03 NOTE — CONSULTS
Nutrition Education    Type and Reason for Visit: Initial, Consult    Nutrition Assessment:  Consult for diet education \"HF diet guidelines\". Family was present during visit (though the not the family she lives with). Pt lives with daughter and JAE. They prepare the meals and do grocery shopping. They do not cook with salt. Pt reports they usually cook at home and rarely eat processed foods. Dietary recall appears fairly low sodium minus a few exceptions. Pt mostly struggles with fluid intake and dry mouth. She reports fluid intake is ~80 oz a day. Provided verbal and written instruction on heart failure nutrition therapy. Topics included amount of sodium/fluid recommended, high sodium foods, fluid measurement, and label reading. · Verbally reviewed information with Patient  · Written educational materials provided. · Contact name and number provided. · Refer to Patient Education activity for more details.     Electronically signed by Harley Garcia RD, LD on 6/3/20 at 11:03 AM EDT    Contact Number: [phone]

## 2020-06-04 LAB
ALBUMIN SERPL-MCNC: 3.9 G/DL (ref 3.4–5)
ANION GAP SERPL CALCULATED.3IONS-SCNC: 10 MMOL/L (ref 3–16)
BACTERIA: ABNORMAL /HPF
BILIRUBIN URINE: NEGATIVE
BLOOD, URINE: ABNORMAL
BUN BLDV-MCNC: 29 MG/DL (ref 7–20)
CALCIUM SERPL-MCNC: 9.2 MG/DL (ref 8.3–10.6)
CHLORIDE BLD-SCNC: 102 MMOL/L (ref 99–110)
CLARITY: ABNORMAL
CO2: 28 MMOL/L (ref 21–32)
COLOR: ABNORMAL
CREAT SERPL-MCNC: 2.3 MG/DL (ref 0.6–1.2)
EPITHELIAL CELLS, UA: 2 /HPF (ref 0–5)
GFR AFRICAN AMERICAN: 25
GFR NON-AFRICAN AMERICAN: 20
GLUCOSE BLD-MCNC: 108 MG/DL (ref 70–99)
GLUCOSE BLD-MCNC: 112 MG/DL (ref 70–99)
GLUCOSE BLD-MCNC: 121 MG/DL (ref 70–99)
GLUCOSE BLD-MCNC: 125 MG/DL (ref 70–99)
GLUCOSE BLD-MCNC: 130 MG/DL (ref 70–99)
GLUCOSE BLD-MCNC: 176 MG/DL (ref 70–99)
GLUCOSE URINE: NEGATIVE MG/DL
HCT VFR BLD CALC: 23.9 % (ref 36–48)
HEMOGLOBIN: 7.7 G/DL (ref 12–16)
KETONES, URINE: NEGATIVE MG/DL
LEUKOCYTE ESTERASE, URINE: ABNORMAL
MAGNESIUM: 2.3 MG/DL (ref 1.8–2.4)
MCH RBC QN AUTO: 32.3 PG (ref 26–34)
MCHC RBC AUTO-ENTMCNC: 32.3 G/DL (ref 31–36)
MCV RBC AUTO: 100 FL (ref 80–100)
MICROSCOPIC EXAMINATION: YES
NITRITE, URINE: NEGATIVE
PDW BLD-RTO: 17 % (ref 12.4–15.4)
PERFORMED ON: ABNORMAL
PH UA: 6 (ref 5–8)
PHOSPHORUS: 5 MG/DL (ref 2.5–4.9)
PLATELET # BLD: 212 K/UL (ref 135–450)
PMV BLD AUTO: 6.8 FL (ref 5–10.5)
POTASSIUM SERPL-SCNC: 4.1 MMOL/L (ref 3.5–5.1)
PRO-BNP: 1705 PG/ML (ref 0–449)
PROTEIN UA: >=300 MG/DL
RBC # BLD: 2.39 M/UL (ref 4–5.2)
RBC UA: >900 /HPF (ref 0–4)
SODIUM BLD-SCNC: 140 MMOL/L (ref 136–145)
SPECIFIC GRAVITY UA: 1.02 (ref 1–1.03)
URIC ACID, SERUM: 7.8 MG/DL (ref 2.6–6)
URINE TYPE: ABNORMAL
UROBILINOGEN, URINE: 0.2 E.U./DL
WBC # BLD: 8.3 K/UL (ref 4–11)
WBC UA: 68 /HPF (ref 0–5)

## 2020-06-04 PROCEDURE — 2580000003 HC RX 258: Performed by: INTERNAL MEDICINE

## 2020-06-04 PROCEDURE — P9047 ALBUMIN (HUMAN), 25%, 50ML: HCPCS | Performed by: INTERNAL MEDICINE

## 2020-06-04 PROCEDURE — 2580000003 HC RX 258: Performed by: NURSE PRACTITIONER

## 2020-06-04 PROCEDURE — 6370000000 HC RX 637 (ALT 250 FOR IP): Performed by: NURSE PRACTITIONER

## 2020-06-04 PROCEDURE — 80069 RENAL FUNCTION PANEL: CPT

## 2020-06-04 PROCEDURE — 6370000000 HC RX 637 (ALT 250 FOR IP): Performed by: INTERNAL MEDICINE

## 2020-06-04 PROCEDURE — 6360000002 HC RX W HCPCS: Performed by: INTERNAL MEDICINE

## 2020-06-04 PROCEDURE — 94761 N-INVAS EAR/PLS OXIMETRY MLT: CPT

## 2020-06-04 PROCEDURE — 83880 ASSAY OF NATRIURETIC PEPTIDE: CPT

## 2020-06-04 PROCEDURE — 2700000000 HC OXYGEN THERAPY PER DAY

## 2020-06-04 PROCEDURE — 97530 THERAPEUTIC ACTIVITIES: CPT

## 2020-06-04 PROCEDURE — 83735 ASSAY OF MAGNESIUM: CPT

## 2020-06-04 PROCEDURE — 99233 SBSQ HOSP IP/OBS HIGH 50: CPT | Performed by: INTERNAL MEDICINE

## 2020-06-04 PROCEDURE — 36415 COLL VENOUS BLD VENIPUNCTURE: CPT

## 2020-06-04 PROCEDURE — 84550 ASSAY OF BLOOD/URIC ACID: CPT

## 2020-06-04 PROCEDURE — 81001 URINALYSIS AUTO W/SCOPE: CPT

## 2020-06-04 PROCEDURE — 85027 COMPLETE CBC AUTOMATED: CPT

## 2020-06-04 PROCEDURE — 1200000000 HC SEMI PRIVATE

## 2020-06-04 PROCEDURE — 97116 GAIT TRAINING THERAPY: CPT

## 2020-06-04 PROCEDURE — 6360000002 HC RX W HCPCS: Performed by: NURSE PRACTITIONER

## 2020-06-04 RX ORDER — CARVEDILOL 6.25 MG/1
6.25 TABLET ORAL 2 TIMES DAILY WITH MEALS
Status: DISCONTINUED | OUTPATIENT
Start: 2020-06-04 | End: 2020-06-05

## 2020-06-04 RX ADMIN — SODIUM CHLORIDE, PRESERVATIVE FREE 10 ML: 5 INJECTION INTRAVENOUS at 21:00

## 2020-06-04 RX ADMIN — CLINDAMYCIN HYDROCHLORIDE 300 MG: 150 CAPSULE ORAL at 09:18

## 2020-06-04 RX ADMIN — DOCUSATE SODIUM 100 MG: 100 CAPSULE, LIQUID FILLED ORAL at 17:32

## 2020-06-04 RX ADMIN — FUROSEMIDE 7.5 MG/HR: 10 INJECTION, SOLUTION INTRAMUSCULAR; INTRAVENOUS at 20:59

## 2020-06-04 RX ADMIN — ALBUMIN (HUMAN) 25 G: 0.25 INJECTION, SOLUTION INTRAVENOUS at 20:59

## 2020-06-04 RX ADMIN — INSULIN LISPRO 1 UNITS: 100 INJECTION, SOLUTION INTRAVENOUS; SUBCUTANEOUS at 12:29

## 2020-06-04 RX ADMIN — Medication 1000 MG: at 09:18

## 2020-06-04 RX ADMIN — CLINDAMYCIN HYDROCHLORIDE 300 MG: 150 CAPSULE ORAL at 14:31

## 2020-06-04 RX ADMIN — ALBUMIN (HUMAN) 25 G: 0.25 INJECTION, SOLUTION INTRAVENOUS at 03:21

## 2020-06-04 RX ADMIN — FUROSEMIDE 5 MG/HR: 10 INJECTION, SOLUTION INTRAMUSCULAR; INTRAVENOUS at 02:27

## 2020-06-04 RX ADMIN — ATORVASTATIN CALCIUM 40 MG: 40 TABLET, FILM COATED ORAL at 09:18

## 2020-06-04 RX ADMIN — Medication 2.5 MG: at 20:59

## 2020-06-04 RX ADMIN — CARVEDILOL 6.25 MG: 6.25 TABLET, FILM COATED ORAL at 17:32

## 2020-06-04 RX ADMIN — ASPIRIN 81 MG: 81 TABLET, COATED ORAL at 09:18

## 2020-06-04 RX ADMIN — Medication 2.5 MG: at 09:18

## 2020-06-04 RX ADMIN — CARVEDILOL 12.5 MG: 12.5 TABLET, FILM COATED ORAL at 09:17

## 2020-06-04 RX ADMIN — CLINDAMYCIN HYDROCHLORIDE 300 MG: 150 CAPSULE ORAL at 20:59

## 2020-06-04 RX ADMIN — GLIPIZIDE 5 MG: 5 TABLET ORAL at 06:30

## 2020-06-04 RX ADMIN — Medication 1000 MG: at 20:59

## 2020-06-04 RX ADMIN — IRON SUCROSE 200 MG: 20 INJECTION, SOLUTION INTRAVENOUS at 09:17

## 2020-06-04 RX ADMIN — SODIUM CHLORIDE, PRESERVATIVE FREE 10 ML: 5 INJECTION INTRAVENOUS at 09:18

## 2020-06-04 RX ADMIN — ALBUMIN (HUMAN) 25 G: 0.25 INJECTION, SOLUTION INTRAVENOUS at 12:29

## 2020-06-04 RX ADMIN — Medication 400 MG: at 09:18

## 2020-06-04 ASSESSMENT — PAIN SCALES - GENERAL
PAINLEVEL_OUTOF10: 0

## 2020-06-04 NOTE — PLAN OF CARE
Problem: Falls - Risk of:  Goal: Will remain free from falls  Description: Will remain free from falls  Outcome: Met This Shift  Goal: Absence of physical injury  Description: Absence of physical injury  Outcome: Met This Shift     Problem: Pain:  Goal: Pain level will decrease  Description: Pain level will decrease  Outcome: Met This Shift  Goal: Control of acute pain  Description: Control of acute pain  Outcome: Met This Shift  Goal: Control of chronic pain  Description: Control of chronic pain  Outcome: Met This Shift     Problem: Airway Clearance - Ineffective:  Goal: Ability to maintain a clear airway will improve  Description: Ability to maintain a clear airway will improve  Outcome: Met This Shift     Problem: Skin Integrity:  Goal: Will show no infection signs and symptoms  Description: Will show no infection signs and symptoms  Outcome: Met This Shift  Goal: Absence of new skin breakdown  Description: Absence of new skin breakdown  Outcome: Not Met This Shift  Note: She has a small open area on her buttocks. Mepilex boarder applied. Problem: OXYGENATION/RESPIRATORY FUNCTION  Goal: Patient will maintain patent airway  Outcome: Met This Shift  Note: Patient has maintained a patent airway, cough noted and is productive, clear/white sputum, RT is involved in patient care and is providing care, patient has had adequate fluid intake and is on a 1500 fluid restriction, educated patient to turn and cough and deep breathe every two hours and as needed, Will continue to monitor and reassess. Goal: Patient will achieve/maintain normal respiratory rate/effort  Description: Respiratory rate and effort will be within normal limits for the patient  Outcome: Met This Shift  Note: Patient is short of breath at rest and with activity. She continues on 2L of oxygen and is not home dependent.       Problem: HEMODYNAMIC STATUS  Goal: Patient has stable vital signs and fluid balance  Outcome: Met This Shift     Problem:

## 2020-06-04 NOTE — PROGRESS NOTES
and HHPT if resistent to SNF placement for increased safety. Treatment Diagnosis: impaired mobility  Patient Education: Pursed lip breathing  REQUIRES PT FOLLOW UP: Yes  Activity Tolerance  Activity Tolerance: Patient limited by fatigue;Patient limited by endurance     Patient Diagnosis(es): The primary encounter diagnosis was Shortness of breath. Diagnoses of Hypoxia, Acute on chronic diastolic heart failure ([facility] [address] and Chronic kidney disease, unspecified CKD stage were also pertinent to this visit. has a past medical history of Blood circulation, collateral, Cerebral artery occlusion with cerebral infarction [facility]), Chronic deep vein thrombosis (DVT) of femoral vein of left lower extremity (HCC), Chronic kidney disease (CKD), stage III (moderate) ([facility] [address] CVA (cerebral infarction), Diabetes, DVT, lower extremity, recurrent ([facility] [address] Edema, First degree AV block, Gout, Heart murmur, Hypercalcemia, Hypercholesterolemia, Hyperhomocysteinemia (HCC), Hypertension, TRUPTI (obstructive sleep apnea), Osteoarthritis, Sciatica, Traumatic hematoma of left lower leg, Venous insufficiency, and Visual loss R eye - REtinal detachment. has a past surgical history that includes other surgical history; Cataract removal with implant (Bilateral); and other surgical history (Left). Restrictions  Restrictions/Precautions  Restrictions/Precautions: Fall Risk  Position Activity Restriction  Other position/activity restrictions: 2L O2 via NC; gomes; IV  Subjective   General  Chart Reviewed: Yes  Additional Pertinent Hx: Pt is a 78 y.o. female who presented to the ED on 6/1/20 with SOB, LE edema. Pt was recently discharged home hospital in April to [facility] where she just discharged from one week ago. Family / Caregiver Present: No  Referring Practitioner: ROGERS Vargas - CHYNA  Subjective  Subjective: \"I feel like I'm getting worse. \"  General Comment  Comments: Pt found supine in bed upon PT arrival.  Pt agreeable to therapy with SBA  Short term goal 3: ambulate >40' with RW and CGA  Patient Goals   Patient goals : go home instead of SNF    Plan    Plan  Times per week: 3-5x/week  Current Treatment Recommendations: Functional Mobility Training  Safety Devices  Type of devices: Bed alarm in place, Call light within reach, Gait belt, Left in bed, Nurse notified     Therapy Time   Individual Concurrent Group Co-treatment   Time In 0859         Time Out 0938         Minutes 39              Timed Code Treatment Minutes:  39    Total Treatment Minutes:  [address],      This note to serve as discharge summary if patient discharged before next session.

## 2020-06-04 NOTE — PROGRESS NOTES
Hospitalist Progress Note      PCP: Galen Herrera MD    Date of Admission: 6/1/2020    Chief Complaint: SOB and low oxygen saturations    Hospital Course:   78 y.o. female who presents to [facility] with SOB. Pt was apparently admitted and discharged on 4/29/2020 to SNF and demanded to be discharged from SNF a week ago and has been having SOB since she left the SNF. Son at bedside states he has been checking her O2 sats and has documented it in 88% last few days and was 85% yesterday and hence brought her to the ER. CXR with congestion and received a dose of IV lasix and admitted for further diuresis.       Subjective: laying in bed, states she is breathing better, she reports she was out of bed from 03-06am, weight down 5lbs, urine dark brown/red secondary to venofer,      Medications:  Reviewed    Infusion Medications    furosemide (LASIX) 1mg/ml infusion 7.5 mg/hr (06/04/20 1131)    dextrose       Scheduled Medications    carvedilol  6.25 mg Oral BID WC    albumin human  25 g Intravenous Q8H    clindamycin  300 mg Oral TID    iron sucrose  200 mg Intravenous Daily    sodium chloride flush  10 mL Intravenous 2 times per day    apixaban  2.5 mg Oral BID    ascorbic acid  1,000 mg Oral BID    aspirin  81 mg Oral Daily    atorvastatin  40 mg Oral Daily    glipiZIDE  5 mg Oral QAM AC    magnesium oxide  400 mg Oral Daily    insulin lispro  0-6 Units Subcutaneous TID WC    insulin lispro  0-3 Units Subcutaneous Nightly     PRN Meds: glucose, dextrose, glucagon (rDNA), dextrose, sodium chloride flush, acetaminophen **OR** acetaminophen, polyethylene glycol, promethazine **OR** ondansetron, docusate sodium      Intake/Output Summary (Last 24 hours) at 6/4/2020 1321  Last data filed at 6/4/2020 1320  Gross per 24 hour   Intake 920 ml   Output 725 ml   Net 195 ml       Exam:    /60   Pulse 79   Temp 98.3 °F (36.8 °C) (Oral)   Resp 18   Ht 5' 1\" (1.549 m)   Wt 272 lb 0.8 oz (123.4 kg)   SpO2 91%   BMI 51.40 kg/m²     General appearance: NAD, morbidly obese,   HEENT Normal cephalic, atraumatic without obvious deformity. Pupils equal, round, and reactive to light. Extra ocular muscles intact. Conjunctivae/corneas clear. Neck: Supple, No jugular venous distention/bruits. Trachea midline without thyromegaly or adenopathy with full range of motion. Lungs: diminished b/l ,CTA  Heart: Regular rate and rhythm with Normal S1/S2 -   Abdomen: Soft, non-tender, obese/distended without rigidity or guarding and positive bowel sounds   Extremities: No clubbing, cyanosis, 3+ edema bilaterally. But improved from yesterday  Skin: Skin color, texture, turgor normal.  No rashes or lesions. Neurologic: Alert, neurovascularly intact with sensory/motor intact upper extremities/lower extremities, bilaterally. Cranial nerves: II-XII intact, grossly non-focal.  Mental status: Alert  Capillary Refill: Acceptable  < 3 seconds  Peripheral Pulses: +3 Easily felt, not easily obliterated with pressure         Labs:   Recent Labs     06/02/20  0551 06/03/20  0544 06/04/20  0519   WBC 7.0 7.5 8.3   HGB 8.4* 8.0* 7.7*   HCT 26.1* 25.0* 23.9*    257 212     Recent Labs     06/02/20  0551 06/03/20  0544 06/04/20  0519    141 140   K 3.7 4.1 4.1    102 102   CO2 28 29 28   BUN 23* 27* 29*   CREATININE 1.8* 2.3* 2.3*   CALCIUM 9.6 9.2 9.2   PHOS  --  5.1* 5.0*     Recent Labs     06/01/20  1535   AST 49*   ALT 12   BILITOT <0.2   ALKPHOS 60     Recent Labs     06/01/20  1540   INR 1.57*     Recent Labs     06/01/20  1535 06/01/20  2106 06/01/20  2349   TROPONINI <0.01 <0.01 0.01       Urinalysis:      Lab Results   Component Value Date    NITRU Negative 06/01/2020    WBCUA 0-2 06/01/2020    BACTERIA 2+ 06/01/2020    RBCUA 0-2 06/01/2020    BLOODU Negative 06/01/2020    SPECGRAV 1.016 06/01/2020    GLUCOSEU Negative 06/01/2020       Radiology:  CT CHEST WO CONTRAST   Final Result   1.  Spectrum of findings suggests CHF and pulmonary edema. Moderate bilateral   pleural effusions. 2. Superimposed infectious process can not be definitively excluded. Recommend follow-up imaging following course of therapy. XR CHEST PORTABLE   Final Result   Findings are most consistent with mild CHF, including cardiomegaly and small   bilateral pleural effusions. There is hazy bibasilar airspace disease,   atelectasis favored over pneumonia. Assessment/Plan:    Active Hospital Problems    Diagnosis Date Noted    Acute diastolic CHF (congestive heart failure) (HCC) [I50.31] 06/01/2020     Acute hypoxic resp failure   - O2 sats dropped to 85% at home  - cont O2 current;y at 2L  Per n/c  -CHest CT-pulm edema- on lasix gtt     Acute/Chronic diastolic CHF   - echo from 3/26/2020 reviewed with normal EF and grade I diastolic dysfn. -BNP elevated 1765 on adm today 1705  -IV lasix gtt 5mg increased to 7.5 on 6/4 per nephrology  -gomes placed in ER- dark brown/red urine secondary to  Venofer  -monitor strict I/O   -daily wts weight 4/23 258, now 278lbs she reports wt was 249lbs at home  Wt today 272lbs  - low sodium diet ordered.-Stressed the importance of fluid (1500ml) and sodium restriction (2g)  - Consulted cardio/CHF nurse. - On coreg/ASA/eliquis/statin.    -OT/PT Eval- AM-PAC 15     CKD III   - creat at baseline, 1.8  -trending up at 2.3 foro past 2 days  -Consulted Nephrology given RF and CHF  - ALbumin low-- IV albumin added per Neph  -NO ACE or ARBS  -NO NSAIDS  -labs per Nephrology     Chronically elevated d dimer   - on eliquis, can consider VQ scan if suspicious for PE     H/o DVT   - on eliquis    Anemia   - hb 9.1, check iron studies  -Venofer daily per Nephrology  -trending down- had bloody nose today  -Transfuse if hg<7  -will send UA for blood  -may hold AC tomorrow- per Cardiology    DM II - controlled,   cont home meds, on SSI    HTN - fairly controlled  cont home meds  -NO ACE or ARBS

## 2020-06-04 NOTE — PROGRESS NOTES
Max:81    24HR INTAKE/OUTPUT:      Intake/Output Summary (Last 24 hours) at 6/4/2020 1033  Last data filed at 6/4/2020 0917  Gross per 24 hour   Intake 740 ml   Output 725 ml   Net 15 ml         Patient Vitals for the past 96 hrs (Last 3 readings):   Weight   06/04/20 0930 272 lb 0.8 oz (123.4 kg)   06/04/20 0830 283 lb 1.1 oz (128.4 kg)   06/03/20 0430 278 lb 14.1 oz (126.5 kg)       GENERAL:  No apparent distress, conversant, clinically appears to be mildly volume overloaded. HEENT:  Ears and nose appear externally without deformity. MMM. Normocephalic, atraumatic. Eyes:  Anicteric sclerae. Moist conjunctivae. No lid lag. ROSALBA  NECK:  Free range of motion. Supple. No thyromegaly. CHEST:  Bibasilar crackles. No intercostal retractions. CARDIOVASCULAR:  Regular rate and rhythm. No rubs. ABDOMEN:  Soft, nontender. Bowel sounds are present. No organomegaly. EXTREMITIES:  Lower extremities:  1 lower extremity edema. NEUROLOGICAL EXAMINATION:  No asterixis. No focal motor neurological deficits. PSYCHIATRIC:  Appropriate affect. Alert, oriented to time, place, and person. SKIN:  Loss of turgor. No rash. Allergies:   Allergies   Allergen Reactions    Latex Rash     Swelling lesions      Ace Inhibitors     Gabapentin      LE edema    Hydrochlorothiazide     Neosporin [Neomycin-Bacitracin Zn-Polymyx]     Penicillins     Sulfa Antibiotics     Tape [Adhesive Tape] Other (See Comments)     Sores          LAB DATA:    CBC:   Lab Results   Component Value Date    WBC 8.3 06/04/2020    RBC 2.39 06/04/2020    HGB 7.7 06/04/2020    HCT 23.9 06/04/2020    .0 06/04/2020    MCH 32.3 06/04/2020    MCHC 32.3 06/04/2020    RDW 17.0 06/04/2020     06/04/2020    MPV 6.8 06/04/2020     BMP:    Lab Results   Component Value Date     06/04/2020    K 4.1 06/04/2020    K 4.0 04/23/2020     06/04/2020    CO2 28 06/04/2020    BUN 29 06/04/2020    CREATININE 2.3 06/04/2020    CALCIUM 9.2 06/04/2020    GFRAA 25 06/04/2020    GFRAA 57 04/16/2013    LABGLOM 20 06/04/2020    GLUCOSE 112 06/04/2020     Magnesium:    Lab Results   Component Value Date    MG 2.30 06/04/2020     Phosphorus:    Lab Results   Component Value Date    PHOS 5.0 06/04/2020     U/A:    Lab Results   Component Value Date    COLORU YELLOW 06/01/2020    PHUR 6.0 06/01/2020    WBCUA 0-2 06/01/2020    RBCUA 0-2 06/01/2020    MUCUS Rare 06/01/2020    BACTERIA 2+ 06/01/2020    CLARITYU Clear 06/01/2020    SPECGRAV 1.016 06/01/2020    LEUKOCYTESUR Negative 06/01/2020    UROBILINOGEN 0.2 06/01/2020    BILIRUBINUR Negative 06/01/2020    BLOODU Negative 06/01/2020    GLUCOSEU Negative 06/01/2020    AMORPHOUS 1+ 06/01/2020         IMPRESSION/RECOMMENDATIONS:      Active Problems:    Acute diastolic CHF (congestive heart failure) ([facility] [address]  Resolved Problems:    * No resolved hospital problems. *    1. Volume overload occurring in the setting of CKD stage III. The patient's creatinine was trending up with diuresis and is now stable. Given findings on CT scan I feel patient will need further diuresis - The patient will probably need to be on diuretics chronically to avoid this from happening in the future. We may have to sacrifice some degree of kidney function in order to maintain volume status. 2.  Anemia of chronic kidney disease - iron deficient - continue IV iron    3. CKD/MBD - phosphorus at target    4. Hypertension. Continue current antihypertensive and avoid hypotension. off Amlodipine and reduced Coreg dose since BP trending down with diuresis    5. Pulmonary edema - Increase Lasix gtt/ Added IV albumin    6. Recent history of DOUGLAS that has resolved and it was felt to be secondary to contrast nephropathy and the use of ARB. 7.  Recent history of hypercalcemia which was felt to be secondary to calcium supplements that has also resolved. 8.  Nephrotic range proteinuria felt to be secondary to diabetic nephropathy.   All serologic workup has been negative this far. UMCR 3381 mg.

## 2020-06-04 NOTE — PROGRESS NOTES
Patient in bed, eating her lunch, no distress noted. She has a new small open area on her buttocks, Mepilex boarder applied. Daily weight done by day shift nursing. She had a bloody nose at lunch time, she did not eat much. Her urine continues to be bloody min the gomes. Cardio is aware of the bleeding. Family at the bedside. Call light in reach.

## 2020-06-04 NOTE — PROGRESS NOTES
[address]   Daily Progress Note      Admit Date:  6/1/2020    CC: \" I am breathing better\"  This is a 77-year-old female with obesity,  diabetes, previous diastolic heart failure, who came to the hospital  with worsening shortness of breath and about 20 to 30-pound weight gain. Symptoms have been steadily worsening and she eventually decided to come  to the hospital.  She was admitted for possible diastolic heart failure  exacerbation. She has also noticed significant swelling of her lower  extremities. She denied any chest tightness or pressure. She had no  fever, chills, or rigors; cough or sputum production. No nausea or  vomiting.     She has been running low on her oxygenation and had a drop up to 85% the  day before her admission. Subjective:  Patient had moderate diuresis with Lasix infusion at 5 mg an hour. Currently is having trouble with significant nose bleed. PObjective:   BP (!) 94/44   Pulse 79   Temp 98.3 °F (36.8 °C) (Oral)   Resp 18   Ht 5' 1\" (1.549 m)   Wt 272 lb 0.8 oz (123.4 kg)   SpO2 91%   BMI 51.40 kg/m²       Intake/Output Summary (Last 24 hours) at 6/4/2020 1150  Last data filed at 6/4/2020 0917  Gross per 24 hour   Intake 740 ml   Output 725 ml   Net 15 ml     Wt Readings from Last 3 Encounters:   06/04/20 272 lb 0.8 oz (123.4 kg)   04/29/20 258 lb 13.1 oz (117.4 kg)   03/26/20 251 lb 15.8 oz (114.3 kg)     Telemetry:NSR    Physical Exam:  General:  NAD, Awake, alert and oriented X4  Skin:  Warm and dry  Neck:  Supple, no JVP appreciated, no bruit  Chest:  Clear to auscultation, no wheezes/rhonchi/rales  Cardiovascular: Irregularly irregular rate. S1S2  Abdomen:  Soft, nontender, +bowel sounds  Extremities:3+ BLE pitting edema.     Cardiac Diagnosis:  diabetes, hypertension, hyperlipidemia and CHF    Medications:    carvedilol  6.25 mg Oral BID WC    albumin human  25 g Intravenous Q8H    clindamycin  300 mg Oral TID    iron sucrose  200 mg Intravenous Daily    sodium chloride flush  10 mL Intravenous 2 times per day    apixaban  2.5 mg Oral BID    ascorbic acid  1,000 mg Oral BID    aspirin  81 mg Oral Daily    atorvastatin  40 mg Oral Daily    glipiZIDE  5 mg Oral QAM AC    magnesium oxide  400 mg Oral Daily    insulin lispro  0-6 Units Subcutaneous TID WC    insulin lispro  0-3 Units Subcutaneous Nightly      furosemide (LASIX) 1mg/ml infusion 7.5 mg/hr (06/04/20 1131)    dextrose       glucose, dextrose, glucagon (rDNA), dextrose, sodium chloride flush, acetaminophen **OR** acetaminophen, polyethylene glycol, promethazine **OR** ondansetron, docusate sodium    Lab Data:  CBC:   Recent Labs     06/02/20  0551 06/03/20  0544 06/04/20  0519   WBC 7.0 7.5 8.3   HGB 8.4* 8.0* 7.7*    257 212     BMP:    Recent Labs     06/02/20  0551 06/03/20  0544 06/04/20  0519    141 140   K 3.7 4.1 4.1   CO2 28 29 28   BUN 23* 27* 29*   CREATININE 1.8* 2.3* 2.3*     LIVR:   Recent Labs     06/01/20  1535   AST 49*   ALT 12     INR:    Recent Labs     06/01/20  1540   INR 1.57*     APTT: No results for input(s): APTT in the last 72 hours. BNP:  No results for input(s): BNP in the last 72 hours. Imaging:A bubble study was performed and fails to show evidence of shunting. LVH with normal systolic function. EF    70%. Grade I Diastolic dysfunction. Trivial mitral regurgitation is present. Mean aortic stenosis with mean gradient of 17 mmHg   The left atrium is normal in size. Normal right ventricular size and function. Assessment:Plan  1 acute on chronic diastolic heart failure  -Breathing has improved but patient has lost 6 pound since yesterday  -Continues to have significant lower extremity edema as well as tendon edema on her back  Disussed with nephrology.   Patient is now on Lasix infusion her dose is increased to 7.5 mg/h  -Stressed the importance of fluid and sodium restriction    Atrial fibrillation  -Rate is currently

## 2020-06-05 ENCOUNTER — APPOINTMENT (OUTPATIENT)
Dept: GENERAL RADIOLOGY | Age: 80
DRG: 291 | End: 2020-06-05
Payer: MEDICARE

## 2020-06-05 PROBLEM — I50.33 ACUTE ON CHRONIC DIASTOLIC HEART FAILURE (HCC): Status: ACTIVE | Noted: 2020-06-01

## 2020-06-05 LAB
ALBUMIN SERPL-MCNC: 4.2 G/DL (ref 3.4–5)
ANION GAP SERPL CALCULATED.3IONS-SCNC: 13 MMOL/L (ref 3–16)
BUN BLDV-MCNC: 32 MG/DL (ref 7–20)
CALCIUM SERPL-MCNC: 8.8 MG/DL (ref 8.3–10.6)
CHLORIDE BLD-SCNC: 100 MMOL/L (ref 99–110)
CO2: 26 MMOL/L (ref 21–32)
CREAT SERPL-MCNC: 2.9 MG/DL (ref 0.6–1.2)
GFR AFRICAN AMERICAN: 19
GFR NON-AFRICAN AMERICAN: 16
GLUCOSE BLD-MCNC: 116 MG/DL (ref 70–99)
GLUCOSE BLD-MCNC: 129 MG/DL (ref 70–99)
GLUCOSE BLD-MCNC: 134 MG/DL (ref 70–99)
GLUCOSE BLD-MCNC: 168 MG/DL (ref 70–99)
GLUCOSE BLD-MCNC: 95 MG/DL (ref 70–99)
HCT VFR BLD CALC: 23.5 % (ref 36–48)
HEMOGLOBIN: 7.5 G/DL (ref 12–16)
MAGNESIUM: 2.4 MG/DL (ref 1.8–2.4)
MCH RBC QN AUTO: 32 PG (ref 26–34)
MCHC RBC AUTO-ENTMCNC: 31.8 G/DL (ref 31–36)
MCV RBC AUTO: 100.7 FL (ref 80–100)
PDW BLD-RTO: 17.2 % (ref 12.4–15.4)
PERFORMED ON: ABNORMAL
PERFORMED ON: NORMAL
PHOSPHORUS: 5.3 MG/DL (ref 2.5–4.9)
PLATELET # BLD: 207 K/UL (ref 135–450)
PMV BLD AUTO: 7 FL (ref 5–10.5)
POTASSIUM SERPL-SCNC: 4.3 MMOL/L (ref 3.5–5.1)
PRO-BNP: 3499 PG/ML (ref 0–449)
RBC # BLD: 2.33 M/UL (ref 4–5.2)
SODIUM BLD-SCNC: 139 MMOL/L (ref 136–145)
URIC ACID, SERUM: 7.9 MG/DL (ref 2.6–6)
WBC # BLD: 7.3 K/UL (ref 4–11)

## 2020-06-05 PROCEDURE — P9047 ALBUMIN (HUMAN), 25%, 50ML: HCPCS | Performed by: INTERNAL MEDICINE

## 2020-06-05 PROCEDURE — 83735 ASSAY OF MAGNESIUM: CPT

## 2020-06-05 PROCEDURE — 6360000002 HC RX W HCPCS: Performed by: INTERNAL MEDICINE

## 2020-06-05 PROCEDURE — 2580000003 HC RX 258: Performed by: INTERNAL MEDICINE

## 2020-06-05 PROCEDURE — 6370000000 HC RX 637 (ALT 250 FOR IP): Performed by: INTERNAL MEDICINE

## 2020-06-05 PROCEDURE — 97530 THERAPEUTIC ACTIVITIES: CPT

## 2020-06-05 PROCEDURE — 83880 ASSAY OF NATRIURETIC PEPTIDE: CPT

## 2020-06-05 PROCEDURE — 36415 COLL VENOUS BLD VENIPUNCTURE: CPT

## 2020-06-05 PROCEDURE — 85027 COMPLETE CBC AUTOMATED: CPT

## 2020-06-05 PROCEDURE — 80069 RENAL FUNCTION PANEL: CPT

## 2020-06-05 PROCEDURE — 84550 ASSAY OF BLOOD/URIC ACID: CPT

## 2020-06-05 PROCEDURE — 1200000000 HC SEMI PRIVATE

## 2020-06-05 PROCEDURE — 99232 SBSQ HOSP IP/OBS MODERATE 35: CPT | Performed by: NURSE PRACTITIONER

## 2020-06-05 PROCEDURE — 2700000000 HC OXYGEN THERAPY PER DAY

## 2020-06-05 PROCEDURE — 94760 N-INVAS EAR/PLS OXIMETRY 1: CPT

## 2020-06-05 PROCEDURE — 71045 X-RAY EXAM CHEST 1 VIEW: CPT

## 2020-06-05 RX ORDER — MIDODRINE HYDROCHLORIDE 5 MG/1
2.5 TABLET ORAL
Status: DISCONTINUED | OUTPATIENT
Start: 2020-06-05 | End: 2020-06-07

## 2020-06-05 RX ORDER — METOLAZONE 5 MG/1
5 TABLET ORAL ONCE
Status: COMPLETED | OUTPATIENT
Start: 2020-06-05 | End: 2020-06-05

## 2020-06-05 RX ADMIN — INSULIN LISPRO 1 UNITS: 100 INJECTION, SOLUTION INTRAVENOUS; SUBCUTANEOUS at 08:47

## 2020-06-05 RX ADMIN — Medication 2.5 MG: at 08:47

## 2020-06-05 RX ADMIN — Medication 400 MG: at 08:46

## 2020-06-05 RX ADMIN — ALBUMIN (HUMAN) 25 G: 0.25 INJECTION, SOLUTION INTRAVENOUS at 11:12

## 2020-06-05 RX ADMIN — Medication 2.5 MG: at 22:29

## 2020-06-05 RX ADMIN — IRON SUCROSE 200 MG: 20 INJECTION, SOLUTION INTRAVENOUS at 08:47

## 2020-06-05 RX ADMIN — Medication 1000 MG: at 08:47

## 2020-06-05 RX ADMIN — MIDODRINE HYDROCHLORIDE 2.5 MG: 5 TABLET ORAL at 17:11

## 2020-06-05 RX ADMIN — Medication 1000 MG: at 22:28

## 2020-06-05 RX ADMIN — GLIPIZIDE 5 MG: 5 TABLET ORAL at 08:47

## 2020-06-05 RX ADMIN — METOLAZONE 5 MG: 5 TABLET ORAL at 11:12

## 2020-06-05 RX ADMIN — ALBUMIN (HUMAN) 25 G: 0.25 INJECTION, SOLUTION INTRAVENOUS at 22:28

## 2020-06-05 RX ADMIN — ASPIRIN 81 MG: 81 TABLET, COATED ORAL at 08:46

## 2020-06-05 RX ADMIN — ALBUMIN (HUMAN) 25 G: 0.25 INJECTION, SOLUTION INTRAVENOUS at 04:37

## 2020-06-05 RX ADMIN — ONDANSETRON 4 MG: 2 INJECTION INTRAMUSCULAR; INTRAVENOUS at 14:54

## 2020-06-05 RX ADMIN — CHLOROTHIAZIDE SODIUM 500 MG: 500 INJECTION, POWDER, LYOPHILIZED, FOR SOLUTION INTRAVENOUS at 12:33

## 2020-06-05 RX ADMIN — FUROSEMIDE 7.5 MG/HR: 10 INJECTION, SOLUTION INTRAMUSCULAR; INTRAVENOUS at 10:02

## 2020-06-05 RX ADMIN — CARVEDILOL 6.25 MG: 6.25 TABLET, FILM COATED ORAL at 08:46

## 2020-06-05 RX ADMIN — ATORVASTATIN CALCIUM 40 MG: 40 TABLET, FILM COATED ORAL at 08:46

## 2020-06-05 RX ADMIN — MIDODRINE HYDROCHLORIDE 2.5 MG: 5 TABLET ORAL at 11:56

## 2020-06-05 ASSESSMENT — PAIN SCALES - GENERAL
PAINLEVEL_OUTOF10: 0
PAINLEVEL_OUTOF10: 0

## 2020-06-05 NOTE — PROGRESS NOTES
Pt AAO x4. Assisted from the chair back to bed with RW x1.  Pt does c/o pain when STACY LE are touched, but denies need for pain meds. ALCARAZ noted with transferring pt to bed. Assessment completed and charted. Fine crackles noted to bases of lungs. O2 sats 92% on 2L/NC. Lasix gtt infusing at 7.5 mL/hr into RFA IV. Tavares patent draining small amount of yellow urine with sediment. Urine in the bag is pink tinged. Pt denies needs at this time. Call light in reach. Will monitor.

## 2020-06-05 NOTE — PROGRESS NOTES
Patient in bed, she complains of nausea, given Zofran. The patient's daughter is at the bedside. The patient and her daughter had questions concerning dialysis, I educated them on the reason for dialysis, frequency of dialysis, and what could happen, if dialysis is needed and the patient chooses not to do it. They both verbalized understanding. Call light in reach bed alarm set.

## 2020-06-05 NOTE — PROGRESS NOTES
cephalic, atraumatic without obvious deformity. Pupils equal, round, and reactive to light. Extra ocular muscles intact. Conjunctivae/corneas clear. Neck: Supple, No jugular venous distention/bruits. Trachea midline without thyromegaly or adenopathy with full range of motion. Lungs: diminished b/l ,CTA  Heart: Regular rate and rhythm with Normal S1/S2 -   Abdomen: Soft, non-tender, obese/distended without rigidity or guarding and positive bowel sounds   Extremities: No clubbing, cyanosis, 3+ edema bilaterally. But improved from yesterday  Skin: Skin color, texture, turgor normal.  No rashes or lesions. Neurologic: Alert, neurovascularly intact with sensory/motor intact upper extremities/lower extremities, bilaterally. Cranial nerves: II-XII intact, grossly non-focal.  Mental status: Alert  Capillary Refill: Acceptable  < 3 seconds  Peripheral Pulses: +3 Easily felt, not easily obliterated with pressure         Labs:   Recent Labs     06/03/20  0544 06/04/20  0519 06/05/20  0612   WBC 7.5 8.3 7.3   HGB 8.0* 7.7* 7.5*   HCT 25.0* 23.9* 23.5*    212 207     Recent Labs     06/03/20  0544 06/04/20  0519 06/05/20  0612    140 139   K 4.1 4.1 4.3    102 100   CO2 29 28 26   BUN 27* 29* 32*   CREATININE 2.3* 2.3* 2.9*   CALCIUM 9.2 9.2 8.8   PHOS 5.1* 5.0* 5.3*     No results for input(s): AST, ALT, BILIDIR, BILITOT, ALKPHOS in the last 72 hours. No results for input(s): INR in the last 72 hours. No results for input(s): Marcha Valentin in the last 72 hours. Urinalysis:      Lab Results   Component Value Date    NITRU Negative 06/04/2020    WBCUA 68 06/04/2020    BACTERIA RARE 06/04/2020    RBCUA >900 06/04/2020    BLOODU LARGE 06/04/2020    SPECGRAV 1.025 06/04/2020    GLUCOSEU Negative 06/04/2020       Radiology:  CT CHEST WO CONTRAST   Final Result   1. Spectrum of findings suggests CHF and pulmonary edema. Moderate bilateral   pleural effusions.    2. Superimposed infectious process

## 2020-06-05 NOTE — PLAN OF CARE
themselves. Problem: OXYGENATION/RESPIRATORY FUNCTION  Goal: Patient will maintain patent airway  6/5/2020 0006 by Brady Joseph RN  Outcome: Ongoing     Problem: OXYGENATION/RESPIRATORY FUNCTION  Goal: Patient will achieve/maintain normal respiratory rate/effort  Description: Respiratory rate and effort will be within normal limits for the patient  6/5/2020 0006 by Brady Joseph RN  Outcome: Ongoing   RR WNL at rest.  Pt has significant ALCARAZ with activity. Will monitor.   Problem: HEMODYNAMIC STATUS  Goal: Patient has stable vital signs and fluid balance  6/5/2020 0006 by Brady Joseph RN  Outcome: Ongoing     Problem: FLUID AND ELECTROLYTE IMBALANCE  Goal: Fluid and electrolyte balance are achieved/maintained  6/5/2020 0006 by Brady Joseph RN  Outcome: Ongoing     Problem: ACTIVITY INTOLERANCE/IMPAIRED MOBILITY  Goal: Mobility/activity is maintained at optimum level for patient  6/5/2020 0006 by Brady Joseph RN  Outcome: Ongoing

## 2020-06-05 NOTE — PROGRESS NOTES
[address]                                                                        [address] [facility] [address]                                                                 [place]                                                         Phone Number: [phone]                                                           Fax Number: [phone]                                                             Nephrology Progress Note    Chief Complaint: SOB/Pulmonary edema    History of Present Illness: We are following this patient for DOUGLAS/CKD III. Respiratory status worse - Resting in bed     Subjective:      Scheduled Meds:   midodrine  2.5 mg Oral TID WC    chlorothiazide (DIURIL) IVPB  500 mg Intravenous Q24H    albumin human  25 g Intravenous Q8H    iron sucrose  200 mg Intravenous Daily    sodium chloride flush  10 mL Intravenous 2 times per day    apixaban  2.5 mg Oral BID    ascorbic acid  1,000 mg Oral BID    aspirin  81 mg Oral Daily    atorvastatin  40 mg Oral Daily    glipiZIDE  5 mg Oral QAM AC    magnesium oxide  400 mg Oral Daily    insulin lispro  0-6 Units Subcutaneous TID WC    insulin lispro  0-3 Units Subcutaneous Nightly        furosemide (LASIX) 1mg/ml infusion 7.5 mg/hr (20 1002)    dextrose         PRN Meds:.glucose, dextrose, glucagon (rDNA), dextrose, sodium chloride flush, acetaminophen **OR** acetaminophen, polyethylene glycol, promethazine **OR** ondansetron, docusate sodium    Physical Exam:    TEMPERATURE:  Current - Temp: 98.2 °F (36.8 °C);  Max - Temp  Av.5 °F (36.9 °C)  Min: 97.9 °F (36.6 °C)  Max: 99.2 °F (37.3 °C)  RESPIRATIONS RANGE: Resp  Av.6  Min: 18  Max: 22  PULSE RANGE: Pulse  Av.2  Min: 73  Max: 88  BLOOD PRESSURE RANGE:  Systolic (51JCU), IGJ:974 , Min:102 , PBK:071   ; Diastolic (85AVB), YJP:42, Min:60,

## 2020-06-05 NOTE — CARE COORDINATION
INITIAL CASE MANAGEMENT ASSESSMENT    Reviewed chart, met with patient to assess possible discharge needs. Explained Case Management role/services. Living Situation: lives with daughter and son in law, they moved in to assist her. ADLs: assisted     DME: 2 wheeled walker, shower bench    PT/OT Recs: OT-13/24, PT-TBD     Active Services: Active with [facility]     Transportation: TBD     Medications: Day Pharmacy- daily pack. PCP: Dr. Clem Valladares      HD/PD: N/A    PLAN/COMMENTS: Was at [facility] 4/25/20 to 5/25/20, not sure if SNF needed, where patient wants to go. SNF list provided. SW/CM provided contact information for patient or family to call with any questions. SW/CM will follow and assist as needed.

## 2020-06-05 NOTE — PROGRESS NOTES
Physical Therapy  Status Note    6/5/2020  Gunjan Tilley Cat  [id number]   [id number]    RN defers therapy this date due to declined medical status. Will follow and see as appropriate. RN notes that patient may require dialysis.   Electronically signed by Silvestre Crawford, [address] on 6/5/2020 at 3:07 PM

## 2020-06-06 ENCOUNTER — APPOINTMENT (OUTPATIENT)
Dept: GENERAL RADIOLOGY | Age: 80
DRG: 291 | End: 2020-06-06
Payer: MEDICARE

## 2020-06-06 LAB
ALBUMIN SERPL-MCNC: 4.4 G/DL (ref 3.4–5)
ANION GAP SERPL CALCULATED.3IONS-SCNC: 12 MMOL/L (ref 3–16)
BUN BLDV-MCNC: 38 MG/DL (ref 7–20)
CALCIUM SERPL-MCNC: 8.5 MG/DL (ref 8.3–10.6)
CHLORIDE BLD-SCNC: 99 MMOL/L (ref 99–110)
CO2: 26 MMOL/L (ref 21–32)
CREAT SERPL-MCNC: 3.8 MG/DL (ref 0.6–1.2)
GFR AFRICAN AMERICAN: 14
GFR NON-AFRICAN AMERICAN: 11
GLUCOSE BLD-MCNC: 117 MG/DL (ref 70–99)
GLUCOSE BLD-MCNC: 62 MG/DL (ref 70–99)
GLUCOSE BLD-MCNC: 81 MG/DL (ref 70–99)
GLUCOSE BLD-MCNC: 89 MG/DL (ref 70–99)
GLUCOSE BLD-MCNC: 93 MG/DL (ref 70–99)
HBV SURFACE AB TITR SER: <3.5 MIU/ML
HCT VFR BLD CALC: 22.9 % (ref 36–48)
HEMOGLOBIN: 7.2 G/DL (ref 12–16)
HEPATITIS B SURFACE ANTIGEN INTERPRETATION: NORMAL
MAGNESIUM: 2.4 MG/DL (ref 1.8–2.4)
MCH RBC QN AUTO: 32 PG (ref 26–34)
MCHC RBC AUTO-ENTMCNC: 31.6 G/DL (ref 31–36)
MCV RBC AUTO: 101.1 FL (ref 80–100)
PDW BLD-RTO: 17.2 % (ref 12.4–15.4)
PERFORMED ON: ABNORMAL
PERFORMED ON: NORMAL
PHOSPHORUS: 5.4 MG/DL (ref 2.5–4.9)
PLATELET # BLD: 183 K/UL (ref 135–450)
PMV BLD AUTO: 7 FL (ref 5–10.5)
POTASSIUM SERPL-SCNC: 4.3 MMOL/L (ref 3.5–5.1)
RBC # BLD: 2.27 M/UL (ref 4–5.2)
SODIUM BLD-SCNC: 137 MMOL/L (ref 136–145)
URIC ACID, SERUM: 7.8 MG/DL (ref 2.6–6)
WBC # BLD: 8.1 K/UL (ref 4–11)

## 2020-06-06 PROCEDURE — 80069 RENAL FUNCTION PANEL: CPT

## 2020-06-06 PROCEDURE — 36415 COLL VENOUS BLD VENIPUNCTURE: CPT

## 2020-06-06 PROCEDURE — 6370000000 HC RX 637 (ALT 250 FOR IP): Performed by: INTERNAL MEDICINE

## 2020-06-06 PROCEDURE — 5A1D70Z PERFORMANCE OF URINARY FILTRATION, INTERMITTENT, LESS THAN 6 HOURS PER DAY: ICD-10-PCS | Performed by: INTERNAL MEDICINE

## 2020-06-06 PROCEDURE — P9047 ALBUMIN (HUMAN), 25%, 50ML: HCPCS | Performed by: INTERNAL MEDICINE

## 2020-06-06 PROCEDURE — 86704 HEP B CORE ANTIBODY TOTAL: CPT

## 2020-06-06 PROCEDURE — 2700000000 HC OXYGEN THERAPY PER DAY

## 2020-06-06 PROCEDURE — 94760 N-INVAS EAR/PLS OXIMETRY 1: CPT

## 2020-06-06 PROCEDURE — 71045 X-RAY EXAM CHEST 1 VIEW: CPT

## 2020-06-06 PROCEDURE — 2580000003 HC RX 258: Performed by: INTERNAL MEDICINE

## 2020-06-06 PROCEDURE — 02HV33Z INSERTION OF INFUSION DEVICE INTO SUPERIOR VENA CAVA, PERCUTANEOUS APPROACH: ICD-10-PCS | Performed by: SURGERY

## 2020-06-06 PROCEDURE — 2500000003 HC RX 250 WO HCPCS

## 2020-06-06 PROCEDURE — 87340 HEPATITIS B SURFACE AG IA: CPT

## 2020-06-06 PROCEDURE — 6360000002 HC RX W HCPCS: Performed by: INTERNAL MEDICINE

## 2020-06-06 PROCEDURE — 36556 INSERT NON-TUNNEL CV CATH: CPT | Performed by: SURGERY

## 2020-06-06 PROCEDURE — 85027 COMPLETE CBC AUTOMATED: CPT

## 2020-06-06 PROCEDURE — 84550 ASSAY OF BLOOD/URIC ACID: CPT

## 2020-06-06 PROCEDURE — B548ZZA ULTRASONOGRAPHY OF SUPERIOR VENA CAVA, GUIDANCE: ICD-10-PCS | Performed by: SURGERY

## 2020-06-06 PROCEDURE — 83735 ASSAY OF MAGNESIUM: CPT

## 2020-06-06 PROCEDURE — 90935 HEMODIALYSIS ONE EVALUATION: CPT

## 2020-06-06 PROCEDURE — 99221 1ST HOSP IP/OBS SF/LOW 40: CPT | Performed by: SURGERY

## 2020-06-06 PROCEDURE — 99232 SBSQ HOSP IP/OBS MODERATE 35: CPT | Performed by: INTERNAL MEDICINE

## 2020-06-06 PROCEDURE — 2100000000 HC CCU R&B

## 2020-06-06 PROCEDURE — 86706 HEP B SURFACE ANTIBODY: CPT

## 2020-06-06 PROCEDURE — 36556 INSERT NON-TUNNEL CV CATH: CPT

## 2020-06-06 RX ORDER — FUROSEMIDE 10 MG/ML
200 INJECTION INTRAMUSCULAR; INTRAVENOUS 2 TIMES DAILY
Status: DISCONTINUED | OUTPATIENT
Start: 2020-06-06 | End: 2020-06-06

## 2020-06-06 RX ORDER — FUROSEMIDE 10 MG/ML
240 INJECTION INTRAMUSCULAR; INTRAVENOUS 2 TIMES DAILY
Status: DISCONTINUED | OUTPATIENT
Start: 2020-06-06 | End: 2020-06-06

## 2020-06-06 RX ORDER — HEPARIN SODIUM 1000 [USP'U]/ML
2800 INJECTION, SOLUTION INTRAVENOUS; SUBCUTANEOUS PRN
Status: DISCONTINUED | OUTPATIENT
Start: 2020-06-06 | End: 2020-06-12 | Stop reason: HOSPADM

## 2020-06-06 RX ORDER — MIDODRINE HYDROCHLORIDE 5 MG/1
2.5 TABLET ORAL
Status: COMPLETED | OUTPATIENT
Start: 2020-06-06 | End: 2020-06-06

## 2020-06-06 RX ORDER — LIDOCAINE HYDROCHLORIDE 10 MG/ML
INJECTION, SOLUTION INFILTRATION; PERINEURAL
Status: COMPLETED
Start: 2020-06-06 | End: 2020-06-06

## 2020-06-06 RX ORDER — ALBUMIN (HUMAN) 12.5 G/50ML
12.5 SOLUTION INTRAVENOUS PRN
Status: DISCONTINUED | OUTPATIENT
Start: 2020-06-06 | End: 2020-06-12 | Stop reason: HOSPADM

## 2020-06-06 RX ADMIN — Medication 400 MG: at 08:09

## 2020-06-06 RX ADMIN — FUROSEMIDE: 10 INJECTION, SOLUTION INTRAMUSCULAR; INTRAVENOUS at 11:08

## 2020-06-06 RX ADMIN — ASPIRIN 81 MG: 81 TABLET, COATED ORAL at 08:10

## 2020-06-06 RX ADMIN — SODIUM CHLORIDE, PRESERVATIVE FREE 10 ML: 5 INJECTION INTRAVENOUS at 08:11

## 2020-06-06 RX ADMIN — IRON SUCROSE 200 MG: 20 INJECTION, SOLUTION INTRAVENOUS at 08:10

## 2020-06-06 RX ADMIN — Medication 1000 MG: at 11:08

## 2020-06-06 RX ADMIN — MIDODRINE HYDROCHLORIDE 2.5 MG: 5 TABLET ORAL at 20:14

## 2020-06-06 RX ADMIN — Medication 2.5 MG: at 22:44

## 2020-06-06 RX ADMIN — Medication 2.5 MG: at 08:10

## 2020-06-06 RX ADMIN — MIDODRINE HYDROCHLORIDE 2.5 MG: 5 TABLET ORAL at 12:21

## 2020-06-06 RX ADMIN — ATORVASTATIN CALCIUM 40 MG: 40 TABLET, FILM COATED ORAL at 08:10

## 2020-06-06 RX ADMIN — LIDOCAINE HYDROCHLORIDE: 10 INJECTION, SOLUTION INFILTRATION; PERINEURAL at 18:32

## 2020-06-06 RX ADMIN — SODIUM CHLORIDE, PRESERVATIVE FREE 10 ML: 5 INJECTION INTRAVENOUS at 22:45

## 2020-06-06 RX ADMIN — MIDODRINE HYDROCHLORIDE 2.5 MG: 5 TABLET ORAL at 22:45

## 2020-06-06 RX ADMIN — Medication 1000 MG: at 22:44

## 2020-06-06 RX ADMIN — ALBUMIN (HUMAN) 25 G: 0.25 INJECTION, SOLUTION INTRAVENOUS at 05:23

## 2020-06-06 RX ADMIN — DARBEPOETIN ALFA 60 MCG: 60 SOLUTION INTRAVENOUS; SUBCUTANEOUS at 23:40

## 2020-06-06 RX ADMIN — GLIPIZIDE 5 MG: 5 TABLET ORAL at 08:10

## 2020-06-06 RX ADMIN — ALBUMIN (HUMAN) 12.5 G: 0.25 INJECTION, SOLUTION INTRAVENOUS at 23:40

## 2020-06-06 ASSESSMENT — PAIN SCALES - GENERAL
PAINLEVEL_OUTOF10: 0

## 2020-06-06 NOTE — PROGRESS NOTES
insufficiency 08/20/2008      Active Hospital Problems    Diagnosis Date Noted    Shortness of breath [R06.02]     Anemia [D64.9]     DOUGLAS (acute kidney injury) ([facility] [address] [N17.9]     Acute on chronic diastolic heart failure (HCC) [I50.33] 06/01/2020       Assessment and Plan:     dCHF  -very hypervolemic and in need of aggressive diuresis  -will reattempt metolazone 5mg po x 1 30 minutes before Lasix administration  -will d/c Lasix continuous infusion and prescribe Lasix 200mg IV BID for today  -discontinue diuril.  -strict I/O, replete electrolytes as needed. -creatinine up-trending due to cardio-renal syndrome  -BP tenuous because of being right-shifted on Matthew-Starling curve (force of contraction vs. Intravascular volume)    Will follow with you. Thank you for allowing me to participate in the care of this patient. If you have any questions, please do not hesitate to contact me.     Pippa Espinoza MD, MS, [facility] [place], [place]  Cardiac Electrophysiology  [address]  [address] [facility], [address] [place] [address]  [address] [place] [address]  [phone]

## 2020-06-06 NOTE — CONSULTS
Non-medical: None   Tobacco Use    Smoking status: Never Smoker    Smokeless tobacco: Never Used   Substance and Sexual Activity    Alcohol use: No     Alcohol/week: 0.0 standard drinks    Drug use: No    Sexual activity: None   Lifestyle    Physical activity     Days per week: None     Minutes per session: None    Stress: None   Relationships    Social connections     Talks on phone: None     Gets together: None     Attends Mandaen service: None     Active member of club or organization: None     Attends meetings of clubs or organizations: None     Relationship status: None    Intimate partner violence     Fear of current or ex partner: None     Emotionally abused: None     Physically abused: None     Forced sexual activity: None   Other Topics Concern    None   Social History Narrative    None     Current Facility-Administered Medications   Medication Dose Route Frequency Provider Last Rate Last Dose    furosemide (LASIX) 200 mg in dextrose 5 % 50 mL IVPB   Intravenous BID Tiffani Bruce MD        lidocaine 1 % injection             midodrine (PROAMATINE) tablet 2.5 mg  2.5 mg Oral TID  Edwige Rodarte MD   2.5 mg at 06/06/20 1221    glucose (GLUTOSE) 40 % oral gel 15 g  15 g Oral PRN Laya Mullen MD        dextrose 50 % IV solution  12.5 g Intravenous PRN Laya Mullen MD        glucagon (rDNA) injection 1 mg  1 mg Intramuscular PRN Laya Mullen MD        dextrose 5 % solution  100 mL/hr Intravenous PRN Laya Mullen MD        sodium chloride flush 0.9 % injection 10 mL  10 mL Intravenous 2 times per day Celso Duff MD   10 mL at 06/06/20 0811    sodium chloride flush 0.9 % injection 10 mL  10 mL Intravenous PRN Celso Duff MD        acetaminophen (TYLENOL) tablet 650 mg  650 mg Oral Q6H PRN Celso Duff MD   650 mg at 06/02/20 2056    Or    acetaminophen (TYLENOL) suppository 650 mg  650 mg Rectal Q6H PRN Celso Duff MD        polyethylene glycol (GLYCOLAX) packet 17 g  17 g Oral Daily PRN Vidhya Griselda Einstein, MD        promethazine (PHENERGAN) tablet 12.5 mg  12.5 mg Oral Q6H PRN Vidhya Griselda Einstein, MD        Or    ondansetron (ZOFRAN) injection 4 mg  4 mg Intravenous Q6H PRN Jerry Singh MD   4 mg at 06/05/20 1454    apixaban (ELIQUIS) tablet 2.5 mg  2.5 mg Oral BID Jerry Singh MD   2.5 mg at 06/06/20 0810    vitamin C (ASCORBIC ACID) tablet 1,000 mg  1,000 mg Oral BID Jerry Singh MD   1,000 mg at 06/06/20 1108    aspirin EC tablet 81 mg  81 mg Oral Daily Ileana Sanchez MD   81 mg at 06/06/20 0810    atorvastatin (LIPITOR) tablet 40 mg  40 mg Oral Daily Ileana Sanchez MD   40 mg at 06/06/20 0810    docusate sodium (COLACE) capsule 100 mg  100 mg Oral BID PRN Jerry Singh MD   100 mg at 06/04/20 1732    glipiZIDE (GLUCOTROL) tablet 5 mg  5 mg Oral QAM AC Ileana Sanchez MD   5 mg at 06/06/20 0810    magnesium oxide (MAG-OX) tablet 400 mg  400 mg Oral Daily Ileana Sanchez MD   400 mg at 06/06/20 0809    insulin lispro (HUMALOG) injection vial 0-6 Units  0-6 Units Subcutaneous TID WC Jerry Singh MD   1 Units at 06/05/20 0847    insulin lispro (HUMALOG) injection vial 0-3 Units  0-3 Units Subcutaneous Nightly Jerry Singh MD   1 Units at 06/02/20 2056     Allergies   Allergen Reactions    Latex Rash     Swelling lesions      Ace Inhibitors     Gabapentin      LE edema    Hydrochlorothiazide     Neosporin [Neomycin-Bacitracin Zn-Polymyx]     Penicillins     Sulfa Antibiotics     Tape [Adhesive Tape] Other (See Comments)     Sores       Review of Systems  Pertinent items are noted in HPI. Objective:     /60   Pulse 69   Temp 98.5 °F (36.9 °C) (Oral)   Resp 12   Ht 5' 1\" (1.549 m)   Wt 284 lb 2.8 oz (128.9 kg)   SpO2 94%   BMI 53.69 kg/m²     General:  alert, appears stated age and cooperative   Skin:  normal   Eyes: conjunctivae/corneas clear. PERRL, EOM's intact. Fundi benign. Mouth: MMM no lesions   Lymph Nodes:  Cervical, supraclavicular, and axillary nodes normal.   Lungs:  Scattered rales   Heart:  regular rate and rhythm, S1, S2 normal, no murmur, click, rub or gallop   Abdomen: soft, non-tender; bowel sounds normal; no masses,  no organomegaly   CVA:  absent   Genitourinary: defer exam   Extremities:  extremities normal, atraumatic, no cyanosis or edema   Neurologic:  negative   Psychiatric:  non focal       Assessment:      CHF and CRI      Rec: Will place central access for HD. Pt and daughter agree to proceed understanding risks of pneumothorax, hemothorax and death.     Kimberli Ek

## 2020-06-06 NOTE — PROGRESS NOTES
PROCEDURE NOTE    Under u/s guidance R IJV HD catheter placed. Excellent blood return. CXR with good placement. May use catheter for HD.     Radha Whitley

## 2020-06-06 NOTE — PROGRESS NOTES
Patient A&O in the bed. Patient tolerating PO intake well. PM medications given without complications. Patient denies pain, nausea or vomiting. Call light within reach, able to make needs known, fall precautions in place. Will monitor.     Electronically signed by Nory Maguire RN on 6/6/2020 at 4:35 AM

## 2020-06-06 NOTE — PROGRESS NOTES
1.8  -trending up at was 2.3 now 3.8-creatinine up-trending due to cardio-renal syndrome  -Consulted Nephrology given RF and CHF  - ALbumin low-- IV albumin added per Neph  -NO ACE or ARBS  -NO NSAIDS  -labs per Nephrology     Chronically elevated d dimer   - on eliquis, can consider VQ scan if suspicious for PE     H/o DVT   - on eliquis    Anemia   - hb 7.2, check iron studies  -Venofer daily per Nephrology  -trending down- had bloody nose today  And hematuria (urine clear yellow)  -Transfuse if hg<7  -will send UA for blood  -continue to monitor [facility]- per Cardiology    DM II - controlled,   cont home meds, on SSI    HTN - fairly controlled  cont home meds- all meds on hold to avoid hypotension  -NO ACE or ARBS per nephrology    Morbid obesity    DVT Prophylaxis: Eliquis  Diet: DIET LOW SODIUM 2 GM; 1500 ml  Code Status: Full Code    PT/OT Eval Status: ordered-on hold due to worsening sob    Dispo - cont care    State Tate, APRN - CNP

## 2020-06-06 NOTE — PLAN OF CARE
infection. 6/5/2020 1749 by Rolene Bosworth, RN  Outcome: Met This Shift  Goal: Absence of new skin breakdown  Description: Absence of new skin breakdown  6/6/2020 0422 by Abdiaziz Gonzales RN  Outcome: Ongoing  6/5/2020 1749 by Rolene Bosworth, RN  Outcome: Met This Shift     Problem: OXYGENATION/RESPIRATORY FUNCTION  Goal: Patient will maintain patent airway  6/6/2020 0422 by Abdiaziz Gonzales RN  Outcome: Ongoing  Note: Will monitor respiratory status. 6/5/2020 1749 by Rolene Bosworth, RN  Outcome: Met This Shift  Goal: Patient will achieve/maintain normal respiratory rate/effort  Description: Respiratory rate and effort will be within normal limits for the patient  6/6/2020 0422 by Abdiaziz Gonzales RN  Outcome: Ongoing  6/5/2020 1749 by Rolene Bosworth, RN  Outcome: Not Met This Shift     Problem: HEMODYNAMIC STATUS  Goal: Patient has stable vital signs and fluid balance  6/6/2020 0422 by Abdiaziz Gonzales RN  Outcome: Ongoing  Note: Will monitor VS and labs. 6/5/2020 1749 by Rolene Bosworth, RN  Outcome: Not Met This Shift     Problem: FLUID AND ELECTROLYTE IMBALANCE  Goal: Fluid and electrolyte balance are achieved/maintained  6/6/2020 0422 by Abdiaziz Gonzales RN  Outcome: Ongoing  Note: Will monitor labs.   6/5/2020 1749 by Rolene Bosworth, RN  Outcome: Met This Shift     Problem: ACTIVITY INTOLERANCE/IMPAIRED MOBILITY  Goal: Mobility/activity is maintained at optimum level for patient  6/6/2020 0422 by Abdiaziz Gonzales RN  Outcome: Ongoing  6/5/2020 1749 by Rolene Bosworth, RN  Outcome: Not Met This Shift

## 2020-06-06 NOTE — PLAN OF CARE
rate/effort  Description: Respiratory rate and effort will be within normal limits for the patient  6/6/2020 1931 by Charissa May RN  Outcome: Ongoing  6/6/2020 1038 by Carlyn Lovelace RN  Outcome: Ongoing     Problem: HEMODYNAMIC STATUS  Goal: Patient has stable vital signs and fluid balance  6/6/2020 1038 by Carlyn Lovelace RN  Outcome: Ongoing     Problem: FLUID AND ELECTROLYTE IMBALANCE  Goal: Fluid and electrolyte balance are achieved/maintained  6/6/2020 1038 by Carlyn Lovelace RN  Outcome: Ongoing     Problem: ACTIVITY INTOLERANCE/IMPAIRED MOBILITY  Goal: Mobility/activity is maintained at optimum level for patient  6/6/2020 1038 by Carlyn Lovelace RN  Outcome: Ongoing

## 2020-06-07 LAB
ALBUMIN SERPL-MCNC: 4.2 G/DL (ref 3.4–5)
ANION GAP SERPL CALCULATED.3IONS-SCNC: 11 MMOL/L (ref 3–16)
BASE EXCESS ARTERIAL: 1 (ref -3–3)
BUN BLDV-MCNC: 23 MG/DL (ref 7–20)
CALCIUM SERPL-MCNC: 8.3 MG/DL (ref 8.3–10.6)
CHLORIDE BLD-SCNC: 100 MMOL/L (ref 99–110)
CO2: 26 MMOL/L (ref 21–32)
CREAT SERPL-MCNC: 2.7 MG/DL (ref 0.6–1.2)
GFR AFRICAN AMERICAN: 21
GFR NON-AFRICAN AMERICAN: 17
GLUCOSE BLD-MCNC: 113 MG/DL (ref 70–99)
GLUCOSE BLD-MCNC: 46 MG/DL (ref 70–99)
GLUCOSE BLD-MCNC: 69 MG/DL (ref 70–99)
GLUCOSE BLD-MCNC: 71 MG/DL (ref 70–99)
GLUCOSE BLD-MCNC: 72 MG/DL (ref 70–99)
GLUCOSE BLD-MCNC: 76 MG/DL (ref 70–99)
GLUCOSE BLD-MCNC: 79 MG/DL (ref 70–99)
GLUCOSE BLD-MCNC: 81 MG/DL (ref 70–99)
GLUCOSE BLD-MCNC: 94 MG/DL (ref 70–99)
HCO3 ARTERIAL: 26.3 MMOL/L (ref 21–29)
HCT VFR BLD CALC: 23.9 % (ref 36–48)
HEMOGLOBIN: 7.6 G/DL (ref 12–16)
MCH RBC QN AUTO: 32.5 PG (ref 26–34)
MCHC RBC AUTO-ENTMCNC: 31.7 G/DL (ref 31–36)
MCV RBC AUTO: 102.4 FL (ref 80–100)
O2 SAT, ARTERIAL: 96 % (ref 93–100)
PCO2 ARTERIAL: 48.7 MM HG (ref 35–45)
PDW BLD-RTO: 17.2 % (ref 12.4–15.4)
PERFORMED ON: ABNORMAL
PERFORMED ON: NORMAL
PH ARTERIAL: 7.34 (ref 7.35–7.45)
PHOSPHORUS: 3.9 MG/DL (ref 2.5–4.9)
PLATELET # BLD: 189 K/UL (ref 135–450)
PMV BLD AUTO: 7.3 FL (ref 5–10.5)
PO2 ARTERIAL: 91.4 MM HG (ref 75–108)
POC SAMPLE TYPE: ABNORMAL
POTASSIUM SERPL-SCNC: 4.1 MMOL/L (ref 3.5–5.1)
PRO-BNP: 6607 PG/ML (ref 0–449)
RBC # BLD: 2.34 M/UL (ref 4–5.2)
SODIUM BLD-SCNC: 137 MMOL/L (ref 136–145)
TCO2 ARTERIAL: 28 MMOL/L
URIC ACID, SERUM: 4.7 MG/DL (ref 2.6–6)
WBC # BLD: 8.9 K/UL (ref 4–11)

## 2020-06-07 PROCEDURE — 84550 ASSAY OF BLOOD/URIC ACID: CPT

## 2020-06-07 PROCEDURE — 6370000000 HC RX 637 (ALT 250 FOR IP): Performed by: INTERNAL MEDICINE

## 2020-06-07 PROCEDURE — 1200000000 HC SEMI PRIVATE

## 2020-06-07 PROCEDURE — 2580000003 HC RX 258: Performed by: INTERNAL MEDICINE

## 2020-06-07 PROCEDURE — 83880 ASSAY OF NATRIURETIC PEPTIDE: CPT

## 2020-06-07 PROCEDURE — 99024 POSTOP FOLLOW-UP VISIT: CPT | Performed by: SURGERY

## 2020-06-07 PROCEDURE — 90935 HEMODIALYSIS ONE EVALUATION: CPT

## 2020-06-07 PROCEDURE — 2700000000 HC OXYGEN THERAPY PER DAY

## 2020-06-07 PROCEDURE — 85027 COMPLETE CBC AUTOMATED: CPT

## 2020-06-07 PROCEDURE — 36600 WITHDRAWAL OF ARTERIAL BLOOD: CPT

## 2020-06-07 PROCEDURE — 99232 SBSQ HOSP IP/OBS MODERATE 35: CPT | Performed by: INTERNAL MEDICINE

## 2020-06-07 PROCEDURE — 82803 BLOOD GASES ANY COMBINATION: CPT

## 2020-06-07 PROCEDURE — 94761 N-INVAS EAR/PLS OXIMETRY MLT: CPT

## 2020-06-07 PROCEDURE — 80069 RENAL FUNCTION PANEL: CPT

## 2020-06-07 RX ORDER — MIDODRINE HYDROCHLORIDE 5 MG/1
5 TABLET ORAL
Status: DISCONTINUED | OUTPATIENT
Start: 2020-06-07 | End: 2020-06-12 | Stop reason: HOSPADM

## 2020-06-07 RX ADMIN — ASPIRIN 81 MG: 81 TABLET, COATED ORAL at 09:54

## 2020-06-07 RX ADMIN — Medication 400 MG: at 09:54

## 2020-06-07 RX ADMIN — HEPARIN SODIUM 2800 UNITS: 1000 INJECTION, SOLUTION INTRAVENOUS; SUBCUTANEOUS at 13:51

## 2020-06-07 RX ADMIN — Medication 1000 MG: at 09:54

## 2020-06-07 RX ADMIN — SODIUM CHLORIDE, PRESERVATIVE FREE 10 ML: 5 INJECTION INTRAVENOUS at 20:24

## 2020-06-07 RX ADMIN — ATORVASTATIN CALCIUM 40 MG: 40 TABLET, FILM COATED ORAL at 09:54

## 2020-06-07 RX ADMIN — DEXTROSE MONOHYDRATE 12.5 G: 500 INJECTION PARENTERAL at 06:46

## 2020-06-07 RX ADMIN — Medication 2.5 MG: at 20:24

## 2020-06-07 RX ADMIN — MIDODRINE HYDROCHLORIDE 2.5 MG: 5 TABLET ORAL at 09:04

## 2020-06-07 RX ADMIN — Medication 2.5 MG: at 09:54

## 2020-06-07 ASSESSMENT — PAIN SCALES - GENERAL
PAINLEVEL_OUTOF10: 0

## 2020-06-07 NOTE — PROGRESS NOTES
Cardiology Progress Note     Admit Date: 2020     Reason for follow up: dCHF    HPI and Interval History:   Patient seen and examined. Clinical notes reviewed. Telemetry reviewed SR 70's. Overnight, RIJ vasc-cath placed and hemodialysis was started with removal of intravascular fluid and now downtrending of Creatinine. Net negative 4.3L over last 24hrs. Still c/o shortness of breath but much improved from yesterday. Review of System:  All other systems reviewed except for that noted above. Pertinent negatives and positives are:     · General: negative for fever, chills   · Ophthalmic ROS: negative for - eye pain or loss of vision  · ENT ROS: negative for - headaches, sore throat   · Respiratory: negative for - cough, sputum  · Cardiovascular: Reviewed in HPI  · Gastrointestinal: negative for - abdominal pain, diarrhea, N/V  · Hematology: negative for - bleeding, blood clots, bruising or jaundice  · Genito-Urinary:  negative for - Dysuria or incontinence  · Musculoskeletal: negative for - Joint swelling, muscle pain  · Neurological: negative for - confusion, dizziness, headaches   · Psychiatric: No anxiety, no depression. · Dermatological: negative for - rash      Physical Examination:  Vitals:    20 0600   BP: (!) 89/57   Pulse: 82   Resp: 24   Temp:    SpO2: 95%        Intake/Output Summary (Last 24 hours) at 2020 0726  Last data filed at 2020 0400  Gross per 24 hour   Intake 890 ml   Output 5240 ml   Net -4350 ml     In: 650 [P.O.:100]  Out: 5185    Wt Readings from Last 3 Encounters:   20 271 lb 13.2 oz (123.3 kg)   20 258 lb 13.1 oz (117.4 kg)   20 251 lb 15.8 oz (114.3 kg)     Temp  Av °F (36.7 °C)  Min: 97.5 °F (36.4 °C)  Max: 98.5 °F (36.9 °C)  Pulse  Av  Min: 69  Max: 83  BP  Min: 89/57  Max: 134/57  SpO2  Av.9 %  Min: 91 %  Max: 98 %    · Telemetry: Sinus rhythm with v-rates 70's bpm   · Constitutional: Alert. Oriented to person, place, and time.  No Problems    Diagnosis Date Noted    Shortness of breath [R06.02]     Anemia [D64.9]     DOUGLAS (acute kidney injury) ([facility] [address] [N17.9]     Acute on chronic diastolic heart failure (HCC) [I50.33] 06/01/2020       Assessment and Plan:     dCHF  -very hypervolemic  -nearly anuric at this time, but RIJ vasc cath has provided the ability to hemodialyze. Defer to nephrology for continued aggressive diuresis. -strict I/O, replete electrolytes as needed. -creatinine down-trending due to removal of volume (cardio-renal syndrome)  -BP still tenuous but better than yesterday. Wide pulse pressure; will review echo done on 3/25/2020 for AR. Will follow with you. Thank you for allowing me to participate in the care of this patient. If you have any questions, please do not hesitate to contact me.     Gigi Rosa MD, MS, [facility], [facility]  Cardiac Electrophysiology  [address]  [address] [facility], [address] [place] [address] Kamaljit Castillo [facility] [address]  [phone]

## 2020-06-07 NOTE — PROGRESS NOTES
Hospitalist Progress Note      PCP: Jose Calhoun MD    Date of Admission: 6/1/2020    Chief Complaint: SOB    Hospital Course: 78 y.o. female who presents to [facility] with SOB. Pt was apparently admitted and discharged on 4/29/2020 to SNF and demanded to be discharged from SNF a week ago and has been having SOB since she left the SNF. Son at bedside states he has been checking her O2 sats and has documented it in 88% last few days and was 85% yesterday and hence brought her to the ER. CXR with congestion and received a dose of IV lasix and admitted for further diuresis. Patient tried on IV lasix followed by lasix drip from 5 to 7.5mg/hr. She failed this therapy so diuril and zaroxylyn were used and she became anuric. Patient then agreed to intermittent HD in attempt to remove fluid. First session she had 5L removed. Subjective: Patient seen and examined. No complaints. Episodes of repeating herself but other than that she is herself. Can transfer out of CVICU. Discussed with Alisia, her daughter and POA.  Will see if nephro wants to perform another session today      Medications:  Reviewed    Infusion Medications    dextrose       Scheduled Medications    midodrine  2.5 mg Oral TID WC    sodium chloride flush  10 mL Intravenous 2 times per day    apixaban  2.5 mg Oral BID    ascorbic acid  1,000 mg Oral BID    aspirin  81 mg Oral Daily    atorvastatin  40 mg Oral Daily    glipiZIDE  5 mg Oral QAM AC    magnesium oxide  400 mg Oral Daily    insulin lispro  0-6 Units Subcutaneous TID WC    insulin lispro  0-3 Units Subcutaneous Nightly     PRN Meds: heparin (porcine), albumin human, glucose, dextrose, glucagon (rDNA), dextrose, sodium chloride flush, acetaminophen **OR** acetaminophen, polyethylene glycol, promethazine **OR** ondansetron, docusate sodium      Intake/Output Summary (Last 24 hours) at 6/7/2020 0842  Last data filed at 6/7/2020 0400  Gross per 24 hour   Intake 770 ml Output 5240 ml   Net -4470 ml       Physical Exam Performed:    BP (!) 100/38   Pulse 75   Temp 98 °F (36.7 °C) (Oral)   Resp 18   Ht 5' 1\" (1.549 m)   Wt 271 lb 13.2 oz (123.3 kg)   SpO2 92%   BMI 51.36 kg/m²     General appearance: No apparent distress, appears stated age and cooperative, morbidly obese  HEENT: Pupils equal, round, and reactive to light. Conjunctivae/corneas clear. Neck: Supple, with full range of motion. No jugular venous distention. Trachea midline. Respiratory:  Normal respiratory effort. Diminished BS BL  Cardiovascular: Regular rate and rhythm with normal S1/S2 without murmurs, rubs or gallops. Abdomen: Soft, non-tender, non-distended with normal bowel sounds. Musculoskeletal: No clubbing, cyanosis or edema bilaterally. Full range of motion without deformity. Skin: Skin color, texture, turgor normal.  No rashes or lesions. Neurologic:  Neurovascularly intact without any focal sensory/motor deficits. Cranial nerves: II-XII intact, grossly non-focal.  Psychiatric: Alert and oriented  Capillary Refill: Brisk,< 3 seconds   Peripheral Pulses: +2 palpable, equal bilaterally       Labs:   Recent Labs     06/05/20  0612 06/06/20  0613 06/07/20  0520   WBC 7.3 8.1 8.9   HGB 7.5* 7.2* 7.6*   HCT 23.5* 22.9* 23.9*    183 189     Recent Labs     06/05/20  0612 06/06/20  0612 06/07/20  0520    137 137   K 4.3 4.3 4.1    99 100   CO2 26 26 26   BUN 32* 38* 23*   CREATININE 2.9* 3.8* 2.7*   CALCIUM 8.8 8.5 8.3   PHOS 5.3* 5.4* 3.9     No results for input(s): AST, ALT, BILIDIR, BILITOT, ALKPHOS in the last 72 hours. No results for input(s): INR in the last 72 hours. No results for input(s): Jovanny Ruelas in the last 72 hours.     Urinalysis:      Lab Results   Component Value Date    NITRU Negative 06/04/2020    WBCUA 68 06/04/2020    BACTERIA RARE 06/04/2020    RBCUA >900 06/04/2020    BLOODU LARGE 06/04/2020    SPECGRAV 1.025 06/04/2020    GLUCOSEU Negative

## 2020-06-07 NOTE — PLAN OF CARE
Problem: Falls - Risk of:  Goal: Will remain free from falls  Description: Will remain free from falls  6/7/2020 0554 by John Reece RN  Outcome: Met This Shift  Note: Fall prevention strategy active, call light in reach, bed alarm activated, non-slip socks on both feet. No falls overnight. Problem: Falls - Risk of:  Goal: Absence of physical injury  Description: Absence of physical injury  6/7/2020 0554 by John Reece RN  Outcome: Met This Shift  Note: No physical injury sustained overnight. Problem: Pain:  Goal: Pain level will decrease  Description: Pain level will decrease  6/7/2020 0554 by John Reece RN  Outcome: Met This Shift  Note: During pain assessment patient scored 0/10. Problem: Airway Clearance - Ineffective:  Goal: Ability to maintain a clear airway will improve  Description: Ability to maintain a clear airway will improve  6/7/2020 0554 by John Reece RN  Outcome: Met This Shift  Note: Patient able to maintain a patent airway through effective airway clearance overnight. Problem: Skin Integrity:  Goal: Will show no infection signs and symptoms  Description: Will show no infection signs and symptoms  Outcome: Met This Shift     Problem: Skin Integrity:  Goal: Absence of new skin breakdown  Description: Absence of new skin breakdown  6/7/2020 0554 by John Reece RN  Outcome: Met This Shift  Note: During skin assessment no new skin breakdown identified. Problem: OXYGENATION/RESPIRATORY FUNCTION  Goal: Patient will achieve/maintain normal respiratory rate/effort  Description: Respiratory rate and effort will be within normal limits for the patient  6/7/2020 0554 by John Reece RN  Outcome: Met This Shift  Note: Patient respirations unlabored overnight, SpO2 high 90's on 6lpm via nasal cannula.      Problem: HEMODYNAMIC STATUS  Goal: Patient has stable vital signs and fluid balance  Outcome: Ongoing  Note: Patient had first run of hemodialysis yesterday evening, requiring midodrine x 2 per eMAR for hypotension, but otherwise tolerated dialysis well, with fluid removal of 4600ml per Dialysis RN.

## 2020-06-08 ENCOUNTER — APPOINTMENT (OUTPATIENT)
Dept: GENERAL RADIOLOGY | Age: 80
DRG: 291 | End: 2020-06-08
Payer: MEDICARE

## 2020-06-08 LAB
ALBUMIN SERPL-MCNC: 3.9 G/DL (ref 3.4–5)
ANION GAP SERPL CALCULATED.3IONS-SCNC: 12 MMOL/L (ref 3–16)
BASE EXCESS ARTERIAL: -1 (ref -3–3)
BASE EXCESS ARTERIAL: -3 MMOL/L (ref -3–3)
BUN BLDV-MCNC: 30 MG/DL (ref 7–20)
CALCIUM SERPL-MCNC: 8.2 MG/DL (ref 8.3–10.6)
CARBOXYHEMOGLOBIN ARTERIAL: 1.2 % (ref 0–1.5)
CHLORIDE BLD-SCNC: 96 MMOL/L (ref 99–110)
CO2: 25 MMOL/L (ref 21–32)
CREAT SERPL-MCNC: 3.9 MG/DL (ref 0.6–1.2)
GFR AFRICAN AMERICAN: 13
GFR NON-AFRICAN AMERICAN: 11
GLUCOSE BLD-MCNC: 104 MG/DL (ref 70–99)
GLUCOSE BLD-MCNC: 116 MG/DL (ref 70–99)
GLUCOSE BLD-MCNC: 131 MG/DL (ref 70–99)
GLUCOSE BLD-MCNC: 136 MG/DL (ref 70–99)
GLUCOSE BLD-MCNC: 140 MG/DL (ref 70–99)
GLUCOSE BLD-MCNC: 55 MG/DL (ref 70–99)
GLUCOSE BLD-MCNC: 60 MG/DL (ref 70–99)
GLUCOSE BLD-MCNC: 78 MG/DL (ref 70–99)
GLUCOSE BLD-MCNC: 79 MG/DL (ref 70–99)
HCO3 ARTERIAL: 24.7 MMOL/L (ref 21–29)
HCO3 ARTERIAL: 25.2 MMOL/L (ref 21–29)
HCT VFR BLD CALC: 23.2 % (ref 36–48)
HEMOGLOBIN, ART, EXTENDED: 7.9 G/DL (ref 12–16)
HEMOGLOBIN: 7.4 G/DL (ref 12–16)
LACTATE: 0.57 MMOL/L (ref 0.4–2)
MCH RBC QN AUTO: 32.4 PG (ref 26–34)
MCHC RBC AUTO-ENTMCNC: 31.8 G/DL (ref 31–36)
MCV RBC AUTO: 101.9 FL (ref 80–100)
METHEMOGLOBIN ARTERIAL: 1 %
O2 CONTENT ARTERIAL: 11 ML/DL
O2 SAT, ARTERIAL: 100 %
O2 SAT, ARTERIAL: 98 % (ref 93–100)
O2 THERAPY: ABNORMAL
PCO2 ARTERIAL: 45.6 MM HG (ref 35–45)
PCO2 ARTERIAL: 59.9 MMHG (ref 35–45)
PDW BLD-RTO: 17.5 % (ref 12.4–15.4)
PERFORMED ON: ABNORMAL
PERFORMED ON: NORMAL
PERFORMED ON: NORMAL
PH ARTERIAL: 7.22 (ref 7.35–7.45)
PH ARTERIAL: 7.35 (ref 7.35–7.45)
PHOSPHORUS: 4 MG/DL (ref 2.5–4.9)
PLATELET # BLD: 169 K/UL (ref 135–450)
PMV BLD AUTO: 7.1 FL (ref 5–10.5)
PO2 ARTERIAL: 113.6 MM HG (ref 75–108)
PO2 ARTERIAL: 192 MMHG (ref 75–108)
POC SAMPLE TYPE: ABNORMAL
POTASSIUM SERPL-SCNC: 4.4 MMOL/L (ref 3.5–5.1)
RBC # BLD: 2.28 M/UL (ref 4–5.2)
SODIUM BLD-SCNC: 133 MMOL/L (ref 136–145)
TCO2 ARTERIAL: 26.6 MMOL/L
TCO2 ARTERIAL: 27 MMOL/L
URIC ACID, SERUM: 5.1 MG/DL (ref 2.6–6)
WBC # BLD: 8.3 K/UL (ref 4–11)

## 2020-06-08 PROCEDURE — 2580000003 HC RX 258: Performed by: INTERNAL MEDICINE

## 2020-06-08 PROCEDURE — 85027 COMPLETE CBC AUTOMATED: CPT

## 2020-06-08 PROCEDURE — 6370000000 HC RX 637 (ALT 250 FOR IP): Performed by: INTERNAL MEDICINE

## 2020-06-08 PROCEDURE — 2700000000 HC OXYGEN THERAPY PER DAY

## 2020-06-08 PROCEDURE — 83605 ASSAY OF LACTIC ACID: CPT

## 2020-06-08 PROCEDURE — 84550 ASSAY OF BLOOD/URIC ACID: CPT

## 2020-06-08 PROCEDURE — 2100000000 HC CCU R&B

## 2020-06-08 PROCEDURE — 80069 RENAL FUNCTION PANEL: CPT

## 2020-06-08 PROCEDURE — 82803 BLOOD GASES ANY COMBINATION: CPT

## 2020-06-08 PROCEDURE — 99232 SBSQ HOSP IP/OBS MODERATE 35: CPT | Performed by: INTERNAL MEDICINE

## 2020-06-08 PROCEDURE — 71045 X-RAY EXAM CHEST 1 VIEW: CPT

## 2020-06-08 PROCEDURE — 82947 ASSAY GLUCOSE BLOOD QUANT: CPT

## 2020-06-08 PROCEDURE — 36592 COLLECT BLOOD FROM PICC: CPT

## 2020-06-08 PROCEDURE — 94660 CPAP INITIATION&MGMT: CPT

## 2020-06-08 PROCEDURE — 94761 N-INVAS EAR/PLS OXIMETRY MLT: CPT

## 2020-06-08 PROCEDURE — 36600 WITHDRAWAL OF ARTERIAL BLOOD: CPT

## 2020-06-08 RX ADMIN — ATORVASTATIN CALCIUM 40 MG: 40 TABLET, FILM COATED ORAL at 11:16

## 2020-06-08 RX ADMIN — Medication 2.5 MG: at 21:40

## 2020-06-08 RX ADMIN — MIDODRINE HYDROCHLORIDE 5 MG: 5 TABLET ORAL at 17:29

## 2020-06-08 RX ADMIN — Medication 2.5 MG: at 11:14

## 2020-06-08 RX ADMIN — Medication 1000 MG: at 21:40

## 2020-06-08 RX ADMIN — DEXTROSE MONOHYDRATE 12.5 G: 500 INJECTION PARENTERAL at 10:38

## 2020-06-08 RX ADMIN — Medication 1000 MG: at 11:14

## 2020-06-08 RX ADMIN — Medication 400 MG: at 11:14

## 2020-06-08 RX ADMIN — SODIUM CHLORIDE, PRESERVATIVE FREE 10 ML: 5 INJECTION INTRAVENOUS at 21:43

## 2020-06-08 RX ADMIN — MIDODRINE HYDROCHLORIDE 5 MG: 5 TABLET ORAL at 11:09

## 2020-06-08 RX ADMIN — ASPIRIN 81 MG: 81 TABLET, COATED ORAL at 11:14

## 2020-06-08 RX ADMIN — DEXTROSE MONOHYDRATE 12.5 G: 500 INJECTION PARENTERAL at 04:23

## 2020-06-08 ASSESSMENT — PAIN SCALES - GENERAL
PAINLEVEL_OUTOF10: 0
PAINLEVEL_OUTOF10: 0

## 2020-06-08 NOTE — PROGRESS NOTES
Max:76    24HR INTAKE/OUTPUT:      Intake/Output Summary (Last 24 hours) at 6/8/2020 1119  Last data filed at 6/8/2020 0319  Gross per 24 hour   Intake 270 ml   Output 52 ml   Net 218 ml         Patient Vitals for the past 96 hrs (Last 3 readings):   Weight   06/08/20 0319 269 lb 2.9 oz (122.1 kg)   06/07/20 0015 271 lb 13.2 oz (123.3 kg)   06/06/20 2100 281 lb 15.5 oz (127.9 kg)       GENERAL:  conversant, clinically appears to be volume overloaded. HEENT:  Ears and nose appear externally without deformity. MMM. Normocephalic, atraumatic. Eyes:  Anicteric sclerae. Moist conjunctivae. No lid lag. ROSALBA  NECK:  Free range of motion. Supple. No thyromegaly. R IJ Vasc cath in place  CHEST:  Bibasilar crackles. No intercostal retractions. CARDIOVASCULAR:  Regular rate and rhythm. No rubs. ABDOMEN:  Soft, nontender. Bowel sounds are present. No organomegaly. EXTREMITIES:  Lower extremities: 2+ extremity edema. weeping noted in LE's  NEUROLOGICAL EXAMINATION:  No asterixis. No focal motor neurological deficits. Awake MILDLY CONFUSED  SKIN:  Loss of turgor. No rash. Allergies:   Allergies   Allergen Reactions    Latex Rash     Swelling lesions      Ace Inhibitors     Gabapentin      LE edema    Hydrochlorothiazide     Neosporin [Neomycin-Bacitracin Zn-Polymyx]     Penicillins     Sulfa Antibiotics     Tape Deanne Nunnery Tape] Other (See Comments)     Sores          LAB DATA:    CBC:   Lab Results   Component Value Date    WBC 8.3 06/08/2020    RBC 2.28 06/08/2020    HGB 7.4 06/08/2020    HCT 23.2 06/08/2020    .9 06/08/2020    MCH 32.4 06/08/2020    MCHC 31.8 06/08/2020    RDW 17.5 06/08/2020     06/08/2020    MPV 7.1 06/08/2020     BMP:    Lab Results   Component Value Date     06/08/2020    K 4.4 06/08/2020    K 4.0 04/23/2020    CL 96 06/08/2020    CO2 25 06/08/2020    BUN 30 06/08/2020    CREATININE 3.9 06/08/2020    CALCIUM 8.2 06/08/2020    GFRAA 13 06/08/2020    GFRAA 57 04/16/2013    LABGLOM 11 06/08/2020    GLUCOSE 60 06/08/2020     Magnesium:    Lab Results   Component Value Date    MG 2.40 06/06/2020     Phosphorus:    Lab Results   Component Value Date    PHOS 4.0 06/08/2020     U/A:    Lab Results   Component Value Date    COLORU RED 06/04/2020    PHUR 6.0 06/04/2020    WBCUA 68 06/04/2020    RBCUA >900 06/04/2020    MUCUS Rare 06/01/2020    BACTERIA RARE 06/04/2020    CLARITYU TURBID 06/04/2020    SPECGRAV 1.025 06/04/2020    LEUKOCYTESUR TRACE 06/04/2020    UROBILINOGEN 0.2 06/04/2020    BILIRUBINUR Negative 06/04/2020    BLOODU LARGE 06/04/2020    GLUCOSEU Negative 06/04/2020    AMORPHOUS 1+ 06/01/2020         IMPRESSION/RECOMMENDATIONS:      Active Problems:    Acute on chronic diastolic heart failure (HCC)    Shortness of breath    Anemia    DOUGLAS (acute kidney injury) ([facility] [address]  Resolved Problems:    * No resolved hospital problems. *    1. Volume overload occurring in the setting of CKD stage III. The patient's creatinine has been trending up with diuresis and relative hypotension. Stopped Coreg and Amlodipine and started [employer]. Oliguric despite Lasix gtt and Albumin Cr increased to 3.8 mg Increased o2 requirement >> Initiated on HD 6/6 Had UF yesterday 2 L was removed Plan HD again tomorrow Will need TDC if remains dialysis dependent and resp status is better  D/W son at bedside    2. Anemia of chronic kidney disease - iron deficient - On  IV iron  YANCI with HD HGB stable 7.4 gm    3. CKD/MBD - phosphorus at target 4 mg    4. Hypertension - stopped antihypertensive medication on Midodrine Will start IV Lasix    5. Pulmonary edema -no improvement with  Lasix gtt/IV albumin, started RRT UF yesterday Wt down 24 lbs since admission    6. Recent history of DOUGLAS that has resolved and it was felt to be secondary to contrast nephropathy and the use of ARB.     7.  Recent history of hypercalcemia which was felt to be secondary to calcium supplements that has also

## 2020-06-08 NOTE — PROGRESS NOTES
Tai   Daily Progress Note      Admit Date:  6/1/2020    CC: \" I am breathing better\"  This is a 60-year-old female with obesity,  diabetes, previous diastolic heart failure, who came to the hospital  with worsening shortness of breath and about 20 to 30-pound weight gain. Symptoms have been steadily worsening and she eventually decided to come  to the hospital.  She was admitted for possible diastolic heart failure  exacerbation. She has also noticed significant swelling of her lower  extremities. She denied any chest tightness or pressure. She had no  fever, chills, or rigors; cough or sputum production. No nausea or  Vomiting.         She has been running low on her oxygenation a    Subjective:   Pt is now  Transferred to ICU . She is now on dialysis but had hypotension & also needed bipap. . BP runs low & her BP meds are stopped    PObjective:   BP (!) 100/51   Pulse 76   Temp 97.9 °F (36.6 °C) (Axillary)   Resp 25   Ht 5' 1\" (1.549 m)   Wt 269 lb 2.9 oz (122.1 kg)   SpO2 90%   BMI 50.86 kg/m²       Intake/Output Summary (Last 24 hours) at 6/8/2020 1555  Last data filed at 6/8/2020 1200  Gross per 24 hour   Intake 390 ml   Output 77 ml   Net 313 ml     Wt Readings from Last 3 Encounters:   06/08/20 269 lb 2.9 oz (122.1 kg)   04/29/20 258 lb 13.1 oz (117.4 kg)   03/26/20 251 lb 15.8 oz (114.3 kg)     Telemetry:NSR    Physical Exam:  General:  NAD, Awake, alert and oriented X4  Skin:  Warm and dry  Neck:  Supple, no JVP appreciated, no bruit  Chest:  Clear to auscultation, no wheezes/rhonchi/rales  Cardiovascular: Irregularly irregular rate. S1S2  Abdomen:  Soft, nontender, +bowel sounds  Extremities:1-2+ BLE pitting edema.     Cardiac Diagnosis:  diabetes, hypertension, hyperlipidemia and CHF    Medications:    midodrine  5 mg Oral TID WC    sodium chloride flush  10 mL Intravenous 2 times per day    apixaban  2.5 mg Oral BID    ascorbic acid  1,000 mg Oral BID    aspirin  81 patient.         Complexity of medical decision making-very high    Morbid obesity    :      Electronically signed by Tim Meyer MD on 6/8/2020 at 3:55 PM

## 2020-06-08 NOTE — PROGRESS NOTES
pressures    Morbid Obesity  Counseled on weight loss    DVT Prophylaxis: heparin  Diet: DIET LOW SODIUM 2 GM; 1500 ml  Code Status: Full Code    PT/OT Eval Status:  Will need    Dispo - Inpt    I spent greater than 30 minutes of critical care time with patient in regards to her life threatening acute hypoxic/hypercapnic resp failure    Sujata Wiggins MD

## 2020-06-08 NOTE — PLAN OF CARE
accurate output. Will monitor. 6/7/2020 1616 by Lon Sagastume RN  Outcome: Ongoing     Problem: ACTIVITY INTOLERANCE/IMPAIRED MOBILITY  Goal: Mobility/activity is maintained at optimum level for patient  6/8/2020 0108 by Heather Cisneros RN  Outcome: Ongoing  Note: Turned and repositioned q2h. Pt unable to reposition self in bed. Very weak. Pt states that she uses a wheelchair at home. Daughter states she has a lift chair and special bed at home as well. Will continue to monitor.   6/7/2020 1616 by Lon Sagastume RN  Outcome: Ongoing

## 2020-06-08 NOTE — PROGRESS NOTES
Occupational Therapy    Pt transferred to ICU for vascath placement and emergent dialysis. Pt also on bipap. Please re-order therapy when patient medically stable to participate.     Orlin Garcia OTR/L

## 2020-06-09 LAB
ALBUMIN SERPL-MCNC: 3.8 G/DL (ref 3.4–5)
ANION GAP SERPL CALCULATED.3IONS-SCNC: 13 MMOL/L (ref 3–16)
BUN BLDV-MCNC: 39 MG/DL (ref 7–20)
CALCIUM SERPL-MCNC: 8.2 MG/DL (ref 8.3–10.6)
CHLORIDE BLD-SCNC: 97 MMOL/L (ref 99–110)
CO2: 24 MMOL/L (ref 21–32)
CREAT SERPL-MCNC: 5 MG/DL (ref 0.6–1.2)
GFR AFRICAN AMERICAN: 10
GFR NON-AFRICAN AMERICAN: 8
GLUCOSE BLD-MCNC: 115 MG/DL (ref 70–99)
GLUCOSE BLD-MCNC: 121 MG/DL (ref 70–99)
GLUCOSE BLD-MCNC: 127 MG/DL (ref 70–99)
GLUCOSE BLD-MCNC: 155 MG/DL (ref 70–99)
GLUCOSE BLD-MCNC: 162 MG/DL (ref 70–99)
HCT VFR BLD CALC: 23.1 % (ref 36–48)
HEMOGLOBIN: 7.5 G/DL (ref 12–16)
HEPATITIS B CORE TOTAL ANTIBODY: NEGATIVE
MCH RBC QN AUTO: 33 PG (ref 26–34)
MCHC RBC AUTO-ENTMCNC: 32.7 G/DL (ref 31–36)
MCV RBC AUTO: 101 FL (ref 80–100)
PDW BLD-RTO: 17.8 % (ref 12.4–15.4)
PERFORMED ON: ABNORMAL
PHOSPHORUS: 4.3 MG/DL (ref 2.5–4.9)
PLATELET # BLD: 169 K/UL (ref 135–450)
PMV BLD AUTO: 7.3 FL (ref 5–10.5)
POTASSIUM SERPL-SCNC: 4.5 MMOL/L (ref 3.5–5.1)
RBC # BLD: 2.28 M/UL (ref 4–5.2)
SODIUM BLD-SCNC: 134 MMOL/L (ref 136–145)
URIC ACID, SERUM: 5.9 MG/DL (ref 2.6–6)
WBC # BLD: 7.2 K/UL (ref 4–11)

## 2020-06-09 PROCEDURE — 97530 THERAPEUTIC ACTIVITIES: CPT

## 2020-06-09 PROCEDURE — 2700000000 HC OXYGEN THERAPY PER DAY

## 2020-06-09 PROCEDURE — 84550 ASSAY OF BLOOD/URIC ACID: CPT

## 2020-06-09 PROCEDURE — 99233 SBSQ HOSP IP/OBS HIGH 50: CPT | Performed by: INTERNAL MEDICINE

## 2020-06-09 PROCEDURE — 80069 RENAL FUNCTION PANEL: CPT

## 2020-06-09 PROCEDURE — 2100000000 HC CCU R&B

## 2020-06-09 PROCEDURE — 97168 OT RE-EVAL EST PLAN CARE: CPT

## 2020-06-09 PROCEDURE — 6360000002 HC RX W HCPCS: Performed by: INTERNAL MEDICINE

## 2020-06-09 PROCEDURE — 90935 HEMODIALYSIS ONE EVALUATION: CPT

## 2020-06-09 PROCEDURE — 85027 COMPLETE CBC AUTOMATED: CPT

## 2020-06-09 PROCEDURE — 6370000000 HC RX 637 (ALT 250 FOR IP): Performed by: INTERNAL MEDICINE

## 2020-06-09 PROCEDURE — 94761 N-INVAS EAR/PLS OXIMETRY MLT: CPT

## 2020-06-09 PROCEDURE — 36415 COLL VENOUS BLD VENIPUNCTURE: CPT

## 2020-06-09 PROCEDURE — 94660 CPAP INITIATION&MGMT: CPT

## 2020-06-09 PROCEDURE — 2580000003 HC RX 258: Performed by: INTERNAL MEDICINE

## 2020-06-09 RX ORDER — CHOLECALCIFEROL (VITAMIN D3) 10 MCG
1 TABLET ORAL DAILY
Status: DISCONTINUED | OUTPATIENT
Start: 2020-06-09 | End: 2020-06-12 | Stop reason: HOSPADM

## 2020-06-09 RX ORDER — LORAZEPAM 2 MG/ML
0.5 INJECTION INTRAMUSCULAR EVERY 8 HOURS PRN
Status: DISCONTINUED | OUTPATIENT
Start: 2020-06-09 | End: 2020-06-10

## 2020-06-09 RX ADMIN — ATORVASTATIN CALCIUM 40 MG: 40 TABLET, FILM COATED ORAL at 16:40

## 2020-06-09 RX ADMIN — Medication 2.5 MG: at 09:00

## 2020-06-09 RX ADMIN — DARBEPOETIN ALFA 60 MCG: 60 SOLUTION INTRAVENOUS; SUBCUTANEOUS at 14:52

## 2020-06-09 RX ADMIN — Medication 1000 MG: at 16:40

## 2020-06-09 RX ADMIN — MIDODRINE HYDROCHLORIDE 5 MG: 5 TABLET ORAL at 16:42

## 2020-06-09 RX ADMIN — DOCUSATE SODIUM 100 MG: 100 CAPSULE, LIQUID FILLED ORAL at 16:40

## 2020-06-09 RX ADMIN — LORAZEPAM 0.5 MG: 2 INJECTION INTRAMUSCULAR; INTRAVENOUS at 21:11

## 2020-06-09 RX ADMIN — ONDANSETRON 4 MG: 2 INJECTION INTRAMUSCULAR; INTRAVENOUS at 19:56

## 2020-06-09 RX ADMIN — ASPIRIN 81 MG: 81 TABLET, COATED ORAL at 16:39

## 2020-06-09 RX ADMIN — NEPHROCAP 1 MG: 1 CAP ORAL at 16:39

## 2020-06-09 RX ADMIN — SODIUM CHLORIDE, PRESERVATIVE FREE 10 ML: 5 INJECTION INTRAVENOUS at 19:56

## 2020-06-09 RX ADMIN — MIDODRINE HYDROCHLORIDE 5 MG: 5 TABLET ORAL at 09:00

## 2020-06-09 RX ADMIN — Medication 1000 MG: at 21:03

## 2020-06-09 RX ADMIN — SODIUM CHLORIDE, PRESERVATIVE FREE 10 ML: 5 INJECTION INTRAVENOUS at 08:43

## 2020-06-09 RX ADMIN — Medication 400 MG: at 16:40

## 2020-06-09 RX ADMIN — INSULIN LISPRO 1 UNITS: 100 INJECTION, SOLUTION INTRAVENOUS; SUBCUTANEOUS at 11:24

## 2020-06-09 ASSESSMENT — PAIN DESCRIPTION - PAIN TYPE: TYPE: CHRONIC PAIN

## 2020-06-09 ASSESSMENT — PAIN SCALES - GENERAL
PAINLEVEL_OUTOF10: 0
PAINLEVEL_OUTOF10: 0
PAINLEVEL_OUTOF10: 2

## 2020-06-09 ASSESSMENT — PAIN - FUNCTIONAL ASSESSMENT: PAIN_FUNCTIONAL_ASSESSMENT: PREVENTS OR INTERFERES SOME ACTIVE ACTIVITIES AND ADLS

## 2020-06-09 ASSESSMENT — PAIN DESCRIPTION - ORIENTATION: ORIENTATION: RIGHT;LEFT

## 2020-06-09 ASSESSMENT — PAIN DESCRIPTION - LOCATION: LOCATION: LEG

## 2020-06-09 ASSESSMENT — PAIN DESCRIPTION - FREQUENCY: FREQUENCY: INTERMITTENT

## 2020-06-09 ASSESSMENT — PAIN DESCRIPTION - DESCRIPTORS: DESCRIPTORS: ACHING

## 2020-06-09 ASSESSMENT — PAIN DESCRIPTION - PROGRESSION: CLINICAL_PROGRESSION: NOT CHANGED

## 2020-06-09 ASSESSMENT — PAIN DESCRIPTION - ONSET: ONSET: ON-GOING

## 2020-06-09 NOTE — PROGRESS NOTES
Min:71 , QSR:557   ; Diastolic (32BGO), BCM:93, Min:43, Max:88    24HR INTAKE/OUTPUT:      Intake/Output Summary (Last 24 hours) at 6/9/2020 1203  Last data filed at 6/9/2020 1000  Gross per 24 hour   Intake 75 ml   Output 140 ml   Net -65 ml         Patient Vitals for the past 96 hrs (Last 3 readings):   Weight   06/09/20 0835 270 lb 8.1 oz (122.7 kg)   06/08/20 0319 269 lb 2.9 oz (122.1 kg)   06/07/20 0015 271 lb 13.2 oz (123.3 kg)       GENERAL:  conversant, clinically appears to be volume overloaded. HEENT:  Ears and nose appear externally without deformity. MMM. Normocephalic, atraumatic. Eyes:  Anicteric sclerae. Moist conjunctivae. No lid lag. ROSALBA  NECK:  Free range of motion. Supple. No thyromegaly. R IJ Vasc cath in place dressing mildly soaked  CHEST:  Bibasilar crackles. No intercostal retractions. CARDIOVASCULAR:  Regular rate and rhythm. No rubs. ABDOMEN:  Soft, nontender. Bowel sounds are present. No organomegaly. EXTREMITIES:  Lower extremities: 2+ extremity edema. weeping noted in LE's  NEUROLOGICAL EXAMINATION:  No asterixis. No focal motor neurological deficits. Awake answering appropriately  SKIN:  Loss of turgor. No rash. Allergies:   Allergies   Allergen Reactions    Latex Rash     Swelling lesions      Ace Inhibitors     Gabapentin      LE edema    Hydrochlorothiazide     Neosporin [Neomycin-Bacitracin Zn-Polymyx]     Penicillins     Sulfa Antibiotics     Tape Blackburn Benes Tape] Other (See Comments)     Sores          LAB DATA:    CBC:   Lab Results   Component Value Date    WBC 7.2 06/09/2020    RBC 2.28 06/09/2020    HGB 7.5 06/09/2020    HCT 23.1 06/09/2020    .0 06/09/2020    MCH 33.0 06/09/2020    MCHC 32.7 06/09/2020    RDW 17.8 06/09/2020     06/09/2020    MPV 7.3 06/09/2020     BMP:    Lab Results   Component Value Date     06/09/2020    K 4.5 06/09/2020    K 4.0 04/23/2020    CL 97 06/09/2020    CO2 24 06/09/2020    BUN 39 06/09/2020 nephropathy and the use of ARB. 7.  Recent history of hypercalcemia which was felt to be secondary to calcium supplements that has also resolved. 8.  Nephrotic range proteinuria felt to be secondary to diabetic nephropathy. All serologic workup has been negative this far.  UMCR 3381 mg.     9 Hypercarbia on Bipap overnight Improved

## 2020-06-09 NOTE — Clinical Note
Treatment time: 3 hours  Net UF: 3500 ml     Pre weight: 122.9 kg   Post weight: *** kg  EDW:TBD   Access used: LEEANNA BOYD  Access function: Good with  ml/min     Medications or blood products given: Aranesp 60 mcg   Regular outpatient schedule: TBD     Summary of response to treatment:Tolerated treatment fairly. Patient complained of shortness of breath in the beginning of treatment.      Copy of dialysis treatment record placed in chart, to be scanned into EMR

## 2020-06-09 NOTE — PROGRESS NOTES
to sit: Unable to assess  Comment: not assessed due to poor endurance while SOSOB  Ambulation  Ambulation?: No     Balance  Sitting - Static: Fair  Sitting - Dynamic: Poor        Plan   Plan  Times per week: 3-5x/week  Current Treatment Recommendations: Functional Mobility Training  Safety Devices  Type of devices: Call light within reach, Left in bed, Nurse notified, Patient at risk for falls    AM-PAC Score  AM-PAC Inpatient Mobility Raw Score : 8 (06/09/20 1038)  AM-PAC Inpatient T-Scale Score : 28.52 (06/09/20 1038)  Mobility Inpatient CMS 0-100% Score: 86.62 (06/09/20 1038)  Mobility Inpatient CMS G-Code Modifier : CM (06/09/20 1038)          Goals  Short term goals  Time Frame for Short term goals: by acute discharge -ongoing  Short term goal 1: bed mobility with SBA  Short term goal 2: sit<>Stand with SBA  Short term goal 3: ambulate >40' with RW and CGA  Patient Goals   Patient goals : go home instead of SNF       Therapy Time   Individual Concurrent Group Co-treatment   Time In 0930         Time Out 1000         Minutes 30         Timed Code Treatment Minutes: 590 Candelario Bellamy, PT

## 2020-06-09 NOTE — PROGRESS NOTES
0730 Handoff report completed with Emily Granados, RN. Patient resting in bed.     0800 Assessment completed. Alert and oriented x 4. Afebrile. VS stable. Denies any pain. Appetite diminished, encouraging small frequent bites. Repositioned in bed to prevent skin breakdown. Plan for dialysis treatment today. 0830 Dr Maya Maxwell at the bedside for patient eval. Plan for OT/PT consult to eval patient mobility. 1010 OT/PT at the bedside. Per report patient able to sit on side of bed with assistance, but becomes short of breath with increased movement. Placed back into bed. 1030 Patient reports feeling dyspneic, SpO2 sats 93%. Placed on bipap 16/6, 50%. 1130 Patient states she is unable to tolerated bipap, placed back on High flow cannula, respirations easier, reports improvement in breathing    1310 Dialysis RN at the bedside continuing dialysis treatment. Reporting dyspnea again. SpO2 sats stable, refusing to wear bipap. Daughter at the bedside for emotional support. Will update Dr Maya Maxwell.     1320 PRN ativan order received per Dr Maya Maxwell for anxiety. [address] [facility] Per Dr Arnold Pedersen can be held for tunneled dialysis catheter. Will need to be held x 2 days, scheduled for line placement 6/11.     1345 Patient tolerating dialysis, shortness of breath improving. VS stable. Does not require PRN ativan at this time. 1600 Dialysis treatment completed. Patient tolerated well. 0 Handoff report completed with Emily Granados RN.

## 2020-06-09 NOTE — PROGRESS NOTES
Occupational Therapy   Occupational Therapy Initial Assessment  Date: 2020   Patient Name: Guadalupe Davey  MRN: [id number]     : 1940    Date of Service: 2020    Discharge Recommendations:  Patient would benefit from continued therapy after discharge, 3-5 sessions per week  OT Equipment Recommendations  Other: defer to [address] [place] [address] scored a  on the AM-PAC ADL Inpatient form. Current research shows that an AM-PAC score of 17 or less is typically not associated with a discharge to the patient's home setting. Based on the patient's AM-PAC score and their current ADL deficits, it is recommended that the patient have 3-5 sessions per week of Occupational Therapy at d/c to increase the patient's independence. At this time, this patient lacks the endurance, and/or tolerance for 3 hours of therapy/day, 5-7x/wk and would benefit most from a follow up treatment frequency of 3-5x/wk. Please see assessment section for further patient specific details. If patient discharges prior to next session this note will serve as a discharge summary. Please see below for the latest assessment towards goals. Assessment   Performance deficits / Impairments: Decreased functional mobility ; Decreased strength;Decreased endurance;Decreased ADL status; Decreased balance;Decreased high-level IADLs  Assessment: 78 y.o female admitted with CHF and ICU transfer for emergent dialysis. PTA pt was home for ~ 1 week from [facility] prior to coming to hospital. Today, pt required max A x 2 for bed mobility. Pt with decreased activity endurance and EOB balance, therefore returned to supine after ~ 2 min sitting EOB. Pt functioning below baseline and will benefit from skilled therapy at this time. Treatment Diagnosis: CHF, HD  Prognosis: Good  History: PTA pt was at home from [facility] for ~ 6 days prior to readmit to [address] [facility]. PTA pt lives at home with family who assist with most ADLs, although pt able to toilet self. goal 4: Pt will complete UE exercises in all planes to increase endurance   Long term goals  Time Frame for Long term goals : stgs=ltgs  Patient Goals   Patient goals : to return home       Therapy Time   Individual Concurrent Group Co-treatment   Time In 0930         Time Out 1000         Minutes 30         Timed Code Treatment Minutes: 30 Minutes     This note to serve as OT d/c summary if pt is d/c-ed prior to next therapy session.     Ricardo Galeano, OTR/L

## 2020-06-10 LAB
ALBUMIN SERPL-MCNC: 3.7 G/DL (ref 3.4–5)
ANION GAP SERPL CALCULATED.3IONS-SCNC: 10 MMOL/L (ref 3–16)
BASE EXCESS ARTERIAL: -2.6 MMOL/L (ref -3–3)
BUN BLDV-MCNC: 26 MG/DL (ref 7–20)
CALCIUM SERPL-MCNC: 8.5 MG/DL (ref 8.3–10.6)
CARBOXYHEMOGLOBIN ARTERIAL: 1.3 % (ref 0–1.5)
CHLORIDE BLD-SCNC: 97 MMOL/L (ref 99–110)
CO2: 27 MMOL/L (ref 21–32)
CREAT SERPL-MCNC: 3.6 MG/DL (ref 0.6–1.2)
GFR AFRICAN AMERICAN: 15
GFR NON-AFRICAN AMERICAN: 12
GLUCOSE BLD-MCNC: 105 MG/DL (ref 70–99)
GLUCOSE BLD-MCNC: 139 MG/DL (ref 70–99)
GLUCOSE BLD-MCNC: 148 MG/DL (ref 70–99)
GLUCOSE BLD-MCNC: 163 MG/DL (ref 70–99)
GLUCOSE BLD-MCNC: 178 MG/DL (ref 70–99)
HCO3 ARTERIAL: 24.9 MMOL/L (ref 21–29)
HCT VFR BLD CALC: 25.6 % (ref 36–48)
HEMOGLOBIN, ART, EXTENDED: 8 G/DL (ref 12–16)
HEMOGLOBIN: 8.2 G/DL (ref 12–16)
MCH RBC QN AUTO: 33.2 PG (ref 26–34)
MCHC RBC AUTO-ENTMCNC: 32.2 G/DL (ref 31–36)
MCV RBC AUTO: 103.3 FL (ref 80–100)
METHEMOGLOBIN ARTERIAL: 0.4 %
O2 CONTENT ARTERIAL: 11 ML/DL
O2 SAT, ARTERIAL: 93.7 %
O2 THERAPY: ABNORMAL
PCO2 ARTERIAL: 58.2 MMHG (ref 35–45)
PDW BLD-RTO: 18.5 % (ref 12.4–15.4)
PERFORMED ON: ABNORMAL
PH ARTERIAL: 7.24 (ref 7.35–7.45)
PHOSPHORUS: 4.6 MG/DL (ref 2.5–4.9)
PLATELET # BLD: 194 K/UL (ref 135–450)
PMV BLD AUTO: 7.5 FL (ref 5–10.5)
PO2 ARTERIAL: 67.8 MMHG (ref 75–108)
POTASSIUM SERPL-SCNC: 4.2 MMOL/L (ref 3.5–5.1)
PRO-BNP: 4888 PG/ML (ref 0–449)
RBC # BLD: 2.48 M/UL (ref 4–5.2)
SODIUM BLD-SCNC: 134 MMOL/L (ref 136–145)
TCO2 ARTERIAL: 26.7 MMOL/L
URIC ACID, SERUM: 3.5 MG/DL (ref 2.6–6)
WBC # BLD: 8.4 K/UL (ref 4–11)

## 2020-06-10 PROCEDURE — 83880 ASSAY OF NATRIURETIC PEPTIDE: CPT

## 2020-06-10 PROCEDURE — 6370000000 HC RX 637 (ALT 250 FOR IP): Performed by: INTERNAL MEDICINE

## 2020-06-10 PROCEDURE — 36600 WITHDRAWAL OF ARTERIAL BLOOD: CPT

## 2020-06-10 PROCEDURE — 94761 N-INVAS EAR/PLS OXIMETRY MLT: CPT

## 2020-06-10 PROCEDURE — 2100000000 HC CCU R&B

## 2020-06-10 PROCEDURE — 6360000002 HC RX W HCPCS: Performed by: INTERNAL MEDICINE

## 2020-06-10 PROCEDURE — 2700000000 HC OXYGEN THERAPY PER DAY

## 2020-06-10 PROCEDURE — 84550 ASSAY OF BLOOD/URIC ACID: CPT

## 2020-06-10 PROCEDURE — 2580000003 HC RX 258: Performed by: INTERNAL MEDICINE

## 2020-06-10 PROCEDURE — 94660 CPAP INITIATION&MGMT: CPT

## 2020-06-10 PROCEDURE — 80069 RENAL FUNCTION PANEL: CPT

## 2020-06-10 PROCEDURE — 85027 COMPLETE CBC AUTOMATED: CPT

## 2020-06-10 PROCEDURE — 82803 BLOOD GASES ANY COMBINATION: CPT

## 2020-06-10 PROCEDURE — 99233 SBSQ HOSP IP/OBS HIGH 50: CPT | Performed by: INTERNAL MEDICINE

## 2020-06-10 RX ORDER — DIPHENHYDRAMINE HYDROCHLORIDE 50 MG/ML
25 INJECTION INTRAMUSCULAR; INTRAVENOUS ONCE
Status: COMPLETED | OUTPATIENT
Start: 2020-06-10 | End: 2020-06-10

## 2020-06-10 RX ORDER — CLINDAMYCIN PHOSPHATE 900 MG/50ML
900 INJECTION INTRAVENOUS
Status: DISCONTINUED | OUTPATIENT
Start: 2020-06-11 | End: 2020-06-11

## 2020-06-10 RX ORDER — QUETIAPINE FUMARATE 100 MG/1
100 TABLET, FILM COATED ORAL NIGHTLY
Status: DISCONTINUED | OUTPATIENT
Start: 2020-06-10 | End: 2020-06-12 | Stop reason: HOSPADM

## 2020-06-10 RX ADMIN — MIDODRINE HYDROCHLORIDE 5 MG: 5 TABLET ORAL at 12:16

## 2020-06-10 RX ADMIN — LORAZEPAM 0.5 MG: 2 INJECTION INTRAMUSCULAR; INTRAVENOUS at 07:00

## 2020-06-10 RX ADMIN — Medication 1000 MG: at 12:16

## 2020-06-10 RX ADMIN — ATORVASTATIN CALCIUM 40 MG: 40 TABLET, FILM COATED ORAL at 12:15

## 2020-06-10 RX ADMIN — INSULIN LISPRO 1 UNITS: 100 INJECTION, SOLUTION INTRAVENOUS; SUBCUTANEOUS at 09:40

## 2020-06-10 RX ADMIN — Medication 1000 MG: at 20:08

## 2020-06-10 RX ADMIN — Medication 400 MG: at 09:44

## 2020-06-10 RX ADMIN — NEPHROCAP 1 MG: 1 CAP ORAL at 12:15

## 2020-06-10 RX ADMIN — SODIUM CHLORIDE, PRESERVATIVE FREE 10 ML: 5 INJECTION INTRAVENOUS at 12:17

## 2020-06-10 RX ADMIN — SODIUM CHLORIDE, PRESERVATIVE FREE 10 ML: 5 INJECTION INTRAVENOUS at 20:08

## 2020-06-10 RX ADMIN — INSULIN LISPRO 1 UNITS: 100 INJECTION, SOLUTION INTRAVENOUS; SUBCUTANEOUS at 12:14

## 2020-06-10 RX ADMIN — QUETIAPINE FUMARATE 100 MG: 100 TABLET ORAL at 20:08

## 2020-06-10 RX ADMIN — DIPHENHYDRAMINE HYDROCHLORIDE 25 MG: 50 INJECTION, SOLUTION INTRAMUSCULAR; INTRAVENOUS at 21:33

## 2020-06-10 RX ADMIN — ASPIRIN 81 MG: 81 TABLET, COATED ORAL at 12:17

## 2020-06-10 ASSESSMENT — PAIN SCALES - GENERAL
PAINLEVEL_OUTOF10: 0

## 2020-06-10 NOTE — PROGRESS NOTES
4 Eyes Skin Assessment     The patient is being assess for  Shift Handoff    I agree that 2 RN's have performed a thorough Head to Toe Skin Assessment on the patient. ALL assessment sites listed below have been assessed. Areas assessed by both nurses: all  []   Head, Face, and Ears   []   Shoulders, Back, and Chest  []   Arms, Elbows, and Hands   []   Coccyx, Sacrum, and IschIum  []   Legs, Feet, and Heels        Does the Patient have Skin Breakdown?   No         Amrit Prevention initiated:  Yes   Wound Care Orders initiated:  NA      [facility] nurse consulted for Pressure Injury (Stage 3,4, Unstageable, DTI, NWPT, and Complex wounds), New and Established Ostomies:  NA      Nurse 1 eSignature: Electronically signed by Bala Escalona RN on 6/10/20 at 7:47 PM EDT    **SHARE this note so that the co-signing nurse is able to place an eSignature**    Nurse 2 eSignature: Electronically signed by Zachary Dior RN on 6/10/20 at 8:31 PM EDT

## 2020-06-10 NOTE — PROGRESS NOTES
[facility]   Daily Progress Note      Admit Date:  6/1/2020    CC: \" I am breathing better\"  This is a 80-year-old female with obesity,  diabetes, previous diastolic heart failure, who came to the hospital  with worsening shortness of breath and about 20 to 30-pound weight gain. Symptoms have been steadily worsening and she eventually decided to come  to the hospital.  She was admitted for possible diastolic heart failure  exacerbation. She has also noticed significant swelling of her lower  extremities. She denied any chest tightness or pressure. She had no  fever, chills, or rigors; cough or sputum production. No nausea or  Vomiting.           Subjective:  Patient is quite confused after she received a dose of Ativan earlier. Currently now on BiPAP. Denies any chest pain tightness to me she does have increased shortness of breath. Blood pressure remains stable and her atrial fibrillation is under control           PObjective:   BP (!) 114/48   Pulse 73   Temp 98.1 °F (36.7 °C) (Axillary)   Resp 20   Ht 5' 1\" (1.549 m)   Wt 263 lb 3.7 oz (119.4 kg)   SpO2 98%   BMI 49.74 kg/m²       Intake/Output Summary (Last 24 hours) at 6/10/2020 1450  Last data filed at 6/10/2020 1000  Gross per 24 hour   Intake 640 ml   Output 3550 ml   Net -2910 ml     Wt Readings from Last 3 Encounters:   06/09/20 263 lb 3.7 oz (119.4 kg)   04/29/20 258 lb 13.1 oz (117.4 kg)   03/26/20 251 lb 15.8 oz (114.3 kg)     Telemetry:NSR    Physical Exam:  General:  NAD, Awake, alert and oriented X4  Skin:  Warm and dry  Neck:  Supple, no JVP appreciated, no bruit  Chest:  Clear to auscultation, no wheezes/rhonchi/rales  Cardiovascular: Irregularly irregular rate. S1S2  Abdomen:  Soft, nontender, +bowel sounds  Extremities2+ BLE pitting edema.     Cardiac Diagnosis:  diabetes, hypertension, hyperlipidemia and CHF    Medications:    [START ON 6/11/2020] clindamycin (CLEOCIN) IV  900 mg Intravenous On Call    appears acute in origin  -We will obtain a chest x-ray  ? Need for pulmonary consult    Atrial fibrillation  -Rate is currently controlled  -She is on Eliquis 2.5 mg p.o. twice daily      DOUGLAS on Chronic kidney disease  -Renal function has again significantly worsen compared to yesterday  -Patient underwent dialysis with fluid removal 6/9/2020  Hypotension    BP has improved since her blood pressure medicines were put on hold      . Anemia    -Hemoglobin has slightly improved to 7.5g  -We will repeat hemoglobin in the morning and if it is below 7 g will consider transfusing the patient.         Complexity of medical decision making-very high    Morbid obesity    :  Discussed with the family    Electronically signed by Markell Herring MD on 6/10/2020 at 2:50 PM

## 2020-06-10 NOTE — PROGRESS NOTES
Physical Therapy    Attempted to see pt for PT tx. Per RN, defer therapy at this time as pt recently placed back on bipap. Will follow up as pt condition permit.     Electronically signed by Natasha Adrian PT on 6/10/2020 at 8:04 AM

## 2020-06-10 NOTE — PROGRESS NOTES
Pulse  Av.3  Min: 68  Max: 89  BLOOD PRESSURE RANGE:  Systolic (72WJR), NKF:239 , Min:94 , ROK:825   ; Diastolic (79IBF), CECILIA:59, Min:35, Max:116    24HR INTAKE/OUTPUT:      Intake/Output Summary (Last 24 hours) at 6/10/2020 1150  Last data filed at 6/10/2020 1000  Gross per 24 hour   Intake 640 ml   Output 3550 ml   Net -2910 ml         Patient Vitals for the past 96 hrs (Last 3 readings):   Weight   20 1517 263 lb 3.7 oz (119.4 kg)   20 1217 270 lb 15.1 oz (122.9 kg)   20 0835 270 lb 8.1 oz (122.7 kg)       GENERAL: somnolent  HEENT:  Ears and nose appear externally without deformity. MMM. Normocephalic, atraumatic. Eyes:  Anicteric sclerae. Moist conjunctivae. No lid lag. ROSALBA  NECK:  Free range of motion. Supple. No thyromegaly. R IJ Vasc cath in place dressing mildly soaked  CHEST:  few crackles. No intercostal retractions. CARDIOVASCULAR:  Regular rate and rhythm. No rubs. ABDOMEN:  Soft, nontender. Bowel sounds are present. No organomegaly. EXTREMITIES:  Lower extremities: 1+ extremity edema. weeping noted in LE's  NEUROLOGICAL EXAMINATION:  No asterixis. No focal motor neurological deficits. Awake answering appropriately  SKIN:  Loss of turgor. No rash. Allergies:   Allergies   Allergen Reactions    Latex Rash     Swelling lesions      Ace Inhibitors     Gabapentin      LE edema    Hydrochlorothiazide     Neosporin [Neomycin-Bacitracin Zn-Polymyx]     Penicillins     Sulfa Antibiotics     Tape [Adhesive Tape] Other (See Comments)     Sores          LAB DATA:    CBC:   Lab Results   Component Value Date    WBC 8.4 06/10/2020    RBC 2.48 06/10/2020    HGB 8.2 06/10/2020    HCT 25.6 06/10/2020    .3 06/10/2020    MCH 33.2 06/10/2020    MCHC 32.2 06/10/2020    RDW 18.5 06/10/2020     06/10/2020    MPV 7.5 06/10/2020     BMP:    Lab Results   Component Value Date     06/10/2020    K 4.2 06/10/2020    K 4.0 2020    CL 97 06/10/2020    CO2

## 2020-06-11 ENCOUNTER — APPOINTMENT (OUTPATIENT)
Dept: GENERAL RADIOLOGY | Age: 80
DRG: 291 | End: 2020-06-11
Payer: MEDICARE

## 2020-06-11 LAB
ALBUMIN SERPL-MCNC: 3.3 G/DL (ref 3.4–5)
ALBUMIN SERPL-MCNC: 3.4 G/DL (ref 3.4–5)
ALP BLD-CCNC: 50 U/L (ref 40–129)
ALT SERPL-CCNC: 10 U/L (ref 10–40)
AMMONIA: 35 UMOL/L (ref 11–51)
ANION GAP SERPL CALCULATED.3IONS-SCNC: 12 MMOL/L (ref 3–16)
AST SERPL-CCNC: 10 U/L (ref 15–37)
BASE EXCESS ARTERIAL: -1.5 MMOL/L (ref -3–3)
BASE EXCESS ARTERIAL: -2.2 MMOL/L (ref -3–3)
BILIRUB SERPL-MCNC: 0.3 MG/DL (ref 0–1)
BILIRUBIN DIRECT: <0.2 MG/DL (ref 0–0.3)
BILIRUBIN, INDIRECT: ABNORMAL MG/DL (ref 0–1)
BUN BLDV-MCNC: 35 MG/DL (ref 7–20)
CALCIUM SERPL-MCNC: 8.6 MG/DL (ref 8.3–10.6)
CARBOXYHEMOGLOBIN ARTERIAL: 1.7 % (ref 0–1.5)
CARBOXYHEMOGLOBIN ARTERIAL: 1.7 % (ref 0–1.5)
CHLORIDE BLD-SCNC: 98 MMOL/L (ref 99–110)
CO2: 24 MMOL/L (ref 21–32)
CREAT SERPL-MCNC: 4.7 MG/DL (ref 0.6–1.2)
GFR AFRICAN AMERICAN: 11
GFR NON-AFRICAN AMERICAN: 9
GLUCOSE BLD-MCNC: 139 MG/DL (ref 70–99)
GLUCOSE BLD-MCNC: 148 MG/DL (ref 70–99)
GLUCOSE BLD-MCNC: 154 MG/DL (ref 70–99)
GLUCOSE BLD-MCNC: 160 MG/DL (ref 70–99)
GLUCOSE BLD-MCNC: 166 MG/DL (ref 70–99)
HCO3 ARTERIAL: 25.1 MMOL/L (ref 21–29)
HCO3 ARTERIAL: 26.3 MMOL/L (ref 21–29)
HCT VFR BLD CALC: 24.7 % (ref 36–48)
HEMOGLOBIN, ART, EXTENDED: 7.9 G/DL (ref 12–16)
HEMOGLOBIN, ART, EXTENDED: 9.1 G/DL (ref 12–16)
HEMOGLOBIN: 7.7 G/DL (ref 12–16)
LACTIC ACID: 0.5 MMOL/L (ref 0.4–2)
MCH RBC QN AUTO: 32.5 PG (ref 26–34)
MCHC RBC AUTO-ENTMCNC: 31.3 G/DL (ref 31–36)
MCV RBC AUTO: 103.8 FL (ref 80–100)
METHEMOGLOBIN ARTERIAL: 0.4 %
METHEMOGLOBIN ARTERIAL: 0.7 %
O2 CONTENT ARTERIAL: 10 ML/DL
O2 CONTENT ARTERIAL: 12 ML/DL
O2 SAT, ARTERIAL: 93.2 %
O2 SAT, ARTERIAL: 94.3 %
O2 THERAPY: ABNORMAL
O2 THERAPY: ABNORMAL
PCO2 ARTERIAL: 55.7 MMHG (ref 35–45)
PCO2 ARTERIAL: 62.9 MMHG (ref 35–45)
PDW BLD-RTO: 19.2 % (ref 12.4–15.4)
PERFORMED ON: ABNORMAL
PH ARTERIAL: 7.23 (ref 7.35–7.45)
PH ARTERIAL: 7.26 (ref 7.35–7.45)
PHOSPHORUS: 4.6 MG/DL (ref 2.5–4.9)
PLATELET # BLD: 176 K/UL (ref 135–450)
PMV BLD AUTO: 7.3 FL (ref 5–10.5)
PO2 ARTERIAL: 67.1 MMHG (ref 75–108)
PO2 ARTERIAL: 67.5 MMHG (ref 75–108)
POTASSIUM SERPL-SCNC: 4.3 MMOL/L (ref 3.5–5.1)
RBC # BLD: 2.38 M/UL (ref 4–5.2)
SODIUM BLD-SCNC: 134 MMOL/L (ref 136–145)
TCO2 ARTERIAL: 26.8 MMOL/L
TCO2 ARTERIAL: 28.3 MMOL/L
TOTAL PROTEIN: 5.8 G/DL (ref 6.4–8.2)
URIC ACID, SERUM: 4.3 MG/DL (ref 2.6–6)
WBC # BLD: 7.4 K/UL (ref 4–11)

## 2020-06-11 PROCEDURE — 36592 COLLECT BLOOD FROM PICC: CPT

## 2020-06-11 PROCEDURE — 99233 SBSQ HOSP IP/OBS HIGH 50: CPT | Performed by: INTERNAL MEDICINE

## 2020-06-11 PROCEDURE — 71045 X-RAY EXAM CHEST 1 VIEW: CPT

## 2020-06-11 PROCEDURE — 85027 COMPLETE CBC AUTOMATED: CPT

## 2020-06-11 PROCEDURE — 94660 CPAP INITIATION&MGMT: CPT

## 2020-06-11 PROCEDURE — 6370000000 HC RX 637 (ALT 250 FOR IP): Performed by: INTERNAL MEDICINE

## 2020-06-11 PROCEDURE — 2700000000 HC OXYGEN THERAPY PER DAY

## 2020-06-11 PROCEDURE — 2580000003 HC RX 258: Performed by: INTERNAL MEDICINE

## 2020-06-11 PROCEDURE — 80069 RENAL FUNCTION PANEL: CPT

## 2020-06-11 PROCEDURE — 36600 WITHDRAWAL OF ARTERIAL BLOOD: CPT

## 2020-06-11 PROCEDURE — 82140 ASSAY OF AMMONIA: CPT

## 2020-06-11 PROCEDURE — 2500000003 HC RX 250 WO HCPCS

## 2020-06-11 PROCEDURE — 2100000000 HC CCU R&B

## 2020-06-11 PROCEDURE — 94761 N-INVAS EAR/PLS OXIMETRY MLT: CPT

## 2020-06-11 PROCEDURE — 80076 HEPATIC FUNCTION PANEL: CPT

## 2020-06-11 PROCEDURE — 6360000002 HC RX W HCPCS: Performed by: INTERNAL MEDICINE

## 2020-06-11 PROCEDURE — 83605 ASSAY OF LACTIC ACID: CPT

## 2020-06-11 PROCEDURE — 82803 BLOOD GASES ANY COMBINATION: CPT

## 2020-06-11 PROCEDURE — 84550 ASSAY OF BLOOD/URIC ACID: CPT

## 2020-06-11 RX ORDER — MORPHINE SULFATE 2 MG/ML
2 INJECTION, SOLUTION INTRAMUSCULAR; INTRAVENOUS EVERY 4 HOURS PRN
Status: DISCONTINUED | OUTPATIENT
Start: 2020-06-11 | End: 2020-06-12 | Stop reason: HOSPADM

## 2020-06-11 RX ADMIN — MORPHINE SULFATE 2 MG: 2 INJECTION, SOLUTION INTRAMUSCULAR; INTRAVENOUS at 21:24

## 2020-06-11 RX ADMIN — INSULIN LISPRO 1 UNITS: 100 INJECTION, SOLUTION INTRAVENOUS; SUBCUTANEOUS at 12:54

## 2020-06-11 RX ADMIN — INSULIN LISPRO 1 UNITS: 100 INJECTION, SOLUTION INTRAVENOUS; SUBCUTANEOUS at 10:18

## 2020-06-11 RX ADMIN — INSULIN LISPRO 1 UNITS: 100 INJECTION, SOLUTION INTRAVENOUS; SUBCUTANEOUS at 17:27

## 2020-06-11 RX ADMIN — Medication 2 MG: at 05:08

## 2020-06-11 RX ADMIN — MORPHINE SULFATE 2 MG: 2 INJECTION, SOLUTION INTRAMUSCULAR; INTRAVENOUS at 11:00

## 2020-06-11 RX ADMIN — SODIUM CHLORIDE, PRESERVATIVE FREE 10 ML: 5 INJECTION INTRAVENOUS at 21:25

## 2020-06-11 RX ADMIN — MIDODRINE HYDROCHLORIDE 5 MG: 5 TABLET ORAL at 12:23

## 2020-06-11 RX ADMIN — QUETIAPINE FUMARATE 100 MG: 100 TABLET ORAL at 21:24

## 2020-06-11 RX ADMIN — ACETAMINOPHEN 650 MG: 325 TABLET ORAL at 12:53

## 2020-06-11 RX ADMIN — MORPHINE SULFATE 2 MG: 2 INJECTION, SOLUTION INTRAMUSCULAR; INTRAVENOUS at 15:28

## 2020-06-11 RX ADMIN — ACETAMINOPHEN 650 MG: 325 TABLET ORAL at 21:24

## 2020-06-11 ASSESSMENT — PAIN SCALES - GENERAL
PAINLEVEL_OUTOF10: 7
PAINLEVEL_OUTOF10: 0
PAINLEVEL_OUTOF10: 7
PAINLEVEL_OUTOF10: 2
PAINLEVEL_OUTOF10: 7
PAINLEVEL_OUTOF10: 0
PAINLEVEL_OUTOF10: 7
PAINLEVEL_OUTOF10: 0
PAINLEVEL_OUTOF10: 7
PAINLEVEL_OUTOF10: 7

## 2020-06-11 ASSESSMENT — PAIN DESCRIPTION - PAIN TYPE: TYPE: CHRONIC PAIN;ACUTE PAIN

## 2020-06-11 ASSESSMENT — PAIN DESCRIPTION - ONSET: ONSET: ON-GOING

## 2020-06-11 ASSESSMENT — PAIN DESCRIPTION - DESCRIPTORS: DESCRIPTORS: SHARP;SHOOTING

## 2020-06-11 ASSESSMENT — PAIN DESCRIPTION - ORIENTATION: ORIENTATION: RIGHT;LEFT

## 2020-06-11 ASSESSMENT — PAIN DESCRIPTION - PROGRESSION: CLINICAL_PROGRESSION: NOT CHANGED

## 2020-06-11 ASSESSMENT — PAIN DESCRIPTION - FREQUENCY: FREQUENCY: CONTINUOUS

## 2020-06-11 ASSESSMENT — PAIN DESCRIPTION - LOCATION: LOCATION: FOOT

## 2020-06-11 ASSESSMENT — PAIN DESCRIPTION - DIRECTION: RADIATING_TOWARDS: LEGS

## 2020-06-11 NOTE — PROGRESS NOTES
1500- Report received from [facility]. Patient assessed in bed on bi pap. Somewhat restless, PRN morphine given. Family at bedside. 1700- Patient fairly lethargic while assessing for ability to safely take midodrine PO. Med not given. 1800- MAP at 45 levo bumped from 4 up to 5.      1900- Family visitors leave to return in morning for 9am meeting with palliative care to discuss options moving forward. 9810- Report and bedside handoff given to [facility]. Patient resting in bed on bi pap, sitter at bedside.

## 2020-06-11 NOTE — PROGRESS NOTES
[facility]   Daily Progress Note      Admit Date:  6/1/2020    CC: \" I am breathing better\"  This is a 30-year-old female with obesity,  diabetes, previous diastolic heart failure, who came to the hospital  with worsening shortness of breath and about 20 to 30-pound weight gain. Symptoms have been steadily worsening and she eventually decided to come  to the hospital.  She was admitted for possible diastolic heart failure  exacerbation. She has also noticed significant swelling of her lower  extremities. She denied any chest tightness or pressure. She had no  fever, chills, or rigors; cough or sputum production. No nausea or  Vomiting.           Subjective:  Patient became significantly hypotensive yesterday and has been placed on a Levophed drip. Blood pressure has improved to 122 this morning. EKG now shows sinus rhythm. He is still remains quite confused. She has no fever        PObjective:   BP (!) 113/41   Pulse 72   Temp 97.9 °F (36.6 °C) (Temporal)   Resp 16   Ht 5' 1\" (1.549 m)   Wt 264 lb 15.9 oz (120.2 kg)   SpO2 100%   BMI 50.07 kg/m²       Intake/Output Summary (Last 24 hours) at 6/11/2020 1056  Last data filed at 6/11/2020 0600  Gross per 24 hour   Intake 360 ml   Output 72 ml   Net 288 ml     Wt Readings from Last 3 Encounters:   06/11/20 264 lb 15.9 oz (120.2 kg)   04/29/20 258 lb 13.1 oz (117.4 kg)   03/26/20 251 lb 15.8 oz (114.3 kg)     Telemetry:NSR    Physical Exam:  General:  NAD, Awake, alert but confused  Skin:  Warm and dry  Neck:  Supple, no JVP appreciated, no bruit  Chest:  Clear to auscultation, no wheezes/rhonchi/rales  Cardiovascular: Regular rate and rhythm. S1S2  Abdomen:  Soft, nontender,bowel sounds next  Extremities2+ BLE pitting edema.     Cardiac Diagnosis:  diabetes, hypertension, hyperlipidemia and CHF    Medications:    QUEtiapine  100 mg Oral Nightly    darbepoetin alexander-polysorbate  60 mcg Intravenous Weekly - Monday    b complex-C-folic

## 2020-06-11 NOTE — PROGRESS NOTES
Medications:  Reviewed    Infusion Medications    norepinephrine 7 mcg/min (06/11/20 0710)    dextrose       Scheduled Medications    clindamycin (CLEOCIN) IV  900 mg Intravenous On Call    QUEtiapine  100 mg Oral Nightly    darbepoetin alexander-polysorbate  60 mcg Intravenous Weekly - Monday    b complex-C-folic acid  1 capsule Oral Daily    midodrine  5 mg Oral TID WC    sodium chloride flush  10 mL Intravenous 2 times per day    [Held by provider] apixaban  2.5 mg Oral BID    ascorbic acid  1,000 mg Oral BID    aspirin  81 mg Oral Daily    atorvastatin  40 mg Oral Daily    magnesium oxide  400 mg Oral Daily    insulin lispro  0-6 Units Subcutaneous TID WC    insulin lispro  0-3 Units Subcutaneous Nightly     PRN Meds: heparin (porcine), albumin human, glucose, dextrose, glucagon (rDNA), dextrose, sodium chloride flush, acetaminophen **OR** acetaminophen, polyethylene glycol, promethazine **OR** ondansetron, docusate sodium      Intake/Output Summary (Last 24 hours) at 6/11/2020 0840  Last data filed at 6/11/2020 0600  Gross per 24 hour   Intake 360 ml   Output 107 ml   Net 253 ml       Physical Exam Performed:    BP (!) 96/35   Pulse 71   Temp 98.4 °F (36.9 °C) (Axillary)   Resp 15   Ht 5' 1\" (1.549 m)   Wt 264 lb 15.9 oz (120.2 kg)   SpO2 100%   BMI 50.07 kg/m²     General appearance: No apparent distress, appears stated age, morbidly obese  HEENT: Pupils equal, round, and reactive to light. Conjunctivae/corneas clear. Neck: Supple, with full range of motion. No jugular venous distention. Trachea midline. Respiratory:  Normal respiratory effort. Diminished BS BL  Cardiovascular: Regular rate and rhythm with normal S1/S2 +JÚNIOR  Abdomen: Soft, non-tender, non-distended with normal bowel sounds. Musculoskeletal: No clubbing, cyanosis or edema bilaterally. Full range of motion without deformity. Skin: Skin color, texture, turgor normal.  No rashes or lesions.   Neurologic:

## 2020-06-11 NOTE — CONSULTS
HF  RN consult received from Dr Delvin Zhang as part of HF order set. Pt is being coded HF per CDS HF list as of 6/3/2020. Chart reviewed. Pt presented to ED via [facility] with c/o SOB x 1 week. Pt was hospitalized 4/23 - 4/29/2020 with AMS. She discharged to a SNF from which she insisted on discharge ~ 1 week ago. Son has been checking O2 sats at home and reports they have been declining each day. [facility] RN records show pt has gained 5 # in last 5 days. Pt does have a prior history of HF but is only treated with PRN lasix. She also has known TRUPTI and uses CPAP. She has no established cardiologist.     Kamilahdayne Black on admission was [phone] with Cr 1.8. CXR showed mild CHF. Pt was treated with IV bolus Lasix. Cardiology was consulted. Dr Josue Jones notes a 20-30# weight gain (? Time frame) and generalized anasarca with AonCdiaHF. Pt has known CKD. Pt was transitioned to Lasix gtt. Pt responded poorly to gtt and ultimately was started on hemodialysis 6/6/2020. She has been confused and not appropriate for HF education. Pt has now elected to enroll in hospice care as of 6/12/2020. HF RNs will no longer follow.

## 2020-06-11 NOTE — PROGRESS NOTES
Maylinf via [employer]. Pt remains on Bipap currently. 0221: Perfect serve message sent to Dr. Matthew Alvarez   Her MAP is trending downward since the beginning of my shift. It is now averaging the low 50s. The Bipap has been on since 0100, but it did begin to decline a little prior to that. [id number] Leavens to Dr. Matthew Alvarez- order to draw an ABG at 0330 and to have RT increase minute ventilation on Bipap to 10-11. RT called and notified. 0345: Pt more responsive- pulling arm away while being stuck for ABG. Arterial stick was unsuccessful. AM labs drawn off of vascath pigtail and sent to processing. RT cvalled to try to draw ABG.    0400: ABG collected by RT Rhina. Pt tolerated well. Reassessment completed. Will not answer questions or follow commands, but does respond to painful stimulation and frequently moans when being touched. 8433: ABG resulted. Results relayed to Dr. Matthew Alvarez. FiO2 increased to 60% based on this pO2 Results for Ginger Morgan (MRN [id number]) as of 6/11/2020 04:19   Ref. Range 6/11/2020 04:00   pH, Arterial Latest Ref Range: 7.350 - 7.450  7.263 (L)   pCO2, Arterial Latest Ref Range: 35.0 - 45.0 mmHg 55.7 (H)   pO2, Arterial Latest Ref Range: 75.0 - 108.0 mmHg 67.5 (L)   HCO3, Arterial Latest Ref Range: 21.0 - 29.0 mmol/L 25.1   TCO2 (calc), Art Latest Ref Range: Not Established mmol/L 26.8   Base Excess, Arterial Latest Ref Range: -3.0 - 3.0 mmol/L -2.2     0500: BP 86/37 (49). Dr. Matthew Alvarez on unit and aware.  Order to start levophed and titrate for a MAP of 60.    0710: Handoff with Nayeli Garcia RN

## 2020-06-11 NOTE — PLAN OF CARE
Problem: Falls - Risk of:  Goal: Will remain free from falls  Description: Will remain free from falls  Outcome: Ongoing  Note: Pt has remained free of falls, bed alarm active and audible. Bed locked in lowest position, side rails raised 3/4. Fall light illuminated. Call light and personal items within reach. Problem: Airway Clearance - Ineffective:  Goal: Ability to maintain a clear airway will improve  Description: Ability to maintain a clear airway will improve  Outcome: Ongoing  Note: Patient encouraged to cough and deep breathe q1h with use of IS q1h. Airway patent with no s/s aspiration. Problem: Skin Integrity:  Goal: Absence of new skin breakdown  Description: Absence of new skin breakdown  Outcome: Ongoing  Note: Monitoring patient skin integrity for skin breakdown, turning and repositioning q2h per protocol. Preventive dressings applied to heels and sacrum. Heels and elbows elevated off of bed with pillows. Problem: FLUID AND ELECTROLYTE IMBALANCE  Goal: Fluid and electrolyte balance are achieved/maintained  Outcome: Ongoing  Note: Strict I&O recorded, daily weights, daily labs. Monitor for dysrhythmias. Notify MD of any abnormal findings. Problem: Confusion - Acute:  Goal: Absence of continued neurological deterioration signs and symptoms  Description: Absence of continued neurological deterioration signs and symptoms  Outcome: Ongoing  Note: Pt frequently reoriented by staff, hourly rounding, visual aids to help with orientation, and adherence to POC to reach Pt goals     Problem: Injury - Risk of, Physical Injury:  Goal: Will remain free from falls  Description: Will remain free from falls  Outcome: Ongoing  Note: Pt has remained free of falls, bed alarm active and audible. Bed locked in lowest position, side rails raised 3/4. Fall light illuminated. Call light and personal items within reach.        Problem: Sleep Pattern Disturbance:  Goal: Appears well-rested  Description: Appears well-rested  Outcome: Ongoing  Note: Non pharmacological sleep promotion in place to encourage natural sleep cycle.

## 2020-06-11 NOTE — PROGRESS NOTES
[address]                                                                        [address] [address],[address]                                                                 [place]                                                         Phone Number: [phone]                                                           Fax Number: [phone]                                                             Nephrology Progress Note    Chief Complaint: SOB/Pulmonary edema    History of Present Illness: We are following this patient for DOUGLAS/CKD III. Started on HD   For oliguria and volume ovrload  Remains oliguric  Overnight on Bipap  On 3 L O2   Weak Started on Levophed overnight  Confused      Subjective:      Scheduled Meds:   QUEtiapine  100 mg Oral Nightly    darbepoetin alexander-polysorbate  60 mcg Intravenous Weekly - Monday    b complex-C-folic acid  1 capsule Oral Daily    midodrine  5 mg Oral TID WC    sodium chloride flush  10 mL Intravenous 2 times per day    [Held by provider] apixaban  2.5 mg Oral BID    insulin lispro  0-6 Units Subcutaneous TID WC    insulin lispro  0-3 Units Subcutaneous Nightly        norepinephrine 5 mcg/min (20 0900)    dextrose         PRN Meds:.morphine, heparin (porcine), albumin human, glucose, dextrose, glucagon (rDNA), dextrose, sodium chloride flush, acetaminophen **OR** acetaminophen, polyethylene glycol, promethazine **OR** ondansetron, docusate sodium    Physical Exam:    TEMPERATURE:  Current - Temp: 97.9 °F (36.6 °C);  Max - Temp  Av.2 °F (36.8 °C)  Min: 97.9 °F (36.6 °C)  Max: 98.8 °F (37.1 °C)  RESPIRATIONS RANGE: Resp  Av.2  Min: 13  Max: 27  PULSE RANGE: Pulse  Av.2  Min: 68  Max: 87  BLOOD PRESSURE RANGE:  Systolic (08EFY), UBU:048 , Min:86 , SWK:269   ; Diastolic (43TYK), ZNV:15, Min:31, Max:61    24HR Component Value Date    MG 2.40 06/06/2020     Phosphorus:    Lab Results   Component Value Date    PHOS 4.6 06/11/2020     U/A:    Lab Results   Component Value Date    COLORU RED 06/04/2020    PHUR 6.0 06/04/2020    WBCUA 68 06/04/2020    RBCUA >900 06/04/2020    MUCUS Rare 06/01/2020    BACTERIA RARE 06/04/2020    CLARITYU TURBID 06/04/2020    SPECGRAV 1.025 06/04/2020    LEUKOCYTESUR TRACE 06/04/2020    UROBILINOGEN 0.2 06/04/2020    BILIRUBINUR Negative 06/04/2020    BLOODU LARGE 06/04/2020    GLUCOSEU Negative 06/04/2020    AMORPHOUS 1+ 06/01/2020         IMPRESSION/RECOMMENDATIONS:      Active Problems:    Acute diastolic CHF (congestive heart failure) ([facility])    Shortness of breath    Anemia    DOUGLAS (acute kidney injury) ([facility] [address]    Atrial fibrillation with controlled ventricular response ([facility])  Resolved Problems:    * No resolved hospital problems. *    1. Volume overload occurring in the setting of CKD stage III. The patient's creatinine has been trending up with diuresis and relative hypotension. Stopped Coreg and Amlodipine and started [employer]. Oliguric despite Lasix gtt and Albumin Cr increased to 3.8 mg Increased o2 requirement >> Initiated on HD 6/6   Not making progress on Levophed now  Family considering palliative care Wll hold off on [facility] placement and HD today  Would be appropriate for hospice care of family decides    2. Anemia of chronic kidney disease - iron deficient - On  IV iron  YANCI with HD HGB 7.7 gm    3. 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 Hypertension - stopped antihypertensive medication Unable to swallow Midodrine On Levophed now     5. Pulmonary edema -no improvement with  Lasix gtt/IV albumin, started HD 6/6    6. Recent history of DOUGLAS that has resolved and it was felt to be secondary to contrast nephropathy and the use of ARB. 7.  Recent history of hypercalcemia which was felt to be secondary to calcium supplements that has also resolved.     8.  Nephrotic range

## 2020-06-12 ENCOUNTER — TELEPHONE (OUTPATIENT)
Dept: FAMILY MEDICINE CLINIC | Age: 80
End: 2020-06-12

## 2020-06-12 VITALS
RESPIRATION RATE: 22 BRPM | SYSTOLIC BLOOD PRESSURE: 109 MMHG | OXYGEN SATURATION: 96 % | DIASTOLIC BLOOD PRESSURE: 32 MMHG | HEART RATE: 91 BPM | WEIGHT: 269.62 LBS | TEMPERATURE: 98.4 F | HEIGHT: 61 IN | BODY MASS INDEX: 50.91 KG/M2

## 2020-06-12 LAB
ALBUMIN SERPL-MCNC: 3.3 G/DL (ref 3.4–5)
ALBUMIN SERPL-MCNC: ABNORMAL G/DL (ref 3.4–5)
ANION GAP SERPL CALCULATED.3IONS-SCNC: 12 MMOL/L (ref 3–16)
ANION GAP SERPL CALCULATED.3IONS-SCNC: ABNORMAL MMOL/L (ref 3–16)
BUN BLDV-MCNC: 43 MG/DL (ref 7–20)
BUN BLDV-MCNC: ABNORMAL MG/DL (ref 7–20)
CALCIUM SERPL-MCNC: 8.8 MG/DL (ref 8.3–10.6)
CALCIUM SERPL-MCNC: ABNORMAL MG/DL (ref 8.3–10.6)
CHLORIDE BLD-SCNC: 99 MMOL/L (ref 99–110)
CHLORIDE BLD-SCNC: ABNORMAL MMOL/L (ref 99–110)
CO2: 22 MMOL/L (ref 21–32)
CO2: ABNORMAL MMOL/L (ref 21–32)
CREAT SERPL-MCNC: 5.8 MG/DL (ref 0.6–1.2)
CREAT SERPL-MCNC: ABNORMAL MG/DL (ref 0.6–1.2)
GFR AFRICAN AMERICAN: 8
GFR AFRICAN AMERICAN: ABNORMAL
GFR NON-AFRICAN AMERICAN: 7
GFR NON-AFRICAN AMERICAN: ABNORMAL
GLUCOSE BLD-MCNC: 127 MG/DL (ref 70–99)
GLUCOSE BLD-MCNC: 138 MG/DL (ref 70–99)
GLUCOSE BLD-MCNC: ABNORMAL MG/DL (ref 70–99)
HCT VFR BLD CALC: 25.3 % (ref 36–48)
HEMOGLOBIN: 8 G/DL (ref 12–16)
MCH RBC QN AUTO: 32.7 PG (ref 26–34)
MCHC RBC AUTO-ENTMCNC: 31.8 G/DL (ref 31–36)
MCV RBC AUTO: 103.1 FL (ref 80–100)
PDW BLD-RTO: 19.3 % (ref 12.4–15.4)
PERFORMED ON: ABNORMAL
PHOSPHORUS: 4.8 MG/DL (ref 2.5–4.9)
PHOSPHORUS: ABNORMAL MG/DL (ref 2.5–4.9)
PLATELET # BLD: 188 K/UL (ref 135–450)
PMV BLD AUTO: 7.4 FL (ref 5–10.5)
POTASSIUM SERPL-SCNC: 4 MMOL/L (ref 3.5–5.1)
POTASSIUM SERPL-SCNC: ABNORMAL MMOL/L (ref 3.5–5.1)
RBC # BLD: 2.46 M/UL (ref 4–5.2)
SODIUM BLD-SCNC: 133 MMOL/L (ref 136–145)
SODIUM BLD-SCNC: ABNORMAL MMOL/L (ref 136–145)
URIC ACID, SERUM: 5.4 MG/DL (ref 2.6–6)
URIC ACID, SERUM: ABNORMAL MG/DL (ref 2.6–6)
WBC # BLD: 4.1 K/UL (ref 4–11)

## 2020-06-12 PROCEDURE — 2580000003 HC RX 258: Performed by: INTERNAL MEDICINE

## 2020-06-12 PROCEDURE — 80069 RENAL FUNCTION PANEL: CPT

## 2020-06-12 PROCEDURE — 84550 ASSAY OF BLOOD/URIC ACID: CPT

## 2020-06-12 PROCEDURE — 94660 CPAP INITIATION&MGMT: CPT

## 2020-06-12 PROCEDURE — 6360000002 HC RX W HCPCS: Performed by: INTERNAL MEDICINE

## 2020-06-12 PROCEDURE — 94761 N-INVAS EAR/PLS OXIMETRY MLT: CPT

## 2020-06-12 PROCEDURE — 2700000000 HC OXYGEN THERAPY PER DAY

## 2020-06-12 PROCEDURE — 85027 COMPLETE CBC AUTOMATED: CPT

## 2020-06-12 RX ORDER — LORAZEPAM 1 MG/1
1 TABLET ORAL EVERY 8 HOURS PRN
Qty: 30 TABLET | Refills: 0 | Status: SHIPPED | OUTPATIENT
Start: 2020-06-12 | End: 2020-07-12

## 2020-06-12 RX ORDER — MORPHINE SULFATE 100 MG/5ML
10 SOLUTION ORAL
Qty: 1 BOTTLE | Refills: 0 | Status: SHIPPED | OUTPATIENT
Start: 2020-06-12 | End: 2020-07-12

## 2020-06-12 RX ORDER — HYOSCYAMINE SULFATE 0.12 MG/1
1 TABLET SUBLINGUAL 3 TIMES DAILY PRN
Qty: 120 EACH | Refills: 0 | Status: SHIPPED | OUTPATIENT
Start: 2020-06-12

## 2020-06-12 RX ORDER — SCOLOPAMINE TRANSDERMAL SYSTEM 1 MG/1
1 PATCH, EXTENDED RELEASE TRANSDERMAL
Qty: 10 PATCH | Refills: 11 | Status: SHIPPED | OUTPATIENT
Start: 2020-06-12 | End: 2020-06-12 | Stop reason: HOSPADM

## 2020-06-12 RX ADMIN — SODIUM CHLORIDE, PRESERVATIVE FREE 10 ML: 5 INJECTION INTRAVENOUS at 09:03

## 2020-06-12 RX ADMIN — MORPHINE SULFATE 2 MG: 2 INJECTION, SOLUTION INTRAMUSCULAR; INTRAVENOUS at 16:32

## 2020-06-12 RX ADMIN — MORPHINE SULFATE 2 MG: 2 INJECTION, SOLUTION INTRAMUSCULAR; INTRAVENOUS at 01:04

## 2020-06-12 ASSESSMENT — PAIN SCALES - GENERAL
PAINLEVEL_OUTOF10: 7
PAINLEVEL_OUTOF10: 7

## 2020-06-12 ASSESSMENT — PAIN DESCRIPTION - PROGRESSION
CLINICAL_PROGRESSION: NOT CHANGED
CLINICAL_PROGRESSION: NOT CHANGED

## 2020-06-12 ASSESSMENT — PAIN DESCRIPTION - DIRECTION: RADIATING_TOWARDS: LEGS

## 2020-06-12 ASSESSMENT — PAIN DESCRIPTION - ORIENTATION: ORIENTATION: RIGHT;LEFT

## 2020-06-12 ASSESSMENT — PAIN DESCRIPTION - DESCRIPTORS: DESCRIPTORS: SHARP;SHOOTING

## 2020-06-12 ASSESSMENT — PAIN DESCRIPTION - FREQUENCY: FREQUENCY: CONTINUOUS

## 2020-06-12 ASSESSMENT — PAIN DESCRIPTION - LOCATION: LOCATION: FOOT

## 2020-06-12 ASSESSMENT — PAIN DESCRIPTION - ONSET: ONSET: ON-GOING

## 2020-06-12 ASSESSMENT — PAIN DESCRIPTION - PAIN TYPE: TYPE: ACUTE PAIN;CHRONIC PAIN

## 2020-06-12 NOTE — CARE COORDINATION
LSW reviewed chart. Rec'd dc order. Call placed to dgtr, Nai Higginbotham, who states she is aware of transfer for today and states she will be moved around 4pm.  IMM letter pamela.   [place], [place]     Case Management   [phone]    6/12/2020  10:31 AM

## 2020-06-12 NOTE — PROGRESS NOTES
Nutrition Assessment    Type and Reason for Visit: Reassess    Nutrition Recommendations:   Monitor plan of care moving forward  Restart diet including supplementation when appropriate    Nutrition Assessment: Noted no improvement in renal status. Pt with altered mental status & confusion adversely affecting po. Will continue Ensure Pudding for now. Noted pt npo for renal access placement this date. Procedure cancelled per Nephrology. HD on hold this date as well. Anticipate diet will resume as long as mental status appropriate to accept po. Noted meeting planned with Palliative Care to discuss goals moving forward. Malnutrition Assessment:  · Malnutrition Status: At risk for malnutrition  · Context: Acute illness or injury  · Findings of the 6 clinical characteristics of malnutrition (Minimum of 2 out of 6 clinical characteristics is required to make the diagnosis of moderate or severe Protein Calorie Malnutrition based on AND/ASPEN Guidelines):  1. Energy Intake-Less than or equal to 50% of estimated energy requirement, Greater than or equal to 5 days    2. Weight Loss-Unable to assess(fluid shifts),    3. Fat Loss-Unable to assess,    4. Muscle Loss-Unable to assess,    5. Fluid Accumulation-Moderate to severe fluid accumulation, Extremities, Generalized  6.  Strength-Not measured    Nutrition Risk Level: High    Nutrient Needs:  · Estimated Daily Total Kcal: 6419-9200 (11-15 x  kg (adj for HD)  · Estimated Daily Protein (g):  (2-2.5 x IBW 48 kg (adj for HD )  · Estimated Daily Total Fluid (ml/day): 1500    Nutrition Diagnosis:   · Problem: Inadequate oral intake  · Etiology: related to Cognitive or neurological impairment     Signs and symptoms:  as evidenced by Intake 25-50%    Objective Information:  · Nutrition-Focused Physical Findings: Noted no BM since 6/3. Nursing addressing. Noted +2 generalized edema.   · Wound Type: (Noted pt with open area on buttocks)  · Current Nutrition Therapies:  · Oral Diet Orders: NPO   · Oral Diet intake: NPO  · Oral Nutrition Supplement (ONS) Orders: None  · ONS intake: NPO  · Anthropometric Measures:  · Ht: 5' 1\" (154.9 cm)   · Current Body Wt: 270 lb (122.5 kg)(actual today)  · Admission Body Wt: 279 lb (126.6 kg)  · Usual Body Wt: (250-260s per records)  · % Weight Change:  ,  wt loss trend this adm, likely r/t a combination of decreased po & fluid shifts  · Ideal Body Wt: 105 lb (47.6 kg), % Ideal Body    · BMI Classification: BMI > or equal to 40.0 Obese Class III    Nutrition Interventions:   Continue NPO(restart nutrition when appropriate)  Continued Inpatient Monitoring    Nutrition Evaluation:   · Evaluation: Progress towards goals declining   · Goals: consume >/= to 50 %    · Monitoring: Nutrition Progression, Skin Integrity, Mental Status/Confusion, Weight, Pertinent Labs, Diarrhea, Constipation      Electronically signed by Juan Carlos Wong RD, LD on 6/12/20 at 9:07 AM EDT    Contact Number: [phone]

## 2020-06-12 NOTE — PROGRESS NOTES
Occupational Therapy    Per chart, pt transferred to inpatient hospice later this date. Will sign off.     Linda Blanco, OTR/L

## 2020-06-12 NOTE — DISCHARGE SUMMARY
CHEST WO CONTRAST   Final Result   1. Spectrum of findings suggests CHF and pulmonary edema. Moderate bilateral   pleural effusions. 2. Superimposed infectious process can not be definitively excluded. Recommend follow-up imaging following course of therapy. XR CHEST PORTABLE   Final Result   Findings are most consistent with mild CHF, including cardiomegaly and small   bilateral pleural effusions. There is hazy bibasilar airspace disease,   atelectasis favored over pneumonia. Disposition:  Home with Hospice     Condition at Discharge: Terminal    Discharge Instructions/Follow-up: Hospice    Code Status:  Limited     Activity: activity as tolerated    Diet: regular diet      Labs: For convenience and continuity at follow-up the following most recent labs are provided:      CBC:    Lab Results   Component Value Date    WBC 4.1 06/12/2020    HGB 8.0 06/12/2020    HCT 25.3 06/12/2020     06/12/2020       Renal:    Lab Results   Component Value Date     06/12/2020    K 4.0 06/12/2020    K 4.0 04/23/2020    CL 99 06/12/2020    CO2 22 06/12/2020    BUN 43 06/12/2020    CREATININE 5.8 06/12/2020    CALCIUM 8.8 06/12/2020    PHOS 4.8 06/12/2020       Discharge Medications:     Current Discharge Medication List           Details   morphine sulfate 20 MG/ML concentrated oral solution Take 0.5 mLs by mouth every 2 hours as needed for Pain for up to 30 days. Qty: 1 Bottle, Refills: 0    Comments: Reduce doses taken as pain becomes manageable  Associated Diagnoses: End of life care      LORazepam (ATIVAN) 1 MG tablet Take 1 tablet by mouth every 8 hours as needed for Anxiety for up to 30 doses.   Qty: 30 tablet, Refills: 0    Associated Diagnoses: End of life care      Hyoscyamine Sulfate SL (LEVSIN/SL) 0.125 MG SUBL Place 1 capsule under the tongue 3 times daily as needed (Secretions)  Qty: 120 each, Refills: 0             Future Appointments   Date Time Provider Saloni Lopez 7/23/2020  2:00 PM MD MIKIE Awan & Marly Mattson [facility]       Time Spent on discharge is more than 30 minutes in the examination, evaluation, counseling and review of medications and discharge plan. Signed:    José Sotelo MD   6/12/2020      Thank you Slick Harrison MD for the opportunity to be involved in this patient's care. If you have any questions or concerns please feel free to contact me at [phone].

## 2020-06-12 NOTE — CONSULTS
loratadine (CLARITIN) 10 MG tablet Take 10 mg by mouth daily. Objective         BP (!) 96/41   Pulse 70   Temp 98.4 °F (36.9 °C) (Axillary)   Resp 18   Ht 5' 1\" (1.549 m)   Wt 269 lb 10 oz (122.3 kg)   SpO2 100%   BMI 50.94 kg/m²     Code Status: DNR CC    Advanced Directives: completed requested copy daughter Johnnie Mayorga [phone]    Assessment        Management and Education    Persons available for education:      [] Self     [] Caregiver       [] Spouse       [x] Other Family Member   []  Other    Spiritual History:  notified: Yes,     Does the patient have a Primary Care Physician? Yes    Level of patient/caregiver understanding able to:        [x] Verbalize Understanding   [] Demonstrate Understanding       [] Teach Back       [] Needs Reinforcement     []  Other:      Teaching Time:  1hours  0 minutes     Plan        Social Service Consult Made:  Yes  Assistance filling out Living Will/HPOA:  No      Discharge Plan:  Education/support to family  Providing support for coping/adaptation/distress of family  Clarification of medical condition to patient and family  Code status clarified: [facility]  Palliative care orders introduced  Provided information about hospice    Discharge Environment:  [x] Hospice Consult Agency: list provided chose [facility]   [x] Inpatient Hospice vs   [x] Home with Hospice Care     Discussion: Patient resting in bed on bipap and levo. Family meeting with Dr Vickey Lockett, myself, 4 children and 2 in laws to discuss goals of care. Dr Vickey Lockett has explained current medical condition to family and patient's statement of not wanting dialysis. After discussion decided to stop all aggressive measures and seek help from hospice with goal of getting home today with hospice. Referral to [facility] [address]. I will continue to follow Ms. Michelle Sarmiento care as needed. Thank you for allowing me to participate in the care of Ms. Tom Andres .      Electronically

## 2020-06-15 ENCOUNTER — TELEPHONE (OUTPATIENT)
Dept: FAMILY MEDICINE CLINIC | Age: 80
End: 2020-06-15

## 2020-06-15 NOTE — TELEPHONE ENCOUNTER
Calling to find out if Dr Lorri Lozano would sign DC for pt. I placed Alondra Araujo on hold. I checked with Nessa and she said yes. I informed Alondra Araujo, but advised her that Dr Lrori Lozano would not be available to sign until Wednesday.

## 2020-08-22 NOTE — TELEPHONE ENCOUNTER
I contacted [facility] and sent a new order over for Home Physical Therapy
Nessa,  Do you know what this is about?
English

## 2022-02-15 NOTE — TELEPHONE ENCOUNTER
Osmani Hargrove tell by discharge summary if patient was sent home with hospice or not. If not, we need to get her in for a 40-minute hospital follow-up, and need you to document the transition of care note today. Colchicine Counseling:  Patient counseled regarding adverse effects including but not limited to stomach upset (nausea, vomiting, stomach pain, or diarrhea).  Patient instructed to limit alcohol consumption while taking this medication.  Colchicine may reduce blood counts especially with prolonged use.  The patient understands that monitoring of kidney function and blood counts may be required, especially at baseline. The patient verbalized understanding of the proper use and possible adverse effects of colchicine.  All of the patient's questions and concerns were addressed.

## 2023-01-01 NOTE — PROGRESS NOTES
[facility], [place]   Daily Progress Note      Admit Date:  6/1/2020    Reason for follow up visit: DHF    CC: \"I'm breathing worse today. \"    77 y/o female with PMH significant for obesity, DHF, CKD and diabetes who was admitted with worsening SOB and 2-=3-# weight gain. She had been steadily worsening at home and presented to hospital. She was hypoxic and placed on IV lasix. She has been receiving IV lasix with increase in drip dosage yesterday without improvement. Interval History:  Pt. seen and examined; records reviewed  + SOB worse today and edema increased  Remains on O2 @ 2L  Wt today 281#. BP reviewed (now off all BP lowering meds)  Dyspneic at rest  Lasix infusion at 7.5 mg/hr. Cr 2.9 today  + edema (legs weeping)  + hematuria improving    Subjective:  Pt with no acute overnight cardiac events. + SOB, edema (worse)  Denies chest pain, productive cough, palpitations or dizziness    Review of Systems:   Pt feeling poorly today and not wanting to answer many questions    Objective:   /62   Pulse 79   Temp 98.2 °F (36.8 °C) (Oral)   Resp 20   Ht 5' 1\" (1.549 m)   Wt 281 lb 15.5 oz (127.9 kg)   SpO2 92%   BMI 53.28 kg/m²       Intake/Output Summary (Last 24 hours) at 6/5/2020 1133  Last data filed at 6/5/2020 1038  Gross per 24 hour   Intake 1278 ml   Output 320 ml   Net 958 ml     Wt Readings from Last 3 Encounters:   06/05/20 281 lb 15.5 oz (127.9 kg)   04/29/20 258 lb 13.1 oz (117.4 kg)   03/26/20 251 lb 15.8 oz (114.3 kg)       Physical Exam:  General: In no acute distress. Oriented x 3. Drowsy but awakens readily to verbal stimuli  Skin:  Warm and dry. No new appearing rashes or lesions. Neck:  Supple and thick. + JVD  Chest: Lungs with diminished breath sounds bilaterally. No wheezes/rhonchi/rales  Cardiovascular:  Irreg, irreg; normal S1 and S2;  I/VI systolic murmur  Abdomen:obese, soft, nontender, +bowel sounds.  + lower abdominal wall edema  Extremities:  + anasarca; lower legs weeping; 3-4+ pitting edema from hips to feet bilaterally. 1+ bilateral DP/PT pulses    Medications:    carvedilol  6.25 mg Oral BID WC    albumin human  25 g Intravenous Q8H    iron sucrose  200 mg Intravenous Daily    sodium chloride flush  10 mL Intravenous 2 times per day    apixaban  2.5 mg Oral BID    ascorbic acid  1,000 mg Oral BID    aspirin  81 mg Oral Daily    atorvastatin  40 mg Oral Daily    glipiZIDE  5 mg Oral QAM AC    magnesium oxide  400 mg Oral Daily    insulin lispro  0-6 Units Subcutaneous TID WC    insulin lispro  0-3 Units Subcutaneous Nightly      furosemide (LASIX) 1mg/ml infusion 7.5 mg/hr (06/05/20 1002)    dextrose       glucose, dextrose, glucagon (rDNA), dextrose, sodium chloride flush, acetaminophen **OR** acetaminophen, polyethylene glycol, promethazine **OR** ondansetron, docusate sodium    Lab Data:  CBC:   Recent Labs     06/03/20  0544 06/04/20  0519 06/05/20  0612   WBC 7.5 8.3 7.3   HGB 8.0* 7.7* 7.5*    212 207     BMP:    Recent Labs     06/03/20  0544 06/04/20  0519 06/05/20  0612    140 139   K 4.1 4.1 4.3   CO2 29 28 26   BUN 27* 29* 32*   CREATININE 2.3* 2.3* 2.9*     Results for Braden Hernandez (MRN [id number]) as of 6/5/2020 11:46   Ref. Range 6/2/2020 05:51 6/4/2020 05:19 6/5/2020 06:12   Pro-BNP Latest Ref Range: 0 - 449 pg/mL  1,705 (H) 3,499 (H)   Cholesterol, Total Latest Ref Range: 0 - 199 mg/dL 103     HDL Cholesterol Latest Ref Range: 40 - 60 mg/dL 44     LDL Calculated Latest Ref Range: <100 mg/dL 36     Triglycerides Latest Ref Range: 0 - 150 mg/dL 114     VLDL Cholesterol Calculated Latest Ref Range: Not Established mg/dL 23       6/5/2020 CXR:  Slightly increased right-sided pulmonary edema.  Otherwise stable chest.   Unchanged bilateral pleural effusions and adjacent atelectasis. 3/26/2020 Echo:   A bubble study was performed and fails to show evidence of shunting. LVH with normal systolic function. EF    70%.  Grade I (2) well flexed

## 2023-09-09 NOTE — PROGRESS NOTES
Seen as new patient for diabetes    She has a h/o CKD, was advised insulin, here for advice on starting insulin as would like to avoid.     Diagnosed with Type 2 diabetes mellitus >20 years  Known diabetic complications: microalbuminuria  Uncontrolled    Current diabetic medications     Glipizide XL 5mg     bydureon held 1/20  H/o metformin use      Last A1c 7.7%<-----  6.8%<---- 6.7%<-----7.1%<-----6.9%<-- 7.9    Prior visit with dietician: Yes   Current diet: on average, 3 meals per day   Current exercise: walking   Current monitoring regimen: home blood tests -4  times daily     Has brought blood glucose log/meter: No   Home blood sugar records: 129-319  High glucose when had steroid injections  Any episodes of hypoglycemia? no    H/o CVA    Hyperlipidemia:   For   Years  Takes simvastatin 40mg  Uncontrolled       Hypertension for years  Stable  Takes norvasc 2.5mg Losartan 50mg clonidine half tab BID Metoprolol 100mg BID    Last eye exam:   Last foot exam: 3/20  Last microalbumin to creatinine ratio: 9756.8 sees Dr. Vonnie Buckley  Cr  1.5---> 1.1      Past Medical History:   Diagnosis Date    Blood circulation, collateral     Cerebral artery occlusion with cerebral infarction ([address]     Chronic deep vein thrombosis (DVT) of femoral vein of left lower extremity (HCC)     Chronic kidney disease (CKD), stage III (moderate) ([address]     CVA (cerebral infarction)     Diabetes     DVT, lower extremity, recurrent ([address] 3/30/2012    x 3 LLE :  life long coumadin    Edema     First degree AV block     Gout     Heart murmur 10/14/2014    EF normal, no aortic stenosis Echo 11/11/14 - mild MR    Hypercalcemia 10/11/2018    Hypercholesterolemia     Hyperhomocysteinemia ([address] 4/16/12    Hypertension     TRUPTI (obstructive sleep apnea)     CPAP at 16cm    Osteoarthritis     Sciatica 11/8/2016    Traumatic hematoma of left lower leg 3/21/2017    Venous insufficiency     Visual loss R eye - REtinal detachment Past Surgical History:   Procedure Laterality Date    CATARACT REMOVAL WITH IMPLANT Bilateral     OTHER SURGICAL HISTORY      dental extraction    OTHER SURGICAL HISTORY Left     I and D Dr. David Mc left lower extremity     Current Outpatient Medications   Medication Sig Dispense Refill    ELIQUIS 5 MG TABS tablet TAKE 1 TABLET BY MOUTH 2 TIMES DAILY 180 tablet 1    amLODIPine (NORVASC) 2.5 MG tablet TAKE 1 TABLET BY MOUTH DAILY 90 tablet 1    losartan (COZAAR) 50 MG tablet TAKE 1 TABLET BY MOUTH DAILY. 90 tablet 3    cloNIDine (CATAPRES) 0.1 MG tablet TAKE 0.5 (1/2) TABLET BY MOUTH 2 TIMES DAILY. 90 tablet 3    potassium chloride (KLOR-CON) 10 MEQ extended release tablet TAKE ONE TABLET BY MOUTH DAILY 90 tablet 3    glipiZIDE (GLUCOTROL XL) 5 MG extended release tablet TAKE 1 TABLET BY MOUTH DAILY. 90 tablet 3    metoprolol (LOPRESSOR) 100 MG tablet TAKE ONE TABLET BY MOUTH TWO TIMES A  tablet 3    allopurinol (ZYLOPRIM) 300 MG tablet TAKE ONE HALF TABLET BY MOUTH DAILY 45 tablet 3    glucose monitoring kit (FREESTYLE) monitoring kit 1 kit by Does not apply route daily 1 kit 0    fluticasone (FLONASE) 50 MCG/ACT nasal spray 2 sprays by Nasal route daily 1 Bottle 1    ascorbic acid (VITAMIN C) 500 MG tablet Take 500 mg by mouth 2 times daily      Cholecalciferol (VITAMIN D PO) Take 1 tablet by mouth daily      Omega-3 Fatty Acids (FISH OIL) 1000 MG CAPS Take 1,000 mg by mouth daily      magnesium oxide (MAG-OX) 400 MG tablet Take 400 mg by mouth daily      acetaminophen (APAP EXTRA STRENGTH) 500 MG tablet Take 1 tablet by mouth every 6 hours as needed for Pain 40 tablet 0    Handicap Placard MISC by Does not apply route Duration 5 years 1 each 0    TRUETEST TEST strip TEST UP TO 5 TIMES A  strip 3    loratadine (CLARITIN) 10 MG tablet Take 10 mg by mouth daily.  Multiple Minerals-Vitamins (CITRACAL PLUS) TABS Take 1 tablet by mouth 2 times daily.         Elastic Bandages Lab Results   Component Value Date    LDLCALC see below 02/24/2020    [address] 74 02/25/2019    [address] 88 03/20/2018     Lab Results   Component Value Date    LABVLDL see below 02/24/2020    LABVLDL 41 02/25/2019    LABVLDL 51 03/20/2018     Lab Results   Component Value Date    LABA1C 6.8 01/03/2020       Assessment:     Emile Lester is a 78 y.o. female with :    1.T2DM: Longstanding, uncontrolled, on oral medication, she would like to avoid insulin. A1c 7.7% . Her last Cr has improved. Discussed goals, diet changes. Will start on trulicity as will not need adjustment with GFR change. Advised diet changes  2. HTN : BP high, reports better readings at PCP  3. HLD : LDL and Tg high, may need more potent statin  4. CVA  5. DVT  6. CKD       Plan:      Start trulicity 6.34AY weekly   Glipizide 5mg   Advise to check blood sugar 1-2 times a day   Patient to send blood sugar log for titration. Advise to low simple carbohydrate and protein with each  meal diet. Diabetes Care: recommend yearly eye exam, foot exam and urine microalbumin to   creatinine ratio. 09-Sep-2023 20:32